# Patient Record
Sex: MALE | Race: WHITE | NOT HISPANIC OR LATINO | Employment: OTHER | URBAN - METROPOLITAN AREA
[De-identification: names, ages, dates, MRNs, and addresses within clinical notes are randomized per-mention and may not be internally consistent; named-entity substitution may affect disease eponyms.]

---

## 2017-02-15 ENCOUNTER — GENERIC CONVERSION - ENCOUNTER (OUTPATIENT)
Dept: OTHER | Facility: OTHER | Age: 62
End: 2017-02-15

## 2017-03-23 ENCOUNTER — ALLSCRIPTS OFFICE VISIT (OUTPATIENT)
Dept: OTHER | Facility: OTHER | Age: 62
End: 2017-03-23

## 2017-03-23 DIAGNOSIS — E78.5 HYPERLIPIDEMIA: ICD-10-CM

## 2017-03-23 DIAGNOSIS — I10 ESSENTIAL (PRIMARY) HYPERTENSION: ICD-10-CM

## 2017-10-17 ENCOUNTER — ALLSCRIPTS OFFICE VISIT (OUTPATIENT)
Dept: OTHER | Facility: OTHER | Age: 62
End: 2017-10-17

## 2017-10-17 DIAGNOSIS — E87.1 HYPO-OSMOLALITY AND HYPONATREMIA (CODE): ICD-10-CM

## 2017-10-17 DIAGNOSIS — I10 ESSENTIAL (PRIMARY) HYPERTENSION: ICD-10-CM

## 2017-10-17 DIAGNOSIS — R73.01 IMPAIRED FASTING GLUCOSE: ICD-10-CM

## 2017-10-17 DIAGNOSIS — E78.5 HYPERLIPIDEMIA: ICD-10-CM

## 2017-10-17 DIAGNOSIS — Z12.11 ENCOUNTER FOR SCREENING FOR MALIGNANT NEOPLASM OF COLON: ICD-10-CM

## 2017-10-17 DIAGNOSIS — Z12.5 ENCOUNTER FOR SCREENING FOR MALIGNANT NEOPLASM OF PROSTATE: ICD-10-CM

## 2017-10-18 NOTE — CONSULTS
Chief Complaint  Chief Complaint Free Text Note Form: Follow up      Advance Directives  Advance Directive [facility]:   The patient is not in agreement to receive the Advance Care Planning service--    NO - Patient does not have an advance health care directive  Capacity/Competence: This patient has full decision making capacity for discussion of advance care planning-- and-- This patient has full decision making competency for discussion of advance care planning  Summary of Advance Directive Conversation  Patient in uninterested in pursuing advance directives  He would like his sister Maya Chacon to be his Health care proxy if he is unable to speak for himself  History of Present Illness  HPI: Patient presents in follow up for his medical problems and to review lab work which was done in april of this yr  He is without acute complaints  Review of Systems  Focused-Male:   Constitutional: no fever or chills, feels well, no tiredness, no recent weight loss or weight gain  ENT: no complaints of earache, no loss of hearing, no nosebleeds or nasal discharge, no sore throat or hoarseness  Cardiovascular: no complaints of slow or fast heart rate, no chest pain, no palpitations, no leg claudication or lower extremity edema  Respiratory: no complaints of shortness of breath, no wheezing or cough, no dyspnea on exertion, no orthopnea or PND  Gastrointestinal: no complaints of abdominal pain, no constipation, no nausea or vomiting, no diarrhea or bloody stools  Genitourinary: no complaints of dysuria or incontinence, no hesitancy, no nocturia, no genital lesion, no inadequacy of penile erection  Musculoskeletal: no complaints of arthralgia, no myalgia, no joint swelling or stiffness, no limb pain or swelling  Integumentary: no complaints of skin rash or lesion, no itching or dry skin, no skin wounds     Neurological: no complaints of headache, no confusion, no numbness or tingling, no dizziness or fainting  ROS Reviewed:   ROS reviewed  Active Problems  1  Acute frontal sinusitis (461 1) (J01 10)   2  Acute kidney injury (584 9) (N17 9)   3  Allergic rhinitis (477 9) (J30 9)   4  Anemia (285 9) (D64 9)   5  Backache (724 5) (M54 9)   6  Diverticulitis of colon (562 11) (K57 32)   7  Dry skin (701 1) (L85 3)   8  Encounter for prostate cancer screening (V76 44) (Z12 5)   9  Flu vaccine need (V04 81) (Z23)   10  Heme positive stool (792 1) (R19 5)   11  Hyperlipidemia (272 4) (E78 5)   12  Hypertension (401 9) (I10)   13  Hyponatremia (276 1) (E87 1)   14  Impaired fasting glucose (790 21) (R73 01)   15  Need for influenza vaccination (V04 81) (Z23)   16  Pain, chest wall (786 52) (R07 89)   17  Positive FIT (fecal immunochemical test) (792 1) (R19 5)   18  Sebaceous cyst (706 2) (L72 3)   19  Tinea cruris (110 3) (B35 6)   20  Warts (078 10) (B07 9)    Past Medical History  1  History of Disc degeneration, lumbar (722 52) (M51 36)   2  History of Flu vaccine need (V04 81) (Z23)   3  History of peripheral vascular disease (V12 59) (Z86 79)   4  History of Impacted cerumen, unspecified laterality (380 4) (H61 20)   5  History of Otalgia, unspecified laterality (388 70) (H92 09)   6  History of Shoulder joint pain, unspecified laterality  Active Problems And Past Medical History Reviewed: The active problems and past medical history were reviewed and updated today  Surgical History  Surgical History Reviewed: The surgical history was reviewed and updated today  Family History  Mother    1  Family history of malignant neoplasm of breast (V16 3) (Z80 3)  Father    2  Family history of hip fracture (V17 89) (Z84 89)  Family History Reviewed: The family history was reviewed and updated today  Social History   · Full-time employment   · Never a smoker   · No alcohol use   · No drug use   · Single  Social History Reviewed: The social history was reviewed and updated today        Current Meds   1  AmLODIPine Besylate 5 MG Oral Tablet; take 1 tablet every day; Therapy: 43FFY7246 to (Evaluate:61Pmf3116)  Requested for: 47YMR5258; Last   Rx:[id number] Ordered   2  Atorvastatin Calcium 40 MG Oral Tablet; take 1 tablet every day; Therapy: 28EIQ4952 to (Evaluate:31Jan2018)  Requested for: 52Hip4018; Last   Rx:24Kbx5682 Ordered   3  HydroCHLOROthiazide 12 5 MG Oral Capsule; TAKE 1 CAPSULE EVERY DAY; Therapy: 62JKC8661 to (MOHGDPRU:26NIW7636)  Requested for: 14Fwr5507; Last   Rx:12Avq7159 Ordered   4  Lisinopril 40 MG Oral Tablet; take 1 tablet every day; Therapy: 88QAH4766 to (Evaluate:14Feb2018)  Requested for: 28Paw7134; Last   Rx:72Tri3552 Ordered    Allergies  1  No Known Drug Allergies    Vitals  Signs   Recorded: 36VAM1020 11:13AM   Heart Rate: 81  Systolic: 222  Diastolic: 70  Height: 6 ft   Weight: 217 lb 6 oz  BMI Calculated: 29 48  BSA Calculated: 2 21  O2 Saturation: 99    Physical Exam    Constitutional   General appearance: No acute distress, well appearing and well nourished  Eyes   Conjunctiva and lids: No swelling, erythema, or discharge  Pupils and irises: Equal, round and reactive to light  Ears, Nose, Mouth, and Throat   External inspection of ears and nose: Normal     Otoscopic examination: Tympanic membrance translucent with normal light reflex  Canals patent without erythema  Nasal mucosa, septum, and turbinates: Normal without edema or erythema  Oropharynx: Normal with no erythema, edema, exudate or lesions  Pulmonary   Respiratory effort: No increased work of breathing or signs of respiratory distress  Auscultation of lungs: Clear to auscultation, equal breath sounds bilaterally, no wheezes, no rales, no rhonci  Cardiovascular   Palpation of heart: Normal PMI, no thrills  Auscultation of heart: Normal rate and rhythm, normal S1 and S2, without murmurs      Examination of extremities for edema and/or varicosities: Normal     Carotid pulses: Normal  Abdomen   Abdomen: Non-tender, no masses  Liver and spleen: No hepatomegaly or splenomegaly  Lymphatic   Palpation of lymph nodes in neck: No lymphadenopathy  Musculoskeletal   Gait and station: Normal     Digits and nails: Normal without clubbing or cyanosis  Inspection/palpation of joints, bones, and muscles: Normal     Skin   Skin and subcutaneous tissue: Normal without rashes or lesions  Neurologic   Cranial nerves: Cranial nerves 2-12 intact  Reflexes: 2+ and symmetric  Sensation: No sensory loss  Psychiatric   Orientation to person, place and time: Normal     Mood and affect: Normal          Results/Data  PHQ-2 Adult Depression Screening 17Oct2017 11:09AM User, Ahs     Test Name Result Flag Reference   PHQ-2 Adult Depression Score 0     Over the last two weeks, how often have you been bothered by any of the following problems? Little interest or pleasure in doing things: Not at all - 0  Feeling down, depressed, or hopeless: Not at all - 0   PHQ-2 Adult Depression Screening Negative         Assessment  1  Hypertension (401 9) (I10)   2  Hyperlipidemia (272 4) (E78 5)   3  Impaired fasting glucose (790 21) (R73 01)   4  Hyponatremia (276 1) (E87 1)   5  Acute renal insufficiency (593 9) (N28 9)   6  Encounter for prostate cancer screening (V76 44) (Z12 5)   7  Colon cancer screening (V76 51) (Z12 11)    Plan  Colon cancer screening    · (1) OCCULT BLOOD, FECAL IMMUNOCHEMICAL TEST; Status:Active; Requested  YEJ:26VCQ1865;   Encounter for prostate cancer screening    · (1) PSA (SCREEN) (Dx V76 44 Screen for Prostate Cancer); Status:Active; Requested  for:17Oct2017;   Hyperlipidemia    · Eat no more than 30 grams of fat per day ; Status:Complete;   Done: 13SBF0099 11:30AM  Hyperlipidemia, Hypertension, Hyponatremia, Impaired fasting glucose    · (1) CK (CPK); Status:Active; Requested for:17Oct2017;    · (1) COMPREHENSIVE METABOLIC PANEL; Status:Active;  Requested BCX:13ESA7490;    · (1) HEMOGLOBIN A1C; Status:Active; Requested for:17Oct2017;    · (1) LIPID PANEL, FASTING; Status:Active; Requested NVD:22OJU8212;   Need for influenza vaccination    · Fluzone Quadrivalent Intramuscular Suspension; INJECT 0 5  ML  Intramuscular; To Be Done: 39AMY4366    Discussion/Summary  Discussion Summary:   1) htn well controlled bmp with elevated crt  Will recheckhyperlipidemia ldl at goal check lipids ck and lftsimpaired fasting glucose follow a1c and fbs and microalbumen ratiohyponatremia will followscreen for protate ca psa and roberta at follow upscreen for colon ca fit test this yrFlu shot today        Signatures   Electronically signed by : KAMLESH Foote ; Oct 17 2017 11:34AM EST                       (Author)

## 2018-01-13 VITALS
WEIGHT: 217.38 LBS | DIASTOLIC BLOOD PRESSURE: 70 MMHG | OXYGEN SATURATION: 99 % | BODY MASS INDEX: 29.44 KG/M2 | SYSTOLIC BLOOD PRESSURE: 108 MMHG | HEIGHT: 72 IN | HEART RATE: 81 BPM

## 2018-01-16 NOTE — MISCELLANEOUS
Assessment    1  Diverticulitis of colon (562 11) (K57 32)   2  Acute kidney injury (584 9) (N17 9)   3  Hypertension (401 9) (I10)   4  Hyperlipidemia (272 4) (E78 5)    Plan  Hyperlipidemia    · (1) BASIC METABOLIC PROFILE; Status:Active; Requested for:23Mar2017;    Perform:[facility]; [facility]:90QAZ7897; Ordered;  Allena Lean; Ordered By:Emmanuel Talavera;  Hypertension    · (1) CBC/PLT/DIFF; Status:Active; Requested for:23Mar2017;    Perform:[facility]; SWU:[id number]; Ordered;  For:Hypertension; Ordered By:Emmanuel Talavera;   · Follow-up visit in 3 months Evaluation and Treatment  Follow-up  Status: Hold For -  Scheduling  Requested for: 34TNX4353   Ordered; For: Hypertension; Ordered By: Salazar Ly Performed:  Due: 15GRJ2998    Discussion/Summary  Discussion Summary:   He is doing well he had some modest renal failure in the hospital( see  Chief Complaint  Chief Complaint Free Text Note Form: Hospital follow-up      History of Present Illness  TCM Communication [facility]: The patient is being contacted for follow-up after hospitalization  Hospital records were reviewed  He was hospitalized at [facility]  The date of admission: 3/20/17, date of discharge: 3/21/17  Diagnosis: divertic  He was discharged to home  Medications reviewed and updated today  The patient is currently asymptomatic  Communication performed and completed by   HPI: He was hospitalized with abdominal pain, diverticulitis he has is still on antibiotics he is asymptomatic no GI complaints at all he is eating well  Diverticulitis (Brief): The patient is being seen for follow-up of a hospitalization for diverticulitis  The patient is currently asymptomatic  No associated symptoms are reported  Hyperlipidemia (Follow-Up): The patient states his hyperlipidemia has been stable since the last visit  He has no comorbid illnesses  He has no significant interval events     Symptoms: The patient is doing well with his hyperlipidemia goals    Hypertension (Follow-Up): The patient presents for follow-up of essential hypertension  The patient states he has been doing well with his blood pressure control since the last visit  He has no comorbid illnesses  He has no significant interval events  Symptoms: The patient is currently asymptomatic  Review of Systems  Complete-Male:   Constitutional: No fever or chills, feels well, no tiredness, no recent weight gain or weight loss  Eyes: No complaints of eye pain, no red eyes, no discharge from eyes, no itchy eyes  ENT: no complaints of earache, no hearing loss, no nosebleeds, no nasal discharge, no sore throat, no hoarseness  Cardiovascular: No complaints of slow heart rate, no fast heart rate, no chest pain, no palpitations, no leg claudication, no lower extremity  Respiratory: No complaints of shortness of breath, no wheezing, no cough, no SOB on exertion, no orthopnea or PND  Gastrointestinal: as noted in HPI  Genitourinary: No complaints of dysuria, no incontinence, no hesitancy, no nocturia, no genital lesion, no testicular pain  Musculoskeletal: No complaints of arthralgia, no myalgias, no joint swelling or stiffness, no limb pain or swelling  Integumentary: No complaints of skin rash or skin lesions, no itching, no skin wound, no dry skin  Neurological: No compliants of headache, no confusion, no convulsions, no numbness or tingling, no dizziness or fainting, no limb weakness, no difficulty walking  Psychiatric: Is not suicidal, no sleep disturbances, no anxiety or depression, no change in personality, no emotional problems  Endocrine: No complaints of proptosis, no hot flashes, no muscle weakness, no erectile dysfunction, no deepening of the voice, no feelings of weakness  Hematologic/Lymphatic: No complaints of swollen glands, no swollen glands in the neck, does not bleed easily, no easy bruising  ROS Reviewed:   ROS reviewed  Active Problems    1   Acute frontal sinusitis (461 1) (J01 10)   2  Allergic rhinitis (477 9) (J30 9)   3  Anemia (285 9) (D64 9)   4  Backache (724 5) (M54 9)   5  Dry skin (701 1) (L85 3)   6  Encounter for prostate cancer screening (V76 44) (Z12 5)   7  Flu vaccine need (V04 81) (Z23)   8  Heme positive stool (792 1) (R19 5)   9  Hyperlipidemia (272 4) (E78 5)   10  Hypertension (401 9) (I10)   11  Hyponatremia (276 1) (E87 1)   12  Impaired fasting glucose (790 21) (R73 01)   13  Pain, chest wall (786 52) (R07 89)   14  Positive FIT (fecal immunochemical test) (792 1) (R19 5)   15  Sebaceous cyst (706 2) (L72 3)   16  Tinea cruris (110 3) (B35 6)   17  Warts (078 10) (B07 9)    Past Medical History    1  History of Disc degeneration, lumbar (722 52) (M51 36)   2  History of Flu vaccine need (V04 81) (Z23)   3  History of peripheral vascular disease (V12 59) (Z86 79)   4  History of Impacted cerumen, unspecified laterality (380 4) (H61 20)   5  History of Otalgia, unspecified laterality (388 70) (H92 09)   6  History of Shoulder joint pain, unspecified laterality    Surgical History  Surgical History Reviewed: The surgical history was reviewed and updated today  Family History  Mother    1  Family history of malignant neoplasm of breast (V16 3) (Z80 3)  Father    2  Family history of hip fracture (V17 89) (Z84 89)  Family History Reviewed: The family history was reviewed and updated today  Social History    · Full-time employment   · Never a smoker   · No alcohol use   · No drug use   · Single  Social History Reviewed: The social history was reviewed and updated today  Current Meds   1  AmLODIPine Besylate 5 MG Oral Tablet; take 1 tablet by mouth once daily; Therapy: 29WBH7532 to (Evaluate:18Feb2017)  Requested for: 51Lrz2146; Last   Rx:41Bnz4088 Ordered   2  Atorvastatin Calcium 40 MG Oral Tablet; TAKE 1 TABLET DAILY  Requested for:   01Pkj4139; Last Rx:60Fie0125 Ordered   3   Clotrimazole-Betamethasone 1-0 05 % External Cream; Apply sparingly twice daily; Therapy: 06ZBK4059 to (Last Rx:07Apr2015)  Requested for: 07Apr2015 Ordered   4  Fluticasone Propionate 50 MCG/ACT Nasal Suspension; USE 2 SPRAYS IN EACH   NOSTRIL ONCE DAILY; Therapy: 19LNC7501 to (Last Rx:14Jan2015)  Requested for: 36ENG5836 Ordered   5  HydroCHLOROthiazide 12 5 MG Oral Capsule; take 1 capsule by mouth once daily; Therapy: 40PHA2566 to (Evaluate:30Oct2016)  Requested for: 96Dwl2131; Last   Rx:00Edn5741 Ordered   6  Lisinopril 40 MG Oral Tablet; take 1 tablet by mouth once daily; Therapy: 61IIE6275 to (Evaluate:30Oct2016)  Requested for: 55Wun1184; Last   Rx:26Ata2344 Ordered  Medication List Reviewed: The medication list was reviewed and updated today  Allergies    1  No Known Drug Allergies    Vitals  Signs   Recorded: 60NCE3980 04:48PM   Temperature: 97 5 F  Heart Rate: 80  Systolic: 188  Diastolic: 72  Height: 6 ft   Weight: 224 lb 4 oz  BMI Calculated: 30 41  BSA Calculated: 2 24  O2 Saturation: 97    Physical Exam    Constitutional   General appearance: No acute distress, well appearing and well nourished  Eyes   Conjunctiva and lids: No swelling, erythema, or discharge  Pupils and irises: Equal, round and reactive to light  Ears, Nose, Mouth, and Throat   External inspection of ears and nose: Normal     Otoscopic examination: Tympanic membrance translucent with normal light reflex  Canals patent without erythema  Nasal mucosa, septum, and turbinates: Normal without edema or erythema  Oropharynx: Normal with no erythema, edema, exudate or lesions  Pulmonary   Respiratory effort: No increased work of breathing or signs of respiratory distress  Auscultation of lungs: Clear to auscultation, equal breath sounds bilaterally, no wheezes, no rales, no rhonci  Cardiovascular   Palpation of heart: Normal PMI, no thrills  Auscultation of heart: Normal rate and rhythm, normal S1 and S2, without murmurs      Examination of extremities for edema and/or varicosities: Normal     Carotid pulses: Normal     Abdomen   Abdomen: Non-tender, no masses  Liver and spleen: No hepatomegaly or splenomegaly  Lymphatic   Palpation of lymph nodes in neck: No lymphadenopathy  Musculoskeletal   Gait and station: Normal     Digits and nails: Normal without clubbing or cyanosis  Inspection/palpation of joints, bones, and muscles: Normal     Skin   Skin and subcutaneous tissue: Normal without rashes or lesions  Neurologic   Cranial nerves: Cranial nerves 2-12 intact  Reflexes: 2+ and symmetric  Sensation: No sensory loss      Psychiatric   Orientation to person, place and time: Normal     Mood and affect: Normal          Signatures   Electronically signed by : SONYA Avalos; Mar 23 2017  6:42PM EST                       (Author)    Electronically signed by : KAMLESH Estrada ; Mar 24 2017  8:16AM EST

## 2018-01-22 VITALS
OXYGEN SATURATION: 97 % | TEMPERATURE: 97.5 F | HEART RATE: 80 BPM | WEIGHT: 224.25 LBS | HEIGHT: 72 IN | BODY MASS INDEX: 30.37 KG/M2 | DIASTOLIC BLOOD PRESSURE: 72 MMHG | SYSTOLIC BLOOD PRESSURE: 126 MMHG

## 2018-01-30 ENCOUNTER — OFFICE VISIT (OUTPATIENT)
Dept: INTERNAL MEDICINE CLINIC | Facility: CLINIC | Age: 63
End: 2018-01-30
Payer: COMMERCIAL

## 2018-01-30 VITALS
HEART RATE: 95 BPM | TEMPERATURE: 99.1 F | WEIGHT: 220 LBS | OXYGEN SATURATION: 97 % | SYSTOLIC BLOOD PRESSURE: 94 MMHG | DIASTOLIC BLOOD PRESSURE: 58 MMHG | BODY MASS INDEX: 30.8 KG/M2 | HEIGHT: 71 IN

## 2018-01-30 DIAGNOSIS — J06.9 VIRAL UPPER RESPIRATORY TRACT INFECTION: Primary | ICD-10-CM

## 2018-01-30 PROCEDURE — 99213 OFFICE O/P EST LOW 20 MIN: CPT | Performed by: INTERNAL MEDICINE

## 2018-01-30 RX ORDER — AMLODIPINE BESYLATE 5 MG/1
1 TABLET ORAL DAILY
COMMUNITY
Start: 2014-08-18 | End: 2018-02-25 | Stop reason: SDUPTHER

## 2018-01-30 RX ORDER — LISINOPRIL 40 MG/1
1 TABLET ORAL DAILY
COMMUNITY
Start: 2014-12-30 | End: 2018-02-25 | Stop reason: SDUPTHER

## 2018-01-30 RX ORDER — ATORVASTATIN CALCIUM 40 MG/1
1 TABLET, FILM COATED ORAL DAILY
COMMUNITY
Start: 2017-08-04 | End: 2018-02-17 | Stop reason: SDUPTHER

## 2018-01-30 RX ORDER — HYDROCHLOROTHIAZIDE 12.5 MG/1
1 CAPSULE, GELATIN COATED ORAL DAILY
COMMUNITY
Start: 2015-01-14 | End: 2018-02-17 | Stop reason: SDUPTHER

## 2018-01-30 RX ORDER — FLUTICASONE PROPIONATE 50 MCG
2 SPRAY, SUSPENSION (ML) NASAL DAILY
Qty: 16 G | Refills: 0 | Status: SHIPPED | OUTPATIENT
Start: 2018-01-30 | End: 2018-12-28 | Stop reason: SDUPTHER

## 2018-01-30 NOTE — PROGRESS NOTES
Assessment/Plan:    No problem-specific Assessment & Plan notes found for this encounter  Diagnoses and all orders for this visit:    Viral upper respiratory tract infection  -     fluticasone (FLONASE) 50 mcg/act nasal spray; 2 sprays into each nostril daily    Other orders  -     amLODIPine (NORVASC) 5 mg tablet; Take 1 tablet by mouth daily  -     atorvastatin (LIPITOR) 40 mg tablet; Take 1 tablet by mouth daily  -     hydrochlorothiazide (MICROZIDE) 12 5 mg capsule; Take 1 capsule by mouth daily  -     lisinopril (ZESTRIL) 40 mg tablet; Take 1 tablet by mouth daily          Subjective:      Patient ID: Andrew Lawson is a 60 y o  male  Patient presents with a 4 day hx of congestion, cough which is nonproductive without fever chills sweats or sob  He took some nyquil otc which didn't help  He also took advil and throat lozenges  The following portions of the patient's history were reviewed and updated as appropriate: allergies, current medications, past family history, past medical history, past social history, past surgical history and problem list       Review of Systems   Constitutional: Negative for activity change, appetite change, chills, diaphoresis, fatigue, fever and unexpected weight change  HENT: Positive for congestion  Negative for dental problem, drooling, ear discharge, ear pain, facial swelling, hearing loss, mouth sores, nosebleeds, postnasal drip, rhinorrhea, sinus pain, sinus pressure, sneezing, sore throat, tinnitus, trouble swallowing and voice change  Eyes: Negative for photophobia, pain, discharge, redness, itching and visual disturbance  Respiratory: Negative for apnea, choking, chest tightness, shortness of breath, wheezing and stridor  Cardiovascular: Negative for chest pain, palpitations and leg swelling     Gastrointestinal: Negative for abdominal distention, abdominal pain, anal bleeding, blood in stool, constipation, diarrhea, nausea, rectal pain and vomiting  Endocrine: Negative for cold intolerance, heat intolerance, polydipsia, polyphagia and polyuria  Genitourinary: Negative for decreased urine volume, difficulty urinating, dysuria, enuresis, flank pain, frequency, genital sores, hematuria and urgency  Musculoskeletal: Negative for arthralgias, back pain, gait problem, joint swelling, myalgias, neck pain and neck stiffness  Skin: Negative for color change, pallor, rash and wound  Allergic/Immunologic: Negative for environmental allergies, food allergies and immunocompromised state  Neurological: Negative for dizziness, tremors, seizures, syncope, facial asymmetry, speech difficulty, weakness, light-headedness, numbness and headaches  Hematological: Negative for adenopathy  Does not bruise/bleed easily  Psychiatric/Behavioral: Negative for agitation, self-injury, sleep disturbance and suicidal ideas  The patient is not nervous/anxious  Objective:     Physical Exam   Constitutional: He is oriented to person, place, and time  He appears well-developed and well-nourished  No distress  HENT:   Head: Normocephalic and atraumatic  Right Ear: External ear normal    Left Ear: External ear normal    Mouth/Throat: Oropharynx is clear and moist    Eyes: Conjunctivae and EOM are normal  Pupils are equal, round, and reactive to light  No scleral icterus  Neck: Normal range of motion  Neck supple  No JVD present  No tracheal deviation present  No thyromegaly present  Cardiovascular: Normal rate, regular rhythm and intact distal pulses  Exam reveals no gallop and no friction rub  No murmur heard  Pulmonary/Chest: Effort normal and breath sounds normal  No respiratory distress  He has no wheezes  He has no rales  He exhibits no tenderness  Abdominal: Soft  Bowel sounds are normal  He exhibits no distension and no mass  There is no tenderness  There is no rebound and no guarding  Musculoskeletal: Normal range of motion   He exhibits no edema, tenderness or deformity  Lymphadenopathy:     He has no cervical adenopathy  Neurological: He is alert and oriented to person, place, and time  He has normal reflexes  No cranial nerve deficit  Coordination normal    Skin: Skin is warm and dry  No rash noted  He is not diaphoretic  No erythema  No pallor  Psychiatric: He has a normal mood and affect   His behavior is normal  Judgment and thought content normal

## 2018-01-30 NOTE — PROGRESS NOTES
Assessment/Plan:      There are no diagnoses linked to this encounter  Subjective:     Patient ID: Criselda Camarena is a 60 y o  male      HPI    Review of Systems      Objective:     Physical Exam

## 2018-01-30 NOTE — LETTER
January 30, 2018     Patient: Devaughn Dobbins   YOB: 1955   Date of Visit: 1/30/2018       To Whom it May Concern:    Raul Lundy is under my professional care  He was seen in my office on 1/30/2018  He may return to work on Thursday, 02/02/2018  If you have any questions or concerns, please don't hesitate to call           Sincerely,          Yaniv Arita MD        CC: No Recipients

## 2018-01-30 NOTE — LETTER
January 30, 2018     Patient: Rosario Fran   YOB: 1955   Date of Visit: 1/30/2018       To Whom it May Concern:    Lito Devante is under my professional care  Please excuse him from work starting Monday 1/29/2018 until Thursday 2/2/2018  If you have any questions or concerns, please don't hesitate to call           Sincerely,          Dontrell Sarkar MD        CC: Rosario Peters

## 2018-01-30 NOTE — ASSESSMENT & PLAN NOTE
URI likely viral will start him on zyrtec otc and flonase spray and if he fails to improve he will call me back

## 2018-02-01 ENCOUNTER — TELEPHONE (OUTPATIENT)
Dept: INTERNAL MEDICINE CLINIC | Facility: CLINIC | Age: 63
End: 2018-02-01

## 2018-02-17 DIAGNOSIS — I10 ESSENTIAL HYPERTENSION: ICD-10-CM

## 2018-02-17 DIAGNOSIS — E78.5 HYPERLIPIDEMIA, UNSPECIFIED HYPERLIPIDEMIA TYPE: Primary | ICD-10-CM

## 2018-02-21 RX ORDER — HYDROCHLOROTHIAZIDE 12.5 MG/1
CAPSULE, GELATIN COATED ORAL
Qty: 30 CAPSULE | Refills: 5 | Status: SHIPPED | OUTPATIENT
Start: 2018-02-21 | End: 2018-12-24 | Stop reason: ALTCHOICE

## 2018-02-21 RX ORDER — ATORVASTATIN CALCIUM 40 MG/1
TABLET, FILM COATED ORAL
Qty: 30 TABLET | Refills: 5 | Status: SHIPPED | OUTPATIENT
Start: 2018-02-21 | End: 2018-06-05 | Stop reason: SDUPTHER

## 2018-02-25 DIAGNOSIS — I10 ESSENTIAL HYPERTENSION: Primary | ICD-10-CM

## 2018-02-27 RX ORDER — AMLODIPINE BESYLATE 5 MG/1
TABLET ORAL
Qty: 30 TABLET | Refills: 5 | Status: SHIPPED | OUTPATIENT
Start: 2018-02-27 | End: 2018-06-05 | Stop reason: SDUPTHER

## 2018-02-27 RX ORDER — LISINOPRIL 40 MG/1
TABLET ORAL
Qty: 30 TABLET | Refills: 5 | Status: SHIPPED | OUTPATIENT
Start: 2018-02-27 | End: 2018-06-05 | Stop reason: SDUPTHER

## 2018-04-21 LAB — HBA1C MFR BLD HPLC: 6.3 %

## 2018-06-05 ENCOUNTER — OFFICE VISIT (OUTPATIENT)
Dept: INTERNAL MEDICINE CLINIC | Facility: CLINIC | Age: 63
End: 2018-06-05
Payer: COMMERCIAL

## 2018-06-05 VITALS
SYSTOLIC BLOOD PRESSURE: 98 MMHG | HEART RATE: 83 BPM | HEIGHT: 71 IN | DIASTOLIC BLOOD PRESSURE: 60 MMHG | BODY MASS INDEX: 30.88 KG/M2 | OXYGEN SATURATION: 95 % | WEIGHT: 220.6 LBS

## 2018-06-05 DIAGNOSIS — E78.5 HYPERLIPIDEMIA, UNSPECIFIED HYPERLIPIDEMIA TYPE: ICD-10-CM

## 2018-06-05 DIAGNOSIS — I10 ESSENTIAL HYPERTENSION: ICD-10-CM

## 2018-06-05 DIAGNOSIS — R73.01 IMPAIRED FASTING GLUCOSE: Primary | ICD-10-CM

## 2018-06-05 DIAGNOSIS — E87.1 HYPONATREMIA: ICD-10-CM

## 2018-06-05 DIAGNOSIS — N18.30 STAGE 3 CHRONIC KIDNEY DISEASE (HCC): ICD-10-CM

## 2018-06-05 PROCEDURE — 99214 OFFICE O/P EST MOD 30 MIN: CPT | Performed by: INTERNAL MEDICINE

## 2018-06-05 PROCEDURE — 3008F BODY MASS INDEX DOCD: CPT | Performed by: INTERNAL MEDICINE

## 2018-06-05 PROCEDURE — 3074F SYST BP LT 130 MM HG: CPT | Performed by: INTERNAL MEDICINE

## 2018-06-05 PROCEDURE — 3078F DIAST BP <80 MM HG: CPT | Performed by: INTERNAL MEDICINE

## 2018-06-05 RX ORDER — ATORVASTATIN CALCIUM 40 MG/1
40 TABLET, FILM COATED ORAL DAILY
Qty: 90 TABLET | Refills: 3 | Status: SHIPPED | OUTPATIENT
Start: 2018-06-05 | End: 2018-12-24

## 2018-06-05 RX ORDER — LISINOPRIL 40 MG/1
40 TABLET ORAL DAILY
Qty: 90 TABLET | Refills: 3 | Status: SHIPPED | OUTPATIENT
Start: 2018-06-05 | End: 2018-12-24

## 2018-06-05 RX ORDER — HYDROCHLOROTHIAZIDE 12.5 MG/1
12.5 CAPSULE, GELATIN COATED ORAL DAILY
Qty: 90 CAPSULE | Refills: 3 | Status: CANCELLED | OUTPATIENT
Start: 2018-06-05

## 2018-06-05 RX ORDER — AMLODIPINE BESYLATE 5 MG/1
5 TABLET ORAL DAILY
Qty: 90 TABLET | Refills: 3 | Status: SHIPPED | OUTPATIENT
Start: 2018-06-05 | End: 2018-12-24

## 2018-06-05 NOTE — PROGRESS NOTES
Assessment/Plan:    Stage 3 chronic kidney disease  Given concurrent hyponatremia will stop hctz and repeat bmp in one week  Impaired fasting glucose  No overt dm  Will follow     Hyponatremia  Likely due to hctz will dc     Hypertension  bp low will dc hctz       Diagnoses and all orders for this visit:    Impaired fasting glucose  -     HEMOGLOBIN A1C W/ EAG ESTIMATION; Future  -     Comprehensive metabolic panel; Future  -     Microalbumin / creatinine urine ratio    Essential hypertension  -     amLODIPine (NORVASC) 5 mg tablet; Take 1 tablet (5 mg total) by mouth daily  -     lisinopril (ZESTRIL) 40 mg tablet; Take 1 tablet (40 mg total) by mouth daily  -     Comprehensive metabolic panel; Future    Hyperlipidemia, unspecified hyperlipidemia type  -     atorvastatin (LIPITOR) 40 mg tablet; Take 1 tablet (40 mg total) by mouth daily  -     CK; Future  -     Lipid Panel with Direct LDL reflex; Future  -     Comprehensive metabolic panel; Future    Hyponatremia  -     Basic metabolic panel; Future    Stage 3 chronic kidney disease    Other orders  -     Cancel: hydrochlorothiazide (MICROZIDE) 12 5 mg capsule; Take 1 capsule (12 5 mg total) by mouth daily          Subjective:      Patient ID: Bashir Martinez is a 60 y o  male  Patient presents in follow up for his medical problems and to review recent lab work  He is without acute complaints  The following portions of the patient's history were reviewed and updated as appropriate: allergies, current medications, past family history, past medical history, past social history, past surgical history and problem list     Review of Systems   Constitutional: Negative for activity change, appetite change, chills, diaphoresis, fatigue, fever and unexpected weight change     HENT: Negative for congestion, dental problem, drooling, ear discharge, ear pain, facial swelling, hearing loss, mouth sores, nosebleeds, postnasal drip, rhinorrhea, sinus pain, sinus pressure, sneezing, sore throat, tinnitus, trouble swallowing and voice change  Eyes: Negative for photophobia, pain, discharge, redness, itching and visual disturbance  Respiratory: Negative for apnea, cough, choking, chest tightness, shortness of breath, wheezing and stridor  Cardiovascular: Negative for chest pain, palpitations and leg swelling  Gastrointestinal: Negative for abdominal distention, abdominal pain, anal bleeding, blood in stool, constipation, diarrhea, nausea, rectal pain and vomiting  Endocrine: Negative for cold intolerance, heat intolerance, polydipsia, polyphagia and polyuria  Genitourinary: Negative for decreased urine volume, difficulty urinating, dysuria, enuresis, flank pain, frequency, genital sores, hematuria and urgency  Musculoskeletal: Negative for arthralgias, back pain, gait problem, joint swelling, myalgias, neck pain and neck stiffness  Skin: Negative for color change, pallor, rash and wound  Allergic/Immunologic: Negative for environmental allergies, food allergies and immunocompromised state  Neurological: Negative for dizziness, tremors, seizures, syncope, facial asymmetry, speech difficulty, weakness, light-headedness, numbness and headaches  Hematological: Negative for adenopathy  Does not bruise/bleed easily  Psychiatric/Behavioral: Negative for agitation, self-injury, sleep disturbance and suicidal ideas  The patient is not nervous/anxious  Objective:      BP 98/60 (BP Location: Left arm, Patient Position: Sitting)   Pulse 83   Ht 5' 11" (1 803 m)   Wt 100 kg (220 lb 9 6 oz)   SpO2 95%   BMI 30 77 kg/m²          Physical Exam   Constitutional: He is oriented to person, place, and time  He appears well-developed and well-nourished  No distress  HENT:   Head: Normocephalic and atraumatic  Right Ear: External ear normal    Left Ear: External ear normal    Mouth/Throat: No oropharyngeal exudate     Eyes: Conjunctivae and EOM are normal  Pupils are equal, round, and reactive to light  No scleral icterus  Neck: Normal range of motion  Neck supple  No JVD present  No tracheal deviation present  No thyromegaly present  Cardiovascular: Normal rate, regular rhythm and intact distal pulses  Exam reveals no gallop and no friction rub  No murmur heard  Pulmonary/Chest: Effort normal and breath sounds normal  No respiratory distress  He has no wheezes  He has no rales  He exhibits no tenderness  Abdominal: Soft  Bowel sounds are normal  He exhibits no distension and no mass  There is no tenderness  There is no rebound and no guarding  Genitourinary:   Genitourinary Comments: Nemg  Testes descended bilaterally without masses  No penile discharge or masses  Nl rectal tone  The prostate is small and smooth and without masses  Musculoskeletal: Normal range of motion  He exhibits no edema, tenderness or deformity  Lymphadenopathy:     He has no cervical adenopathy  Neurological: He is alert and oriented to person, place, and time  He has normal reflexes  No cranial nerve deficit  He exhibits normal muscle tone  Coordination normal    Skin: Skin is warm and dry  No rash noted  He is not diaphoretic  No erythema  No pallor  Psychiatric: He has a normal mood and affect   His behavior is normal  Judgment and thought content normal

## 2018-09-07 DIAGNOSIS — E78.5 HYPERLIPIDEMIA, UNSPECIFIED HYPERLIPIDEMIA TYPE: ICD-10-CM

## 2018-09-07 DIAGNOSIS — I10 ESSENTIAL HYPERTENSION: ICD-10-CM

## 2018-09-07 RX ORDER — ATORVASTATIN CALCIUM 40 MG/1
TABLET, FILM COATED ORAL
Qty: 30 TABLET | Refills: 5 | OUTPATIENT
Start: 2018-09-07

## 2018-09-07 RX ORDER — LISINOPRIL 40 MG/1
TABLET ORAL
Qty: 30 TABLET | Refills: 5 | OUTPATIENT
Start: 2018-09-07

## 2018-09-07 RX ORDER — AMLODIPINE BESYLATE 5 MG/1
TABLET ORAL
Qty: 30 TABLET | Refills: 5 | OUTPATIENT
Start: 2018-09-07

## 2018-09-17 DIAGNOSIS — E78.5 HYPERLIPIDEMIA, UNSPECIFIED HYPERLIPIDEMIA TYPE: ICD-10-CM

## 2018-09-17 DIAGNOSIS — I10 ESSENTIAL HYPERTENSION: ICD-10-CM

## 2018-09-17 RX ORDER — LISINOPRIL 40 MG/1
TABLET ORAL
Qty: 30 TABLET | Refills: 5 | Status: SHIPPED | OUTPATIENT
Start: 2018-09-17 | End: 2018-12-24

## 2018-09-17 RX ORDER — AMLODIPINE BESYLATE 5 MG/1
TABLET ORAL
Qty: 30 TABLET | Refills: 5 | Status: SHIPPED | OUTPATIENT
Start: 2018-09-17 | End: 2018-12-24

## 2018-09-17 RX ORDER — ATORVASTATIN CALCIUM 40 MG/1
TABLET, FILM COATED ORAL
Qty: 30 TABLET | Refills: 5 | Status: SHIPPED | OUTPATIENT
Start: 2018-09-17 | End: 2018-12-24

## 2018-12-07 LAB
CREAT ?TM UR-SCNC: 170 UMOL/L
HBA1C MFR BLD HPLC: 6.3 %

## 2018-12-24 ENCOUNTER — OFFICE VISIT (OUTPATIENT)
Dept: INTERNAL MEDICINE CLINIC | Facility: CLINIC | Age: 63
End: 2018-12-24
Payer: COMMERCIAL

## 2018-12-24 ENCOUNTER — APPOINTMENT (OUTPATIENT)
Dept: LAB | Facility: CLINIC | Age: 63
End: 2018-12-24
Payer: COMMERCIAL

## 2018-12-24 VITALS
HEIGHT: 71 IN | BODY MASS INDEX: 31.08 KG/M2 | TEMPERATURE: 98.7 F | OXYGEN SATURATION: 98 % | WEIGHT: 222 LBS | SYSTOLIC BLOOD PRESSURE: 110 MMHG | HEART RATE: 92 BPM | DIASTOLIC BLOOD PRESSURE: 72 MMHG

## 2018-12-24 DIAGNOSIS — E78.2 MIXED HYPERLIPIDEMIA: ICD-10-CM

## 2018-12-24 DIAGNOSIS — I10 ESSENTIAL HYPERTENSION: ICD-10-CM

## 2018-12-24 DIAGNOSIS — R73.01 IMPAIRED FASTING GLUCOSE: Primary | ICD-10-CM

## 2018-12-24 DIAGNOSIS — R35.0 URINARY FREQUENCY: ICD-10-CM

## 2018-12-24 DIAGNOSIS — Z12.5 SCREENING FOR PROSTATE CANCER: ICD-10-CM

## 2018-12-24 PROBLEM — N18.30 STAGE 3 CHRONIC KIDNEY DISEASE (HCC): Status: RESOLVED | Noted: 2018-06-05 | Resolved: 2018-12-24

## 2018-12-24 LAB
BACTERIA UR QL AUTO: ABNORMAL /HPF
BILIRUB UR QL STRIP: NEGATIVE
CLARITY UR: ABNORMAL
COLOR UR: ABNORMAL
GLUCOSE UR STRIP-MCNC: NEGATIVE MG/DL
HGB UR QL STRIP.AUTO: ABNORMAL
HYALINE CASTS #/AREA URNS LPF: ABNORMAL /LPF
KETONES UR STRIP-MCNC: NEGATIVE MG/DL
LEUKOCYTE ESTERASE UR QL STRIP: ABNORMAL
NITRITE UR QL STRIP: NEGATIVE
NON-SQ EPI CELLS URNS QL MICRO: ABNORMAL /HPF
PH UR STRIP.AUTO: 6 [PH] (ref 4.5–8)
PROT UR STRIP-MCNC: ABNORMAL MG/DL
RBC #/AREA URNS AUTO: ABNORMAL /HPF
SP GR UR STRIP.AUTO: 1.03 (ref 1–1.03)
UROBILINOGEN UR QL STRIP.AUTO: 1 E.U./DL
WBC #/AREA URNS AUTO: ABNORMAL /HPF

## 2018-12-24 PROCEDURE — 3078F DIAST BP <80 MM HG: CPT | Performed by: INTERNAL MEDICINE

## 2018-12-24 PROCEDURE — 99214 OFFICE O/P EST MOD 30 MIN: CPT | Performed by: INTERNAL MEDICINE

## 2018-12-24 PROCEDURE — 3074F SYST BP LT 130 MM HG: CPT | Performed by: INTERNAL MEDICINE

## 2018-12-24 PROCEDURE — 1036F TOBACCO NON-USER: CPT | Performed by: INTERNAL MEDICINE

## 2018-12-24 PROCEDURE — 81001 URINALYSIS AUTO W/SCOPE: CPT | Performed by: INTERNAL MEDICINE

## 2018-12-24 RX ORDER — LISINOPRIL 40 MG/1
40 TABLET ORAL DAILY
Qty: 90 TABLET | Refills: 3
Start: 2018-12-24 | End: 2018-12-24 | Stop reason: SDUPTHER

## 2018-12-24 RX ORDER — AMLODIPINE BESYLATE 5 MG/1
5 TABLET ORAL DAILY
Qty: 90 TABLET | Refills: 3
Start: 2018-12-24 | End: 2018-12-28 | Stop reason: SDUPTHER

## 2018-12-24 RX ORDER — CIPROFLOXACIN 500 MG/1
500 TABLET, FILM COATED ORAL EVERY 12 HOURS SCHEDULED
Qty: 20 TABLET | Refills: 0 | Status: SHIPPED | OUTPATIENT
Start: 2018-12-24 | End: 2019-01-03

## 2018-12-24 RX ORDER — LISINOPRIL 40 MG/1
40 TABLET ORAL DAILY
Qty: 90 TABLET | Refills: 3
Start: 2018-12-24 | End: 2018-12-28 | Stop reason: SDUPTHER

## 2018-12-24 RX ORDER — AMLODIPINE BESYLATE 5 MG/1
5 TABLET ORAL DAILY
Qty: 90 TABLET | Refills: 3
Start: 2018-12-24 | End: 2018-12-24 | Stop reason: SDUPTHER

## 2018-12-24 RX ORDER — ROSUVASTATIN CALCIUM 20 MG/1
20 TABLET, COATED ORAL DAILY
Qty: 90 TABLET | Refills: 3 | Status: SHIPPED | OUTPATIENT
Start: 2018-12-24 | End: 2018-12-28 | Stop reason: SDUPTHER

## 2018-12-24 NOTE — ASSESSMENT & PLAN NOTE
Ldl, and triglycerides elevated   Will change to rosuvastatin and check ck lfts and lipids in 6 mths

## 2018-12-24 NOTE — PROGRESS NOTES
Assessment/Plan:    Urinary frequency  Suspect uti or prostatitis  Check ua and will start cipro 500 mg bid  Impaired fasting glucose  Normal fbs  No overt dm    Hypertension  Well controlled      Hyperlipidemia  Ldl, and triglycerides elevated  Will change to rosuvastatin and check ck lfts and lipids in 6 mths        Diagnoses and all orders for this visit:    Impaired fasting glucose    Essential hypertension  -     Discontinue: amLODIPine (NORVASC) 5 mg tablet; Take 1 tablet (5 mg total) by mouth daily  -     Discontinue: lisinopril (ZESTRIL) 40 mg tablet; Take 1 tablet (40 mg total) by mouth daily  -     amLODIPine (NORVASC) 5 mg tablet; Take 1 tablet (5 mg total) by mouth daily  -     lisinopril (ZESTRIL) 40 mg tablet; Take 1 tablet (40 mg total) by mouth daily    Mixed hyperlipidemia  -     rosuvastatin (CRESTOR) 20 MG tablet; Take 1 tablet (20 mg total) by mouth daily  -     Comprehensive metabolic panel; Future  -     CK; Future  -     Lipid Panel with Direct LDL reflex; Future    Screening for prostate cancer  -     PSA, Total Screen; Future    Urinary frequency  -     UA (URINE) with reflex to Microscopic  -     ciprofloxacin (CIPRO) 500 mg tablet; Take 1 tablet (500 mg total) by mouth every 12 (twelve) hours for 10 days          Subjective:      Patient ID: Anibal Velazquez is a 58 y o  male  Patient presents in follow up for his medical problems and to review recent lab work  He complains of a 3 day hx of runny nose, slight cough  Frequent urination with hesitancy and urgency  He has some chills last night  He has has some soft stools but no diarrhea  Nausea   Associated symptoms include congestion and nausea  Pertinent negatives include no abdominal pain, arthralgias, chest pain, chills, coughing, diaphoresis, fatigue, fever, headaches, joint swelling, myalgias, neck pain, numbness, rash, sore throat, vomiting or weakness         The following portions of the patient's history were reviewed and updated as appropriate: allergies, current medications, past family history, past medical history, past social history, past surgical history and problem list     Review of Systems   Constitutional: Negative for activity change, appetite change, chills, diaphoresis, fatigue, fever and unexpected weight change  HENT: Positive for congestion  Negative for dental problem, drooling, ear discharge, ear pain, facial swelling, hearing loss, mouth sores, nosebleeds, postnasal drip, rhinorrhea, sinus pain, sinus pressure, sneezing, sore throat, tinnitus, trouble swallowing and voice change  Eyes: Negative for photophobia, pain, discharge, redness, itching and visual disturbance  Respiratory: Negative for apnea, cough, choking, chest tightness, shortness of breath, wheezing and stridor  Cardiovascular: Negative for chest pain, palpitations and leg swelling  Gastrointestinal: Positive for nausea  Negative for abdominal distention, abdominal pain, anal bleeding, blood in stool, constipation, diarrhea, rectal pain and vomiting  Endocrine: Negative for cold intolerance, heat intolerance, polydipsia, polyphagia and polyuria  Genitourinary: Positive for frequency and urgency  Negative for decreased urine volume, difficulty urinating, dysuria, enuresis, flank pain, genital sores and hematuria  Musculoskeletal: Negative for arthralgias, back pain, gait problem, joint swelling, myalgias, neck pain and neck stiffness  Skin: Negative for color change, pallor, rash and wound  Allergic/Immunologic: Negative for environmental allergies, food allergies and immunocompromised state  Neurological: Negative for dizziness, tremors, seizures, syncope, facial asymmetry, speech difficulty, weakness, light-headedness, numbness and headaches  Hematological: Negative for adenopathy  Does not bruise/bleed easily  Psychiatric/Behavioral: Negative for agitation, self-injury, sleep disturbance and suicidal ideas   The patient is not nervous/anxious  Objective:      /72   Pulse 92   Temp 98 7 °F (37 1 °C)   Ht 5' 11" (1 803 m)   Wt 101 kg (222 lb)   SpO2 98%   BMI 30 96 kg/m²          Physical Exam   Constitutional: He is oriented to person, place, and time  He appears well-developed and well-nourished  No distress  HENT:   Head: Normocephalic and atraumatic  Right Ear: External ear normal    Left Ear: External ear normal    Mouth/Throat: Oropharynx is clear and moist    Eyes: Pupils are equal, round, and reactive to light  Conjunctivae and EOM are normal  No scleral icterus  Neck: Normal range of motion  Neck supple  No JVD present  No tracheal deviation present  No thyromegaly present  Cardiovascular: Normal rate, regular rhythm and intact distal pulses  Exam reveals no gallop and no friction rub  No murmur heard  Pulmonary/Chest: Effort normal and breath sounds normal  No respiratory distress  He has no wheezes  He has no rales  He exhibits no tenderness  Abdominal: Soft  Bowel sounds are normal  He exhibits no distension and no mass  There is no tenderness  There is no rebound and no guarding  Musculoskeletal: Normal range of motion  He exhibits no edema, tenderness or deformity  Lymphadenopathy:     He has no cervical adenopathy  Neurological: He is alert and oriented to person, place, and time  He has normal reflexes  No cranial nerve deficit  Coordination normal    Skin: Skin is warm and dry  No rash noted  He is not diaphoretic  No erythema  No pallor  Psychiatric: He has a normal mood and affect   His behavior is normal  Judgment and thought content normal

## 2018-12-27 DIAGNOSIS — E78.2 MIXED HYPERLIPIDEMIA: ICD-10-CM

## 2018-12-27 DIAGNOSIS — I10 ESSENTIAL HYPERTENSION: ICD-10-CM

## 2018-12-27 RX ORDER — AMLODIPINE BESYLATE 5 MG/1
5 TABLET ORAL DAILY
Qty: 90 TABLET | Refills: 3 | Status: CANCELLED | OUTPATIENT
Start: 2018-12-27

## 2018-12-27 RX ORDER — ROSUVASTATIN CALCIUM 20 MG/1
20 TABLET, COATED ORAL DAILY
Qty: 90 TABLET | Refills: 3 | Status: CANCELLED | OUTPATIENT
Start: 2018-12-27

## 2018-12-27 RX ORDER — LISINOPRIL 40 MG/1
40 TABLET ORAL DAILY
Qty: 90 TABLET | Refills: 3 | Status: CANCELLED | OUTPATIENT
Start: 2018-12-27

## 2018-12-27 NOTE — TELEPHONE ENCOUNTER
PT SAW DR ARRIETA ON Monday, RX'S WERE SENT TO THE WRONG PHARMACY, PT DID  THE CIPRO, PLEASE SEND THE OTHER RX'S TO [facility]    PT WOULD LIKE A CALL BACK UPON COMPLETION

## 2018-12-28 DIAGNOSIS — I10 ESSENTIAL HYPERTENSION: ICD-10-CM

## 2018-12-28 DIAGNOSIS — E78.2 MIXED HYPERLIPIDEMIA: ICD-10-CM

## 2018-12-28 DIAGNOSIS — J06.9 VIRAL UPPER RESPIRATORY TRACT INFECTION: ICD-10-CM

## 2018-12-28 RX ORDER — FLUTICASONE PROPIONATE 50 MCG
2 SPRAY, SUSPENSION (ML) NASAL DAILY
Qty: 16 G | Refills: 0 | Status: SHIPPED | OUTPATIENT
Start: 2018-12-28 | End: 2019-03-11 | Stop reason: SDUPTHER

## 2018-12-28 RX ORDER — LISINOPRIL 40 MG/1
40 TABLET ORAL DAILY
Qty: 90 TABLET | Refills: 3 | Status: SHIPPED | OUTPATIENT
Start: 2018-12-28 | End: 2020-01-13

## 2018-12-28 RX ORDER — AMLODIPINE BESYLATE 5 MG/1
5 TABLET ORAL DAILY
Qty: 90 TABLET | Refills: 3 | Status: SHIPPED | OUTPATIENT
Start: 2018-12-28 | End: 2020-01-13

## 2018-12-28 RX ORDER — ROSUVASTATIN CALCIUM 20 MG/1
20 TABLET, COATED ORAL DAILY
Qty: 90 TABLET | Refills: 3 | Status: SHIPPED | OUTPATIENT
Start: 2018-12-28 | End: 2020-01-11

## 2019-03-11 ENCOUNTER — OFFICE VISIT (OUTPATIENT)
Dept: INTERNAL MEDICINE CLINIC | Facility: CLINIC | Age: 64
End: 2019-03-11
Payer: COMMERCIAL

## 2019-03-11 VITALS
SYSTOLIC BLOOD PRESSURE: 126 MMHG | OXYGEN SATURATION: 96 % | HEART RATE: 84 BPM | HEIGHT: 71 IN | BODY MASS INDEX: 30.38 KG/M2 | DIASTOLIC BLOOD PRESSURE: 78 MMHG | WEIGHT: 217 LBS

## 2019-03-11 DIAGNOSIS — E78.2 MIXED HYPERLIPIDEMIA: ICD-10-CM

## 2019-03-11 DIAGNOSIS — J06.9 VIRAL UPPER RESPIRATORY TRACT INFECTION: ICD-10-CM

## 2019-03-11 DIAGNOSIS — I10 ESSENTIAL HYPERTENSION: ICD-10-CM

## 2019-03-11 DIAGNOSIS — R73.01 IMPAIRED FASTING GLUCOSE: ICD-10-CM

## 2019-03-11 DIAGNOSIS — Z12.5 PROSTATE CANCER SCREENING: ICD-10-CM

## 2019-03-11 DIAGNOSIS — R19.5 POSITIVE FIT (FECAL IMMUNOCHEMICAL TEST): ICD-10-CM

## 2019-03-11 DIAGNOSIS — J30.9 ALLERGIC RHINITIS, UNSPECIFIED SEASONALITY, UNSPECIFIED TRIGGER: ICD-10-CM

## 2019-03-11 DIAGNOSIS — R01.1 HEART MURMUR: Primary | ICD-10-CM

## 2019-03-11 PROCEDURE — 3078F DIAST BP <80 MM HG: CPT | Performed by: INTERNAL MEDICINE

## 2019-03-11 PROCEDURE — 99213 OFFICE O/P EST LOW 20 MIN: CPT | Performed by: INTERNAL MEDICINE

## 2019-03-11 PROCEDURE — 3074F SYST BP LT 130 MM HG: CPT | Performed by: INTERNAL MEDICINE

## 2019-03-11 PROCEDURE — 3008F BODY MASS INDEX DOCD: CPT | Performed by: INTERNAL MEDICINE

## 2019-03-11 PROCEDURE — 1036F TOBACCO NON-USER: CPT | Performed by: INTERNAL MEDICINE

## 2019-03-11 RX ORDER — FLUTICASONE PROPIONATE 50 MCG
2 SPRAY, SUSPENSION (ML) NASAL DAILY
Qty: 16 G | Refills: 3 | Status: SHIPPED | OUTPATIENT
Start: 2019-03-11 | End: 2019-06-22 | Stop reason: CLARIF

## 2019-03-11 NOTE — PATIENT INSTRUCTIONS
Feels well; blood pressure good  Exam normal except slight heart murmur  Recommend echocardiogram and also repeat F ITP

## 2019-03-11 NOTE — PROGRESS NOTES
Assessment/Plan:       Diagnoses and all orders for this visit:    Heart murmur  -     Echo complete with contrast if indicated; Future    Essential hypertension  -     CBC and differential; Future  -     Echo complete with contrast if indicated; Future    Mixed hyperlipidemia    Impaired fasting glucose    Allergic rhinitis, unspecified seasonality, unspecified trigger    Prostate cancer screening    Positive FIT (fecal immunochemical test)  -     Occult Blood, Fecal Immunochemical; Future          Patient Instructions   Feels well; blood pressure good  Exam normal except slight heart murmur  Recommend echocardiogram and also repeat F ITP        Subjective:      Patient ID: Quyen Wise is a 58 y o  male  A patient who feels well  Hypertension and hyperlipidemia are treated  A number of years ago, he had heme-positive stool  He had the usual pain an endoscopy and PillCam with negative result  No any me  On examination today, he has a 2/6 systolic murmur heard best in the left sternal border radiating to the right in the 2nd ICS  12 point ROS negative      The following portions of the patient's history were reviewed and updated as appropriate:   He has a past medical history of Disc degeneration, lumbar and Peripheral vascular disease ([address] )  ,  does not have any pertinent problems on file  ,   has no past surgical history on file  ,  family history includes Breast cancer in his mother; Hip fracture in his father  ,   reports that he has never smoked  He has never used smokeless tobacco  He reports that he does not drink alcohol or use drugs  ,  has No Known Allergies     Current Outpatient Medications   Medication Sig Dispense Refill    amLODIPine (NORVASC) 5 mg tablet Take 1 tablet (5 mg total) by mouth daily 90 tablet 3    lisinopril (ZESTRIL) 40 mg tablet Take 1 tablet (40 mg total) by mouth daily 90 tablet 3    rosuvastatin (CRESTOR) 20 MG tablet Take 1 tablet (20 mg total) by mouth daily 90 tablet 3    fluticasone (FLONASE) 50 mcg/act nasal spray 2 sprays into each nostril daily (Patient not taking: Reported on 3/11/2019) 16 g 0     No current facility-administered medications for this visit  Review of Systems   Constitutional: Negative for chills and fever  HENT: Negative for sore throat and trouble swallowing  Eyes: Negative for pain  Respiratory: Negative for cough and shortness of breath  Cardiovascular: Negative for chest pain and palpitations  Gastrointestinal: Negative for abdominal pain, blood in stool, diarrhea, nausea and vomiting  Endocrine: Negative for cold intolerance and heat intolerance  Genitourinary: Negative for dysuria, frequency and testicular pain  Musculoskeletal: Negative for joint swelling  Allergic/Immunologic: Negative for immunocompromised state  Neurological: Negative for dizziness, syncope and headaches  Hematological: Negative for adenopathy  Does not bruise/bleed easily  Psychiatric/Behavioral: Negative for dysphoric mood  The patient is not nervous/anxious  Objective:  Vitals:    03/11/19 1757   BP: 126/78   Pulse: 84   SpO2: 96%      Physical Exam   Constitutional: He is oriented to person, place, and time  He appears well-developed and well-nourished  No distress  Male patient, moderately overweight, who appears to be stated age   HENT:   Head: Normocephalic and atraumatic  Eyes: Pupils are equal, round, and reactive to light  Conjunctivae are normal    Neck: Normal range of motion  No thyromegaly present  Cardiovascular: Normal rate and regular rhythm  Murmur heard  Decrescendo systolic murmur is present with a grade of 2/6  Early systolic decrescendo murmur heard best left sternal border 2nd intercostal space radiating to the right  Pulmonary/Chest: Effort normal  No respiratory distress  He has no wheezes  He has no rales  Abdominal: Soft  There is no tenderness     Genitourinary: Penis normal  Right testis shows no mass and no tenderness  Left testis shows no mass and no tenderness  Musculoskeletal: Normal range of motion  He exhibits no deformity  Lymphadenopathy:     He has no cervical adenopathy  Neurological: He is alert and oriented to person, place, and time  Skin: Skin is warm and dry  Psychiatric: He has a normal mood and affect   His behavior is normal  Judgment and thought content normal

## 2019-04-05 ENCOUNTER — HOSPITAL ENCOUNTER (OUTPATIENT)
Dept: NON INVASIVE DIAGNOSTICS | Facility: CLINIC | Age: 64
Discharge: HOME | End: 2019-04-05
Payer: COMMERCIAL

## 2019-04-05 DIAGNOSIS — R01.1 HEART MURMUR: ICD-10-CM

## 2019-04-05 DIAGNOSIS — I10 ESSENTIAL HYPERTENSION: ICD-10-CM

## 2019-04-05 PROCEDURE — 93306 TTE W/DOPPLER COMPLETE: CPT

## 2019-04-05 PROCEDURE — 93306 TTE W/DOPPLER COMPLETE: CPT | Performed by: INTERNAL MEDICINE

## 2019-04-17 ENCOUNTER — OFFICE VISIT (OUTPATIENT)
Dept: INTERNAL MEDICINE CLINIC | Facility: CLINIC | Age: 64
End: 2019-04-17
Payer: COMMERCIAL

## 2019-04-17 VITALS
SYSTOLIC BLOOD PRESSURE: 142 MMHG | WEIGHT: 212 LBS | BODY MASS INDEX: 29.68 KG/M2 | HEIGHT: 71 IN | OXYGEN SATURATION: 96 % | HEART RATE: 85 BPM | DIASTOLIC BLOOD PRESSURE: 80 MMHG

## 2019-04-17 DIAGNOSIS — K52.9 COLITIS: Primary | ICD-10-CM

## 2019-04-17 PROBLEM — I34.0 MILD MITRAL REGURGITATION: Status: ACTIVE | Noted: 2019-04-17

## 2019-04-17 PROBLEM — I35.0 MILD AORTIC STENOSIS: Status: ACTIVE | Noted: 2019-04-05

## 2019-04-17 PROBLEM — I35.0 MILD AORTIC STENOSIS: Status: ACTIVE | Noted: 2019-04-17

## 2019-04-17 PROBLEM — I34.0 MILD MITRAL REGURGITATION: Status: ACTIVE | Noted: 2019-04-05

## 2019-04-17 PROCEDURE — 99213 OFFICE O/P EST LOW 20 MIN: CPT | Performed by: INTERNAL MEDICINE

## 2019-05-30 ENCOUNTER — OFFICE VISIT (OUTPATIENT)
Dept: INTERNAL MEDICINE CLINIC | Facility: CLINIC | Age: 64
End: 2019-05-30
Payer: COMMERCIAL

## 2019-05-30 VITALS
WEIGHT: 213 LBS | SYSTOLIC BLOOD PRESSURE: 140 MMHG | DIASTOLIC BLOOD PRESSURE: 80 MMHG | OXYGEN SATURATION: 96 % | TEMPERATURE: 99.7 F | BODY MASS INDEX: 29.82 KG/M2 | HEART RATE: 87 BPM | HEIGHT: 71 IN

## 2019-05-30 DIAGNOSIS — Z12.11 COLON CANCER SCREENING: ICD-10-CM

## 2019-05-30 DIAGNOSIS — R05.9 COUGH: Primary | ICD-10-CM

## 2019-05-30 PROBLEM — K52.9 COLITIS: Status: RESOLVED | Noted: 2019-04-17 | Resolved: 2019-05-30

## 2019-05-30 LAB — S PYO AG THROAT QL: NEGATIVE

## 2019-05-30 PROCEDURE — 99214 OFFICE O/P EST MOD 30 MIN: CPT | Performed by: INTERNAL MEDICINE

## 2019-05-30 PROCEDURE — 87880 STREP A ASSAY W/OPTIC: CPT | Performed by: INTERNAL MEDICINE

## 2019-05-30 RX ORDER — METHYLPREDNISOLONE 4 MG/1
TABLET ORAL
Qty: 21 EACH | Refills: 0 | Status: SHIPPED | OUTPATIENT
Start: 2019-05-30 | End: 2019-06-17

## 2019-05-31 ENCOUNTER — APPOINTMENT (OUTPATIENT)
Dept: RADIOLOGY | Facility: CLINIC | Age: 64
End: 2019-05-31
Payer: COMMERCIAL

## 2019-05-31 DIAGNOSIS — R05.9 COUGH: ICD-10-CM

## 2019-05-31 PROCEDURE — 70220 X-RAY EXAM OF SINUSES: CPT

## 2019-06-03 ENCOUNTER — OFFICE VISIT (OUTPATIENT)
Dept: INTERNAL MEDICINE CLINIC | Facility: CLINIC | Age: 64
End: 2019-06-03
Payer: COMMERCIAL

## 2019-06-03 ENCOUNTER — TELEPHONE (OUTPATIENT)
Dept: INTERNAL MEDICINE CLINIC | Facility: CLINIC | Age: 64
End: 2019-06-03

## 2019-06-03 VITALS
BODY MASS INDEX: 28.98 KG/M2 | WEIGHT: 207 LBS | HEART RATE: 86 BPM | SYSTOLIC BLOOD PRESSURE: 126 MMHG | OXYGEN SATURATION: 97 % | HEIGHT: 71 IN | TEMPERATURE: 99.9 F | DIASTOLIC BLOOD PRESSURE: 72 MMHG

## 2019-06-03 DIAGNOSIS — R63.4 WEIGHT LOSS, UNINTENTIONAL: Primary | ICD-10-CM

## 2019-06-03 DIAGNOSIS — R10.30 LOWER ABDOMINAL PAIN: ICD-10-CM

## 2019-06-03 PROCEDURE — 99214 OFFICE O/P EST MOD 30 MIN: CPT | Performed by: PHYSICIAN ASSISTANT

## 2019-06-04 ENCOUNTER — TELEPHONE (OUTPATIENT)
Dept: GASTROENTEROLOGY | Facility: CLINIC | Age: 64
End: 2019-06-04

## 2019-06-04 ENCOUNTER — TELEPHONE (OUTPATIENT)
Dept: INTERNAL MEDICINE CLINIC | Facility: CLINIC | Age: 64
End: 2019-06-04

## 2019-06-04 DIAGNOSIS — R10.30 LOWER ABDOMINAL PAIN: ICD-10-CM

## 2019-06-04 DIAGNOSIS — R63.4 WEIGHT LOSS, UNINTENTIONAL: Primary | ICD-10-CM

## 2019-06-07 ENCOUNTER — HOSPITAL ENCOUNTER (OUTPATIENT)
Dept: ULTRASOUND IMAGING | Facility: CLINIC | Age: 64
Discharge: HOME | End: 2019-06-07
Payer: COMMERCIAL

## 2019-06-07 DIAGNOSIS — R10.30 LOWER ABDOMINAL PAIN: ICD-10-CM

## 2019-06-07 DIAGNOSIS — R63.4 WEIGHT LOSS, UNINTENTIONAL: ICD-10-CM

## 2019-06-07 PROCEDURE — 76700 US EXAM ABDOM COMPLETE: CPT

## 2019-06-13 ENCOUNTER — TELEPHONE (OUTPATIENT)
Dept: INTERNAL MEDICINE CLINIC | Facility: CLINIC | Age: 64
End: 2019-06-13

## 2019-06-13 ENCOUNTER — OFFICE VISIT (OUTPATIENT)
Dept: GASTROENTEROLOGY | Facility: CLINIC | Age: 64
End: 2019-06-13
Payer: COMMERCIAL

## 2019-06-13 VITALS
WEIGHT: 209 LBS | SYSTOLIC BLOOD PRESSURE: 120 MMHG | HEIGHT: 71 IN | BODY MASS INDEX: 29.26 KG/M2 | DIASTOLIC BLOOD PRESSURE: 84 MMHG | HEART RATE: 89 BPM | RESPIRATION RATE: 16 BRPM

## 2019-06-13 DIAGNOSIS — R10.30 LOWER ABDOMINAL PAIN: ICD-10-CM

## 2019-06-13 DIAGNOSIS — R63.4 WEIGHT LOSS, UNINTENTIONAL: Primary | ICD-10-CM

## 2019-06-13 PROCEDURE — 99203 OFFICE O/P NEW LOW 30 MIN: CPT | Performed by: PHYSICIAN ASSISTANT

## 2019-06-16 ENCOUNTER — ANESTHESIA EVENT (OUTPATIENT)
Dept: GASTROENTEROLOGY | Facility: HOSPITAL | Age: 64
End: 2019-06-16

## 2019-06-17 ENCOUNTER — ANESTHESIA (OUTPATIENT)
Dept: GASTROENTEROLOGY | Facility: HOSPITAL | Age: 64
End: 2019-06-17

## 2019-06-17 ENCOUNTER — HOSPITAL ENCOUNTER (OUTPATIENT)
Dept: GASTROENTEROLOGY | Facility: HOSPITAL | Age: 64
Setting detail: OUTPATIENT SURGERY
Discharge: HOME | End: 2019-06-17
Attending: INTERNAL MEDICINE | Admitting: INTERNAL MEDICINE
Payer: COMMERCIAL

## 2019-06-17 VITALS
HEART RATE: 68 BPM | DIASTOLIC BLOOD PRESSURE: 76 MMHG | BODY MASS INDEX: 27.92 KG/M2 | WEIGHT: 206.13 LBS | RESPIRATION RATE: 18 BRPM | SYSTOLIC BLOOD PRESSURE: 120 MMHG | TEMPERATURE: 98.1 F | OXYGEN SATURATION: 98 % | HEIGHT: 72 IN

## 2019-06-17 DIAGNOSIS — R63.4 WEIGHT LOSS, UNINTENTIONAL: ICD-10-CM

## 2019-06-17 DIAGNOSIS — R10.30 LOWER ABDOMINAL PAIN: ICD-10-CM

## 2019-06-17 PROCEDURE — 88305 TISSUE EXAM BY PATHOLOGIST: CPT | Performed by: PATHOLOGY

## 2019-06-17 PROCEDURE — 43239 EGD BIOPSY SINGLE/MULTIPLE: CPT | Performed by: INTERNAL MEDICINE

## 2019-06-17 PROCEDURE — 45378 DIAGNOSTIC COLONOSCOPY: CPT | Performed by: INTERNAL MEDICINE

## 2019-06-17 RX ORDER — SODIUM CHLORIDE, SODIUM LACTATE, POTASSIUM CHLORIDE, CALCIUM CHLORIDE 600; 310; 30; 20 MG/100ML; MG/100ML; MG/100ML; MG/100ML
125 INJECTION, SOLUTION INTRAVENOUS CONTINUOUS
Status: DISCONTINUED | OUTPATIENT
Start: 2019-06-17 | End: 2019-06-21 | Stop reason: HOSPADM

## 2019-06-17 RX ORDER — PROPOFOL 10 MG/ML
INJECTION, EMULSION INTRAVENOUS AS NEEDED
Status: DISCONTINUED | OUTPATIENT
Start: 2019-06-17 | End: 2019-06-17 | Stop reason: SURG

## 2019-06-17 RX ORDER — LIDOCAINE HYDROCHLORIDE 10 MG/ML
INJECTION, SOLUTION EPIDURAL; INFILTRATION; INTRACAUDAL; PERINEURAL AS NEEDED
Status: DISCONTINUED | OUTPATIENT
Start: 2019-06-17 | End: 2019-06-17 | Stop reason: SURG

## 2019-06-17 RX ADMIN — SODIUM CHLORIDE, SODIUM LACTATE, POTASSIUM CHLORIDE, AND CALCIUM CHLORIDE 125 ML/HR: .6; .31; .03; .02 INJECTION, SOLUTION INTRAVENOUS at 11:43

## 2019-06-17 RX ADMIN — PROPOFOL 50 MG: 10 INJECTION, EMULSION INTRAVENOUS at 11:59

## 2019-06-17 RX ADMIN — PROPOFOL 100 MG: 10 INJECTION, EMULSION INTRAVENOUS at 11:56

## 2019-06-17 RX ADMIN — PROPOFOL 50 MG: 10 INJECTION, EMULSION INTRAVENOUS at 12:07

## 2019-06-17 RX ADMIN — LIDOCAINE HYDROCHLORIDE 40 MG: 10 INJECTION, SOLUTION EPIDURAL; INFILTRATION; INTRACAUDAL; PERINEURAL at 11:56

## 2019-06-17 RX ADMIN — PROPOFOL 50 MG: 10 INJECTION, EMULSION INTRAVENOUS at 12:03

## 2019-06-19 ENCOUNTER — TELEPHONE (OUTPATIENT)
Dept: GASTROENTEROLOGY | Facility: CLINIC | Age: 64
End: 2019-06-19

## 2019-06-22 ENCOUNTER — APPOINTMENT (OUTPATIENT)
Dept: LAB | Facility: CLINIC | Age: 64
End: 2019-06-22
Payer: COMMERCIAL

## 2019-06-22 ENCOUNTER — OFFICE VISIT (OUTPATIENT)
Dept: INTERNAL MEDICINE CLINIC | Facility: CLINIC | Age: 64
End: 2019-06-22
Payer: COMMERCIAL

## 2019-06-22 VITALS
TEMPERATURE: 97.8 F | OXYGEN SATURATION: 98 % | SYSTOLIC BLOOD PRESSURE: 118 MMHG | HEART RATE: 69 BPM | HEIGHT: 72 IN | DIASTOLIC BLOOD PRESSURE: 64 MMHG | BODY MASS INDEX: 28.17 KG/M2 | WEIGHT: 208 LBS

## 2019-06-22 DIAGNOSIS — B36.9 FUNGAL RASH OF TORSO: ICD-10-CM

## 2019-06-22 DIAGNOSIS — R10.30 LOWER ABDOMINAL PAIN: ICD-10-CM

## 2019-06-22 DIAGNOSIS — I10 ESSENTIAL HYPERTENSION: ICD-10-CM

## 2019-06-22 DIAGNOSIS — R63.4 WEIGHT LOSS, UNINTENTIONAL: ICD-10-CM

## 2019-06-22 DIAGNOSIS — B36.9 FUNGAL RASH OF TORSO: Primary | ICD-10-CM

## 2019-06-22 DIAGNOSIS — E78.2 MIXED HYPERLIPIDEMIA: ICD-10-CM

## 2019-06-22 LAB
ALBUMIN SERPL BCP-MCNC: 4.1 G/DL (ref 3.5–5)
ALP SERPL-CCNC: 84 U/L (ref 46–116)
ALT SERPL W P-5'-P-CCNC: 57 U/L (ref 12–78)
ANION GAP SERPL CALCULATED.3IONS-SCNC: 4 MMOL/L (ref 4–13)
AST SERPL W P-5'-P-CCNC: 27 U/L (ref 5–45)
BASOPHILS # BLD AUTO: 0.04 THOUSANDS/ΜL (ref 0–0.1)
BASOPHILS NFR BLD AUTO: 1 % (ref 0–1)
BILIRUB SERPL-MCNC: 0.49 MG/DL (ref 0.2–1)
BILIRUB UR QL STRIP: NEGATIVE
BUN SERPL-MCNC: 23 MG/DL (ref 5–25)
CALCIUM SERPL-MCNC: 9.9 MG/DL (ref 8.3–10.1)
CHLORIDE SERPL-SCNC: 102 MMOL/L (ref 100–108)
CHOLEST SERPL-MCNC: 182 MG/DL (ref 50–200)
CLARITY UR: CLEAR
CO2 SERPL-SCNC: 27 MMOL/L (ref 21–32)
COLOR UR: YELLOW
CREAT SERPL-MCNC: 1.18 MG/DL (ref 0.6–1.3)
EOSINOPHIL # BLD AUTO: 0.48 THOUSAND/ΜL (ref 0–0.61)
EOSINOPHIL NFR BLD AUTO: 7 % (ref 0–6)
ERYTHROCYTE [DISTWIDTH] IN BLOOD BY AUTOMATED COUNT: 13.1 % (ref 11.6–15.1)
EST. AVERAGE GLUCOSE BLD GHB EST-MCNC: 134 MG/DL
GFR SERPL CREATININE-BSD FRML MDRD: 65 ML/MIN/1.73SQ M
GLUCOSE P FAST SERPL-MCNC: 98 MG/DL (ref 65–99)
GLUCOSE UR STRIP-MCNC: NEGATIVE MG/DL
HBA1C MFR BLD: 6.3 % (ref 4.2–6.3)
HCT VFR BLD AUTO: 41.3 % (ref 36.5–49.3)
HDLC SERPL-MCNC: 42 MG/DL (ref 40–60)
HGB BLD-MCNC: 13.1 G/DL (ref 12–17)
HGB UR QL STRIP.AUTO: NEGATIVE
IMM GRANULOCYTES # BLD AUTO: 0.02 THOUSAND/UL (ref 0–0.2)
IMM GRANULOCYTES NFR BLD AUTO: 0 % (ref 0–2)
KETONES UR STRIP-MCNC: NEGATIVE MG/DL
LDLC SERPL CALC-MCNC: 108 MG/DL (ref 0–100)
LEUKOCYTE ESTERASE UR QL STRIP: NEGATIVE
LYMPHOCYTES # BLD AUTO: 2.18 THOUSANDS/ΜL (ref 0.6–4.47)
LYMPHOCYTES NFR BLD AUTO: 31 % (ref 14–44)
MCH RBC QN AUTO: 29.8 PG (ref 26.8–34.3)
MCHC RBC AUTO-ENTMCNC: 31.7 G/DL (ref 31.4–37.4)
MCV RBC AUTO: 94 FL (ref 82–98)
MONOCYTES # BLD AUTO: 0.52 THOUSAND/ΜL (ref 0.17–1.22)
MONOCYTES NFR BLD AUTO: 7 % (ref 4–12)
NEUTROPHILS # BLD AUTO: 3.85 THOUSANDS/ΜL (ref 1.85–7.62)
NEUTS SEG NFR BLD AUTO: 54 % (ref 43–75)
NITRITE UR QL STRIP: NEGATIVE
NONHDLC SERPL-MCNC: 140 MG/DL
NRBC BLD AUTO-RTO: 0 /100 WBCS
PH UR STRIP.AUTO: 6 [PH]
PLATELET # BLD AUTO: 246 THOUSANDS/UL (ref 149–390)
PMV BLD AUTO: 9.5 FL (ref 8.9–12.7)
POTASSIUM SERPL-SCNC: 5 MMOL/L (ref 3.5–5.3)
PROT SERPL-MCNC: 8.3 G/DL (ref 6.4–8.2)
PROT UR STRIP-MCNC: NEGATIVE MG/DL
PSA SERPL-MCNC: 0.8 NG/ML (ref 0–4)
RBC # BLD AUTO: 4.39 MILLION/UL (ref 3.88–5.62)
SODIUM SERPL-SCNC: 133 MMOL/L (ref 136–145)
SP GR UR STRIP.AUTO: 1.02 (ref 1–1.03)
TRIGL SERPL-MCNC: 162 MG/DL
TSH SERPL DL<=0.05 MIU/L-ACNC: 1.08 UIU/ML (ref 0.36–3.74)
UROBILINOGEN UR QL STRIP.AUTO: 0.2 E.U./DL
WBC # BLD AUTO: 7.09 THOUSAND/UL (ref 4.31–10.16)

## 2019-06-22 PROCEDURE — 84443 ASSAY THYROID STIM HORMONE: CPT

## 2019-06-22 PROCEDURE — 36415 COLL VENOUS BLD VENIPUNCTURE: CPT

## 2019-06-22 PROCEDURE — 83036 HEMOGLOBIN GLYCOSYLATED A1C: CPT

## 2019-06-22 PROCEDURE — G0103 PSA SCREENING: HCPCS

## 2019-06-22 PROCEDURE — 99214 OFFICE O/P EST MOD 30 MIN: CPT | Performed by: PHYSICIAN ASSISTANT

## 2019-06-22 PROCEDURE — 3074F SYST BP LT 130 MM HG: CPT | Performed by: PHYSICIAN ASSISTANT

## 2019-06-22 PROCEDURE — 80061 LIPID PANEL: CPT

## 2019-06-22 PROCEDURE — 80053 COMPREHEN METABOLIC PANEL: CPT

## 2019-06-22 PROCEDURE — 81003 URINALYSIS AUTO W/O SCOPE: CPT | Performed by: PHYSICIAN ASSISTANT

## 2019-06-22 PROCEDURE — 85025 COMPLETE CBC W/AUTO DIFF WBC: CPT

## 2019-06-22 PROCEDURE — 3078F DIAST BP <80 MM HG: CPT | Performed by: PHYSICIAN ASSISTANT

## 2019-06-22 RX ORDER — CLOTRIMAZOLE AND BETAMETHASONE DIPROPIONATE 10; .64 MG/G; MG/G
CREAM TOPICAL 2 TIMES DAILY
Qty: 30 G | Refills: 0 | Status: SHIPPED | OUTPATIENT
Start: 2019-06-22 | End: 2019-07-01 | Stop reason: SDUPTHER

## 2019-06-25 ENCOUNTER — TELEPHONE (OUTPATIENT)
Dept: INTERNAL MEDICINE CLINIC | Facility: CLINIC | Age: 64
End: 2019-06-25

## 2019-06-25 ENCOUNTER — TELEPHONE (OUTPATIENT)
Dept: GASTROENTEROLOGY | Facility: CLINIC | Age: 64
End: 2019-06-25

## 2019-07-01 ENCOUNTER — TELEPHONE (OUTPATIENT)
Dept: INTERNAL MEDICINE CLINIC | Facility: CLINIC | Age: 64
End: 2019-07-01

## 2019-07-01 DIAGNOSIS — E78.2 MIXED HYPERLIPIDEMIA: ICD-10-CM

## 2019-07-01 DIAGNOSIS — R63.4 WEIGHT LOSS, UNINTENTIONAL: ICD-10-CM

## 2019-07-01 DIAGNOSIS — R10.30 LOWER ABDOMINAL PAIN: ICD-10-CM

## 2019-07-01 DIAGNOSIS — B36.9 FUNGAL RASH OF TORSO: ICD-10-CM

## 2019-07-01 DIAGNOSIS — I10 ESSENTIAL HYPERTENSION: ICD-10-CM

## 2019-07-01 RX ORDER — CLOTRIMAZOLE AND BETAMETHASONE DIPROPIONATE 10; .64 MG/G; MG/G
CREAM TOPICAL 2 TIMES DAILY
Qty: 30 G | Refills: 0 | Status: SHIPPED | OUTPATIENT
Start: 2019-07-01 | End: 2019-07-03 | Stop reason: SDUPTHER

## 2019-07-01 NOTE — TELEPHONE ENCOUNTER
Needs refill on clotrimazole-betamethasone cream   [facility]    Would also like his results of his recent blood work done on 6/22/19

## 2019-07-01 NOTE — TELEPHONE ENCOUNTER
Week ago Saturday he had labs done, then Friday, in the mail, he rec'ed more lab orders     When he came in this morning to get them done, the lab told him they were already done  Ivett Maxwell He thinks they were different lab orders then he already had done    Are there any other labs he needs to have done  Ivett Maxwell ? ?

## 2019-07-03 ENCOUNTER — OFFICE VISIT (OUTPATIENT)
Dept: INTERNAL MEDICINE CLINIC | Facility: CLINIC | Age: 64
End: 2019-07-03
Payer: COMMERCIAL

## 2019-07-03 VITALS
DIASTOLIC BLOOD PRESSURE: 80 MMHG | HEIGHT: 72 IN | HEART RATE: 70 BPM | WEIGHT: 209 LBS | SYSTOLIC BLOOD PRESSURE: 142 MMHG | BODY MASS INDEX: 28.31 KG/M2 | OXYGEN SATURATION: 97 %

## 2019-07-03 DIAGNOSIS — B36.9 FUNGAL RASH OF TORSO: ICD-10-CM

## 2019-07-03 DIAGNOSIS — E78.2 MIXED HYPERLIPIDEMIA: ICD-10-CM

## 2019-07-03 DIAGNOSIS — L70.0 BLACKHEAD: Primary | ICD-10-CM

## 2019-07-03 DIAGNOSIS — R10.30 LOWER ABDOMINAL PAIN: ICD-10-CM

## 2019-07-03 DIAGNOSIS — I10 ESSENTIAL HYPERTENSION: ICD-10-CM

## 2019-07-03 PROCEDURE — 99214 OFFICE O/P EST MOD 30 MIN: CPT | Performed by: PHYSICIAN ASSISTANT

## 2019-07-03 PROCEDURE — 1036F TOBACCO NON-USER: CPT | Performed by: PHYSICIAN ASSISTANT

## 2019-07-03 PROCEDURE — 1101F PT FALLS ASSESS-DOCD LE1/YR: CPT | Performed by: PHYSICIAN ASSISTANT

## 2019-07-03 PROCEDURE — 3008F BODY MASS INDEX DOCD: CPT | Performed by: PHYSICIAN ASSISTANT

## 2019-07-03 RX ORDER — CLOTRIMAZOLE AND BETAMETHASONE DIPROPIONATE 10; .64 MG/G; MG/G
CREAM TOPICAL 2 TIMES DAILY
Qty: 45 G | Refills: 3 | Status: SHIPPED | OUTPATIENT
Start: 2019-07-03 | End: 2020-03-16 | Stop reason: CLARIF

## 2019-07-12 ENCOUNTER — OFFICE VISIT (OUTPATIENT)
Dept: INTERNAL MEDICINE CLINIC | Facility: CLINIC | Age: 64
End: 2019-07-12
Payer: COMMERCIAL

## 2019-07-12 VITALS
BODY MASS INDEX: 28.56 KG/M2 | DIASTOLIC BLOOD PRESSURE: 82 MMHG | WEIGHT: 210.6 LBS | SYSTOLIC BLOOD PRESSURE: 134 MMHG | OXYGEN SATURATION: 96 % | HEART RATE: 77 BPM

## 2019-07-12 DIAGNOSIS — I35.0 MILD AORTIC STENOSIS: ICD-10-CM

## 2019-07-12 DIAGNOSIS — E78.2 MIXED HYPERLIPIDEMIA: ICD-10-CM

## 2019-07-12 DIAGNOSIS — Z23 NEED FOR PNEUMOCOCCAL VACCINATION: ICD-10-CM

## 2019-07-12 DIAGNOSIS — I34.0 MILD MITRAL REGURGITATION: ICD-10-CM

## 2019-07-12 DIAGNOSIS — R73.01 IMPAIRED FASTING GLUCOSE: ICD-10-CM

## 2019-07-12 DIAGNOSIS — I10 ESSENTIAL HYPERTENSION: Primary | ICD-10-CM

## 2019-07-12 PROCEDURE — 90670 PCV13 VACCINE IM: CPT | Performed by: INTERNAL MEDICINE

## 2019-07-12 PROCEDURE — 99213 OFFICE O/P EST LOW 20 MIN: CPT | Performed by: INTERNAL MEDICINE

## 2019-07-12 PROCEDURE — 1036F TOBACCO NON-USER: CPT | Performed by: INTERNAL MEDICINE

## 2019-07-12 PROCEDURE — 3075F SYST BP GE 130 - 139MM HG: CPT | Performed by: INTERNAL MEDICINE

## 2019-07-12 PROCEDURE — 90471 IMMUNIZATION ADMIN: CPT | Performed by: INTERNAL MEDICINE

## 2019-07-12 NOTE — PROGRESS NOTES
Assessment/Plan:       Diagnoses and all orders for this visit:    Essential hypertension    Mild aortic stenosis    Mild mitral regurgitation    Impaired fasting glucose    Mixed hyperlipidemia    Need for pneumococcal vaccination  -     PNEUMOCOCCAL CONJUGATE VACCINE 13-VALENT GREATER THAN 6 MONTHS          Patient Instructions    Hypertension and hyperlipidemia are controlled  Prediabetic with good hemoglobin A1c  Continue same medications and see me again in 6 months  Prevnar today  Subjective:      Patient ID: Debi Arvizu is a 71 y o  male  Follow-up for this patient for seen earlier this year   Hypertensive, hyperlipidemia, impaired fasting glucose with normal hemoglobin A1c  A fairly loud holosystolic heart murmur translated into mild aortic stenosis and mild mitral regurgitation    He feels fairly well  He had  Both upper and lower endoscopy  The upper endoscopy was normal with the exception of mild antral gastritis  The lower endoscopy -to colonoscopy -was completely normal   Due to prior polyps he needs to have another colonoscopy in 5 years  He had been expanding some crampy lower abdominal pain  This is still present but significantly reduced only very rare episodes  The following portions of the patient's history were reviewed and updated as appropriate:   He has a past medical history of Disc degeneration, lumbar and Peripheral vascular disease ([address] )  ,  does not have any pertinent problems on file  ,   has no past surgical history on file  ,  family history includes Breast cancer in his mother; Hip fracture in his father  ,   reports that he has never smoked  He has never used smokeless tobacco  He reports that he does not drink alcohol or use drugs  ,  has No Known Allergies     Current Outpatient Medications   Medication Sig Dispense Refill    amLODIPine (NORVASC) 5 mg tablet Take 1 tablet (5 mg total) by mouth daily 90 tablet 3    clotrimazole-betamethasone (LOTRISONE) 1-0 05 % cream Apply topically 2 (two) times a day 45 g 3    lisinopril (ZESTRIL) 40 mg tablet Take 1 tablet (40 mg total) by mouth daily 90 tablet 3    rosuvastatin (CRESTOR) 20 MG tablet Take 1 tablet (20 mg total) by mouth daily 90 tablet 3     No current facility-administered medications for this visit  Review of Systems   Constitutional: Negative for chills and fever  HENT: Negative for sore throat and trouble swallowing  Eyes: Negative for pain  Respiratory: Negative for cough and shortness of breath  Cardiovascular: Negative for chest pain and palpitations  Gastrointestinal: Positive for abdominal pain  Negative for blood in stool, diarrhea, nausea and vomiting  Endocrine: Negative for cold intolerance and heat intolerance  Genitourinary: Negative for dysuria, frequency and testicular pain  Musculoskeletal: Negative for joint swelling  Allergic/Immunologic: Negative for immunocompromised state  Neurological: Negative for dizziness, syncope and headaches  Hematological: Negative for adenopathy  Does not bruise/bleed easily  Psychiatric/Behavioral: Negative for dysphoric mood  The patient is not nervous/anxious  Objective:  Vitals:    07/12/19 1700   BP: 134/82   Pulse: 77   SpO2: 96%      Physical Exam   Constitutional: He is oriented to person, place, and time  He appears well-developed and well-nourished  Alert overweight male patient who appears to be stated age   HENT:   Head: Normocephalic and atraumatic  Eyes: Pupils are equal, round, and reactive to light  Neck: Normal range of motion  Neck supple  No tracheal deviation present  No thyromegaly present  Cardiovascular: Normal rate, regular rhythm and normal heart sounds  Exam reveals no gallop  No murmur heard  Pulmonary/Chest: Effort normal  No respiratory distress  He has no wheezes  He has no rales  Abdominal: Soft  Bowel sounds are normal  There is no tenderness     Musculoskeletal: Normal range of motion  He exhibits no tenderness or deformity  Neurological: He is alert and oriented to person, place, and time  He has normal reflexes  Coordination normal    Skin: Skin is warm and dry  Psychiatric: He has a normal mood and affect

## 2019-07-12 NOTE — PATIENT INSTRUCTIONS
Hypertension and hyperlipidemia are controlled  Prediabetic with good hemoglobin A1c  Continue same medications and see me again in 6 months  Prevnar today

## 2019-07-12 NOTE — PROGRESS NOTES
BMI Counseling: Body mass index is 28 56 kg/m²  Discussed the patient's BMI with him  The BMI is above average  BMI counseling and education was provided to the patient  Nutrition recommendations include moderation in carbohydrate intake

## 2019-10-18 ENCOUNTER — OFFICE VISIT (OUTPATIENT)
Dept: INTERNAL MEDICINE CLINIC | Facility: CLINIC | Age: 64
End: 2019-10-18
Payer: COMMERCIAL

## 2019-10-18 VITALS
SYSTOLIC BLOOD PRESSURE: 122 MMHG | TEMPERATURE: 97.9 F | OXYGEN SATURATION: 98 % | WEIGHT: 212.8 LBS | BODY MASS INDEX: 28.82 KG/M2 | HEIGHT: 72 IN | HEART RATE: 80 BPM | DIASTOLIC BLOOD PRESSURE: 82 MMHG

## 2019-10-18 DIAGNOSIS — I34.0 MILD MITRAL REGURGITATION: ICD-10-CM

## 2019-10-18 DIAGNOSIS — J06.9 VIRAL UPPER RESPIRATORY TRACT INFECTION: ICD-10-CM

## 2019-10-18 DIAGNOSIS — E87.1 HYPONATREMIA: ICD-10-CM

## 2019-10-18 DIAGNOSIS — R05.9 COUGH: ICD-10-CM

## 2019-10-18 DIAGNOSIS — E78.2 MIXED HYPERLIPIDEMIA: ICD-10-CM

## 2019-10-18 DIAGNOSIS — I10 ESSENTIAL HYPERTENSION: Primary | ICD-10-CM

## 2019-10-18 PROCEDURE — 3008F BODY MASS INDEX DOCD: CPT | Performed by: PHYSICIAN ASSISTANT

## 2019-10-18 PROCEDURE — 99214 OFFICE O/P EST MOD 30 MIN: CPT | Performed by: PHYSICIAN ASSISTANT

## 2019-10-18 PROCEDURE — 3074F SYST BP LT 130 MM HG: CPT | Performed by: PHYSICIAN ASSISTANT

## 2019-10-18 PROCEDURE — 3079F DIAST BP 80-89 MM HG: CPT | Performed by: PHYSICIAN ASSISTANT

## 2019-10-18 RX ORDER — FEXOFENADINE HCL AND PSEUDOEPHEDRINE HCI 60; 120 MG/1; MG/1
1 TABLET, EXTENDED RELEASE ORAL 2 TIMES DAILY
Qty: 30 TABLET | Refills: 0 | Status: SHIPPED | OUTPATIENT
Start: 2019-10-18 | End: 2020-01-28

## 2019-10-18 NOTE — PROGRESS NOTES
Assessment/Plan: viral/allergic cold  Tylenol Allegra-D return if he worsens       Diagnoses and all orders for this visit:    Essential hypertension    Mild mitral regurgitation    Mixed hyperlipidemia    Hyponatremia    Cough  -     fexofenadine-pseudoephedrine (ALLEGRA-D)  MG per tablet; Take 1 tablet by mouth 2 (two) times a day    Viral upper respiratory tract infection        No problem-specific Assessment & Plan notes found for this encounter  Subjective:      Patient ID: Danial Porter is a 71 y o  male  3 day history of stuffy nose nasal congestion slight sore throat and occasional nonproductive cough  Felt a little achy and the 1st day  Sore throat has resolved complaining of stuffy runny nose and occasional coughing no wheezing or shortness of breath no chest pain dizziness or syncope   Today has a slight frontal headache  normally active his health has been good hypertension hyperlipidemia well controlled with medication  The following portions of the patient's history were reviewed and updated as appropriate:   He has a past medical history of Disc degeneration, lumbar and Peripheral vascular disease ([address] )  ,  does not have any pertinent problems on file  ,   has no past surgical history on file  ,  family history includes Breast cancer in his mother; Hip fracture in his father  ,   reports that he has never smoked  He has never used smokeless tobacco  He reports that he does not drink alcohol or use drugs  ,  has No Known Allergies     Current Outpatient Medications   Medication Sig Dispense Refill    amLODIPine (NORVASC) 5 mg tablet Take 1 tablet (5 mg total) by mouth daily 90 tablet 3    lisinopril (ZESTRIL) 40 mg tablet Take 1 tablet (40 mg total) by mouth daily 90 tablet 3    rosuvastatin (CRESTOR) 20 MG tablet Take 1 tablet (20 mg total) by mouth daily 90 tablet 3    clotrimazole-betamethasone (LOTRISONE) 1-0 05 % cream Apply topically 2 (two) times a day (Patient not taking: Reported on 10/18/2019) 45 g 3    fexofenadine-pseudoephedrine (ALLEGRA-D)  MG per tablet Take 1 tablet by mouth 2 (two) times a day 30 tablet 0     No current facility-administered medications for this visit  Review of Systems   Constitutional: Negative for activity change, appetite change, chills, diaphoresis, fatigue, fever and unexpected weight change  HENT: Positive for congestion and postnasal drip  Negative for dental problem, drooling, ear discharge, ear pain, facial swelling, hearing loss, nosebleeds, rhinorrhea, sinus pressure, sinus pain, sneezing, sore throat, tinnitus, trouble swallowing and voice change  Eyes: Negative for photophobia, pain, discharge, redness, itching and visual disturbance  Respiratory: Positive for cough  Negative for apnea, choking, chest tightness, shortness of breath, wheezing and stridor  Cardiovascular: Negative for chest pain, palpitations and leg swelling  Gastrointestinal: Negative for abdominal distention, abdominal pain, anal bleeding, blood in stool, constipation, diarrhea, nausea, rectal pain and vomiting  Endocrine: Negative for cold intolerance, heat intolerance, polydipsia, polyphagia and polyuria  Genitourinary: Negative for decreased urine volume, difficulty urinating, dysuria, enuresis, flank pain, frequency, genital sores, hematuria and urgency  Musculoskeletal: Negative for arthralgias, back pain, gait problem, joint swelling, myalgias, neck pain and neck stiffness  Skin: Negative for color change, pallor, rash and wound  Neurological: Positive for headaches  Negative for dizziness, tremors, seizures, syncope, facial asymmetry, speech difficulty, weakness, light-headedness and numbness  Hematological: Negative for adenopathy  Does not bruise/bleed easily     Psychiatric/Behavioral: Negative for agitation, behavioral problems, confusion, decreased concentration, dysphoric mood, hallucinations, self-injury, sleep disturbance and suicidal ideas  The patient is not nervous/anxious and is not hyperactive  Objective:  Vitals:    10/18/19 0803   BP: 122/82   BP Location: Left arm   Patient Position: Sitting   Cuff Size: Standard   Pulse: 80   Temp: 97 9 °F (36 6 °C)   TempSrc: Oral   SpO2: 98%   Weight: 96 5 kg (212 lb 12 8 oz)   Height: 6' (1 829 m)     Body mass index is 28 86 kg/m²  Physical Exam   Constitutional: He is oriented to person, place, and time  He appears well-developed  HENT:   Head: Normocephalic  Right Ear: External ear normal    Left Ear: External ear normal    Nose: Mucosal edema present  Right sinus exhibits no frontal sinus tenderness  Left sinus exhibits no frontal sinus tenderness  Mouth/Throat: Oropharynx is clear and moist    Eyes: Pupils are equal, round, and reactive to light  Conjunctivae are normal    Neck: Neck supple  No JVD present  No thyromegaly present  Cardiovascular: Normal rate, regular rhythm and intact distal pulses  Murmur heard  Pulmonary/Chest: Effort normal and breath sounds normal  No stridor  No respiratory distress  He has no wheezes  He has no rales  He exhibits no tenderness  Abdominal: Soft  Bowel sounds are normal    Musculoskeletal: Normal range of motion  He exhibits no edema  Lymphadenopathy:     He has no cervical adenopathy  Neurological: He is alert and oriented to person, place, and time  He has normal reflexes  Skin: Skin is warm and dry

## 2019-11-04 ENCOUNTER — OFFICE VISIT (OUTPATIENT)
Dept: DERMATOLOGY | Facility: CLINIC | Age: 64
End: 2019-11-04
Payer: COMMERCIAL

## 2019-11-04 DIAGNOSIS — B36.9 FUNGAL RASH OF TORSO: ICD-10-CM

## 2019-11-04 DIAGNOSIS — Z13.89 SCREENING FOR SKIN CONDITION: ICD-10-CM

## 2019-11-04 DIAGNOSIS — B36.0 TINEA VERSICOLOR: Primary | ICD-10-CM

## 2019-11-04 DIAGNOSIS — L40.8 INVERSE PSORIASIS: ICD-10-CM

## 2019-11-04 DIAGNOSIS — L70.0 BLACKHEAD: ICD-10-CM

## 2019-11-04 PROCEDURE — 99203 OFFICE O/P NEW LOW 30 MIN: CPT | Performed by: DERMATOLOGY

## 2019-11-04 RX ORDER — MOMETASONE FUROATE 1 MG/G
CREAM TOPICAL DAILY
Qty: 15 G | Refills: 1 | Status: SHIPPED | OUTPATIENT
Start: 2019-11-04 | End: 2020-03-16 | Stop reason: CLARIF

## 2019-11-04 RX ORDER — KETOCONAZOLE 20 MG/ML
1 SHAMPOO TOPICAL WEEKLY
Qty: 120 ML | Refills: 4 | Status: SHIPPED | OUTPATIENT
Start: 2019-11-04 | End: 2021-03-09

## 2019-11-04 NOTE — PROGRESS NOTES
[facility] DERMATOLOGY  [address] [place] [address]  Martha [place]unama [id number]  [phone]  [phone]     MRN: [id number] : 1955  Encounter: [id number]  Patient Information: Tamera Whaley  Chief complaint:  Painful rash in groin and rash he has had for years    History of present illness:  41-year-old male presents for concerns regarding a rash in his groin folds that happened per present for awhile and been hurting patient has been given some Lotrisone cream which helped a little bit also concerned regarding another rash that is in his axilla and chest that is been present for years  Past Medical History:   Diagnosis Date    Disc degeneration, lumbar     Peripheral vascular disease ([address] )      History reviewed  No pertinent surgical history  Social History   Social History     Substance and Sexual Activity   Alcohol Use No     Social History     Substance and Sexual Activity   Drug Use No     Social History     Tobacco Use   Smoking Status Never Smoker   Smokeless Tobacco Never Used     Family History   Problem Relation Age of Onset    Breast cancer Mother     Hip fracture Father      Meds/Allergies   No Known Allergies    Meds:  Prior to Admission medications    Medication Sig Start Date End Date Taking?  Authorizing Provider   amLODIPine (NORVASC) 5 mg tablet Take 1 tablet (5 mg total) by mouth daily 18  Yes Ludwig Aparicio PA-C   lisinopril (ZESTRIL) 40 mg tablet Take 1 tablet (40 mg total) by mouth daily 18  Yes Ludwig Aparicio PA-C   rosuvastatin (CRESTOR) 20 MG tablet Take 1 tablet (20 mg total) by mouth daily 18  Yes Ludwig Aparicio PA-C   clotrimazole-betamethasone (LOTRISONE) 1-0 05 % cream Apply topically 2 (two) times a day  Patient not taking: Reported on 10/18/2019 7/3/19   Ludwig Aparicio PA-C   fexofenadine-pseudoephedrine (ALLEGRA-D)  MG per tablet Take 1 tablet by mouth 2 (two) times a day  Patient not taking: Reported on 2019 10/18/19 Mana Trimble PA-C       Subjective:     Review of Systems:    General: negative for - chills, fatigue, fever,  weight gain or weight loss  Psychological: negative for - anxiety, behavioral disorder, concentration difficulties, decreased libido, depression, irritability, memory difficulties, mood swings, sleep disturbances or suicidal ideation  ENT: negative for - hearing difficulties , nasal congestion, nasal discharge, oral lesions, sinus pain, sneezing, sore throat  Allergy and Immunology: negative for - hives, insect bite sensitivity,  Hematological and Lymphatic: negative for - bleeding problems, blood clots,bruising, swollen lymph nodes  Endocrine: negative for - hair pattern changes, hot flashes, malaise/lethargy, mood swings, palpitations, polydipsia/polyuria, skin changes, temperature intolerance or unexpected weight change  Respiratory: negative for - cough, hemoptysis, orthopnea, shortness of breath, or wheezing  Cardiovascular: negative for - chest pain, dyspnea on exertion, edema,  Gastrointestinal: negative for - abdominal pain, nausea/vomiting  Genito-Urinary: negative for - dysuria, incontinence, irregular/heavy menses or urinary frequency/urgency  Musculoskeletal: negative for - gait disturbance, joint pain, joint stiffness, joint swelling, muscle pain, muscular weakness  Dermatological:  As in HPI  Neurological: negative for confusion, dizziness, headaches, impaired coordination/balance, memory loss, numbness/tingling, seizures, speech problems, tremors or weakness       Objective: There were no vitals taken for this visit      Physical Exam:    General Appearance:    Alert, cooperative, no distress   Head:    Normocephalic, without obvious abnormality, atraumatic           Skin:   A full skin exam was performed including scalp, head scalp, eyes, ears, nose, lips, neck, chest, axilla, abdomen, back, buttocks, bilateral upper extremities, bilateral lower extremities, hands, feet, fingers, toes, fingernails, and toenails scaling erythematous area with well-demarcated patches in intergluteal area as well as in the lower groin fold with some fissures noted tannish scaling widespread macules on the axilla chest and back KOH prep performed and positive     Assessment:     1  Tinea versicolor  ketoconazole (NIZORAL) 2 % shampoo   2  Fungal rash of torso  Ambulatory referral to Dermatology   3  [facility]  Ambulatory referral to Dermatology   4  Inverse psoriasis  mometasone (ELOCON) 0 1 % cream   5  Screening for skin condition           Plan:   Tinea versicolor we discussed the concept of this yeast overgrowth will go ahead treat with ketoconazole shampoo patient to shampoo with medication and his lather from head to thighs rinse after 5 minutes repeat daily for 3 days and then weekly to prevent recurrence  Inverse psoriasis at present will go ahead treat with cortisone salve to see if we get this under control and to use as needed  Screening for Dermatologic Disorders: Nothing else of concern noted on complete exam follow up diana Ncik MD  11/4/2019,3:45 PM    Portions of the record may have been created with voice recognition software   Occasional wrong word or "sound a like" substitutions may have occurred due to the inherent limitations of voice recognition software   Read the chart carefully and recognize, using context, where substitutions have occurred

## 2019-11-04 NOTE — PATIENT INSTRUCTIONS
Tinea versicolor we discussed the concept of this yeast overgrowth will go ahead treat with ketoconazole shampoo patient to shampoo with medication and his lather from head to thighs rinse after 5 minutes repeat daily for 3 days and then weekly to prevent recurrence  Inverse psoriasis at present will go ahead treat with cortisone salve to see if we get this under control and to use as needed  Screening for Dermatologic Disorders: Nothing else of concern noted on complete exam follow up prn

## 2020-01-11 DIAGNOSIS — E78.2 MIXED HYPERLIPIDEMIA: ICD-10-CM

## 2020-01-11 RX ORDER — ROSUVASTATIN CALCIUM 20 MG/1
TABLET, COATED ORAL
Qty: 30 TABLET | Refills: 0 | Status: SHIPPED | OUTPATIENT
Start: 2020-01-11 | End: 2020-02-10

## 2020-01-12 DIAGNOSIS — I10 ESSENTIAL HYPERTENSION: ICD-10-CM

## 2020-01-13 RX ORDER — LISINOPRIL 40 MG/1
TABLET ORAL
Qty: 30 TABLET | Refills: 0 | Status: SHIPPED | OUTPATIENT
Start: 2020-01-13 | End: 2020-02-10

## 2020-01-13 RX ORDER — AMLODIPINE BESYLATE 5 MG/1
TABLET ORAL
Qty: 30 TABLET | Refills: 0 | Status: SHIPPED | OUTPATIENT
Start: 2020-01-13 | End: 2020-02-10

## 2020-01-23 ENCOUNTER — OFFICE VISIT (OUTPATIENT)
Dept: INTERNAL MEDICINE CLINIC | Facility: CLINIC | Age: 65
End: 2020-01-23
Payer: COMMERCIAL

## 2020-01-23 VITALS
HEIGHT: 72 IN | BODY MASS INDEX: 29.74 KG/M2 | WEIGHT: 219.6 LBS | HEART RATE: 78 BPM | DIASTOLIC BLOOD PRESSURE: 88 MMHG | OXYGEN SATURATION: 98 % | SYSTOLIC BLOOD PRESSURE: 150 MMHG

## 2020-01-23 DIAGNOSIS — E78.2 MIXED HYPERLIPIDEMIA: ICD-10-CM

## 2020-01-23 DIAGNOSIS — Z23 ENCOUNTER FOR IMMUNIZATION: ICD-10-CM

## 2020-01-23 DIAGNOSIS — Z12.5 PROSTATE CANCER SCREENING: ICD-10-CM

## 2020-01-23 DIAGNOSIS — I34.0 MILD MITRAL REGURGITATION: ICD-10-CM

## 2020-01-23 DIAGNOSIS — Z11.4 ENCOUNTER FOR SCREENING FOR HIV: ICD-10-CM

## 2020-01-23 DIAGNOSIS — Z11.59 ENCOUNTER FOR HEPATITIS C SCREENING TEST FOR LOW RISK PATIENT: ICD-10-CM

## 2020-01-23 DIAGNOSIS — R06.00 DYSPNEA ON EXERTION: ICD-10-CM

## 2020-01-23 DIAGNOSIS — R10.9 ABDOMINAL CRAMPING: Primary | ICD-10-CM

## 2020-01-23 DIAGNOSIS — I10 ESSENTIAL HYPERTENSION: ICD-10-CM

## 2020-01-23 DIAGNOSIS — I35.0 MILD AORTIC STENOSIS: ICD-10-CM

## 2020-01-23 PROBLEM — R05.9 COUGH: Status: RESOLVED | Noted: 2019-05-30 | Resolved: 2020-01-23

## 2020-01-23 PROCEDURE — 90471 IMMUNIZATION ADMIN: CPT | Performed by: INTERNAL MEDICINE

## 2020-01-23 PROCEDURE — 90732 PPSV23 VACC 2 YRS+ SUBQ/IM: CPT | Performed by: INTERNAL MEDICINE

## 2020-01-23 PROCEDURE — 90472 IMMUNIZATION ADMIN EACH ADD: CPT | Performed by: INTERNAL MEDICINE

## 2020-01-23 PROCEDURE — 99214 OFFICE O/P EST MOD 30 MIN: CPT | Performed by: INTERNAL MEDICINE

## 2020-01-23 PROCEDURE — 3008F BODY MASS INDEX DOCD: CPT | Performed by: INTERNAL MEDICINE

## 2020-01-23 PROCEDURE — 93000 ELECTROCARDIOGRAM COMPLETE: CPT | Performed by: INTERNAL MEDICINE

## 2020-01-23 PROCEDURE — 90662 IIV NO PRSV INCREASED AG IM: CPT | Performed by: INTERNAL MEDICINE

## 2020-01-23 PROCEDURE — 1036F TOBACCO NON-USER: CPT | Performed by: INTERNAL MEDICINE

## 2020-01-23 PROCEDURE — 1160F RVW MEDS BY RX/DR IN RCRD: CPT | Performed by: INTERNAL MEDICINE

## 2020-01-23 NOTE — PATIENT INSTRUCTIONS
Hypertensive and dyslipidemic patient with occasional dyspnea on exertion will get a walking stress test   He should take aspirin 81 mg daily   Recheck hemoglobin A1c  23 Valent pneumococcal vaccine today and influenza vaccine today  Follow-up around the time of his birthday        Referral to GI for continued crampy abdominal pain

## 2020-01-23 NOTE — PROGRESS NOTES
Assessment/Plan:       Diagnoses and all orders for this visit:    Abdominal cramping  -     Ambulatory referral to Gastroenterology; Future    Dyspnea on exertion  -     POCT ECG  -     Stress test only, exercise; Future    Essential hypertension  -     POCT ECG    Mild mitral regurgitation    Mild aortic stenosis    Mixed hyperlipidemia    Encounter for immunization  -     influenza vaccine, [id number], high-dose, PF 0 5 mL (FLUZONE HIGH-DOSE)  -     PNEUMOCOCCAL POLYSACCHARIDE VACCINE 23-VALENT =>3YO SQ IM    Prostate cancer screening    Encounter for hepatitis C screening test for low risk patient  -     Hepatitis C antibody; Future    Encounter for screening for HIV  -     HIV 1/2 AG-AB combo; Future                Subjective:      Patient ID: Louis Neville is a 71 y o  male  Hypertensive, hyperlipidemia, impaired fasting glucose with normal hemoglobin A1c      about 2 weeks ago the patient had a 20 minutes episode  He was helping a nephew move from 1 home to another and so was doing a lot of heavy lifting and carrying  He had an onset of shortness of breath associated with a sensation of "tightness "felt in the high upper back centrally  There was no diaphoresis and no nausea  He sat down to rest and 8 disappeared in about 20 minutes  This is not the 1st time this has happened; he has had this occur occasionally for the past 7-8 years in situations of heavy exertion  It has never been assessed before  A fairly loud holosystolic heart murmur translated into mild aortic stenosis and mild mitral regurgitation      He continues to experience occasional episodes of crampy GI distress with no nausea, vomiting, or diarrhea  He had  Both upper and lower endoscopy  The upper endoscopy was normal with the exception of mild antral gastritis  The lower endoscopy -to colonoscopy -was completely normal   Due to prior polyps he needs to have another colonoscopy in 4 years            The following portions of the patient's history were reviewed and updated as appropriate:   He has a past medical history of Disc degeneration, lumbar and Peripheral vascular disease ([address] )  ,  does not have any pertinent problems on file  ,   has no past surgical history on file  ,  family history includes Breast cancer in his mother; Hip fracture in his father  ,   reports that he has never smoked  He has never used smokeless tobacco  He reports that he does not drink alcohol or use drugs  ,  has No Known Allergies     Current Outpatient Medications   Medication Sig Dispense Refill    amLODIPine (NORVASC) 5 mg tablet TAKE 1 TABLET BY MOUTH EVERY DAY 30 tablet 0    ketoconazole (NIZORAL) 2 % shampoo Apply 1 application topically once a week Lather from head to thighs repeat weekly 120 mL 4    lisinopril (ZESTRIL) 40 mg tablet TAKE 1 TABLET BY MOUTH EVERY DAY 30 tablet 0    rosuvastatin (CRESTOR) 20 MG tablet TAKE 1 TABLET BY MOUTH EVERY DAY 30 tablet 0    clotrimazole-betamethasone (LOTRISONE) 1-0 05 % cream Apply topically 2 (two) times a day (Patient not taking: Reported on 10/18/2019) 45 g 3    mometasone (ELOCON) 0 1 % cream Apply topically daily To rash in groin as needed (Patient not taking: Reported on 1/23/2020) 15 g 1     No current facility-administered medications for this visit  Review of Systems   Constitutional: Negative for chills and fever  HENT: Negative for sore throat and trouble swallowing  Eyes: Negative for pain  Respiratory: Positive for shortness of breath  Negative for cough  Cardiovascular: Negative for chest pain and palpitations  Gastrointestinal: Positive for abdominal pain  Negative for blood in stool, diarrhea, nausea and vomiting  Endocrine: Negative for cold intolerance and heat intolerance  Genitourinary: Negative for dysuria, frequency and testicular pain  Musculoskeletal: Positive for back pain  Negative for joint swelling  Skin: Negative for rash     Allergic/Immunologic: Negative for immunocompromised state  Neurological: Negative for dizziness, syncope and headaches  Hematological: Negative for adenopathy  Does not bruise/bleed easily  Psychiatric/Behavioral: Negative for dysphoric mood  The patient is not nervous/anxious  Objective:  Vitals:    01/23/20 1708   BP: 150/88   Pulse: 78   SpO2: 98%      Physical Exam   Constitutional: He is oriented to person, place, and time  He appears well-developed and well-nourished  Alert overweight male patient who appears to be stated age   HENT:   Head: Normocephalic and atraumatic  Eyes: Pupils are equal, round, and reactive to light  Neck: Normal range of motion  Neck supple  No tracheal deviation present  No thyromegaly present  Cardiovascular: Normal rate, regular rhythm and normal heart sounds  Exam reveals no gallop  No murmur heard  Pulmonary/Chest: Effort normal  No respiratory distress  He has no wheezes  He has no rales  Abdominal: Soft  Bowel sounds are normal  There is no tenderness  Musculoskeletal: Normal range of motion  He exhibits no tenderness or deformity  Neurological: He is alert and oriented to person, place, and time  He has normal reflexes  Coordination normal    Skin: Skin is warm and dry  Psychiatric: He has a normal mood and affect  Patient Instructions    Hypertensive and dyslipidemic patient with occasional dyspnea on exertion will get a walking stress test   He should take aspirin 81 mg daily   Recheck hemoglobin A1c  23 Valent pneumococcal vaccine today and influenza vaccine today  Follow-up around the time of his birthday        Referral to GI for continued crampy abdominal pain

## 2020-01-25 ENCOUNTER — APPOINTMENT (OUTPATIENT)
Dept: LAB | Facility: CLINIC | Age: 65
End: 2020-01-25
Payer: COMMERCIAL

## 2020-01-25 DIAGNOSIS — Z11.59 ENCOUNTER FOR HEPATITIS C SCREENING TEST FOR LOW RISK PATIENT: ICD-10-CM

## 2020-01-25 DIAGNOSIS — Z11.4 ENCOUNTER FOR SCREENING FOR HIV: ICD-10-CM

## 2020-01-25 LAB — HCV AB SER QL: NORMAL

## 2020-01-25 PROCEDURE — 86803 HEPATITIS C AB TEST: CPT

## 2020-01-25 PROCEDURE — 36415 COLL VENOUS BLD VENIPUNCTURE: CPT

## 2020-01-25 PROCEDURE — 87389 HIV-1 AG W/HIV-1&-2 AB AG IA: CPT

## 2020-01-26 LAB — HIV 1+2 AB+HIV1 P24 AG SERPL QL IA: NORMAL

## 2020-02-03 ENCOUNTER — HOSPITAL ENCOUNTER (OUTPATIENT)
Dept: NON INVASIVE DIAGNOSTICS | Facility: CLINIC | Age: 65
Discharge: HOME | End: 2020-02-03
Payer: COMMERCIAL

## 2020-02-03 DIAGNOSIS — R06.00 DYSPNEA ON EXERTION: ICD-10-CM

## 2020-02-03 LAB
ARRHY DURING EX: NORMAL
CHEST PAIN STATEMENT: NORMAL
MAX DIASTOLIC BP: 78 MMHG
MAX HEART RATE: 139 BPM
MAX PREDICTED HEART RATE: 155 BPM
MAX. SYSTOLIC BP: 190 MMHG
PROTOCOL NAME: NORMAL
REASON FOR TERMINATION: NORMAL
TARGET HR FORMULA: NORMAL
TEST INDICATION: NORMAL
TIME IN EXERCISE PHASE: NORMAL

## 2020-02-03 PROCEDURE — 93016 CV STRESS TEST SUPVJ ONLY: CPT | Performed by: INTERNAL MEDICINE

## 2020-02-03 PROCEDURE — 93018 CV STRESS TEST I&R ONLY: CPT | Performed by: INTERNAL MEDICINE

## 2020-02-03 PROCEDURE — 93017 CV STRESS TEST TRACING ONLY: CPT

## 2020-02-08 DIAGNOSIS — E78.2 MIXED HYPERLIPIDEMIA: ICD-10-CM

## 2020-02-08 DIAGNOSIS — I10 ESSENTIAL HYPERTENSION: ICD-10-CM

## 2020-02-10 RX ORDER — LISINOPRIL 40 MG/1
TABLET ORAL
Qty: 30 TABLET | Refills: 0 | Status: SHIPPED | OUTPATIENT
Start: 2020-02-10 | End: 2020-03-11

## 2020-02-10 RX ORDER — AMLODIPINE BESYLATE 5 MG/1
TABLET ORAL
Qty: 30 TABLET | Refills: 0 | Status: SHIPPED | OUTPATIENT
Start: 2020-02-10 | End: 2020-03-11

## 2020-02-10 RX ORDER — ROSUVASTATIN CALCIUM 20 MG/1
TABLET, COATED ORAL
Qty: 30 TABLET | Refills: 0 | Status: SHIPPED | OUTPATIENT
Start: 2020-02-10 | End: 2020-03-11

## 2020-02-21 ENCOUNTER — CONSULT (OUTPATIENT)
Dept: GASTROENTEROLOGY | Facility: CLINIC | Age: 65
End: 2020-02-21
Payer: COMMERCIAL

## 2020-02-21 VITALS
HEIGHT: 72 IN | DIASTOLIC BLOOD PRESSURE: 80 MMHG | WEIGHT: 217 LBS | RESPIRATION RATE: 16 BRPM | HEART RATE: 66 BPM | SYSTOLIC BLOOD PRESSURE: 134 MMHG | BODY MASS INDEX: 29.39 KG/M2

## 2020-02-21 DIAGNOSIS — R10.9 ABDOMINAL CRAMPING: ICD-10-CM

## 2020-02-21 PROCEDURE — 99214 OFFICE O/P EST MOD 30 MIN: CPT | Performed by: INTERNAL MEDICINE

## 2020-02-21 PROCEDURE — 3079F DIAST BP 80-89 MM HG: CPT | Performed by: INTERNAL MEDICINE

## 2020-02-21 PROCEDURE — 3075F SYST BP GE 130 - 139MM HG: CPT | Performed by: INTERNAL MEDICINE

## 2020-02-21 PROCEDURE — 4040F PNEUMOC VAC/ADMIN/RCVD: CPT | Performed by: INTERNAL MEDICINE

## 2020-02-21 PROCEDURE — 3008F BODY MASS INDEX DOCD: CPT | Performed by: INTERNAL MEDICINE

## 2020-02-21 PROCEDURE — 1036F TOBACCO NON-USER: CPT | Performed by: INTERNAL MEDICINE

## 2020-02-21 NOTE — PROGRESS NOTES
[facility]      Chief Complaint:  Abdominal cramping    HPI:  Angie Dixon is a 71 y o   male who presents with occasional abdominal cramping across the mid and lower abdomen which is directly related to stress at his job  Patient has had a significant workup in the past including colonoscopy with terminal ileoscopy, colo guard, blood work, ultrasound, EGD  Results were very mild inflammation of the GE junction on gastroscopy, essentially unremarkable LFTs and CBC except for slightly low protein and a slightly elevated eosinophil count  Ultrasound showed mild fatty liver with hepatomegaly  Again is LFTs were normal   Patient has no nausea or vomiting  No radiation of the discomfort  It lasts until the stressful situation is over  He has no melena hematochezia or rectal bleeding  No weight loss  No fever or chills  No alarm symptomatology at all         Review of Systems:   Constitutional: No fever or chills, feels well, no tiredness, no recent weight gain or weight loss  HENT: No complaints of earache, no hearing loss, no nosebleeds, no nasal discharge, no sore throat, no hoarseness  Eyes: No complaints of eye pain, no red eyes, no discharge from eyes, no itchy eyes  Cardiovascular: No complaints of slow heart rate, no fast heart rate, no chest pain, no palpitations, no leg claudication, no lower extremity edema  Respiratory: No complaints of shortness of breath, no wheezing, no cough, no SOB on exertion, no orthopnea  Gastrointestinal: As noted in HPI  Genitourinary: No complaints of dysuria, no incontinence, no hesitancy, no nocturia  Musculoskeletal: No complaints of arthralgia, no myalgias, no joint swelling or stiffness, no limb pain or swelling  Neurological: No complaints of headache, no confusion, no convulsions, no numbness or tingling, no dizziness or fainting, no limb weakness, no difficulty walking      Skin: No complaints of skin rash or skin lesions, no itching, no skin wound, no dry skin  Hematological/Lymphatic: No complaints of swollen glands, does not bleed easy  Allergic/Immunologic: No immunocompromised state  Endocrine:  No complaints of polyuria, no polydipsia  Psychiatric/Behavioral: is not suicidal, no sleep disturbances, no anxiety or depression, no change in personality, no emotional problems  Historical Information   Past Medical History:   Diagnosis Date    Disc degeneration, lumbar     Peripheral vascular disease ([address] )      History reviewed  No pertinent surgical history  Social History   Social History     Substance and Sexual Activity   Alcohol Use No     Social History     Substance and Sexual Activity   Drug Use No     Social History     Tobacco Use   Smoking Status Never Smoker   Smokeless Tobacco Never Used     Family History   Problem Relation Age of Onset    Breast cancer Mother     Hip fracture Father          Current Medications: has a current medication list which includes the following prescription(s): amlodipine, lisinopril, rosuvastatin, clotrimazole-betamethasone, hyoscyamine, ketoconazole, and mometasone  Vital Signs: /80   Pulse 66   Resp 16   Ht 6' (1 829 m)   Wt 98 4 kg (217 lb)   BMI 29 43 kg/m²       Physical Exam:   Constitutional  General Appearance: No acute distress, well appearing and well nourished  Head  Normocephalic  Eyes  Conjunctivae and lids: No swelling, erythema, or discharge  Pupils and irises: Equal, round and reactive to light  Ears, Nose, Mouth, and Throat  External inspection of ears and nose: Normal  Nasal mucosa, septum and turbinates: Normal without edema or erythema/   Oropharynx: Normal with no erythema, edema, exudate or lesions  Neck  Normal range of motion  Neck supple  Cardiovascular  Auscultation of the heart: Normal rate and rhythm, normal S1 and S2 without murmurs    Examination of the extremities for edema and/or varicosities: Normal  Pulmonary/Chest  Respiratory effort: No increased work of breathing or signs of respiratory distress  Auscultation of lungs: Clear to auscultation, equal breath sounds bilaterally, no wheezes, rales, no rhonchi  Abdomen  Abdomen: Non-tender, no masses  Liver and spleen: No hepatomegaly or splenomegaly  Musculoskeletal  Gait and station: normal   Digits and Nails: normal without clubbing or cyanosis  Inspection/palpation of joints, bones, and muscles: Normal  Neurological  No nystagmus or asterixis  Skin  Skin and subcutaneous tissue: Normal without rashes or lesions  Lymphatic  Palpation of the lymph nodes in neck: No lymphadenopathy  Psychiatric  Orientation to person, place and time: Normal   Mood and affect: Normal          Labs:  Lab Results   Component Value Date    ALT 57 06/22/2019    AST 27 06/22/2019    BUN 23 06/22/2019    CALCIUM 9 9 06/22/2019     06/22/2019    CO2 27 06/22/2019    CREATININE 1 18 06/22/2019    HDL 42 06/22/2019    HCT 41 3 06/22/2019    HGB 13 1 06/22/2019    HGBA1C 6 3 06/22/2019     06/22/2019    K 5 0 06/22/2019    PSA 0 8 06/22/2019    TRIG 162 (H) 06/22/2019    WBC 7 09 06/22/2019         X-Rays & Procedures:   No orders to display           ______________________________________________________________________      Assessment & Plan:     Ad Cobos was seen today for abdominal pain  Diagnoses and all orders for this visit:    Abdominal cramping  -     Ambulatory referral to Gastroenterology  -     hyoscyamine (LEVSIN/SL) 0 125 mg SL tablet; Take 1 tablet (0 125 mg total) by mouth every 4 (four) hours as needed for cramping    There is very significant and close relationship between his symptoms and stress on his job  He is going to retire in July  However his symptomatology is only very mild and sporadic  There are no alarm symptoms  Patient will be given a trial of Levsin  He will call me with his progress    We will see him again in 1 year

## 2020-03-11 DIAGNOSIS — E78.2 MIXED HYPERLIPIDEMIA: ICD-10-CM

## 2020-03-11 DIAGNOSIS — I10 ESSENTIAL HYPERTENSION: ICD-10-CM

## 2020-03-11 RX ORDER — AMLODIPINE BESYLATE 5 MG/1
TABLET ORAL
Qty: 30 TABLET | Refills: 0 | Status: SHIPPED | OUTPATIENT
Start: 2020-03-11 | End: 2020-04-07

## 2020-03-11 RX ORDER — ROSUVASTATIN CALCIUM 20 MG/1
TABLET, COATED ORAL
Qty: 30 TABLET | Refills: 0 | Status: SHIPPED | OUTPATIENT
Start: 2020-03-11 | End: 2020-04-07

## 2020-03-11 RX ORDER — LISINOPRIL 40 MG/1
TABLET ORAL
Qty: 30 TABLET | Refills: 0 | Status: SHIPPED | OUTPATIENT
Start: 2020-03-11 | End: 2020-04-07

## 2020-03-16 ENCOUNTER — OFFICE VISIT (OUTPATIENT)
Dept: INTERNAL MEDICINE CLINIC | Facility: CLINIC | Age: 65
End: 2020-03-16
Payer: COMMERCIAL

## 2020-03-16 VITALS
WEIGHT: 216 LBS | HEIGHT: 72 IN | SYSTOLIC BLOOD PRESSURE: 160 MMHG | HEART RATE: 81 BPM | BODY MASS INDEX: 29.26 KG/M2 | OXYGEN SATURATION: 98 % | TEMPERATURE: 98.1 F | DIASTOLIC BLOOD PRESSURE: 72 MMHG

## 2020-03-16 DIAGNOSIS — J06.9 VIRAL UPPER RESPIRATORY TRACT INFECTION: ICD-10-CM

## 2020-03-16 DIAGNOSIS — I35.0 MILD AORTIC STENOSIS: Primary | ICD-10-CM

## 2020-03-16 DIAGNOSIS — I10 ESSENTIAL HYPERTENSION: ICD-10-CM

## 2020-03-16 PROCEDURE — 4040F PNEUMOC VAC/ADMIN/RCVD: CPT | Performed by: PHYSICIAN ASSISTANT

## 2020-03-16 PROCEDURE — 3288F FALL RISK ASSESSMENT DOCD: CPT | Performed by: PHYSICIAN ASSISTANT

## 2020-03-16 PROCEDURE — 3077F SYST BP >= 140 MM HG: CPT | Performed by: PHYSICIAN ASSISTANT

## 2020-03-16 PROCEDURE — 1036F TOBACCO NON-USER: CPT | Performed by: PHYSICIAN ASSISTANT

## 2020-03-16 PROCEDURE — 99214 OFFICE O/P EST MOD 30 MIN: CPT | Performed by: PHYSICIAN ASSISTANT

## 2020-03-16 PROCEDURE — 1101F PT FALLS ASSESS-DOCD LE1/YR: CPT | Performed by: PHYSICIAN ASSISTANT

## 2020-03-16 PROCEDURE — 3008F BODY MASS INDEX DOCD: CPT | Performed by: PHYSICIAN ASSISTANT

## 2020-03-16 PROCEDURE — 3078F DIAST BP <80 MM HG: CPT | Performed by: PHYSICIAN ASSISTANT

## 2020-03-16 NOTE — PROGRESS NOTES
Assessment/Plan: physical exam unremarkable  Most likely viral illness resolving recommend symptomatic treatment  He is okay to go back to work       Diagnoses and all orders for this visit:    Mild aortic stenosis    Essential hypertension    Viral upper respiratory tract infection        No problem-specific Assessment & Plan notes found for this encounter  Subjective:      Patient ID: Ellie Jin is a 71 y o  male  5 days ago he developed scratchy throat stuffy nose and a slight cough  On the 1st day he had an episode of vomiting and diarrhea but this resolved scratchy throat and coughing have resolved today he has somewhat of a frontal headache mild no facial pain no fever chills his appetite is good he sleeping well control his stuffy nose symptoms with NyQuil  He is anxious to go back to work  Normally active and well previous problems with abdominal pain cramping vomiting followed by GI doing well on Levsin history of hypertension hyperlipidemia well controlled with meds      The following portions of the patient's history were reviewed and updated as appropriate:   He has a past medical history of Disc degeneration, lumbar and Peripheral vascular disease ([address] )  ,  does not have any pertinent problems on file  ,   has no past surgical history on file  ,  family history includes Breast cancer in his mother; Hip fracture in his father  ,   reports that he has never smoked  He has never used smokeless tobacco  He reports that he does not drink alcohol or use drugs  ,  has No Known Allergies     Current Outpatient Medications   Medication Sig Dispense Refill    amLODIPine (NORVASC) 5 mg tablet TAKE 1 TABLET BY MOUTH EVERY DAY 30 tablet 0    hyoscyamine (LEVSIN/SL) 0 125 mg SL tablet Take 1 tablet (0 125 mg total) by mouth every 4 (four) hours as needed for cramping 30 tablet 3    ketoconazole (NIZORAL) 2 % shampoo Apply 1 application topically once a week Lather from head to thighs repeat weekly 120 mL 4    lisinopril (ZESTRIL) 40 mg tablet TAKE 1 TABLET BY MOUTH EVERY DAY 30 tablet 0    rosuvastatin (CRESTOR) 20 MG tablet TAKE 1 TABLET BY MOUTH EVERY DAY 30 tablet 0     No current facility-administered medications for this visit  Review of Systems   Constitutional: Negative for activity change, appetite change, chills, diaphoresis, fatigue, fever and unexpected weight change  HENT: Positive for congestion  Negative for dental problem, drooling, ear discharge, ear pain, facial swelling, hearing loss, nosebleeds, postnasal drip, rhinorrhea, sinus pressure, sinus pain, sneezing, sore throat, tinnitus, trouble swallowing and voice change  Eyes: Negative for photophobia, pain, discharge, redness, itching and visual disturbance  Respiratory: Positive for cough  Negative for apnea, choking, chest tightness, shortness of breath, wheezing and stridor  Cardiovascular: Negative for chest pain, palpitations and leg swelling  Gastrointestinal: Negative for abdominal distention, abdominal pain, anal bleeding, blood in stool, constipation, diarrhea, nausea, rectal pain and vomiting  Endocrine: Negative for cold intolerance, heat intolerance, polydipsia, polyphagia and polyuria  Genitourinary: Negative for decreased urine volume, difficulty urinating, dysuria, enuresis, flank pain, frequency, genital sores, hematuria and urgency  Musculoskeletal: Negative for arthralgias, back pain, gait problem, joint swelling, myalgias, neck pain and neck stiffness  Skin: Negative for color change, pallor, rash and wound  Neurological: Negative for dizziness, tremors, seizures, syncope, facial asymmetry, speech difficulty, weakness, light-headedness, numbness and headaches  Hematological: Negative for adenopathy  Does not bruise/bleed easily     Psychiatric/Behavioral: Negative for agitation, behavioral problems, confusion, decreased concentration, dysphoric mood, hallucinations, self-injury, sleep disturbance and suicidal ideas  The patient is not nervous/anxious and is not hyperactive  Objective:  Vitals:    03/16/20 1409   BP: 160/72   BP Location: Left arm   Patient Position: Sitting   Cuff Size: Standard   Pulse: 81   Temp: 98 1 °F (36 7 °C)   TempSrc: Tympanic   SpO2: 98%   Weight: 98 kg (216 lb)   Height: 6' (1 829 m)     Body mass index is 29 29 kg/m²  Physical Exam   Constitutional: He is oriented to person, place, and time  He appears well-developed and well-nourished  HENT:   Head: Normocephalic and atraumatic  Right Ear: External ear normal    Left Ear: External ear normal    Nose: Nose normal    Mouth/Throat: Oropharynx is clear and moist    Eyes: Pupils are equal, round, and reactive to light  Conjunctivae are normal    Neck: Normal range of motion  Neck supple  No JVD present  No thyromegaly present  Cardiovascular: Normal rate, regular rhythm and intact distal pulses  Murmur ( 2/6 at the base) heard  Pulmonary/Chest: Effort normal and breath sounds normal  No stridor  No respiratory distress  He has no wheezes  He has no rales  He exhibits no tenderness  Abdominal: Soft  Bowel sounds are normal    Musculoskeletal: Normal range of motion  He exhibits no edema  Lymphadenopathy:     He has no cervical adenopathy  Neurological: He is alert and oriented to person, place, and time  He has normal reflexes  Skin: Skin is warm and dry  Vitals reviewed

## 2020-03-16 NOTE — LETTER
March 16, 2020     Patient: Colleen Hansen   YOB: 1955   Date of Visit: 3/16/2020       To Whom it May Concern:    Keke Shane is under my professional care  He was seen in my office on 3/16/2020  He may return to work on 3/17/20  If you have any questions or concerns, please don't hesitate to call  Sincerely,          Michaelle Carrasquillo PA-C        CC: Dejuan Menard

## 2020-04-07 DIAGNOSIS — E78.2 MIXED HYPERLIPIDEMIA: ICD-10-CM

## 2020-04-07 DIAGNOSIS — I10 ESSENTIAL HYPERTENSION: ICD-10-CM

## 2020-04-07 RX ORDER — AMLODIPINE BESYLATE 5 MG/1
TABLET ORAL
Qty: 30 TABLET | Refills: 0 | Status: SHIPPED | OUTPATIENT
Start: 2020-04-07 | End: 2020-05-04

## 2020-04-07 RX ORDER — ROSUVASTATIN CALCIUM 20 MG/1
TABLET, COATED ORAL
Qty: 30 TABLET | Refills: 0 | Status: SHIPPED | OUTPATIENT
Start: 2020-04-07 | End: 2020-05-04

## 2020-04-07 RX ORDER — LISINOPRIL 40 MG/1
TABLET ORAL
Qty: 30 TABLET | Refills: 0 | Status: SHIPPED | OUTPATIENT
Start: 2020-04-07 | End: 2020-05-04

## 2020-05-03 DIAGNOSIS — I10 ESSENTIAL HYPERTENSION: ICD-10-CM

## 2020-05-03 DIAGNOSIS — E78.2 MIXED HYPERLIPIDEMIA: ICD-10-CM

## 2020-05-04 RX ORDER — AMLODIPINE BESYLATE 5 MG/1
TABLET ORAL
Qty: 30 TABLET | Refills: 0 | Status: SHIPPED | OUTPATIENT
Start: 2020-05-04 | End: 2020-06-04

## 2020-05-04 RX ORDER — ROSUVASTATIN CALCIUM 20 MG/1
TABLET, COATED ORAL
Qty: 30 TABLET | Refills: 0 | Status: SHIPPED | OUTPATIENT
Start: 2020-05-04 | End: 2020-06-04

## 2020-05-04 RX ORDER — LISINOPRIL 40 MG/1
TABLET ORAL
Qty: 30 TABLET | Refills: 0 | Status: SHIPPED | OUTPATIENT
Start: 2020-05-04 | End: 2020-06-04

## 2020-06-04 DIAGNOSIS — E78.2 MIXED HYPERLIPIDEMIA: ICD-10-CM

## 2020-06-04 DIAGNOSIS — I10 ESSENTIAL HYPERTENSION: ICD-10-CM

## 2020-06-04 RX ORDER — LISINOPRIL 40 MG/1
TABLET ORAL
Qty: 30 TABLET | Refills: 0 | Status: SHIPPED | OUTPATIENT
Start: 2020-06-04 | End: 2020-07-02

## 2020-06-04 RX ORDER — AMLODIPINE BESYLATE 5 MG/1
TABLET ORAL
Qty: 30 TABLET | Refills: 0 | Status: SHIPPED | OUTPATIENT
Start: 2020-06-04 | End: 2020-07-02

## 2020-06-04 RX ORDER — ROSUVASTATIN CALCIUM 20 MG/1
TABLET, COATED ORAL
Qty: 30 TABLET | Refills: 0 | Status: SHIPPED | OUTPATIENT
Start: 2020-06-04 | End: 2020-07-02

## 2020-06-23 ENCOUNTER — TELEPHONE (OUTPATIENT)
Dept: INTERNAL MEDICINE CLINIC | Facility: CLINIC | Age: 65
End: 2020-06-23

## 2020-06-24 ENCOUNTER — OFFICE VISIT (OUTPATIENT)
Dept: INTERNAL MEDICINE CLINIC | Facility: CLINIC | Age: 65
End: 2020-06-24
Payer: COMMERCIAL

## 2020-06-24 VITALS
SYSTOLIC BLOOD PRESSURE: 116 MMHG | TEMPERATURE: 98.2 F | WEIGHT: 212.6 LBS | HEIGHT: 71 IN | BODY MASS INDEX: 29.76 KG/M2 | HEART RATE: 68 BPM | DIASTOLIC BLOOD PRESSURE: 68 MMHG | OXYGEN SATURATION: 97 %

## 2020-06-24 DIAGNOSIS — E78.2 MIXED HYPERLIPIDEMIA: ICD-10-CM

## 2020-06-24 DIAGNOSIS — I34.0 MILD MITRAL REGURGITATION: ICD-10-CM

## 2020-06-24 DIAGNOSIS — Z12.5 PROSTATE CANCER SCREENING: ICD-10-CM

## 2020-06-24 DIAGNOSIS — R73.01 IMPAIRED FASTING GLUCOSE: ICD-10-CM

## 2020-06-24 DIAGNOSIS — I10 ESSENTIAL HYPERTENSION: Primary | ICD-10-CM

## 2020-06-24 DIAGNOSIS — I35.0 MILD AORTIC STENOSIS: ICD-10-CM

## 2020-06-24 PROCEDURE — 1160F RVW MEDS BY RX/DR IN RCRD: CPT | Performed by: INTERNAL MEDICINE

## 2020-06-24 PROCEDURE — 3008F BODY MASS INDEX DOCD: CPT | Performed by: INTERNAL MEDICINE

## 2020-06-24 PROCEDURE — 3078F DIAST BP <80 MM HG: CPT | Performed by: INTERNAL MEDICINE

## 2020-06-24 PROCEDURE — 99213 OFFICE O/P EST LOW 20 MIN: CPT | Performed by: INTERNAL MEDICINE

## 2020-06-24 PROCEDURE — 1036F TOBACCO NON-USER: CPT | Performed by: INTERNAL MEDICINE

## 2020-06-24 PROCEDURE — 3074F SYST BP LT 130 MM HG: CPT | Performed by: INTERNAL MEDICINE

## 2020-06-24 PROCEDURE — 4040F PNEUMOC VAC/ADMIN/RCVD: CPT | Performed by: INTERNAL MEDICINE

## 2020-06-26 ENCOUNTER — TELEPHONE (OUTPATIENT)
Dept: INTERNAL MEDICINE CLINIC | Facility: CLINIC | Age: 65
End: 2020-06-26

## 2020-06-26 NOTE — TELEPHONE ENCOUNTER
NETO: Dr Vinicio Marin the ECHO was DENIED by [facility]    Since the hardening of a valve in the heart is mild  Plus he had an ECHO done last yr  [employer] through his insurance will only pay for an ECHO every 3 yrs  Unless there are New signs or symptoms of heart disease  You can do a peer to peer; if you wish      P#: [phone]    Please let me know, how you'd like to proceed

## 2020-07-02 DIAGNOSIS — I10 ESSENTIAL HYPERTENSION: ICD-10-CM

## 2020-07-02 DIAGNOSIS — E78.2 MIXED HYPERLIPIDEMIA: ICD-10-CM

## 2020-07-02 RX ORDER — ROSUVASTATIN CALCIUM 20 MG/1
TABLET, COATED ORAL
Qty: 30 TABLET | Refills: 0 | Status: SHIPPED | OUTPATIENT
Start: 2020-07-02 | End: 2020-07-27

## 2020-07-02 RX ORDER — AMLODIPINE BESYLATE 5 MG/1
TABLET ORAL
Qty: 30 TABLET | Refills: 0 | Status: SHIPPED | OUTPATIENT
Start: 2020-07-02 | End: 2020-07-27

## 2020-07-02 RX ORDER — LISINOPRIL 40 MG/1
TABLET ORAL
Qty: 30 TABLET | Refills: 0 | Status: SHIPPED | OUTPATIENT
Start: 2020-07-02 | End: 2020-07-27

## 2020-07-06 ENCOUNTER — APPOINTMENT (OUTPATIENT)
Dept: LAB | Facility: CLINIC | Age: 65
End: 2020-07-06
Payer: COMMERCIAL

## 2020-07-06 ENCOUNTER — TELEPHONE (OUTPATIENT)
Dept: INTERNAL MEDICINE CLINIC | Facility: CLINIC | Age: 65
End: 2020-07-06

## 2020-07-06 DIAGNOSIS — E83.52 HYPERCALCEMIA: ICD-10-CM

## 2020-07-06 DIAGNOSIS — E83.52 HYPERCALCEMIA: Primary | ICD-10-CM

## 2020-07-06 DIAGNOSIS — E78.2 MIXED HYPERLIPIDEMIA: ICD-10-CM

## 2020-07-06 DIAGNOSIS — R73.01 IMPAIRED FASTING GLUCOSE: ICD-10-CM

## 2020-07-06 DIAGNOSIS — I10 ESSENTIAL HYPERTENSION: ICD-10-CM

## 2020-07-06 DIAGNOSIS — Z12.5 PROSTATE CANCER SCREENING: ICD-10-CM

## 2020-07-06 LAB
25(OH)D3 SERPL-MCNC: 21 NG/ML (ref 30–100)
ALBUMIN SERPL BCP-MCNC: 4.2 G/DL (ref 3.5–5)
ALP SERPL-CCNC: 86 U/L (ref 46–116)
ALT SERPL W P-5'-P-CCNC: 57 U/L (ref 12–78)
ANION GAP SERPL CALCULATED.3IONS-SCNC: 7 MMOL/L (ref 4–13)
AST SERPL W P-5'-P-CCNC: 29 U/L (ref 5–45)
BASOPHILS # BLD AUTO: 0.06 THOUSANDS/ΜL (ref 0–0.1)
BASOPHILS NFR BLD AUTO: 1 % (ref 0–1)
BILIRUB SERPL-MCNC: 0.33 MG/DL (ref 0.2–1)
BILIRUB UR QL STRIP: NEGATIVE
BUN SERPL-MCNC: 22 MG/DL (ref 5–25)
CA-I BLD-SCNC: 1.24 MMOL/L (ref 1.12–1.32)
CALCIUM ALBUM COR SERPL-MCNC: 10.3 MG/DL (ref 8.3–10.1)
CALCIUM SERPL-MCNC: 10.5 MG/DL (ref 8.3–10.1)
CHLORIDE SERPL-SCNC: 106 MMOL/L (ref 100–108)
CHOLEST SERPL-MCNC: 178 MG/DL (ref 50–200)
CLARITY UR: CLEAR
CO2 SERPL-SCNC: 24 MMOL/L (ref 21–32)
COLOR UR: YELLOW
CREAT SERPL-MCNC: 1.21 MG/DL (ref 0.6–1.3)
EOSINOPHIL # BLD AUTO: 0.82 THOUSAND/ΜL (ref 0–0.61)
EOSINOPHIL NFR BLD AUTO: 11 % (ref 0–6)
ERYTHROCYTE [DISTWIDTH] IN BLOOD BY AUTOMATED COUNT: 12.9 % (ref 11.6–15.1)
EST. AVERAGE GLUCOSE BLD GHB EST-MCNC: 134 MG/DL
GFR SERPL CREATININE-BSD FRML MDRD: 62 ML/MIN/1.73SQ M
GLUCOSE P FAST SERPL-MCNC: 104 MG/DL (ref 65–99)
GLUCOSE UR STRIP-MCNC: NEGATIVE MG/DL
HBA1C MFR BLD: 6.3 %
HCT VFR BLD AUTO: 43.1 % (ref 36.5–49.3)
HDLC SERPL-MCNC: 39 MG/DL
HGB BLD-MCNC: 14 G/DL (ref 12–17)
HGB UR QL STRIP.AUTO: NEGATIVE
IGA SERPL-MCNC: 274 MG/DL (ref 70–400)
IGG SERPL-MCNC: 1230 MG/DL (ref 700–1600)
IGM SERPL-MCNC: 112 MG/DL (ref 40–230)
IMM GRANULOCYTES # BLD AUTO: 0.02 THOUSAND/UL (ref 0–0.2)
IMM GRANULOCYTES NFR BLD AUTO: 0 % (ref 0–2)
KETONES UR STRIP-MCNC: NEGATIVE MG/DL
LDLC SERPL CALC-MCNC: 105 MG/DL (ref 0–100)
LEUKOCYTE ESTERASE UR QL STRIP: NEGATIVE
LYMPHOCYTES # BLD AUTO: 2.38 THOUSANDS/ΜL (ref 0.6–4.47)
LYMPHOCYTES NFR BLD AUTO: 31 % (ref 14–44)
MCH RBC QN AUTO: 30.6 PG (ref 26.8–34.3)
MCHC RBC AUTO-ENTMCNC: 32.5 G/DL (ref 31.4–37.4)
MCV RBC AUTO: 94 FL (ref 82–98)
MONOCYTES # BLD AUTO: 0.62 THOUSAND/ΜL (ref 0.17–1.22)
MONOCYTES NFR BLD AUTO: 8 % (ref 4–12)
NEUTROPHILS # BLD AUTO: 3.83 THOUSANDS/ΜL (ref 1.85–7.62)
NEUTS SEG NFR BLD AUTO: 49 % (ref 43–75)
NITRITE UR QL STRIP: NEGATIVE
NRBC BLD AUTO-RTO: 0 /100 WBCS
PH UR STRIP.AUTO: 6 [PH]
PLATELET # BLD AUTO: 272 THOUSANDS/UL (ref 149–390)
PMV BLD AUTO: 9.6 FL (ref 8.9–12.7)
POTASSIUM SERPL-SCNC: 4.9 MMOL/L (ref 3.5–5.3)
PROT SERPL-MCNC: 8.4 G/DL (ref 6.4–8.2)
PROT UR STRIP-MCNC: NEGATIVE MG/DL
PSA SERPL-MCNC: 0.9 NG/ML (ref 0–4)
PTH-INTACT SERPL-MCNC: 21.3 PG/ML (ref 18.4–80.1)
RBC # BLD AUTO: 4.58 MILLION/UL (ref 3.88–5.62)
SODIUM SERPL-SCNC: 137 MMOL/L (ref 136–145)
SP GR UR STRIP.AUTO: 1.02 (ref 1–1.03)
TRIGL SERPL-MCNC: 172 MG/DL
TSH SERPL DL<=0.05 MIU/L-ACNC: 1.3 UIU/ML (ref 0.36–3.74)
UROBILINOGEN UR QL STRIP.AUTO: 0.2 E.U./DL
WBC # BLD AUTO: 7.73 THOUSAND/UL (ref 4.31–10.16)

## 2020-07-06 PROCEDURE — 36415 COLL VENOUS BLD VENIPUNCTURE: CPT

## 2020-07-06 PROCEDURE — 83036 HEMOGLOBIN GLYCOSYLATED A1C: CPT

## 2020-07-06 PROCEDURE — 84443 ASSAY THYROID STIM HORMONE: CPT

## 2020-07-06 PROCEDURE — 82330 ASSAY OF CALCIUM: CPT

## 2020-07-06 PROCEDURE — 82784 ASSAY IGA/IGD/IGG/IGM EACH: CPT

## 2020-07-06 PROCEDURE — 84165 PROTEIN E-PHORESIS SERUM: CPT

## 2020-07-06 PROCEDURE — 81003 URINALYSIS AUTO W/O SCOPE: CPT | Performed by: INTERNAL MEDICINE

## 2020-07-06 PROCEDURE — 82306 VITAMIN D 25 HYDROXY: CPT

## 2020-07-06 PROCEDURE — 80061 LIPID PANEL: CPT

## 2020-07-06 PROCEDURE — 84165 PROTEIN E-PHORESIS SERUM: CPT | Performed by: PATHOLOGY

## 2020-07-06 PROCEDURE — 82397 CHEMILUMINESCENT ASSAY: CPT

## 2020-07-06 PROCEDURE — 80053 COMPREHEN METABOLIC PANEL: CPT

## 2020-07-06 PROCEDURE — G0103 PSA SCREENING: HCPCS

## 2020-07-06 PROCEDURE — 85025 COMPLETE CBC W/AUTO DIFF WBC: CPT

## 2020-07-06 PROCEDURE — 83970 ASSAY OF PARATHORMONE: CPT

## 2020-07-06 NOTE — TELEPHONE ENCOUNTER
I had messaged Dr Erendira Vicente on: 6/26 about the denial    Since the hardening of a valve in his heart is mild and his regurgitation is mild  - his insurance will only approve an ECHO every 3 yrs- unless his cardiac symptoms change

## 2020-07-06 NOTE — TELEPHONE ENCOUNTER
----- Message from Damian Valero MD sent at 7/6/2020 12:08 PM EDT -----  High calcium level requires additional lab testing  S MO    Please call the patient

## 2020-07-07 ENCOUNTER — TELEPHONE (OUTPATIENT)
Dept: INTERNAL MEDICINE CLINIC | Facility: CLINIC | Age: 65
End: 2020-07-07

## 2020-07-07 LAB
ALBUMIN SERPL ELPH-MCNC: 4.56 G/DL (ref 3.5–5)
ALBUMIN SERPL ELPH-MCNC: 58.5 % (ref 52–65)
ALPHA1 GLOB SERPL ELPH-MCNC: 0.23 G/DL (ref 0.1–0.4)
ALPHA1 GLOB SERPL ELPH-MCNC: 2.9 % (ref 2.5–5)
ALPHA2 GLOB SERPL ELPH-MCNC: 0.95 G/DL (ref 0.4–1.2)
ALPHA2 GLOB SERPL ELPH-MCNC: 12.2 % (ref 7–13)
BETA GLOB ABNORMAL SERPL ELPH-MCNC: 0.45 G/DL (ref 0.4–0.8)
BETA1 GLOB SERPL ELPH-MCNC: 5.8 % (ref 5–13)
BETA2 GLOB SERPL ELPH-MCNC: 5.8 % (ref 2–8)
BETA2+GAMMA GLOB SERPL ELPH-MCNC: 0.45 G/DL (ref 0.2–0.5)
GAMMA GLOB ABNORMAL SERPL ELPH-MCNC: 1.15 G/DL (ref 0.5–1.6)
GAMMA GLOB SERPL ELPH-MCNC: 14.8 % (ref 12–22)
IGG/ALB SER: 1.41 {RATIO} (ref 1.1–1.8)
PROT PATTERN SERPL ELPH-IMP: NORMAL
PROT SERPL-MCNC: 7.8 G/DL (ref 6.4–8.2)

## 2020-07-07 NOTE — TELEPHONE ENCOUNTER
Called patient and notified    He also asked about the Echo that was denied, I notified him that Dr David Teague said it can wait until next year   (there is a previous message about this)

## 2020-07-07 NOTE — TELEPHONE ENCOUNTER
----- Message from Jhoana Adkins MD sent at 7/7/2020  8:02 AM EDT -----  Low vitamin- D  Should take vitamin-D over-the-counter 1000 units 2 capsules daily  This is probably the cause of that elevated alkaline phosphatase although 2 other result still need to come in

## 2020-07-14 LAB — PTH RELATED PROT SERPL-SCNC: <2 PMOL/L

## 2020-07-25 DIAGNOSIS — I10 ESSENTIAL HYPERTENSION: ICD-10-CM

## 2020-07-25 DIAGNOSIS — E78.2 MIXED HYPERLIPIDEMIA: ICD-10-CM

## 2020-07-27 RX ORDER — AMLODIPINE BESYLATE 5 MG/1
TABLET ORAL
Qty: 30 TABLET | Refills: 0 | Status: SHIPPED | OUTPATIENT
Start: 2020-07-27 | End: 2020-08-24

## 2020-07-27 RX ORDER — LISINOPRIL 40 MG/1
TABLET ORAL
Qty: 30 TABLET | Refills: 0 | Status: SHIPPED | OUTPATIENT
Start: 2020-07-27 | End: 2020-08-24

## 2020-07-27 RX ORDER — ROSUVASTATIN CALCIUM 20 MG/1
TABLET, COATED ORAL
Qty: 30 TABLET | Refills: 0 | Status: SHIPPED | OUTPATIENT
Start: 2020-07-27 | End: 2020-08-24

## 2020-08-22 DIAGNOSIS — E78.2 MIXED HYPERLIPIDEMIA: ICD-10-CM

## 2020-08-22 DIAGNOSIS — I10 ESSENTIAL HYPERTENSION: ICD-10-CM

## 2020-08-24 RX ORDER — AMLODIPINE BESYLATE 5 MG/1
TABLET ORAL
Qty: 30 TABLET | Refills: 0 | Status: SHIPPED | OUTPATIENT
Start: 2020-08-24 | End: 2020-10-15

## 2020-08-24 RX ORDER — ROSUVASTATIN CALCIUM 20 MG/1
TABLET, COATED ORAL
Qty: 30 TABLET | Refills: 0 | Status: SHIPPED | OUTPATIENT
Start: 2020-08-24 | End: 2020-10-15

## 2020-08-24 RX ORDER — LISINOPRIL 40 MG/1
TABLET ORAL
Qty: 30 TABLET | Refills: 0 | Status: SHIPPED | OUTPATIENT
Start: 2020-08-24 | End: 2020-10-15

## 2020-10-15 DIAGNOSIS — E78.2 MIXED HYPERLIPIDEMIA: ICD-10-CM

## 2020-10-15 DIAGNOSIS — I10 ESSENTIAL HYPERTENSION: ICD-10-CM

## 2020-10-15 RX ORDER — AMLODIPINE BESYLATE 5 MG/1
TABLET ORAL
Qty: 30 TABLET | Refills: 0 | Status: SHIPPED | OUTPATIENT
Start: 2020-10-15 | End: 2020-12-07

## 2020-10-15 RX ORDER — LISINOPRIL 40 MG/1
TABLET ORAL
Qty: 30 TABLET | Refills: 0 | Status: SHIPPED | OUTPATIENT
Start: 2020-10-15 | End: 2020-12-07

## 2020-10-15 RX ORDER — ROSUVASTATIN CALCIUM 20 MG/1
TABLET, COATED ORAL
Qty: 30 TABLET | Refills: 0 | Status: SHIPPED | OUTPATIENT
Start: 2020-10-15 | End: 2020-12-07

## 2020-12-07 DIAGNOSIS — I10 ESSENTIAL HYPERTENSION: ICD-10-CM

## 2020-12-07 DIAGNOSIS — E78.2 MIXED HYPERLIPIDEMIA: ICD-10-CM

## 2020-12-07 RX ORDER — AMLODIPINE BESYLATE 5 MG/1
TABLET ORAL
Qty: 30 TABLET | Refills: 0 | Status: SHIPPED | OUTPATIENT
Start: 2020-12-07 | End: 2021-01-04

## 2020-12-07 RX ORDER — ROSUVASTATIN CALCIUM 20 MG/1
TABLET, COATED ORAL
Qty: 30 TABLET | Refills: 0 | Status: SHIPPED | OUTPATIENT
Start: 2020-12-07 | End: 2021-01-04

## 2020-12-07 RX ORDER — LISINOPRIL 40 MG/1
TABLET ORAL
Qty: 30 TABLET | Refills: 0 | Status: SHIPPED | OUTPATIENT
Start: 2020-12-07 | End: 2021-01-04

## 2021-01-04 DIAGNOSIS — E78.2 MIXED HYPERLIPIDEMIA: ICD-10-CM

## 2021-01-04 DIAGNOSIS — I10 ESSENTIAL HYPERTENSION: ICD-10-CM

## 2021-01-04 RX ORDER — ROSUVASTATIN CALCIUM 20 MG/1
TABLET, COATED ORAL
Qty: 30 TABLET | Refills: 0 | Status: SHIPPED | OUTPATIENT
Start: 2021-01-04 | End: 2021-02-02

## 2021-01-04 RX ORDER — LISINOPRIL 40 MG/1
TABLET ORAL
Qty: 30 TABLET | Refills: 0 | Status: SHIPPED | OUTPATIENT
Start: 2021-01-04 | End: 2021-02-02

## 2021-01-04 RX ORDER — AMLODIPINE BESYLATE 5 MG/1
TABLET ORAL
Qty: 30 TABLET | Refills: 0 | Status: SHIPPED | OUTPATIENT
Start: 2021-01-04 | End: 2021-02-02

## 2021-02-02 DIAGNOSIS — E78.2 MIXED HYPERLIPIDEMIA: ICD-10-CM

## 2021-02-02 DIAGNOSIS — I10 ESSENTIAL HYPERTENSION: ICD-10-CM

## 2021-02-02 RX ORDER — ROSUVASTATIN CALCIUM 20 MG/1
TABLET, COATED ORAL
Qty: 30 TABLET | Refills: 0 | Status: SHIPPED | OUTPATIENT
Start: 2021-02-02 | End: 2021-03-05 | Stop reason: SDUPTHER

## 2021-02-02 RX ORDER — LISINOPRIL 40 MG/1
TABLET ORAL
Qty: 30 TABLET | Refills: 0 | Status: SHIPPED | OUTPATIENT
Start: 2021-02-02 | End: 2021-03-05 | Stop reason: SDUPTHER

## 2021-02-02 RX ORDER — AMLODIPINE BESYLATE 5 MG/1
TABLET ORAL
Qty: 30 TABLET | Refills: 0 | Status: SHIPPED | OUTPATIENT
Start: 2021-02-02 | End: 2021-03-05 | Stop reason: SDUPTHER

## 2021-02-08 RX ORDER — ROSUVASTATIN CALCIUM 20 MG/1
20 TABLET, COATED ORAL DAILY
Qty: 90 TABLET | Refills: 1 | OUTPATIENT
Start: 2021-02-08

## 2021-02-08 RX ORDER — AMLODIPINE BESYLATE 5 MG/1
5 TABLET ORAL DAILY
Qty: 90 TABLET | Refills: 1 | OUTPATIENT
Start: 2021-02-08

## 2021-02-08 NOTE — TELEPHONE ENCOUNTER
amLODIPine (NORVASC) 5 mg tablet    rosuvastatin (CRESTOR) 20 MG tablet    [facility] # [phone]     # [phone]

## 2021-03-05 DIAGNOSIS — I10 ESSENTIAL HYPERTENSION: ICD-10-CM

## 2021-03-05 DIAGNOSIS — E78.2 MIXED HYPERLIPIDEMIA: ICD-10-CM

## 2021-03-05 RX ORDER — AMLODIPINE BESYLATE 5 MG/1
5 TABLET ORAL DAILY
Qty: 90 TABLET | Refills: 3 | Status: SHIPPED | OUTPATIENT
Start: 2021-03-05 | End: 2021-12-27

## 2021-03-05 RX ORDER — ROSUVASTATIN CALCIUM 20 MG/1
20 TABLET, COATED ORAL DAILY
Qty: 90 TABLET | Refills: 3 | Status: SHIPPED | OUTPATIENT
Start: 2021-03-05 | End: 2021-12-27

## 2021-03-05 RX ORDER — LISINOPRIL 40 MG/1
40 TABLET ORAL DAILY
Qty: 90 TABLET | Refills: 3 | Status: SHIPPED | OUTPATIENT
Start: 2021-03-05 | End: 2021-12-27

## 2021-03-05 NOTE — TELEPHONE ENCOUNTER
[facility] mail order # [phone]  Fax # [phone]    100 day supply    amLODIPine (NORVASC) 5 mg tablet    lisinopril (ZESTRIL) 40 mg tablet    rosuvastatin (CRESTOR) 20 MG tablet

## 2021-03-09 ENCOUNTER — OFFICE VISIT (OUTPATIENT)
Dept: INTERNAL MEDICINE CLINIC | Facility: CLINIC | Age: 66
End: 2021-03-09
Payer: COMMERCIAL

## 2021-03-09 VITALS
DIASTOLIC BLOOD PRESSURE: 80 MMHG | HEIGHT: 71 IN | OXYGEN SATURATION: 97 % | WEIGHT: 225 LBS | BODY MASS INDEX: 31.5 KG/M2 | HEART RATE: 89 BPM | SYSTOLIC BLOOD PRESSURE: 120 MMHG | TEMPERATURE: 96.9 F

## 2021-03-09 DIAGNOSIS — R09.89 SYMPTOMS OF UPPER RESPIRATORY INFECTION (URI): ICD-10-CM

## 2021-03-09 DIAGNOSIS — Z23 ENCOUNTER FOR IMMUNIZATION: Primary | ICD-10-CM

## 2021-03-09 DIAGNOSIS — R73.01 IMPAIRED FASTING GLUCOSE: ICD-10-CM

## 2021-03-09 DIAGNOSIS — I35.0 MILD AORTIC STENOSIS: ICD-10-CM

## 2021-03-09 DIAGNOSIS — I10 ESSENTIAL HYPERTENSION: ICD-10-CM

## 2021-03-09 DIAGNOSIS — E78.2 MIXED HYPERLIPIDEMIA: ICD-10-CM

## 2021-03-09 PROCEDURE — 3008F BODY MASS INDEX DOCD: CPT | Performed by: INTERNAL MEDICINE

## 2021-03-09 PROCEDURE — 3288F FALL RISK ASSESSMENT DOCD: CPT | Performed by: INTERNAL MEDICINE

## 2021-03-09 PROCEDURE — 1170F FXNL STATUS ASSESSED: CPT | Performed by: INTERNAL MEDICINE

## 2021-03-09 PROCEDURE — G0439 PPPS, SUBSEQ VISIT: HCPCS | Performed by: INTERNAL MEDICINE

## 2021-03-09 PROCEDURE — 1160F RVW MEDS BY RX/DR IN RCRD: CPT | Performed by: INTERNAL MEDICINE

## 2021-03-09 PROCEDURE — 1125F AMNT PAIN NOTED PAIN PRSNT: CPT | Performed by: INTERNAL MEDICINE

## 2021-03-09 PROCEDURE — 3074F SYST BP LT 130 MM HG: CPT | Performed by: INTERNAL MEDICINE

## 2021-03-09 PROCEDURE — 3079F DIAST BP 80-89 MM HG: CPT | Performed by: INTERNAL MEDICINE

## 2021-03-09 PROCEDURE — 1036F TOBACCO NON-USER: CPT | Performed by: INTERNAL MEDICINE

## 2021-03-09 PROCEDURE — 99214 OFFICE O/P EST MOD 30 MIN: CPT | Performed by: INTERNAL MEDICINE

## 2021-03-09 NOTE — PATIENT INSTRUCTIONS
Chronic diagnoses are stable   Bilateral wax impaction; get over-the-counter Debrox from the pharmacy  Uses a week  If no relief    Please call to get your ears flushed     Laboratory testing requested     Echocardiogram requested     Revisit  In 6 months

## 2021-03-09 NOTE — PROGRESS NOTES
Assessment and Plan:     Problem List Items Addressed This Visit     None      Visit Diagnoses     Encounter for immunization    -  Primary    Relevant Orders    influenza vaccine, high-dose, PF 0 7 mL (FLUZONE HIGH-DOSE)        BMI Counseling: Body mass index is 31 38 kg/m²  The BMI is above normal  Nutrition recommendations include decreasing portion sizes and moderation in carbohydrate intake  Exercise recommendations include moderate physical activity 150 minutes/week  Preventive health issues were discussed with patient, and age appropriate screening tests were ordered as noted in patient's After Visit Summary  Personalized health advice and appropriate referrals for health education or preventive services given if needed, as noted in patient's After Visit Summary  History of Present Illness:     Patient presents for Medicare Annual Wellness visit    Patient Care Team:  Luis Manuel Owens MD as PCP - General (Internal Medicine)  Luis Manuel Owens MD as PCP - [address] [place], [place] [address])     Problem List:     Patient Active Problem List   Diagnosis    Allergic rhinitis    Hyperlipidemia    Hypertension    Hyponatremia    Impaired fasting glucose    Urinary frequency    Prostate cancer screening    Mild aortic stenosis    Mild mitral regurgitation    Symptoms of upper respiratory infection (URI)      Past Medical and Surgical History:     Past Medical History:   Diagnosis Date    Disc degeneration, lumbar     Peripheral vascular disease ([facility] [address] )      History reviewed  No pertinent surgical history     Family History:     Family History   Problem Relation Age of Onset    Breast cancer Mother     Hip fracture Father       Social History:     E-Cigarette/Vaping    E-Cigarette Use Never User      E-Cigarette/Vaping Substances    Nicotine No     THC No     CBD No     Flavoring No      Social History     Socioeconomic History    Marital status: Single     Spouse name: None    Number of children: None    Years of education: None    Highest education level: None   Occupational History    Occupation: full-time employment   Social Needs    Financial resource strain: None    Food insecurity     Worry: None     Inability: None    Transportation needs     Medical: None     Non-medical: None   Tobacco Use    Smoking status: Never Smoker    Smokeless tobacco: Never Used   Substance and Sexual Activity    Alcohol use: No    Drug use: No    Sexual activity: Not Currently   Lifestyle    Physical activity     Days per week: 0 days     Minutes per session: 0 min    Stress: Not at all   Relationships    Social connections     Talks on phone: None     Gets together: None     Attends Muslim service: None     Active member of club or organization: None     Attends meetings of clubs or organizations: None     Relationship status: None    Intimate partner violence     Fear of current or ex partner: None     Emotionally abused: None     Physically abused: None     Forced sexual activity: None   Other Topics Concern    None   Social History Narrative    None      Medications and Allergies:     Current Outpatient Medications   Medication Sig Dispense Refill    amLODIPine (NORVASC) 5 mg tablet Take 1 tablet (5 mg total) by mouth daily 90 tablet 3    lisinopril (ZESTRIL) 40 mg tablet Take 1 tablet (40 mg total) by mouth daily 90 tablet 3    rosuvastatin (CRESTOR) 20 MG tablet Take 1 tablet (20 mg total) by mouth daily 90 tablet 3     No current facility-administered medications for this visit        Allergies   Allergen Reactions    Pollen Extract Nasal Congestion      Immunizations:     Immunization History   Administered Date(s) Administered    INFLUENZA 11/05/2014, 01/05/2016, 10/17/2017    Influenza Quadrivalent, 6-35 Months IM 11/05/2014, 01/05/2016, 10/17/2017    Influenza, high dose seasonal 0 7 mL 01/23/2020    Influenza, seasonal, injectable 1955    Pneumococcal Conjugate 13-Valent 07/12/2019    Pneumococcal Polysaccharide PPV23 1955, 01/23/2020    Tdap [birth year]    Zoster 1955      Health Maintenance:         Topic Date Due    Colonoscopy Surveillance  06/17/2024    Colorectal Cancer Screening  06/17/2029    Hepatitis C Screening  Completed         Topic Date Due    COVID-19 Vaccine (1 of 2) 07/03/1970    DTaP,Tdap,and Td Vaccines (1 - Tdap) 07/03/1975    Influenza Vaccine (1) 09/01/2020      Medicare Health Risk Assessment:     /80 (BP Location: Left arm, Patient Position: Sitting, Cuff Size: Large)   Pulse 89   Temp (!) 96 9 °F (36 1 °C) (Temporal)   Ht 5' 11" (1 803 m)   Wt 102 kg (225 lb)   SpO2 97%   BMI 31 38 kg/m²      Dejuan is here for his Subsequent Wellness visit  Health Risk Assessment:   Patient rates overall health as very good  Patient feels that their physical health rating is same  Eyesight was rated as same  Hearing was rated as same  Patient feels that their emotional and mental health rating is same  Pain experienced in the last 7 days has been none  Patient states that he has experienced no weight loss or gain in last 6 months  Fall Risk Screening: In the past year, patient has experienced: no history of falling in past year      Home Safety:  Patient does not have trouble with stairs inside or outside of their home  Patient has working smoke alarms and has working carbon monoxide detector  Home safety hazards include: none  Nutrition:   Current diet is Regular  Medications:   Patient is not currently taking any over-the-counter supplements  Patient is able to manage medications  Activities of Daily Living (ADLs)/Instrumental Activities of Daily Living (IADLs):   Walk and transfer into and out of bed and chair?: Yes  Dress and groom yourself?: Yes    Bathe or shower yourself?: Yes    Feed yourself?  Yes  Do your laundry/housekeeping?: Yes  Manage your money, pay your bills and track your expenses?: Yes  Make your own meals?: Yes    Do your own shopping?: Yes    Previous Hospitalizations:   Any hospitalizations or ED visits within the last 12 months?: No      Advance Care Planning:   Living will: No    Advanced directive counseling given: Yes    Five wishes given: No    End of Life Decisions reviewed with patient: Yes    Provider agrees with end of life decisions: Yes      Cognitive Screening:   Provider or family/friend/caregiver concerned regarding cognition?: No    PREVENTIVE SCREENINGS      Cardiovascular Screening:    General: Screening Not Indicated and History Lipid Disorder      Diabetes Screening:     General: Screening Current      Colorectal Cancer Screening:     General: Screening Current      Prostate Cancer Screening:    General: Screening Current      Osteoporosis Screening:    General: Risks and Benefits Discussed      Abdominal Aortic Aneurysm (AAA) Screening:        General: Screening Not Indicated      Lung Cancer Screening:     General: Screening Not Indicated      Hepatitis C Screening:    General: Screening Current    Screening, Brief Intervention, and Referral to Treatment (SBIRT)    Screening  Typical number of drinks in a day: 0      Porsche Wells MD

## 2021-03-09 NOTE — PROGRESS NOTES
Assessment/Plan:       Diagnoses and all orders for this visit:    Encounter for immunization  -     Cancel: influenza vaccine, high-dose, PF 0 7 mL (FLUZONE HIGH-DOSE)    Impaired fasting glucose  -     CBC and differential; Future  -     Hemoglobin A1C; Future  -     Comprehensive metabolic panel; Future  -     TSH, 3rd generation with Free T4 reflex; Future  -     UA (URINE) with reflex to Scope  -     Lipid Panel with Direct LDL reflex; Future    Mild aortic stenosis  -     Echo complete with contrast if indicated; Future    Essential hypertension  -     CBC and differential; Future  -     Comprehensive metabolic panel; Future  -     TSH, 3rd generation with Free T4 reflex; Future  -     UA (URINE) with reflex to Scope  -     Lipid Panel with Direct LDL reflex; Future    Symptoms of upper respiratory infection (URI)    Mixed hyperlipidemia  -     Lipid Panel with Direct LDL reflex; Future                Subjective:      Patient ID: Andria Jarquin is a 76 y o  male  A 77-year-old man with mild mitral regurgitation and mild aortic stenosis  essential hypertension is treated and stable  He is on medication   Only symptom reported today is that of some upper respiratory symptoms including sensation of nasal stuffiness  Also, he reports intermittent sensation of clogging any years  On examination he has bilateral ear wax  Last summer he reported occasional lightheadedness with exertion in hot weather  This is not problem right now  Impaired fasting glucose with prediabetic hemoglobin A1c     Modest hyperlipidemia , treated with Crestor      Most recent colonoscopy July 2019  Follow-up recommended 2024  ROS otherwise negative      The following portions of the patient's history were reviewed and updated as appropriate:   He has a past medical history of Disc degeneration, lumbar and Peripheral vascular disease ([address] )  ,  does not have any pertinent problems on file  ,   has no past surgical history on file  ,  family history includes Breast cancer in his mother; Hip fracture in his father  ,   reports that he has never smoked  He has never used smokeless tobacco  He reports that he does not drink alcohol or use drugs  ,  is allergic to pollen extract     Current Outpatient Medications   Medication Sig Dispense Refill    amLODIPine (NORVASC) 5 mg tablet Take 1 tablet (5 mg total) by mouth daily 90 tablet 3    lisinopril (ZESTRIL) 40 mg tablet Take 1 tablet (40 mg total) by mouth daily 90 tablet 3    rosuvastatin (CRESTOR) 20 MG tablet Take 1 tablet (20 mg total) by mouth daily 90 tablet 3     No current facility-administered medications for this visit  Review of Systems   HENT: Positive for congestion  All other systems reviewed and are negative  Objective:  Vitals:    03/09/21 1114   BP: 120/80   Pulse: 89   Temp: (!) 96 9 °F (36 1 °C)   SpO2: 97%      Physical Exam  Constitutional:       Appearance: He is well-developed  Comments:  Alert moderately overweight male patient who appears to be the stated age   HENT:      Head: Normocephalic and atraumatic  Right Ear: There is impacted cerumen  Left Ear: There is impacted cerumen  Eyes:      Pupils: Pupils are equal, round, and reactive to light  Neck:      Musculoskeletal: Normal range of motion and neck supple  Thyroid: No thyromegaly  Trachea: No tracheal deviation  Cardiovascular:      Rate and Rhythm: Normal rate and regular rhythm  Heart sounds: Normal heart sounds  No murmur  No gallop  Pulmonary:      Effort: Pulmonary effort is normal  No respiratory distress  Breath sounds: No wheezing or rales  Abdominal:      General: Bowel sounds are normal       Palpations: Abdomen is soft  Tenderness: There is no abdominal tenderness  Musculoskeletal: Normal range of motion  General: No tenderness or deformity  Skin:     General: Skin is warm and dry     Neurological:      Mental Status: He is alert and oriented to person, place, and time  Coordination: Coordination normal       Deep Tendon Reflexes: Reflexes are normal and symmetric  Patient Instructions    Chronic diagnoses are stable   Bilateral wax impaction; get over-the-counter Debrox from the pharmacy  Uses a week  If no relief    Please call to get your ears flushed     Laboratory testing requested     Echocardiogram requested     Revisit  In 6 months

## 2021-03-10 DIAGNOSIS — Z23 ENCOUNTER FOR IMMUNIZATION: ICD-10-CM

## 2021-03-16 ENCOUNTER — APPOINTMENT (OUTPATIENT)
Dept: LAB | Facility: CLINIC | Age: 66
End: 2021-03-16
Payer: COMMERCIAL

## 2021-03-16 DIAGNOSIS — I10 ESSENTIAL HYPERTENSION: ICD-10-CM

## 2021-03-16 DIAGNOSIS — E78.2 MIXED HYPERLIPIDEMIA: ICD-10-CM

## 2021-03-16 DIAGNOSIS — R73.01 IMPAIRED FASTING GLUCOSE: ICD-10-CM

## 2021-03-16 LAB
ALBUMIN SERPL BCP-MCNC: 4 G/DL (ref 3.5–5)
ALP SERPL-CCNC: 88 U/L (ref 46–116)
ALT SERPL W P-5'-P-CCNC: 70 U/L (ref 12–78)
ANION GAP SERPL CALCULATED.3IONS-SCNC: 5 MMOL/L (ref 4–13)
AST SERPL W P-5'-P-CCNC: 29 U/L (ref 5–45)
BASOPHILS # BLD AUTO: 0.09 THOUSANDS/ΜL (ref 0–0.1)
BASOPHILS NFR BLD AUTO: 1 % (ref 0–1)
BILIRUB SERPL-MCNC: 0.38 MG/DL (ref 0.2–1)
BILIRUB UR QL STRIP: NEGATIVE
BUN SERPL-MCNC: 20 MG/DL (ref 5–25)
CALCIUM SERPL-MCNC: 9.7 MG/DL (ref 8.3–10.1)
CHLORIDE SERPL-SCNC: 103 MMOL/L (ref 100–108)
CHOLEST SERPL-MCNC: 185 MG/DL (ref 50–200)
CLARITY UR: CLEAR
CO2 SERPL-SCNC: 27 MMOL/L (ref 21–32)
COLOR UR: YELLOW
CREAT SERPL-MCNC: 1.16 MG/DL (ref 0.6–1.3)
EOSINOPHIL # BLD AUTO: 0.92 THOUSAND/ΜL (ref 0–0.61)
EOSINOPHIL NFR BLD AUTO: 10 % (ref 0–6)
ERYTHROCYTE [DISTWIDTH] IN BLOOD BY AUTOMATED COUNT: 12.2 % (ref 11.6–15.1)
EST. AVERAGE GLUCOSE BLD GHB EST-MCNC: 134 MG/DL
GFR SERPL CREATININE-BSD FRML MDRD: 65 ML/MIN/1.73SQ M
GLUCOSE P FAST SERPL-MCNC: 120 MG/DL (ref 65–99)
GLUCOSE UR STRIP-MCNC: NEGATIVE MG/DL
HBA1C MFR BLD: 6.3 %
HCT VFR BLD AUTO: 44.6 % (ref 36.5–49.3)
HDLC SERPL-MCNC: 37 MG/DL
HGB BLD-MCNC: 14.4 G/DL (ref 12–17)
HGB UR QL STRIP.AUTO: NEGATIVE
IMM GRANULOCYTES # BLD AUTO: 0.06 THOUSAND/UL (ref 0–0.2)
IMM GRANULOCYTES NFR BLD AUTO: 1 % (ref 0–2)
KETONES UR STRIP-MCNC: NEGATIVE MG/DL
LDLC SERPL CALC-MCNC: 103 MG/DL (ref 0–100)
LEUKOCYTE ESTERASE UR QL STRIP: NEGATIVE
LYMPHOCYTES # BLD AUTO: 3.22 THOUSANDS/ΜL (ref 0.6–4.47)
LYMPHOCYTES NFR BLD AUTO: 33 % (ref 14–44)
MCH RBC QN AUTO: 29.8 PG (ref 26.8–34.3)
MCHC RBC AUTO-ENTMCNC: 32.3 G/DL (ref 31.4–37.4)
MCV RBC AUTO: 92 FL (ref 82–98)
MONOCYTES # BLD AUTO: 0.71 THOUSAND/ΜL (ref 0.17–1.22)
MONOCYTES NFR BLD AUTO: 7 % (ref 4–12)
NEUTROPHILS # BLD AUTO: 4.73 THOUSANDS/ΜL (ref 1.85–7.62)
NEUTS SEG NFR BLD AUTO: 48 % (ref 43–75)
NITRITE UR QL STRIP: NEGATIVE
NRBC BLD AUTO-RTO: 0 /100 WBCS
PH UR STRIP.AUTO: 6 [PH]
PLATELET # BLD AUTO: 285 THOUSANDS/UL (ref 149–390)
PMV BLD AUTO: 9.2 FL (ref 8.9–12.7)
POTASSIUM SERPL-SCNC: 4.6 MMOL/L (ref 3.5–5.3)
PROT SERPL-MCNC: 8.4 G/DL (ref 6.4–8.2)
PROT UR STRIP-MCNC: NEGATIVE MG/DL
RBC # BLD AUTO: 4.83 MILLION/UL (ref 3.88–5.62)
SODIUM SERPL-SCNC: 135 MMOL/L (ref 136–145)
SP GR UR STRIP.AUTO: 1.02 (ref 1–1.03)
TRIGL SERPL-MCNC: 227 MG/DL
TSH SERPL DL<=0.05 MIU/L-ACNC: 2.1 UIU/ML (ref 0.36–3.74)
UROBILINOGEN UR QL STRIP.AUTO: 0.2 E.U./DL
WBC # BLD AUTO: 9.73 THOUSAND/UL (ref 4.31–10.16)

## 2021-03-16 PROCEDURE — 36415 COLL VENOUS BLD VENIPUNCTURE: CPT

## 2021-03-16 PROCEDURE — 83036 HEMOGLOBIN GLYCOSYLATED A1C: CPT

## 2021-03-16 PROCEDURE — 80053 COMPREHEN METABOLIC PANEL: CPT

## 2021-03-16 PROCEDURE — 85025 COMPLETE CBC W/AUTO DIFF WBC: CPT

## 2021-03-16 PROCEDURE — 84443 ASSAY THYROID STIM HORMONE: CPT

## 2021-03-16 PROCEDURE — 81003 URINALYSIS AUTO W/O SCOPE: CPT | Performed by: INTERNAL MEDICINE

## 2021-03-16 PROCEDURE — 80061 LIPID PANEL: CPT

## 2021-03-18 ENCOUNTER — IMMUNIZATIONS (OUTPATIENT)
Dept: FAMILY MEDICINE CLINIC | Facility: HOSPITAL | Age: 66
End: 2021-03-18

## 2021-03-18 DIAGNOSIS — Z23 ENCOUNTER FOR IMMUNIZATION: Primary | ICD-10-CM

## 2021-03-18 PROCEDURE — 0001A SARS-COV-2 / COVID-19 MRNA VACCINE (PFIZER-BIONTECH) 30 MCG: CPT

## 2021-03-18 PROCEDURE — 91300 SARS-COV-2 / COVID-19 MRNA VACCINE (PFIZER-BIONTECH) 30 MCG: CPT

## 2021-04-01 ENCOUNTER — HOSPITAL ENCOUNTER (OUTPATIENT)
Dept: NON INVASIVE DIAGNOSTICS | Facility: CLINIC | Age: 66
Discharge: HOME | End: 2021-04-01
Payer: COMMERCIAL

## 2021-04-01 DIAGNOSIS — I35.0 MILD AORTIC STENOSIS: ICD-10-CM

## 2021-04-01 PROCEDURE — 93306 TTE W/DOPPLER COMPLETE: CPT | Performed by: INTERNAL MEDICINE

## 2021-04-01 PROCEDURE — 93306 TTE W/DOPPLER COMPLETE: CPT

## 2021-04-02 ENCOUNTER — TELEPHONE (OUTPATIENT)
Dept: INTERNAL MEDICINE CLINIC | Facility: CLINIC | Age: 66
End: 2021-04-02

## 2021-04-02 NOTE — TELEPHONE ENCOUNTER
----- Message from Ulisses Wallace MD sent at 4/2/2021 11:33 AM EDT -----  Mild aortic stenosis noted on echocardiogram no change from the last 1

## 2021-04-09 ENCOUNTER — OFFICE VISIT (OUTPATIENT)
Dept: INTERNAL MEDICINE CLINIC | Facility: CLINIC | Age: 66
End: 2021-04-09
Payer: COMMERCIAL

## 2021-04-09 VITALS
DIASTOLIC BLOOD PRESSURE: 82 MMHG | SYSTOLIC BLOOD PRESSURE: 110 MMHG | OXYGEN SATURATION: 97 % | TEMPERATURE: 98.2 F | HEART RATE: 86 BPM | BODY MASS INDEX: 31.44 KG/M2 | WEIGHT: 224.6 LBS | HEIGHT: 71 IN

## 2021-04-09 DIAGNOSIS — R06.00 DYSPNEA ON EXERTION: ICD-10-CM

## 2021-04-09 DIAGNOSIS — I35.0 MILD AORTIC STENOSIS: ICD-10-CM

## 2021-04-09 DIAGNOSIS — R06.02 SOB (SHORTNESS OF BREATH): ICD-10-CM

## 2021-04-09 DIAGNOSIS — Z23 ENCOUNTER FOR IMMUNIZATION: Primary | ICD-10-CM

## 2021-04-09 DIAGNOSIS — M54.6 ACUTE MIDLINE THORACIC BACK PAIN: ICD-10-CM

## 2021-04-09 PROCEDURE — 1036F TOBACCO NON-USER: CPT | Performed by: PHYSICIAN ASSISTANT

## 2021-04-09 PROCEDURE — 3079F DIAST BP 80-89 MM HG: CPT | Performed by: PHYSICIAN ASSISTANT

## 2021-04-09 PROCEDURE — 99214 OFFICE O/P EST MOD 30 MIN: CPT | Performed by: PHYSICIAN ASSISTANT

## 2021-04-09 PROCEDURE — 3725F SCREEN DEPRESSION PERFORMED: CPT | Performed by: PHYSICIAN ASSISTANT

## 2021-04-09 PROCEDURE — 3008F BODY MASS INDEX DOCD: CPT | Performed by: PHYSICIAN ASSISTANT

## 2021-04-09 PROCEDURE — 3074F SYST BP LT 130 MM HG: CPT | Performed by: PHYSICIAN ASSISTANT

## 2021-04-09 PROCEDURE — 93000 ELECTROCARDIOGRAM COMPLETE: CPT | Performed by: PHYSICIAN ASSISTANT

## 2021-04-09 PROCEDURE — 1160F RVW MEDS BY RX/DR IN RCRD: CPT | Performed by: PHYSICIAN ASSISTANT

## 2021-04-09 NOTE — PROGRESS NOTES
Assessment/Plan: history of exertional shortness of breath and aching in his shoulder blades yesterday that caused him to stop  Lasted a 0 5 hour  Now asymptomatic physical exam unremarkable EKG is normal   getting a stress echo D-dimer  If these symptoms recur he will go to the ER or come in immediately  Follow-up in 2 weeks I walked him around the [facility] oxygen saturations stated 98%       Diagnoses and all orders for this visit:    Encounter for immunization    SOB (shortness of breath)    Acute midline thoracic back pain  -     POCT ECG  -     CBC and differential; Future  -     Basic metabolic panel; Future  -     Echo stress test w contrast if indicated; Future    Mild aortic stenosis  -     POCT ECG  -     CBC and differential; Future  -     Basic metabolic panel; Future  -     Echo stress test w contrast if indicated; Future    Dyspnea on exertion  -     POCT ECG  -     CBC and differential; Future  -     Basic metabolic panel; Future  -     Echo stress test w contrast if indicated; Future    Other orders  -     Cancel: TDAP VACCINE GREATER THAN OR EQUAL TO 8YO IM        No problem-specific Assessment & Plan notes found for this encounter  Subjective:      Patient ID: Anibal Velazquez is a 76 y o  male  Yesterday morning while trimming some branches he suddenly developed shortness of breath and an aching feeling in his shoulder blades which caused him to stop what he was doing and bend over forward for relief a little bit of sweating  No pleuritic pain or cough no palpitations or dizziness his shortness of breath lasted for about a 0 5 hour at rest   An hour later he tried to do a little bit of work but remained fatigued with aching he needs his shoulder blade  He rested for the rest of the day with no further shortness of breath or chest pain  Today he feels a very slight vague aching feeling under his shoulder blades    No pleuritic pain no fever chills he has been feeling well active ambulatory until his present problem began he has had episodes of exertional shortness of breath in the past that passed  After a few minutes  Had an echocardiogram 2 weeks ago which showed mild aortic stenosis normal ejection fraction recent screening labs normal   Negative non-nuclear stress test 1 year ago  Hyperlipidemia hypertension well controlled with meds      The following portions of the patient's history were reviewed and updated as appropriate:   He has a past medical history of Disc degeneration, lumbar and Peripheral vascular disease ([address] )  ,  does not have any pertinent problems on file  ,   has no past surgical history on file  ,  family history includes Breast cancer in his mother; Hip fracture in his father  ,   reports that he has never smoked  He has never used smokeless tobacco  He reports that he does not drink alcohol or use drugs  ,  is allergic to pollen extract     Current Outpatient Medications   Medication Sig Dispense Refill    amLODIPine (NORVASC) 5 mg tablet Take 1 tablet (5 mg total) by mouth daily 90 tablet 3    lisinopril (ZESTRIL) 40 mg tablet Take 1 tablet (40 mg total) by mouth daily 90 tablet 3    rosuvastatin (CRESTOR) 20 MG tablet Take 1 tablet (20 mg total) by mouth daily 90 tablet 3     No current facility-administered medications for this visit  Review of Systems   Constitutional: Negative for chills and fever  HENT: Negative for ear pain and sore throat  Eyes: Negative for pain and visual disturbance  Respiratory: Negative for cough and shortness of breath  Cardiovascular: Positive for chest pain  Negative for palpitations  Gastrointestinal: Negative for abdominal pain and vomiting  Genitourinary: Negative for dysuria and hematuria  Musculoskeletal: Positive for back pain  Negative for arthralgias  Skin: Negative for color change and rash  Neurological: Negative for seizures and syncope  All other systems reviewed and are negative  Objective:  Vitals:    04/09/21 0751   BP: 110/82   Pulse: 86   Temp: 98 2 °F (36 8 °C)   SpO2: 97%   Weight: 102 kg (224 lb 9 6 oz)   Height: 5' 11" (1 803 m)     Body mass index is 31 33 kg/m²  Physical Exam  Vitals signs reviewed  Constitutional:       Appearance: He is well-developed  He is obese  HENT:      Head: Normocephalic  Right Ear: Tympanic membrane and external ear normal       Left Ear: Tympanic membrane and external ear normal       Nose: Nose normal       Mouth/Throat:      Mouth: Mucous membranes are moist    Eyes:      Extraocular Movements: Extraocular movements intact  Conjunctiva/sclera: Conjunctivae normal       Pupils: Pupils are equal, round, and reactive to light  Neck:      Musculoskeletal: Normal range of motion and neck supple  Thyroid: No thyromegaly  Vascular: No carotid bruit  Comments:  No JVD sitting  Cardiovascular:      Rate and Rhythm: Normal rate and regular rhythm  Pulses: Normal pulses  Heart sounds: Murmur ( soft systolic at the base) present  Pulmonary:      Effort: Pulmonary effort is normal  No respiratory distress  Breath sounds: Normal breath sounds  No stridor  No wheezing, rhonchi or rales  Chest:      Chest wall: No tenderness  Abdominal:      General: Abdomen is flat  Bowel sounds are normal       Palpations: Abdomen is soft  Musculoskeletal: Normal range of motion  General: No swelling  Lymphadenopathy:      Cervical: No cervical adenopathy  Skin:     General: Skin is warm and dry  Neurological:      General: No focal deficit present  Mental Status: He is alert and oriented to person, place, and time  Deep Tendon Reflexes: Reflexes are normal and symmetric  Psychiatric:         Mood and Affect: Mood normal          Thought Content:  Thought content normal          Judgment: Judgment normal

## 2021-04-10 ENCOUNTER — APPOINTMENT (OUTPATIENT)
Dept: LAB | Facility: CLINIC | Age: 66
End: 2021-04-10
Payer: COMMERCIAL

## 2021-04-10 DIAGNOSIS — R06.00 DYSPNEA ON EXERTION: ICD-10-CM

## 2021-04-10 DIAGNOSIS — M54.6 ACUTE MIDLINE THORACIC BACK PAIN: ICD-10-CM

## 2021-04-10 DIAGNOSIS — I35.0 MILD AORTIC STENOSIS: ICD-10-CM

## 2021-04-10 LAB
ANION GAP SERPL CALCULATED.3IONS-SCNC: 3 MMOL/L (ref 4–13)
BASOPHILS # BLD AUTO: 0.06 THOUSANDS/ΜL (ref 0–0.1)
BASOPHILS NFR BLD AUTO: 1 % (ref 0–1)
BUN SERPL-MCNC: 20 MG/DL (ref 5–25)
CALCIUM SERPL-MCNC: 9.7 MG/DL (ref 8.3–10.1)
CHLORIDE SERPL-SCNC: 104 MMOL/L (ref 100–108)
CO2 SERPL-SCNC: 28 MMOL/L (ref 21–32)
CREAT SERPL-MCNC: 1.2 MG/DL (ref 0.6–1.3)
EOSINOPHIL # BLD AUTO: 0.59 THOUSAND/ΜL (ref 0–0.61)
EOSINOPHIL NFR BLD AUTO: 8 % (ref 0–6)
ERYTHROCYTE [DISTWIDTH] IN BLOOD BY AUTOMATED COUNT: 12.8 % (ref 11.6–15.1)
GFR SERPL CREATININE-BSD FRML MDRD: 63 ML/MIN/1.73SQ M
GLUCOSE P FAST SERPL-MCNC: 120 MG/DL (ref 65–99)
HCT VFR BLD AUTO: 43 % (ref 36.5–49.3)
HGB BLD-MCNC: 14.2 G/DL (ref 12–17)
IMM GRANULOCYTES # BLD AUTO: 0.03 THOUSAND/UL (ref 0–0.2)
IMM GRANULOCYTES NFR BLD AUTO: 0 % (ref 0–2)
LYMPHOCYTES # BLD AUTO: 2.73 THOUSANDS/ΜL (ref 0.6–4.47)
LYMPHOCYTES NFR BLD AUTO: 36 % (ref 14–44)
MCH RBC QN AUTO: 30.5 PG (ref 26.8–34.3)
MCHC RBC AUTO-ENTMCNC: 33 G/DL (ref 31.4–37.4)
MCV RBC AUTO: 92 FL (ref 82–98)
MONOCYTES # BLD AUTO: 0.54 THOUSAND/ΜL (ref 0.17–1.22)
MONOCYTES NFR BLD AUTO: 7 % (ref 4–12)
NEUTROPHILS # BLD AUTO: 3.6 THOUSANDS/ΜL (ref 1.85–7.62)
NEUTS SEG NFR BLD AUTO: 48 % (ref 43–75)
NRBC BLD AUTO-RTO: 0 /100 WBCS
PLATELET # BLD AUTO: 258 THOUSANDS/UL (ref 149–390)
PMV BLD AUTO: 9 FL (ref 8.9–12.7)
POTASSIUM SERPL-SCNC: 4.5 MMOL/L (ref 3.5–5.3)
RBC # BLD AUTO: 4.66 MILLION/UL (ref 3.88–5.62)
SODIUM SERPL-SCNC: 135 MMOL/L (ref 136–145)
WBC # BLD AUTO: 7.55 THOUSAND/UL (ref 4.31–10.16)

## 2021-04-10 PROCEDURE — 85025 COMPLETE CBC W/AUTO DIFF WBC: CPT

## 2021-04-10 PROCEDURE — 36415 COLL VENOUS BLD VENIPUNCTURE: CPT

## 2021-04-10 PROCEDURE — 80048 BASIC METABOLIC PNL TOTAL CA: CPT

## 2021-04-11 ENCOUNTER — APPOINTMENT (EMERGENCY)
Dept: RADIOLOGY | Facility: HOSPITAL | Age: 66
End: 2021-04-11
Payer: COMMERCIAL

## 2021-04-11 ENCOUNTER — HOSPITAL ENCOUNTER (EMERGENCY)
Facility: HOSPITAL | Age: 66
Discharge: HOME | End: 2021-04-11
Attending: EMERGENCY MEDICINE
Payer: COMMERCIAL

## 2021-04-11 VITALS
SYSTOLIC BLOOD PRESSURE: 136 MMHG | OXYGEN SATURATION: 96 % | RESPIRATION RATE: 18 BRPM | TEMPERATURE: 98.3 F | DIASTOLIC BLOOD PRESSURE: 92 MMHG | HEART RATE: 74 BPM

## 2021-04-11 DIAGNOSIS — M54.6 ACUTE BILATERAL THORACIC BACK PAIN: Primary | ICD-10-CM

## 2021-04-11 DIAGNOSIS — R06.00 DYSPNEA ON EXERTION: ICD-10-CM

## 2021-04-11 LAB
ALBUMIN SERPL BCP-MCNC: 3.8 G/DL (ref 3.5–5)
ALP SERPL-CCNC: 80 U/L (ref 46–116)
ALT SERPL W P-5'-P-CCNC: 78 U/L (ref 12–78)
ANION GAP SERPL CALCULATED.3IONS-SCNC: 12 MMOL/L (ref 4–13)
AST SERPL W P-5'-P-CCNC: 34 U/L (ref 5–45)
BASOPHILS # BLD AUTO: 0.05 THOUSANDS/ΜL (ref 0–0.1)
BASOPHILS NFR BLD AUTO: 1 % (ref 0–1)
BILIRUB SERPL-MCNC: 0.25 MG/DL (ref 0.2–1)
BUN SERPL-MCNC: 21 MG/DL (ref 5–25)
CALCIUM SERPL-MCNC: 10.1 MG/DL (ref 8.3–10.1)
CHLORIDE SERPL-SCNC: 99 MMOL/L (ref 100–108)
CO2 SERPL-SCNC: 23 MMOL/L (ref 21–32)
CREAT SERPL-MCNC: 1.17 MG/DL (ref 0.6–1.3)
EOSINOPHIL # BLD AUTO: 0.53 THOUSAND/ΜL (ref 0–0.61)
EOSINOPHIL NFR BLD AUTO: 7 % (ref 0–6)
ERYTHROCYTE [DISTWIDTH] IN BLOOD BY AUTOMATED COUNT: 12.7 % (ref 11.6–15.1)
GFR SERPL CREATININE-BSD FRML MDRD: 65 ML/MIN/1.73SQ M
GLUCOSE SERPL-MCNC: 129 MG/DL (ref 65–140)
HCT VFR BLD AUTO: 42.1 % (ref 36.5–49.3)
HGB BLD-MCNC: 13.8 G/DL (ref 12–17)
IMM GRANULOCYTES # BLD AUTO: 0.04 THOUSAND/UL (ref 0–0.2)
IMM GRANULOCYTES NFR BLD AUTO: 1 % (ref 0–2)
LYMPHOCYTES # BLD AUTO: 2.25 THOUSANDS/ΜL (ref 0.6–4.47)
LYMPHOCYTES NFR BLD AUTO: 30 % (ref 14–44)
MCH RBC QN AUTO: 29.4 PG (ref 26.8–34.3)
MCHC RBC AUTO-ENTMCNC: 32.8 G/DL (ref 31.4–37.4)
MCV RBC AUTO: 90 FL (ref 82–98)
MONOCYTES # BLD AUTO: 0.68 THOUSAND/ΜL (ref 0.17–1.22)
MONOCYTES NFR BLD AUTO: 9 % (ref 4–12)
NEUTROPHILS # BLD AUTO: 3.86 THOUSANDS/ΜL (ref 1.85–7.62)
NEUTS SEG NFR BLD AUTO: 52 % (ref 43–75)
NRBC BLD AUTO-RTO: 0 /100 WBCS
PLATELET # BLD AUTO: 233 THOUSANDS/UL (ref 149–390)
PMV BLD AUTO: 8.5 FL (ref 8.9–12.7)
POTASSIUM SERPL-SCNC: 4 MMOL/L (ref 3.5–5.3)
PROT SERPL-MCNC: 8 G/DL (ref 6.4–8.2)
RBC # BLD AUTO: 4.69 MILLION/UL (ref 3.88–5.62)
SODIUM SERPL-SCNC: 134 MMOL/L (ref 136–145)
TROPONIN I SERPL-MCNC: <0.02 NG/ML
WBC # BLD AUTO: 7.41 THOUSAND/UL (ref 4.31–10.16)

## 2021-04-11 PROCEDURE — 84484 ASSAY OF TROPONIN QUANT: CPT | Performed by: EMERGENCY MEDICINE

## 2021-04-11 PROCEDURE — 71046 X-RAY EXAM CHEST 2 VIEWS: CPT

## 2021-04-11 PROCEDURE — 99284 EMERGENCY DEPT VISIT MOD MDM: CPT | Performed by: EMERGENCY MEDICINE

## 2021-04-11 PROCEDURE — 85025 COMPLETE CBC W/AUTO DIFF WBC: CPT | Performed by: EMERGENCY MEDICINE

## 2021-04-11 PROCEDURE — 80053 COMPREHEN METABOLIC PANEL: CPT | Performed by: EMERGENCY MEDICINE

## 2021-04-11 PROCEDURE — 36415 COLL VENOUS BLD VENIPUNCTURE: CPT

## 2021-04-11 PROCEDURE — 99284 EMERGENCY DEPT VISIT MOD MDM: CPT

## 2021-04-11 PROCEDURE — 93005 ELECTROCARDIOGRAM TRACING: CPT

## 2021-04-11 RX ORDER — LIDOCAINE 50 MG/G
1 PATCH TOPICAL ONCE
Status: DISCONTINUED | OUTPATIENT
Start: 2021-04-11 | End: 2021-04-11 | Stop reason: HOSPADM

## 2021-04-11 RX ADMIN — LIDOCAINE 5% 1 PATCH: 700 PATCH TOPICAL at 13:59

## 2021-04-11 NOTE — DISCHARGE INSTRUCTIONS
Take ibuprofen (Motrin, Advil) or acetaminophen (Tylenol) as needed for pain, as per the instructions  Use ice or heat, and topical medications to the area  Try to avoid heavy lifting or strenuous activities until your back feels better  Do stretching exercises to keep the muscles of your loose  Follow up closely with your primary care doctor for further evaluation of your symptoms  Return if you develop worsening or changing pain, chest pain, persistent trouble breathing, or for any other concerns

## 2021-04-11 NOTE — ED PROVIDER NOTES
History  Chief Complaint   Patient presents with    Shoulder Pain     pt states to have started with pain between shoulder blades after doing yardwork on thursday  pt states to have been seen at PCP on friday  pt states PCP informed pt if not improved to be seen in ED  pt c/o mid back pain, neck pain     HPI    Prior to Admission Medications   Prescriptions Last Dose Informant Patient Reported? Taking? amLODIPine (NORVASC) 5 mg tablet  Self No No   Sig: Take 1 tablet (5 mg total) by mouth daily   lisinopril (ZESTRIL) 40 mg tablet  Self No No   Sig: Take 1 tablet (40 mg total) by mouth daily   rosuvastatin (CRESTOR) 20 MG tablet  Self No No   Sig: Take 1 tablet (20 mg total) by mouth daily      Facility-Administered Medications: None       Past Medical History:   Diagnosis Date    Disc degeneration, lumbar     Peripheral vascular disease ([facility] [address] )        History reviewed  No pertinent surgical history  Family History   Problem Relation Age of Onset    Breast cancer Mother     Hip fracture Father      I have reviewed and agree with the history as documented  E-Cigarette/Vaping    E-Cigarette Use Never User      E-Cigarette/Vaping Substances    Nicotine No     THC No     CBD No     Flavoring No      Social History     Tobacco Use    Smoking status: Never Smoker    Smokeless tobacco: Never Used   Substance Use Topics    Alcohol use: No    Drug use: No       Review of Systems    Physical Exam  Physical Exam  Vitals signs and nursing note reviewed  Constitutional:       General: He is not in acute distress  Appearance: He is well-developed  Comments: Hypertensive on arrival, improved on repeat   HENT:      Head: Normocephalic and atraumatic  Eyes:      Conjunctiva/sclera: Conjunctivae normal       Pupils: Pupils are equal, round, and reactive to light  Neck:      Musculoskeletal: Full passive range of motion without pain and normal range of motion  Normal range of motion   No pain with movement, spinous process tenderness or muscular tenderness  Trachea: No tracheal deviation  Comments: No worsening pain in her back with movement of the neck  Cardiovascular:      Rate and Rhythm: Normal rate and regular rhythm  Heart sounds: Normal heart sounds  Pulmonary:      Effort: Pulmonary effort is normal  No respiratory distress  Breath sounds: Normal breath sounds  Chest:      Chest wall: No tenderness or crepitus  Abdominal:      General: There is no distension  Palpations: Abdomen is soft  Tenderness: There is no abdominal tenderness  Musculoskeletal:      Right shoulder: He exhibits normal range of motion (no pain in back with movement) and no tenderness  Left shoulder: He exhibits normal range of motion (no pain in back with movement) and no tenderness  Thoracic back: He exhibits tenderness (very mild)  He exhibits normal range of motion (no worsening pain with movement of the upper back, including extension of both arms forward and contraction of scapula together) and no bony tenderness  Lumbar back: He exhibits normal range of motion, no tenderness and no bony tenderness  Back:       Right lower leg: No edema  Left lower leg: No edema  Skin:     General: Skin is warm and dry  Neurological:      Mental Status: He is alert and oriented to person, place, and time  GCS: GCS eye subscore is 4  GCS verbal subscore is 5  GCS motor subscore is 6     Psychiatric:         Behavior: Behavior normal          Vital Signs  ED Triage Vitals [04/11/21 1150]   Temperature Pulse Respirations Blood Pressure SpO2   98 3 °F (36 8 °C) 94 16 (!) 173/96 98 %      Temp Source Heart Rate Source Patient Position - Orthostatic VS BP Location FiO2 (%)   Oral Monitor Sitting Right arm --      Pain Score       --           Vitals:    04/11/21 1150 04/11/21 1401   BP: (!) 173/96 136/92   Pulse: 94 74   Patient Position - Orthostatic VS: Sitting Sitting Visual Acuity      ED Medications  Medications   lidocaine (LIDODERM) 5 % patch 1 patch (1 patch Topical Medication Applied 4/11/21 4210)       Diagnostic Studies  Results Reviewed     Procedure Component Value Units Date/Time    Troponin I [[id number]]  (Normal) Collected: 04/11/21 1240    Lab Status: Final result Specimen: Blood from Arm, Right Updated: 04/11/21 1307     Troponin I <0 02 ng/mL     Comprehensive metabolic panel [[id number]]  (Abnormal) Collected: 04/11/21 1240    Lab Status: Final result Specimen: Blood from Arm, Right Updated: 04/11/21 1305     Sodium 134 mmol/L      Potassium 4 0 mmol/L      Chloride 99 mmol/L      CO2 23 mmol/L      ANION GAP 12 mmol/L      BUN 21 mg/dL      Creatinine 1 17 mg/dL      Glucose 129 mg/dL      Calcium 10 1 mg/dL      AST 34 U/L      ALT 78 U/L      Alkaline Phosphatase 80 U/L      Total Protein 8 0 g/dL      Albumin 3 8 g/dL      Total Bilirubin 0 25 mg/dL      eGFR 65 ml/min/1 73sq m     Narrative:      [place] guidelines for Chronic Kidney Disease (CKD):     Stage 1 with normal or high GFR (GFR > 90 mL/min/1 73 square meters)    Stage 2 Mild CKD (GFR = 60-89 mL/min/1 73 square meters)    Stage 3A Moderate CKD (GFR = 45-59 mL/min/1 73 square meters)    Stage 3B Moderate CKD (GFR = 30-44 mL/min/1 73 square meters)    Stage 4 Severe CKD (GFR = 15-29 mL/min/1 73 square meters)    Stage 5 End Stage CKD (GFR <15 mL/min/1 73 square meters)  Note: GFR calculation is accurate only with a steady state creatinine    CBC and differential [[id number]]  (Abnormal) Collected: 04/11/21 1240    Lab Status: Final result Specimen: Blood from Arm, Right Updated: 04/11/21 1246     WBC 7 41 Thousand/uL      RBC 4 69 Million/uL      Hemoglobin 13 8 g/dL      Hematocrit 42 1 %      MCV 90 fL      MCH 29 4 pg      MCHC 32 8 g/dL      RDW 12 7 %      MPV 8 5 fL      Platelets 471 Thousands/uL      nRBC 0 /100 WBCs      Neutrophils Relative 52 % Immat GRANS % 1 %      Lymphocytes Relative 30 %      Monocytes Relative 9 %      Eosinophils Relative 7 %      Basophils Relative 1 %      Neutrophils Absolute 3 86 Thousands/µL      Immature Grans Absolute 0 04 Thousand/uL      Lymphocytes Absolute 2 25 Thousands/µL      Monocytes Absolute 0 68 Thousand/µL      Eosinophils Absolute 0 53 Thousand/µL      Basophils Absolute 0 05 Thousands/µL                  XR chest 2 views    (Results Pending)              Procedures  ECG 12 Lead Documentation Only    Date/Time: 4/11/2021 2:12 PM  Performed by: Selena Jj MD  Authorized by: Selena Jj MD     Indications / Diagnosis:  Upper back pain  ECG reviewed by me, the ED Provider: yes    Patient location:  ED  Previous ECG:     Previous ECG:  Unavailable  Interpretation:     Interpretation: non-specific    Rate:     ECG rate:  81    ECG rate assessment: normal    Rhythm:     Rhythm: sinus rhythm and A-V block    Ectopy:     Ectopy: none    QRS:     QRS axis:  Left    QRS intervals:  Normal  Conduction:     Conduction: abnormal      Abnormal conduction: 1st degree    ST segments:     ST segments:  Normal  T waves:     T waves: normal               ED Course               Identification of Seniors at Risk      Most Recent Value   (ISAR) Identification of Seniors at Risk   Before the illness or injury that brought you to the Emergency, did you need someone to help you on a regular basis? 0 Filed at: 04/11/2021 1150   In the last 24 hours, have you needed more help than usual?  0 Filed at: 04/11/2021 1150   Have you been hospitalized for one or more nights during the past 6 months? 0 Filed at: 04/11/2021 1150   In general, do you see well?  0 Filed at: 04/11/2021 1150   In general, do you have serious problems with your memory? 0 Filed at: 04/11/2021 1150   Do you take more than three different medications every day?   1 Filed at: 04/11/2021 1150   ISAR Score  1 Filed at: 04/11/2021 1150 SBIRT 20yo+      Most Recent Value   SBIRT (24 yo +)   In order to provide better care to our patients, we are screening all of our patients for alcohol and drug use  Would it be okay to ask you these screening questions? No Filed at: 04/11/2021 1312   Initial Alcohol Screen: US AUDIT-C    1  How often do you have a drink containing alcohol?  0 Filed at: 04/11/2021 1312   2  How many drinks containing alcohol do you have on a typical day you are drinking? 0 Filed at: 04/11/2021 1312   3a  Male UNDER 65: How often do you have five or more drinks on one occasion? 0 Filed at: 04/11/2021 1312   Audit-C Score  0 Filed at: 04/11/2021 1312   SHEELA: How many times in the past year have you    Used an illegal drug or used a prescription medication for non-medical reasons? Never Filed at: 04/11/2021 1312                    MDM  Number of Diagnoses or Management Options  Acute bilateral thoracic back pain: new and requires workup  Dyspnea on exertion: new and requires workup  Diagnosis management comments: This is a 68-year-old male who presents here today for evaluation of upper back pain  He states on 4/8 he was outside doing yard work, trimming some bushes when he developed pain in his upper back between the shoulder blades, that made him feel like he had to hunch over  He says at certain times, will radiate up into his neck  He felt like he was short of breath with this  Symptoms resolved after about 30 minutes  He states later in the day he was trying to help somebody doing lifting and felt a had less energy than normal and mild worsening of the pain, but no recurrence of the shortness breath  He was seen by his doctor the following day, has EKG that was normal, and referred for an outpatient stress test   He was advised that if he had recurrence of symptoms, to come to the emergency department for further evaluation  The patient states he was fine yesterday    He states today he started having pain in his upper back again  He feels like he has a deep "muscle ache "  He says sometimes movements will cause worsening symptoms  He denies any associated shortness of breath  There is no radiation outside of his back  He has no chest pain or abdominal pain, palpitations current dyspnea exertion  He blunt trauma to the area  He denies fevers or URI symptoms  He has not taken or done anything for the pain since he does not like medications  He states his pain has been present for several hours, prompting him to come for evaluation  He states he has had similar symptoms before, but usually episodes do not last nearly as long  He says last time this happened was back in August when he was doing lifting yard work in the summer and was not drinking as much as he should have  He states the other day when he began having symptoms he had not drank any water that day  He does have an exercise stress test done on 2/3/20 which was normal   He did have an echocardiogram that showed mild aortic stenosis but no other abnormalities  He was referred for a stress test by his doctor after his visit two days ago for further evaluation of his symptoms  Review of systems:  Otherwise negative unless stated as above    He is well-appearing, acute distress  He has mild tenderness just to the midline of the right scapula but no other tenderness to his back  He has no worsening pain with movement  Exam is otherwise unremarkable  With just back pain today this is more suspicious for muscular process, however with him he usually having some shortness of breath with that there is concern for atypical presentation of ACS  He is a nonsmoker and does not live with a smoker, this could still be related to underlying lung disease  I am not concerned about aortic dissection, PE contributing to his symptoms  We will check a chest x-ray and lab work here to evaluate  The EKG obtained in triage is unremarkable      Chest x-ray was reviewed by myself, and shows no acute abnormalities  Lab work is unremarkable  He feels like his muscle aching is slightly less after application of lidocaine patch  He is denying any other symptoms while being in the emergency department  I discussed with the patient findings, uncertain exact etiology of his symptoms, discussed staying for delta troponin to further evaluate for ACS, and the patient declines this, stating he will follow up closely with his primary care doctor  I discussed with him continued treatment at home, follow-up with PCP, low threshold for return for worsening or changing symptoms, and he expresses understanding with this plan  Amount and/or Complexity of Data Reviewed  Clinical lab tests: reviewed and ordered  Tests in the radiology section of CPT®: ordered and reviewed  Decide to obtain previous medical records or to obtain history from someone other than the patient: yes  Review and summarize past medical records: yes  Independent visualization of images, tracings, or specimens: yes        Disposition  Final diagnoses:   Acute bilateral thoracic back pain   Dyspnea on exertion     Time reflects when diagnosis was documented in both MDM as applicable and the Disposition within this note     Time User Action Codes Description Comment    4/11/2021  2:30 PM Chapin Caro Add [M54 6] Acute bilateral thoracic back pain     4/11/2021  2:30 PM Chapin Caro Add [R06 00] Dyspnea on exertion       ED Disposition     ED Disposition Condition Date/Time Comment    Discharge Fair Sun Apr 11, 2021  2:31 PM Ida Duarte discharge to home/self care        Follow-up Information     Follow up With Specialties Details Why Contact Info    Viki Contreras MD Internal Medicine Schedule an appointment as soon as possible for a visit in 2 days to follow up on your symptoms [id number] [facility] [address] [phone]            Patient's Medications   Discharge Prescriptions    No medications on file     No discharge procedures on file      PDMP Review     None          ED Provider  Electronically Signed by           Soren Aviles MD  04/11/21 5387

## 2021-04-12 ENCOUNTER — IMMUNIZATIONS (OUTPATIENT)
Dept: FAMILY MEDICINE CLINIC | Facility: HOSPITAL | Age: 66
End: 2021-04-12

## 2021-04-12 DIAGNOSIS — Z23 ENCOUNTER FOR IMMUNIZATION: Primary | ICD-10-CM

## 2021-04-12 PROCEDURE — 91300 SARS-COV-2 / COVID-19 MRNA VACCINE (PFIZER-BIONTECH) 30 MCG: CPT

## 2021-04-12 PROCEDURE — 0002A SARS-COV-2 / COVID-19 MRNA VACCINE (PFIZER-BIONTECH) 30 MCG: CPT

## 2021-04-13 LAB
ATRIAL RATE: 81 BPM
P AXIS: 55 DEGREES
PR INTERVAL: 224 MS
QRS AXIS: -34 DEGREES
QRSD INTERVAL: 98 MS
QT INTERVAL: 338 MS
QTC INTERVAL: 392 MS
T WAVE AXIS: 36 DEGREES
VENTRICULAR RATE: 81 BPM

## 2021-04-13 PROCEDURE — 93010 ELECTROCARDIOGRAM REPORT: CPT | Performed by: INTERNAL MEDICINE

## 2021-05-11 ENCOUNTER — HOSPITAL ENCOUNTER (OUTPATIENT)
Dept: NON INVASIVE DIAGNOSTICS | Facility: CLINIC | Age: 66
Discharge: HOME | End: 2021-05-11
Payer: COMMERCIAL

## 2021-05-11 DIAGNOSIS — M54.6 ACUTE MIDLINE THORACIC BACK PAIN: ICD-10-CM

## 2021-05-11 DIAGNOSIS — I35.0 MILD AORTIC STENOSIS: ICD-10-CM

## 2021-05-11 DIAGNOSIS — R06.00 DYSPNEA ON EXERTION: ICD-10-CM

## 2021-05-11 PROCEDURE — 93350 STRESS TTE ONLY: CPT

## 2021-05-11 PROCEDURE — 93351 STRESS TTE COMPLETE: CPT | Performed by: INTERNAL MEDICINE

## 2021-05-12 LAB
MAX DIASTOLIC BP: 84 MMHG
MAX HEART RATE: 153 BPM
MAX PREDICTED HEART RATE: 154 BPM
MAX. SYSTOLIC BP: 186 MMHG
PROTOCOL NAME: NORMAL
TARGET HR FORMULA: NORMAL
TIME IN EXERCISE PHASE: NORMAL

## 2021-05-14 ENCOUNTER — RA CDI HCC (OUTPATIENT)
Dept: OTHER | Facility: HOSPITAL | Age: 66
End: 2021-05-14

## 2021-05-14 NOTE — PROGRESS NOTES
[facility]  coding opportunities          Chart reviewed, no opportunity found: CHART REVIEWED, NO OPPORTUNITY FOUND

## 2021-05-17 ENCOUNTER — OFFICE VISIT (OUTPATIENT)
Dept: INTERNAL MEDICINE CLINIC | Facility: CLINIC | Age: 66
End: 2021-05-17
Payer: COMMERCIAL

## 2021-05-17 VITALS
BODY MASS INDEX: 30.88 KG/M2 | SYSTOLIC BLOOD PRESSURE: 118 MMHG | OXYGEN SATURATION: 97 % | WEIGHT: 221.4 LBS | DIASTOLIC BLOOD PRESSURE: 80 MMHG | TEMPERATURE: 97.8 F | HEART RATE: 75 BPM

## 2021-05-17 DIAGNOSIS — T73.3XXS FATIGUE DUE TO EXCESSIVE EXERTION, SEQUELA: ICD-10-CM

## 2021-05-17 DIAGNOSIS — I35.0 MILD AORTIC STENOSIS: ICD-10-CM

## 2021-05-17 DIAGNOSIS — R06.00 DYSPNEA ON EXERTION: ICD-10-CM

## 2021-05-17 DIAGNOSIS — M54.6 ACUTE MIDLINE THORACIC BACK PAIN: ICD-10-CM

## 2021-05-17 DIAGNOSIS — R06.02 SOB (SHORTNESS OF BREATH): Primary | ICD-10-CM

## 2021-05-17 PROCEDURE — 1036F TOBACCO NON-USER: CPT | Performed by: PHYSICIAN ASSISTANT

## 2021-05-17 PROCEDURE — 99214 OFFICE O/P EST MOD 30 MIN: CPT | Performed by: PHYSICIAN ASSISTANT

## 2021-05-17 PROCEDURE — 3074F SYST BP LT 130 MM HG: CPT | Performed by: PHYSICIAN ASSISTANT

## 2021-05-17 PROCEDURE — 1160F RVW MEDS BY RX/DR IN RCRD: CPT | Performed by: PHYSICIAN ASSISTANT

## 2021-05-17 PROCEDURE — 3079F DIAST BP 80-89 MM HG: CPT | Performed by: PHYSICIAN ASSISTANT

## 2021-05-17 NOTE — PROGRESS NOTES
Assessment/Plan:  I reviewed his lab stress echo with him  Negative  He still feels very fatigued he does not sleep well will try workup for sleep apnea  Follow-up in a month       Diagnoses and all orders for this visit:    SOB (shortness of breath)  -     Diagnostic Sleep Study; Future  -     Complete PFT with post bronchodilator; Future  -     Ambulatory referral to Pulmonology; Future    Acute midline thoracic back pain    Mild aortic stenosis    Dyspnea on exertion  -     Diagnostic Sleep Study; Future  -     Complete PFT with post bronchodilator; Future  -     Ambulatory referral to Pulmonology; Future    Fatigue due to excessive exertion, sequela  -     Diagnostic Sleep Study; Future  -     Complete PFT with post bronchodilator; Future  -     Ambulatory referral to Pulmonology; Future        No problem-specific Assessment & Plan notes found for this encounter  Subjective:      Patient ID: So López is a 76 y o  male  For follow-up  Seen a month ago because of exertional dyspnea and vague tightness across his back with exertion  Screening labs stress echo was ordered  He was seen in the ER 4 weeks ago because of aching in his upper back workup was done no etiology was found troponins negative PE ruled out  No signs of respiratory failure  Had a recent stress echo which showed no sign of any ischemia  He still feels very fatigued he does not sleep well he has vague discomfort at times in his upper back sometimes when walking sometimes not overall however has been feeling well normally active no fever chills no cough his appetite is good  History of hypertension hyperlipidemia well controlled meds      The following portions of the patient's history were reviewed and updated as appropriate:   He has a past medical history of Disc degeneration, lumbar and Peripheral vascular disease ([address] )  ,  does not have any pertinent problems on file  ,   has no past surgical history on file  ,  family history includes Breast cancer in his mother; Hip fracture in his father  ,   reports that he has never smoked  He has never used smokeless tobacco  He reports that he does not drink alcohol or use drugs  ,  is allergic to pollen extract     Current Outpatient Medications   Medication Sig Dispense Refill    amLODIPine (NORVASC) 5 mg tablet Take 1 tablet (5 mg total) by mouth daily 90 tablet 3    lisinopril (ZESTRIL) 40 mg tablet Take 1 tablet (40 mg total) by mouth daily 90 tablet 3    rosuvastatin (CRESTOR) 20 MG tablet Take 1 tablet (20 mg total) by mouth daily 90 tablet 3     No current facility-administered medications for this visit  Review of Systems   Constitutional: Positive for fatigue  Negative for chills and fever  HENT: Negative for ear pain and sore throat  Eyes: Negative for pain and visual disturbance  Respiratory: Positive for chest tightness and shortness of breath  Negative for cough  Cardiovascular: Negative for chest pain and palpitations  Gastrointestinal: Negative for abdominal pain and vomiting  Genitourinary: Negative for dysuria and hematuria  Musculoskeletal: Positive for back pain  Negative for arthralgias  Skin: Negative for color change and rash  Neurological: Negative for seizures and syncope  All other systems reviewed and are negative  Objective:  Vitals:    05/17/21 0748   BP: 118/80   BP Location: Left arm   Patient Position: Sitting   Cuff Size: Standard   Pulse: 75   Temp: 97 8 °F (36 6 °C)   TempSrc: Temporal   SpO2: 97%   Weight: 100 kg (221 lb 6 4 oz)     Body mass index is 30 88 kg/m²  Physical Exam  Vitals signs reviewed  Constitutional:       Appearance: He is well-developed  He is obese  HENT:      Head: Normocephalic        Right Ear: Tympanic membrane and external ear normal       Left Ear: Tympanic membrane and external ear normal       Nose: Nose normal       Mouth/Throat:      Mouth: Mucous membranes are moist    Eyes: Extraocular Movements: Extraocular movements intact  Conjunctiva/sclera: Conjunctivae normal       Pupils: Pupils are equal, round, and reactive to light  Neck:      Musculoskeletal: Normal range of motion and neck supple  Thyroid: No thyromegaly  Cardiovascular:      Rate and Rhythm: Normal rate and regular rhythm  Pulses: Normal pulses  Heart sounds: Normal heart sounds  No murmur  Pulmonary:      Effort: Pulmonary effort is normal  No respiratory distress  Breath sounds: Normal breath sounds  No wheezing, rhonchi or rales  Abdominal:      General: Abdomen is flat  Bowel sounds are normal  There is no distension  Palpations: Abdomen is soft  Genitourinary:     Penis: Normal     Musculoskeletal: Normal range of motion  General: No swelling or deformity  Skin:     General: Skin is warm and dry  Coloration: Skin is not jaundiced  Neurological:      General: No focal deficit present  Mental Status: He is alert and oriented to person, place, and time  Cranial Nerves: No cranial nerve deficit  Motor: No weakness  Deep Tendon Reflexes: Reflexes are normal and symmetric  Psychiatric:         Mood and Affect: Mood normal          Thought Content:  Thought content normal          Judgment: Judgment normal

## 2021-06-01 ENCOUNTER — TELEPHONE (OUTPATIENT)
Dept: SLEEP CENTER | Facility: CLINIC | Age: 66
End: 2021-06-01

## 2021-06-01 NOTE — TELEPHONE ENCOUNTER
----- Message from Izzy Parkinson DO sent at 5/30/2021 10:20 PM EDT -----  approve  ----- Message -----  From: Kierra Lamar  Sent: 5/26/2021   9:24 AM EDT  To: [facility] Provider    This sleep study needs approval      If approved please sign and return to clerical pool  If denied please include reasons why  Also provide alternative testing if warranted  Please sign and return to clerical pool

## 2021-06-24 ENCOUNTER — HOSPITAL ENCOUNTER (OUTPATIENT)
Dept: SLEEP CENTER | Facility: CLINIC | Age: 66
Discharge: HOME | End: 2021-06-24
Payer: COMMERCIAL

## 2021-06-24 DIAGNOSIS — R06.00 DYSPNEA ON EXERTION: ICD-10-CM

## 2021-06-24 DIAGNOSIS — T73.3XXS FATIGUE DUE TO EXCESSIVE EXERTION, SEQUELA: ICD-10-CM

## 2021-06-24 DIAGNOSIS — R06.02 SOB (SHORTNESS OF BREATH): ICD-10-CM

## 2021-06-24 PROCEDURE — 95810 POLYSOM 6/> YRS 4/> PARAM: CPT | Performed by: INTERNAL MEDICINE

## 2021-06-24 PROCEDURE — 95810 POLYSOM 6/> YRS 4/> PARAM: CPT

## 2021-06-25 NOTE — PROGRESS NOTES
Sleep Study Documentation    Pre-Sleep Study       Sleep testing procedure explained to patient:YES    Patient napped prior to study:NO    Caffeine:Dayshift worker after 12PM   Caffeine use:YES- coffee  18 ounces or more    Alcohol:Dayshift workers after 5PM: Alcohol use:NO    Typical day for patient:YES       Study Documentation    Sleep Study Indications: excessive daytime sleepiness, BMI>30, chronic or AM headache, unrefreshing sleep    Sleep Study: Diagnostic   Snore:Mild  Supplemental O2: no    O2 flow rate (L/min) range n/a  O2 flow rate (L/min) final n/a  Minimum SaO2 86%  Baseline SaO2 95%        Mode of Therapy: n/a    EKG abnormalities: no     EEG abnormalities: no    Sleep Study Recorded < 2 hours: N/A    Sleep Study Recorded > 2 hours but incomplete study: N/A    Sleep Study Recorded 6 hours but no sleep obtained: NO    Patient classification: retired       Post-Sleep Study    Medication used at bedtime or during sleep study:NO    Patient reports time it took to fall asleep:30 to 60 minutes    Patient reports waking up during study:3 or more times  Patient reports returning to sleep in greater than 30 minutes  Patient reports sleeping 2 to 4 hours without dreaming  Patient reports sleep during study:typical    Patient rated sleepiness: Somewhat sleepy or tired    PAP treatment:no

## 2021-06-28 ENCOUNTER — HOSPITAL ENCOUNTER (OUTPATIENT)
Dept: PULMONOLOGY | Facility: HOSPITAL | Age: 66
Discharge: HOME | End: 2021-06-28
Payer: COMMERCIAL

## 2021-06-28 DIAGNOSIS — T73.3XXS FATIGUE DUE TO EXCESSIVE EXERTION, SEQUELA: ICD-10-CM

## 2021-06-28 DIAGNOSIS — R06.02 SOB (SHORTNESS OF BREATH): ICD-10-CM

## 2021-06-28 DIAGNOSIS — R06.00 DYSPNEA ON EXERTION: ICD-10-CM

## 2021-06-28 PROCEDURE — 94060 EVALUATION OF WHEEZING: CPT | Performed by: INTERNAL MEDICINE

## 2021-06-28 PROCEDURE — 94727 GAS DIL/WSHOT DETER LNG VOL: CPT

## 2021-06-28 PROCEDURE — 94727 GAS DIL/WSHOT DETER LNG VOL: CPT | Performed by: INTERNAL MEDICINE

## 2021-06-28 PROCEDURE — 94060 EVALUATION OF WHEEZING: CPT

## 2021-06-28 PROCEDURE — 94729 DIFFUSING CAPACITY: CPT | Performed by: INTERNAL MEDICINE

## 2021-06-28 PROCEDURE — 94760 N-INVAS EAR/PLS OXIMETRY 1: CPT

## 2021-06-28 PROCEDURE — 94729 DIFFUSING CAPACITY: CPT

## 2021-06-28 RX ORDER — ALBUTEROL SULFATE 2.5 MG/3ML
2.5 SOLUTION RESPIRATORY (INHALATION) ONCE
Status: COMPLETED | OUTPATIENT
Start: 2021-06-28 | End: 2021-06-28

## 2021-06-28 RX ADMIN — ALBUTEROL SULFATE 2.5 MG: 2.5 SOLUTION RESPIRATORY (INHALATION) at 08:39

## 2021-07-07 ENCOUNTER — TELEPHONE (OUTPATIENT)
Dept: SLEEP CENTER | Facility: CLINIC | Age: 66
End: 2021-07-07

## 2021-07-07 NOTE — TELEPHONE ENCOUNTER
Spoke with patient, advised no ABUNDIO or PLM    Can discuss further at visit with Dr Amalia Ham 7/9/2021

## 2021-07-09 ENCOUNTER — CONSULT (OUTPATIENT)
Dept: PULMONOLOGY | Facility: CLINIC | Age: 66
End: 2021-07-09
Payer: COMMERCIAL

## 2021-07-09 VITALS
SYSTOLIC BLOOD PRESSURE: 110 MMHG | TEMPERATURE: 96.6 F | BODY MASS INDEX: 30.52 KG/M2 | DIASTOLIC BLOOD PRESSURE: 80 MMHG | HEIGHT: 71 IN | WEIGHT: 218 LBS | HEART RATE: 75 BPM | OXYGEN SATURATION: 97 %

## 2021-07-09 DIAGNOSIS — R06.02 SOB (SHORTNESS OF BREATH): Primary | ICD-10-CM

## 2021-07-09 DIAGNOSIS — J98.4 RESTRICTIVE LUNG DISEASE: ICD-10-CM

## 2021-07-09 DIAGNOSIS — E66.9 OBESITY (BMI 30-39.9): ICD-10-CM

## 2021-07-09 DIAGNOSIS — R06.00 DYSPNEA ON EXERTION: ICD-10-CM

## 2021-07-09 DIAGNOSIS — J45.40 MODERATE PERSISTENT ASTHMA WITHOUT COMPLICATION: ICD-10-CM

## 2021-07-09 PROCEDURE — 1036F TOBACCO NON-USER: CPT | Performed by: INTERNAL MEDICINE

## 2021-07-09 PROCEDURE — 99204 OFFICE O/P NEW MOD 45 MIN: CPT | Performed by: INTERNAL MEDICINE

## 2021-07-09 PROCEDURE — 3008F BODY MASS INDEX DOCD: CPT | Performed by: INTERNAL MEDICINE

## 2021-07-09 RX ORDER — BUDESONIDE AND FORMOTEROL FUMARATE DIHYDRATE 160; 4.5 UG/1; UG/1
2 AEROSOL RESPIRATORY (INHALATION) 2 TIMES DAILY
Qty: 10.2 G | Refills: 1 | Status: SHIPPED | OUTPATIENT
Start: 2021-07-09 | End: 2021-07-19 | Stop reason: SDUPTHER

## 2021-07-09 NOTE — PROGRESS NOTES
Assessment/Plan:     Diagnoses and all orders for this visit:    SOB (shortness of breath)  -     CT chest high resolution; Future  -     [facility] Allergy Panel, Adult; Future  -     Hypersensitivity pnuemonitis profile; Future    Dyspnea on exertion  -     CT chest high resolution; Future    Restrictive lung disease  -     CT chest high resolution; Future    Moderate persistent asthma without complication  -     budesonide-formoterol (SYMBICORT) 160-4 5 mcg/act inhaler; Inhale 2 puffs 2 (two) times a day Rinse mouth after use  Obesity (BMI 30-39  9)        Shortness of breath in this patient who has never smoked was worked in a [employer] for about 12 years, PFTs demonstrating moderate obstructive and restrictive limitation  Will get high-resolution CT of the chest further delineate the cause for the restriction  Reviewed PFT results with the patient in detail  Given significant in coughing spells and wheezing will and Symbicort 160/4 52 puffs twice daily   MDI technique reviewed with the patient  Rinse mouth after use   He does have albuterol MDI to be used 2 puffs 4 times daily as needed only  Will also get respiratory allergy panel and hypersensitivity pneumonitis panel and follow-up  Also reviewed his sleep study results with the patient with diagnostic sleep study with poor sleep efficiency at 54%, with increased sleep onset latency, no evidence of any obstructive sleep apnea no periodic limb movement disorder  He does have trouble falling asleep he states, sometimes 7 hour of 1-1/2 hours, discussed with the patient to take melatonin 6 mg tablet to the counter an hour prior to the referred bedtime  Sleep hygiene discussed with the patient  Follow-up after the about testing or p r n  earlier as needed  Return in about 2 months (around 9/9/2021)  All questions are answered to the patient's satisfaction and understanding  He verbalizes understanding    He is encouraged to call with any further questions or concerns  Portions of the record may have been created with voice recognition software  Occasional wrong word or "sound a like" substitutions may have occurred due to the inherent limitations of voice recognition software  Read the chart carefully and recognize, using context, where substitutions have occurred  a    Electronically Signed by Michael Owens MD    ______________________________________________________________________    Chief Complaint:   Chief Complaint   Patient presents with    Shortness of Breath    Wheezing    Insomnia        Patient ID: Mina Steven is a 78 y o  y o  male has a past medical history of Disc degeneration, lumbar, Peripheral vascular disease ([address] ), SOB (shortness of breath), and Wheezing  7/9/2021  Patient presents today for initial visit  Patient is a very pleasant 48-year-old gentleman who has never smoked complaining of shortness of breath and occasional coughing spells he states  Also has history of insomnia and history of snoring for which he also had a diagnostic sleep study done  Patient also has history of  Sleep disturbance with history of snoring he did have a diagnostic sleep study done to rule out obstructive sleep apnea  He states he goes to bed at around 10:00 p m  takes about an hour to 1-1/2 hours to fall asleep and his out of bed at 6:00 a m  has 1-2 nocturnal awakenings for nocturia  He still tired and fatigued during the day but does not take a nap during the daytime  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 , 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   Pets/Enviroment:one cat and a dog  Travel history: none  Review of Systems   Constitutional: Negative  HENT: Negative  Eyes: Negative  Respiratory: Positive for cough and shortness of breath  Cardiovascular: Negative  Gastrointestinal: Negative  Endocrine: Negative  Genitourinary: Negative  Musculoskeletal: Negative  Allergic/Immunologic: Negative  Neurological: Negative  Hematological: Negative  Psychiatric/Behavioral: Negative  Social history: He reports that he has never smoked  He has never used smokeless tobacco  He reports that he does not drink alcohol and does not use drugs  Past surgical history: History reviewed  No pertinent surgical history  Family history:   Family History   Problem Relation Age of Onset    Breast cancer Mother     Hip fracture Father        Immunization History   Administered Date(s) Administered    INFLUENZA 11/05/2014, 01/05/2016, 10/17/2017    Influenza Quadrivalent, 6-35 Months IM 11/05/2014, 01/05/2016, 10/17/2017    Influenza, high dose seasonal 0 7 mL 01/23/2020    Influenza, seasonal, injectable 1955    Pneumococcal Conjugate 13-Valent 07/12/2019    Pneumococcal Polysaccharide PPV23 1955, 01/23/2020    SARS-CoV-2 / COVID-19 mRNA IM (Pfizer-BioNTech) 03/18/2021, 04/12/2021    Tdap 1955, 1955    Zoster 1955     Current Outpatient Medications   Medication Sig Dispense Refill    amLODIPine (NORVASC) 5 mg tablet Take 1 tablet (5 mg total) by mouth daily 90 tablet 3    lisinopril (ZESTRIL) 40 mg tablet Take 1 tablet (40 mg total) by mouth daily 90 tablet 3    rosuvastatin (CRESTOR) 20 MG tablet Take 1 tablet (20 mg total) by mouth daily 90 tablet 3    budesonide-formoterol (SYMBICORT) 160-4 5 mcg/act inhaler Inhale 2 puffs 2 (two) times a day Rinse mouth after use  10 2 g 1     No current facility-administered medications for this visit  Allergies: Pollen extract    Objective:  Vitals:    07/09/21 1007   BP: 110/80   Pulse: 75   Temp: (!) 96 6 °F (35 9 °C)   SpO2: 97%   Weight: 98 9 kg (218 lb)   Height: 5' 11" (1 803 m)   Oxygen Therapy  SpO2: 97 %      Wt Readings from Last 3 Encounters:   07/09/21 98 9 kg (218 lb)   05/17/21 100 kg (221 lb 6 4 oz)   04/09/21 102 kg (224 lb 9 6 oz)     Body mass index is 30 4 kg/m²  Physical Exam  Vitals and nursing note reviewed  Constitutional:       Appearance: He is well-developed  HENT:      Head: Normocephalic and atraumatic  Eyes:      Conjunctiva/sclera: Conjunctivae normal       Pupils: Pupils are equal, round, and reactive to light  Neck:      Thyroid: No thyromegaly  Vascular: No JVD  Cardiovascular:      Rate and Rhythm: Normal rate and regular rhythm  Heart sounds: Normal heart sounds  No murmur heard  No friction rub  No gallop  Pulmonary:      Effort: Pulmonary effort is normal  No respiratory distress  Breath sounds: Normal breath sounds  No wheezing or rales  Chest:      Chest wall: No tenderness  Musculoskeletal:         General: No tenderness or deformity  Normal range of motion  Cervical back: Normal range of motion and neck supple  Lymphadenopathy:      Cervical: No cervical adenopathy  Skin:     General: Skin is warm and dry  Neurological:      Mental Status: He is alert and oriented to person, place, and time  Diagnostics:  I have personally reviewed pertinent reports  Complete PFT with post bronchodilator    Result Date: 7/2/2021  Narrative: Requesting provider:  Joshua Mahoney Diagnosis:  Shortness of breath Results: Patient gave good effort and cooperation  The testing met ATS Standards for Acceptability and Repeatability  Patient had some difficulty with FVC maneuver Spirometry: FEV1/FVC Ratio is 65 %  FEV1 is 2 36 L/66 % of predicted  FVC is 3 65 L/78 % of predicted  There is significant bronchodilator response Lung volumes: RV 2 18 L/87 % of predicted, TLC 5 31 L/71 on % of predicted, RV/TLC ratio 41 % Diffusing capacity: 88 % of predicted     Impression: 1  Combined moderate obstructive and mild restrictive airflow limitation on spirometry with decreased vital capacity 2  Significant bronchodilator response 3  Decreased total lung capacity and vital capacity indicating mild restriction 4   Normal diffusion capacity 5  Probably normal flow volume loop with some cough or hesitancy during the maneuver Hiral King MD Portions of the record may have been created with voice recognition software  Occasional wrong word or "sound a like" substitutions may have occurred due to the inherent limitations of voice recognition software  Read the chart carefully and recognize, using context, where substitutions have occurred

## 2021-07-10 ENCOUNTER — APPOINTMENT (OUTPATIENT)
Dept: LAB | Facility: CLINIC | Age: 66
End: 2021-07-10
Payer: COMMERCIAL

## 2021-07-10 DIAGNOSIS — R06.02 SOB (SHORTNESS OF BREATH): ICD-10-CM

## 2021-07-10 PROCEDURE — 86003 ALLG SPEC IGE CRUDE XTRC EA: CPT

## 2021-07-10 PROCEDURE — 36415 COLL VENOUS BLD VENIPUNCTURE: CPT

## 2021-07-10 PROCEDURE — 82785 ASSAY OF IGE: CPT

## 2021-07-10 PROCEDURE — 86331 IMMUNODIFFUSION OUCHTERLONY: CPT

## 2021-07-10 PROCEDURE — 86602 ANTINOMYCES ANTIBODY: CPT

## 2021-07-10 PROCEDURE — 86606 ASPERGILLUS ANTIBODY: CPT

## 2021-07-10 PROCEDURE — 86671 FUNGUS NES ANTIBODY: CPT

## 2021-07-12 LAB
A ALTERNATA IGE QN: <0.1 KUA/I
A FUMIGATUS IGE QN: <0.1 KUA/I
ALLERGEN COMMENT: ABNORMAL
BERMUDA GRASS IGE QN: <0.1 KUA/I
BOXELDER IGE QN: <0.1 KUA/I
C HERBARUM IGE QN: <0.1 KUA/I
CAT DANDER IGE QN: <0.1 KUA/I
CMN PIGWEED IGE QN: <0.1 KUA/I
COMMON RAGWEED IGE QN: 0.19 KUA/I
COTTONWOOD IGE QN: <0.1 KUA/I
D FARINAE IGE QN: 0.28 KUA/I
D PTERONYSS IGE QN: 0.7 KUA/I
DOG DANDER IGE QN: <0.1 KUA/I
LONDON PLANE IGE QN: <0.1 KUA/I
MOUSE URINE PROT IGE QN: <0.1 KUA/I
MT JUNIPER IGE QN: <0.1 KUA/I
MUGWORT IGE QN: <0.1 KUA/I
P NOTATUM IGE QN: <0.1 KUA/I
ROACH IGE QN: 0.24 KUA/I
SHEEP SORREL IGE QN: <0.1 KUA/I
SILVER BIRCH IGE QN: <0.1 KUA/I
TIMOTHY IGE QN: <0.1 KUA/I
TOTAL IGE SMQN RAST: 70.2 KU/L (ref 0–113)
WALNUT IGE QN: <0.1 KUA/I
WHITE ASH IGE QN: 0.12 KUA/I
WHITE ELM IGE QN: <0.1 KUA/I
WHITE MULBERRY IGE QN: <0.1 KUA/I
WHITE OAK IGE QN: <0.1 KUA/I

## 2021-07-16 LAB
A FUMIGATUS1 AB SER QL ID: NEGATIVE
A PULLULANS AB SER QL: NEGATIVE
LACEYELLA SACCHARI AB SER QL: NEGATIVE
PIGEON SERUM AB QL ID: NEGATIVE
S RECTIVIRGULA AB SER QL ID: NEGATIVE
T VULGARIS AB SER QL ID: NEGATIVE

## 2021-07-19 DIAGNOSIS — J45.40 MODERATE PERSISTENT ASTHMA WITHOUT COMPLICATION: ICD-10-CM

## 2021-07-19 RX ORDER — BUDESONIDE AND FORMOTEROL FUMARATE DIHYDRATE 160; 4.5 UG/1; UG/1
2 AEROSOL RESPIRATORY (INHALATION) 2 TIMES DAILY
Qty: 30.6 G | Refills: 1 | Status: SHIPPED | OUTPATIENT
Start: 2021-07-19 | End: 2021-09-15

## 2021-08-01 ENCOUNTER — HOSPITAL ENCOUNTER (OUTPATIENT)
Dept: CT IMAGING | Facility: HOSPITAL | Age: 66
Discharge: HOME | End: 2021-08-01
Attending: INTERNAL MEDICINE
Payer: COMMERCIAL

## 2021-08-01 DIAGNOSIS — R06.02 SOB (SHORTNESS OF BREATH): ICD-10-CM

## 2021-08-01 DIAGNOSIS — R06.00 DYSPNEA ON EXERTION: ICD-10-CM

## 2021-08-01 DIAGNOSIS — J98.4 RESTRICTIVE LUNG DISEASE: ICD-10-CM

## 2021-08-01 PROCEDURE — 71250 CT THORAX DX C-: CPT

## 2021-08-01 PROCEDURE — G1004 CDSM NDSC: HCPCS

## 2021-08-06 ENCOUNTER — TELEPHONE (OUTPATIENT)
Dept: PULMONOLOGY | Facility: CLINIC | Age: 66
End: 2021-08-06

## 2021-08-06 NOTE — TELEPHONE ENCOUNTER
----- Message from Duncan Brown MD sent at 8/6/2021  3:07 PM EDT -----   Please let him know the CT of the chest looks good and will discuss in detail during his next visit

## 2021-09-15 ENCOUNTER — OFFICE VISIT (OUTPATIENT)
Dept: INTERNAL MEDICINE CLINIC | Facility: CLINIC | Age: 66
End: 2021-09-15
Payer: COMMERCIAL

## 2021-09-15 VITALS
WEIGHT: 219 LBS | SYSTOLIC BLOOD PRESSURE: 110 MMHG | HEART RATE: 75 BPM | HEIGHT: 71 IN | BODY MASS INDEX: 30.66 KG/M2 | TEMPERATURE: 98.9 F | OXYGEN SATURATION: 97 % | DIASTOLIC BLOOD PRESSURE: 72 MMHG

## 2021-09-15 DIAGNOSIS — I35.0 MILD AORTIC STENOSIS: ICD-10-CM

## 2021-09-15 DIAGNOSIS — I10 ESSENTIAL HYPERTENSION: ICD-10-CM

## 2021-09-15 DIAGNOSIS — Z12.5 PROSTATE CANCER SCREENING: ICD-10-CM

## 2021-09-15 DIAGNOSIS — I34.0 MILD MITRAL REGURGITATION: ICD-10-CM

## 2021-09-15 DIAGNOSIS — E78.2 MIXED HYPERLIPIDEMIA: ICD-10-CM

## 2021-09-15 DIAGNOSIS — R73.01 IMPAIRED FASTING GLUCOSE: Primary | ICD-10-CM

## 2021-09-15 PROCEDURE — 99214 OFFICE O/P EST MOD 30 MIN: CPT | Performed by: INTERNAL MEDICINE

## 2021-09-15 NOTE — PATIENT INSTRUCTIONS
A patient with some continued exertional symptoms  At this point, seems to be some kind of lung disease  Will be following with Pulmonary to consider allergic disease, Churg-Loc syndrome other etiologies  Mild AS with normal systolic function    Dannielle Chaidez is to control the blood pressure   Impaired fasting glucose but stable for years with hemoglobin A1c 6 3% unchanged     Otherwise feels well     Revisit with primary care for his next

## 2021-09-15 NOTE — PROGRESS NOTES
Assessment/Plan:       Diagnoses and all orders for this visit:    Impaired fasting glucose    Essential hypertension    Mild aortic stenosis    Mild mitral regurgitation    Mixed hyperlipidemia    Other orders  -     Cancel: TDAP VACCINE GREATER THAN OR EQUAL TO 6YO IM                Subjective:      Patient ID: Everett Okeefe is a 78 y o  male  A 58-year-old man with  mild aortic stenosis  essential hypertension is treated and stable  He is on medication     Currently following with Pulmonary has a 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  In use to report intermittent shortness of breath and lightheadedness on exertion  A stress echocardiogram done recently was normal   A recent echocardiogram documented normal systolic function and diastolic function with mild AS with normal mitral valve  Impaired fasting glucose with prediabetic hemoglobin A1c     Modest hyperlipidemia , treated with Crestor      Most recent colonoscopy July 2019  Follow-up recommended 2024  ROS otherwise negative      The following portions of the patient's history were reviewed and updated as appropriate:   He has a past medical history of Disc degeneration, lumbar, Peripheral vascular disease ([address] ), SOB (shortness of breath), and Wheezing ,  does not have any pertinent problems on file  ,   has no past surgical history on file  ,  family history includes Breast cancer in his mother; Hip fracture in his father  ,   reports that he has never smoked  He has never used smokeless tobacco  He reports that he does not drink alcohol and does not use drugs  ,  is allergic to pollen extract     Current Outpatient Medications   Medication Sig Dispense Refill    amLODIPine (NORVASC) 5 mg tablet Take 1 tablet (5 mg total) by mouth daily 90 tablet 3    lisinopril (ZESTRIL) 40 mg tablet Take 1 tablet (40 mg total) by mouth daily 90 tablet 3    rosuvastatin (CRESTOR) 20 MG tablet Take 1 tablet (20 mg total) by mouth daily 90 tablet 3     No current facility-administered medications for this visit  Review of Systems   Respiratory: Positive for shortness of breath  Neurological: Positive for light-headedness  All other systems reviewed and are negative  Objective:  Vitals:    09/15/21 0818   BP: 110/72   Pulse: 75   Temp: 98 9 °F (37 2 °C)   SpO2: 97%      Physical Exam  Constitutional:       Appearance: He is well-developed  HENT:      Head: Normocephalic and atraumatic  Eyes:      Pupils: Pupils are equal, round, and reactive to light  Neck:      Thyroid: No thyromegaly  Trachea: No tracheal deviation  Cardiovascular:      Rate and Rhythm: Normal rate and regular rhythm  Heart sounds: Normal heart sounds  No murmur heard  No gallop  Pulmonary:      Effort: Pulmonary effort is normal  No respiratory distress  Breath sounds: No wheezing or rales  Abdominal:      General: Bowel sounds are normal       Palpations: Abdomen is soft  Tenderness: There is no abdominal tenderness  Musculoskeletal:         General: No tenderness or deformity  Normal range of motion  Cervical back: Normal range of motion and neck supple  Skin:     General: Skin is warm and dry  Neurological:      Mental Status: He is alert and oriented to person, place, and time  Coordination: Coordination normal       Deep Tendon Reflexes: Reflexes are normal and symmetric  There are no Patient Instructions on file for this visit

## 2021-09-16 ENCOUNTER — APPOINTMENT (OUTPATIENT)
Dept: LAB | Facility: CLINIC | Age: 66
End: 2021-09-16
Payer: COMMERCIAL

## 2021-09-16 DIAGNOSIS — R73.01 IMPAIRED FASTING GLUCOSE: ICD-10-CM

## 2021-09-16 DIAGNOSIS — Z12.5 PROSTATE CANCER SCREENING: ICD-10-CM

## 2021-09-16 LAB
EST. AVERAGE GLUCOSE BLD GHB EST-MCNC: 134 MG/DL
HBA1C MFR BLD: 6.3 %
PSA SERPL-MCNC: 1 NG/ML (ref 0–4)

## 2021-09-16 PROCEDURE — G0103 PSA SCREENING: HCPCS

## 2021-09-16 PROCEDURE — 36415 COLL VENOUS BLD VENIPUNCTURE: CPT

## 2021-09-16 PROCEDURE — 83036 HEMOGLOBIN GLYCOSYLATED A1C: CPT

## 2021-09-22 ENCOUNTER — OFFICE VISIT (OUTPATIENT)
Dept: PULMONOLOGY | Facility: CLINIC | Age: 66
End: 2021-09-22
Payer: COMMERCIAL

## 2021-09-22 ENCOUNTER — TELEPHONE (OUTPATIENT)
Dept: PULMONOLOGY | Facility: CLINIC | Age: 66
End: 2021-09-22

## 2021-09-22 VITALS
WEIGHT: 218 LBS | BODY MASS INDEX: 30.52 KG/M2 | HEART RATE: 91 BPM | TEMPERATURE: 97 F | HEIGHT: 71 IN | OXYGEN SATURATION: 96 % | SYSTOLIC BLOOD PRESSURE: 130 MMHG | DIASTOLIC BLOOD PRESSURE: 84 MMHG

## 2021-09-22 DIAGNOSIS — J45.40 MODERATE PERSISTENT ASTHMA WITHOUT COMPLICATION: Primary | ICD-10-CM

## 2021-09-22 DIAGNOSIS — R06.02 SOB (SHORTNESS OF BREATH): ICD-10-CM

## 2021-09-22 DIAGNOSIS — E66.9 OBESITY (BMI 30-39.9): ICD-10-CM

## 2021-09-22 PROCEDURE — 1160F RVW MEDS BY RX/DR IN RCRD: CPT | Performed by: INTERNAL MEDICINE

## 2021-09-22 PROCEDURE — 3008F BODY MASS INDEX DOCD: CPT | Performed by: INTERNAL MEDICINE

## 2021-09-22 PROCEDURE — 99214 OFFICE O/P EST MOD 30 MIN: CPT | Performed by: INTERNAL MEDICINE

## 2021-09-22 PROCEDURE — 1036F TOBACCO NON-USER: CPT | Performed by: INTERNAL MEDICINE

## 2021-09-22 PROCEDURE — 3079F DIAST BP 80-89 MM HG: CPT | Performed by: INTERNAL MEDICINE

## 2021-09-22 PROCEDURE — 3075F SYST BP GE 130 - 139MM HG: CPT | Performed by: INTERNAL MEDICINE

## 2021-09-22 RX ORDER — MONTELUKAST SODIUM 10 MG/1
10 TABLET ORAL
Qty: 30 TABLET | Refills: 0 | Status: SHIPPED | OUTPATIENT
Start: 2021-09-22 | End: 2021-12-16

## 2021-09-22 RX ORDER — IPRATROPIUM BROMIDE AND ALBUTEROL SULFATE 2.5; .5 MG/3ML; MG/3ML
3 SOLUTION RESPIRATORY (INHALATION) 4 TIMES DAILY
Qty: 360 ML | Refills: 3 | Status: SHIPPED | OUTPATIENT
Start: 2021-09-22 | End: 2021-09-22 | Stop reason: SDUPTHER

## 2021-09-22 RX ORDER — MONTELUKAST SODIUM 10 MG/1
10 TABLET ORAL
Qty: 90 TABLET | Refills: 1 | Status: SHIPPED | OUTPATIENT
Start: 2021-09-22

## 2021-09-22 RX ORDER — BUDESONIDE AND FORMOTEROL FUMARATE DIHYDRATE 160; 4.5 UG/1; UG/1
2 AEROSOL RESPIRATORY (INHALATION) 2 TIMES DAILY
Qty: 10.2 G | Refills: 1 | Status: SHIPPED | OUTPATIENT
Start: 2021-09-22 | End: 2021-09-22 | Stop reason: SDUPTHER

## 2021-09-22 NOTE — TELEPHONE ENCOUNTER
Thought he told me during his office visit that it would be cheaper if it goes through the mail order that is why sent in there to [facility] mail order pharmacy but anyway if he wants it to the [facility] pharmacy I did resend it again thank you

## 2021-09-22 NOTE — TELEPHONE ENCOUNTER
Patient is calling in stating the prescriptions for today are suppose to go to [facility]  Can you resend to them? Thank you

## 2021-09-22 NOTE — PROGRESS NOTES
Assessment/Plan:   Diagnoses and all orders for this visit:    Moderate persistent asthma without complication  -     ipratropium-albuterol (DUO-NEB) 0 5-2 5 mg/3 mL nebulizer solution; Take 3 mL by nebulization 4 (four) times a day  -     montelukast (SINGULAIR) 10 mg tablet; Take 1 tablet (10 mg total) by mouth daily at bedtime  -     budesonide-formoterol (SYMBICORT) 160-4 5 mcg/act inhaler; Inhale 2 puffs 2 (two) times a day Rinse mouth after use  -     montelukast (SINGULAIR) 10 mg tablet; Take 1 tablet (10 mg total) by mouth daily at bedtime    SOB (shortness of breath)    Obesity (BMI 30-39 9)         moderate persistent asthma without complication  PFTs recently done demonstrating moderate obstructive airflow limitation with a significant response to the bronchodilator also has a restrictive airflow limitation with mildly decreased DLCO   Respiratory allergy panel with mild increase IgE to ragweed and dust mite and  High-resolution CT of the chest is completely normal no evidence of any interstitial lung disease  Add DuoNeb via nebulizer 4 times daily as needed     Add Symbicort 160/4 52 puffs twice daily  Rinse mouth after use   Singulair 10 mg daily at bedtime  Call if any worsening symptoms or increased use of the rescue MDI   Recommend weight loss recent sleep study did not demonstrate any evidence of obstructive sleep apnea his sleep study results were discussed during the last visit  Follow-up in 6 months or p r n  earlier as needed  Return in about 6 months (around 3/22/2022)  All questions are answered to the patient's satisfaction and understanding  He verbalizes understanding  He is encouraged to call with any further questions or concerns  Portions of the record may have been created with voice recognition software  Occasional wrong word or "sound a like" substitutions may have occurred due to the inherent limitations of voice recognition software    Read the chart carefully and recognize, using context, where substitutions have occurred  Electronically Signed by Alfonso Palmer MD    ______________________________________________________________________    Chief Complaint:   Chief Complaint   Patient presents with    Follow-up       Patient ID: Vinicio Bhatti is a 78 y o  y o  male has a past medical history of Disc degeneration, lumbar, Peripheral vascular disease ([address] ), SOB (shortness of breath), and Wheezing     9/22/2021  Patient presents today for follow-up visit  Patient is a very pleasant 14-year-old gentleman who has never smoked complaining of shortness of breath and occasional coughing spells he states  had a CT of the chest and allergy panel done and he is here for follow-up      Review of Systems   Constitutional: Positive for fatigue  HENT: Negative  Eyes: Negative  Respiratory: Positive for cough and shortness of breath  Cardiovascular: Negative  Gastrointestinal: Negative  Endocrine: Negative  Genitourinary: Negative  Musculoskeletal: Negative  Allergic/Immunologic: Positive for environmental allergies  Neurological: Negative  Hematological: Negative  Psychiatric/Behavioral: Negative  Smoking history: He reports that he has never smoked   He has never used smokeless tobacco     The following portions of the patient's history were reviewed and updated as appropriate: allergies, current medications, past family history, past medical history, past social history, past surgical history and problem list     Immunization History   Administered Date(s) Administered    INFLUENZA 11/05/2014, 01/05/2016, 10/17/2017    Influenza Quadrivalent, 6-35 Months IM 11/05/2014, 01/05/2016, 10/17/2017    Influenza, high dose seasonal 0 7 mL 01/23/2020    Influenza, seasonal, injectable 1955    Pneumococcal Conjugate 13-Valent 07/12/2019    Pneumococcal Polysaccharide PPV23 1954, 01/23/2020    SARS-CoV-2 / COVID-19 mRNA IM (Surikate) 03/18/2021, 04/12/2021    Tdap 1955, 1955    Zoster 1955     Current Outpatient Medications   Medication Sig Dispense Refill    amLODIPine (NORVASC) 5 mg tablet Take 1 tablet (5 mg total) by mouth daily 90 tablet 3    lisinopril (ZESTRIL) 40 mg tablet Take 1 tablet (40 mg total) by mouth daily 90 tablet 3    rosuvastatin (CRESTOR) 20 MG tablet Take 1 tablet (20 mg total) by mouth daily 90 tablet 3    budesonide-formoterol (SYMBICORT) 160-4 5 mcg/act inhaler Inhale 2 puffs 2 (two) times a day Rinse mouth after use  10 2 g 1    ipratropium-albuterol (DUO-NEB) 0 5-2 5 mg/3 mL nebulizer solution Take 3 mL by nebulization 4 (four) times a day 360 mL 3    montelukast (SINGULAIR) 10 mg tablet Take 1 tablet (10 mg total) by mouth daily at bedtime 90 tablet 1    montelukast (SINGULAIR) 10 mg tablet Take 1 tablet (10 mg total) by mouth daily at bedtime 30 tablet 0     No current facility-administered medications for this visit  Allergies: Pollen extract    Objective:  Vitals:    09/22/21 0927   BP: 130/84   Pulse: 91   Temp: (!) 97 °F (36 1 °C)   SpO2: 96%   Weight: 98 9 kg (218 lb)   Height: 5' 11" (1 803 m)   Oxygen Therapy  SpO2: 96 %    Wt Readings from Last 3 Encounters:   09/22/21 98 9 kg (218 lb)   09/15/21 99 3 kg (219 lb)   07/09/21 98 9 kg (218 lb)     Body mass index is 30 4 kg/m²  Physical Exam  Vitals and nursing note reviewed  Constitutional:       Appearance: He is well-developed  HENT:      Head: Normocephalic and atraumatic  Eyes:      Conjunctiva/sclera: Conjunctivae normal       Pupils: Pupils are equal, round, and reactive to light  Neck:      Thyroid: No thyromegaly  Vascular: No JVD  Cardiovascular:      Rate and Rhythm: Normal rate and regular rhythm  Heart sounds: Normal heart sounds  No murmur heard  No friction rub  No gallop  Pulmonary:      Effort: Pulmonary effort is normal  No respiratory distress        Breath sounds: Normal breath sounds  No wheezing or rales  Chest:      Chest wall: No tenderness  Musculoskeletal:         General: No tenderness or deformity  Normal range of motion  Cervical back: Normal range of motion and neck supple  Lymphadenopathy:      Cervical: No cervical adenopathy  Skin:     General: Skin is warm and dry  Neurological:      Mental Status: He is alert and oriented to person, place, and time           Lab Review:   Pertinent labs reviewed    Diagnostics:  I have personally reviewed pertinent films in PACS

## 2021-10-12 ENCOUNTER — TELEPHONE (OUTPATIENT)
Dept: PULMONOLOGY | Facility: CLINIC | Age: 66
End: 2021-10-12

## 2021-11-16 ENCOUNTER — TELEPHONE (OUTPATIENT)
Dept: NEPHROLOGY | Facility: CLINIC | Age: 66
End: 2021-11-16

## 2021-11-29 ENCOUNTER — OFFICE VISIT (OUTPATIENT)
Dept: INTERNAL MEDICINE CLINIC | Facility: CLINIC | Age: 66
End: 2021-11-29
Payer: COMMERCIAL

## 2021-11-29 VITALS
HEART RATE: 86 BPM | DIASTOLIC BLOOD PRESSURE: 84 MMHG | SYSTOLIC BLOOD PRESSURE: 128 MMHG | WEIGHT: 219.4 LBS | HEIGHT: 71 IN | OXYGEN SATURATION: 99 % | BODY MASS INDEX: 30.72 KG/M2

## 2021-11-29 DIAGNOSIS — G89.29 CHRONIC ABDOMINAL PAIN: ICD-10-CM

## 2021-11-29 DIAGNOSIS — R10.9 CHRONIC ABDOMINAL PAIN: ICD-10-CM

## 2021-11-29 DIAGNOSIS — Z23 ENCOUNTER FOR IMMUNIZATION: Primary | ICD-10-CM

## 2021-11-29 PROCEDURE — 90662 IIV NO PRSV INCREASED AG IM: CPT

## 2021-11-29 PROCEDURE — G0008 ADMIN INFLUENZA VIRUS VAC: HCPCS

## 2021-11-29 PROCEDURE — 99213 OFFICE O/P EST LOW 20 MIN: CPT | Performed by: INTERNAL MEDICINE

## 2021-12-07 ENCOUNTER — APPOINTMENT (OUTPATIENT)
Dept: LAB | Facility: CLINIC | Age: 66
End: 2021-12-07
Payer: COMMERCIAL

## 2021-12-07 ENCOUNTER — TELEPHONE (OUTPATIENT)
Dept: INTERNAL MEDICINE CLINIC | Facility: CLINIC | Age: 66
End: 2021-12-07

## 2021-12-07 DIAGNOSIS — I10 PRIMARY HYPERTENSION: ICD-10-CM

## 2021-12-07 LAB
ANION GAP SERPL CALCULATED.3IONS-SCNC: 8 MMOL/L (ref 4–13)
BUN SERPL-MCNC: 16 MG/DL (ref 5–25)
CALCIUM SERPL-MCNC: 10.3 MG/DL (ref 8.3–10.1)
CHLORIDE SERPL-SCNC: 99 MMOL/L (ref 100–108)
CO2 SERPL-SCNC: 25 MMOL/L (ref 21–32)
CREAT SERPL-MCNC: 1.13 MG/DL (ref 0.6–1.3)
GFR SERPL CREATININE-BSD FRML MDRD: 67 ML/MIN/1.73SQ M
GLUCOSE P FAST SERPL-MCNC: 130 MG/DL (ref 65–99)
POTASSIUM SERPL-SCNC: 3.7 MMOL/L (ref 3.5–5.3)
SODIUM SERPL-SCNC: 132 MMOL/L (ref 136–145)

## 2021-12-07 PROCEDURE — 80048 BASIC METABOLIC PNL TOTAL CA: CPT

## 2021-12-07 PROCEDURE — 36415 COLL VENOUS BLD VENIPUNCTURE: CPT

## 2021-12-08 ENCOUNTER — TELEPHONE (OUTPATIENT)
Dept: INTERNAL MEDICINE CLINIC | Facility: CLINIC | Age: 66
End: 2021-12-08

## 2021-12-14 ENCOUNTER — APPOINTMENT (OUTPATIENT)
Dept: LAB | Facility: CLINIC | Age: 66
End: 2021-12-14
Payer: COMMERCIAL

## 2021-12-14 DIAGNOSIS — R73.01 IMPAIRED FASTING GLUCOSE: ICD-10-CM

## 2021-12-14 DIAGNOSIS — E83.52 HYPERCALCEMIA: ICD-10-CM

## 2021-12-14 LAB
CA-I BLD-SCNC: 1.31 MMOL/L (ref 1.12–1.32)
EST. AVERAGE GLUCOSE BLD GHB EST-MCNC: 137 MG/DL
HBA1C MFR BLD: 6.4 %
IGA SERPL-MCNC: 295 MG/DL (ref 70–400)
IGG SERPL-MCNC: 1260 MG/DL (ref 700–1600)
IGM SERPL-MCNC: 96 MG/DL (ref 40–230)
PTH-INTACT SERPL-MCNC: 20.7 PG/ML (ref 18.4–80.1)

## 2021-12-14 PROCEDURE — 82330 ASSAY OF CALCIUM: CPT

## 2021-12-14 PROCEDURE — 84165 PROTEIN E-PHORESIS SERUM: CPT

## 2021-12-14 PROCEDURE — 84165 PROTEIN E-PHORESIS SERUM: CPT | Performed by: PATHOLOGY

## 2021-12-14 PROCEDURE — 83970 ASSAY OF PARATHORMONE: CPT

## 2021-12-14 PROCEDURE — 82784 ASSAY IGA/IGD/IGG/IGM EACH: CPT

## 2021-12-14 PROCEDURE — 36415 COLL VENOUS BLD VENIPUNCTURE: CPT

## 2021-12-14 PROCEDURE — 82397 CHEMILUMINESCENT ASSAY: CPT

## 2021-12-14 PROCEDURE — 83036 HEMOGLOBIN GLYCOSYLATED A1C: CPT

## 2021-12-15 LAB
ALBUMIN SERPL ELPH-MCNC: 4.63 G/DL (ref 3.5–5)
ALBUMIN SERPL ELPH-MCNC: 55.1 % (ref 52–65)
ALPHA1 GLOB SERPL ELPH-MCNC: 0.27 G/DL (ref 0.1–0.4)
ALPHA1 GLOB SERPL ELPH-MCNC: 3.2 % (ref 2.5–5)
ALPHA2 GLOB SERPL ELPH-MCNC: 1.15 G/DL (ref 0.4–1.2)
ALPHA2 GLOB SERPL ELPH-MCNC: 13.7 % (ref 7–13)
BETA GLOB ABNORMAL SERPL ELPH-MCNC: 0.52 G/DL (ref 0.4–0.8)
BETA1 GLOB SERPL ELPH-MCNC: 6.2 % (ref 5–13)
BETA2 GLOB SERPL ELPH-MCNC: 6.5 % (ref 2–8)
BETA2+GAMMA GLOB SERPL ELPH-MCNC: 0.55 G/DL (ref 0.2–0.5)
GAMMA GLOB ABNORMAL SERPL ELPH-MCNC: 1.29 G/DL (ref 0.5–1.6)
GAMMA GLOB SERPL ELPH-MCNC: 15.3 % (ref 12–22)
IGG/ALB SER: 1.23 {RATIO} (ref 1.1–1.8)
PROT PATTERN SERPL ELPH-IMP: ABNORMAL
PROT SERPL-MCNC: 8.4 G/DL (ref 6.4–8.2)

## 2021-12-21 ENCOUNTER — IMMUNIZATIONS (OUTPATIENT)
Dept: FAMILY MEDICINE CLINIC | Facility: HOSPITAL | Age: 66
End: 2021-12-21

## 2021-12-21 DIAGNOSIS — Z23 ENCOUNTER FOR IMMUNIZATION: Primary | ICD-10-CM

## 2021-12-21 LAB — PTH RELATED PROT SERPL-SCNC: <2 PMOL/L

## 2021-12-21 PROCEDURE — 91300 COVID-19 PFIZER VACC 0.3 ML: CPT

## 2021-12-21 PROCEDURE — 0001A COVID-19 PFIZER VACC 0.3 ML: CPT

## 2021-12-27 ENCOUNTER — HOSPITAL ENCOUNTER (OUTPATIENT)
Dept: CT IMAGING | Facility: CLINIC | Age: 66
Discharge: HOME | End: 2021-12-27
Payer: COMMERCIAL

## 2021-12-27 DIAGNOSIS — G89.29 CHRONIC ABDOMINAL PAIN: ICD-10-CM

## 2021-12-27 DIAGNOSIS — E78.2 MIXED HYPERLIPIDEMIA: ICD-10-CM

## 2021-12-27 DIAGNOSIS — I10 ESSENTIAL HYPERTENSION: ICD-10-CM

## 2021-12-27 DIAGNOSIS — R10.9 CHRONIC ABDOMINAL PAIN: ICD-10-CM

## 2021-12-27 PROCEDURE — 74160 CT ABDOMEN W/CONTRAST: CPT

## 2021-12-27 PROCEDURE — G1004 CDSM NDSC: HCPCS

## 2021-12-27 RX ORDER — AMLODIPINE BESYLATE 5 MG/1
TABLET ORAL
Qty: 90 TABLET | Refills: 3 | Status: SHIPPED | OUTPATIENT
Start: 2021-12-27 | End: 2022-03-14 | Stop reason: SDUPTHER

## 2021-12-27 RX ORDER — ROSUVASTATIN CALCIUM 20 MG/1
TABLET, COATED ORAL
Qty: 90 TABLET | Refills: 3 | Status: SHIPPED | OUTPATIENT
Start: 2021-12-27 | End: 2022-03-14 | Stop reason: SDUPTHER

## 2021-12-27 RX ORDER — LISINOPRIL 40 MG/1
TABLET ORAL
Qty: 90 TABLET | Refills: 3 | Status: SHIPPED | OUTPATIENT
Start: 2021-12-27 | End: 2022-03-14 | Stop reason: SDUPTHER

## 2021-12-27 RX ADMIN — IOHEXOL 100 ML: 350 INJECTION, SOLUTION INTRAVENOUS at 14:52

## 2022-01-04 ENCOUNTER — OFFICE VISIT (OUTPATIENT)
Dept: PULMONOLOGY | Facility: CLINIC | Age: 67
End: 2022-01-04
Payer: COMMERCIAL

## 2022-01-04 VITALS
HEIGHT: 71 IN | SYSTOLIC BLOOD PRESSURE: 138 MMHG | DIASTOLIC BLOOD PRESSURE: 78 MMHG | WEIGHT: 216 LBS | TEMPERATURE: 97.8 F | HEART RATE: 94 BPM | BODY MASS INDEX: 30.24 KG/M2 | OXYGEN SATURATION: 98 %

## 2022-01-04 DIAGNOSIS — J45.20 MILD INTERMITTENT ASTHMA WITHOUT COMPLICATION: Primary | ICD-10-CM

## 2022-01-04 DIAGNOSIS — J30.9 ALLERGIC RHINITIS, UNSPECIFIED SEASONALITY, UNSPECIFIED TRIGGER: ICD-10-CM

## 2022-01-04 PROCEDURE — 99213 OFFICE O/P EST LOW 20 MIN: CPT | Performed by: PHYSICIAN ASSISTANT

## 2022-01-04 NOTE — PROGRESS NOTES
Assessment/Plan:   Diagnoses and all orders for this visit:    Mild intermittent asthma without complication  -     fluticasone-salmeterol (Advair Diskus) 250-50 mcg/dose inhaler; Inhale 1 puff 2 (two) times a day Rinse mouth after use  Allergic rhinitis, unspecified seasonality, unspecified trigger     Patient is here today for follow up  He is overall doing well with his breathing  Has some mild chronic dyspnea on exertion  He continues with his Wixela twice per day  Has been using the nebulizer 3-4 times per day as he thought he was supposed to use it that many times  I have asked him to cut down to twice per day and see if he still needs it that many times  Can cut down to use as needed  He will follow up with us in 6 months or sooner if necessary  Return in about 6 months (around 7/4/2022)  All questions are answered to the patient's satisfaction and understanding  He verbalizes understanding  He is encouraged to call with any further questions or concerns  Portions of the record may have been created with voice recognition software  Occasional wrong word or "sound a like" substitutions may have occurred due to the inherent limitations of voice recognition software  Read the chart carefully and recognize, using context, where substitutions have occurred  Electronically Signed by Marcos Edward PA-C    ______________________________________________________________________    Chief Complaint: No chief complaint on file  Patient ID: Soraida Carter is a 78 y o  y o  male has a past medical history of Disc degeneration, lumbar, Peripheral vascular disease ([address] ), SOB (shortness of breath), and Wheezing  1/4/2022  Patient presents today for follow-up visit  Patient is a 77 yo male nonsmoker with PMH of asthma, hypertension, hyperlipidemia, mild aortic stenosis  He is here today for follow up  He is overall doing well with his breathing  He continues with his Wixela twice per day, singulair  He has been using his nebulizer 3-4 times per day as he thought he was to be using it regularly  Review of Systems   Constitutional: Negative  HENT: Negative  Respiratory: Negative  Cardiovascular: Negative  Gastrointestinal: Negative  Genitourinary: Negative  Musculoskeletal: Negative  Skin: Negative  Allergic/Immunologic: Positive for environmental allergies  Neurological: Negative  Psychiatric/Behavioral: Negative  Smoking history: He reports that he has never smoked   He has never used smokeless tobacco     The following portions of the patient's history were reviewed and updated as appropriate: allergies, current medications, past family history, past medical history, past social history, past surgical history and problem list     Immunization History   Administered Date(s) Administered    COVID-19 PFIZER VACCINE 0 3 ML IM 03/18/2021, 04/12/2021, 12/21/2021    INFLUENZA 11/05/2014, 01/05/2016, 10/17/2017    Influenza Quadrivalent, 6-35 Months IM 11/05/2014, 01/05/2016, 10/17/2017    Influenza, high dose seasonal 0 7 mL 01/23/2020, 11/29/2021    Influenza, seasonal, injectable 1954    Pneumococcal Conjugate 13-Valent 07/12/2019    Pneumococcal Polysaccharide PPV23 1954, 01/23/2020    Tdap 1955, 1955    Zoster 1955     Current Outpatient Medications   Medication Sig Dispense Refill    amLODIPine (NORVASC) 5 mg tablet TAKE ONE TABLET BY MOUTH EVERY DAY 90 tablet 3    ipratropium-albuterol (DUO-NEB) 0 5-2 5 mg/3 mL nebulizer solution Take 3 mL by nebulization 4 (four) times a day 360 mL 3    lisinopril (ZESTRIL) 40 mg tablet TAKE ONE TABLET BY MOUTH EVERY DAY 90 tablet 3    montelukast (SINGULAIR) 10 mg tablet Take 1 tablet (10 mg total) by mouth daily at bedtime 90 tablet 1    rosuvastatin (CRESTOR) 20 MG tablet TAKE ONE TABLET BY MOUTH EVERY DAY 90 tablet 3    fluticasone-salmeterol (Advair Diskus) 250-50 mcg/dose inhaler Inhale 1 puff 2 (two) times a day Rinse mouth after use  180 blister 2    montelukast (SINGULAIR) 10 mg tablet TAKE 1 TABLET BY MOUTH DAILY AT BEDTIME (Patient not taking: Reported on 1/4/2022) 30 tablet 0     No current facility-administered medications for this visit  Allergies: Pollen extract    Objective:  Vitals:    01/04/22 1357   BP: 138/78   Pulse: 94   Temp: 97 8 °F (36 6 °C)   SpO2: 98%   Weight: 98 kg (216 lb)   Height: 5' 11" (1 803 m)   Oxygen Therapy  SpO2: 98 %    Wt Readings from Last 3 Encounters:   01/04/22 98 kg (216 lb)   11/29/21 99 5 kg (219 lb 6 4 oz)   09/22/21 98 9 kg (218 lb)     Body mass index is 30 13 kg/m²  Physical Exam  Vitals reviewed  Constitutional:       General: He is not in acute distress  Appearance: Normal appearance  He is not ill-appearing  HENT:      Head: Normocephalic and atraumatic  Mouth/Throat:      Pharynx: Oropharynx is clear  Eyes:      Conjunctiva/sclera: Conjunctivae normal       Pupils: Pupils are equal, round, and reactive to light  Cardiovascular:      Rate and Rhythm: Normal rate and regular rhythm  Pulmonary:      Effort: Pulmonary effort is normal  No respiratory distress  Breath sounds: Normal breath sounds  No decreased breath sounds, wheezing, rhonchi or rales  Abdominal:      General: Abdomen is flat  There is no distension  Musculoskeletal:         General: Normal range of motion  Cervical back: Normal range of motion  Right lower leg: No edema  Left lower leg: No edema  Skin:     General: Skin is warm and dry  Neurological:      Mental Status: He is alert and oriented to person, place, and time     Psychiatric:         Mood and Affect: Mood normal          Behavior: Behavior normal          Lab Review:   Lab Results   Component Value Date    K 3 7 12/07/2021    CL 99 (L) 12/07/2021    CO2 25 12/07/2021    BUN 16 12/07/2021    CREATININE 1 13 12/07/2021    CALCIUM 10 3 (H) 12/07/2021     Lab Results Component Value Date    WBC 7 41 04/11/2021    HGB 13 8 04/11/2021    HCT 42 1 04/11/2021    MCV 90 04/11/2021     04/11/2021       Diagnostics:  I have personally reviewed pertinent reports  Reviewed prior CT scan  Office Spirometry Results:     ESS:    CT abdomen w contrast    Result Date: 1/2/2022  Narrative: CT ABDOMEN WITH IV CONTRAST INDICATION:   R10 9: Unspecified abdominal pain G89 29: Other chronic pain  COMPARISON: None  TECHNIQUE:  CT examination of the abdomen was performed  Axial, sagittal, and coronal 2D reformatted images were created from the source data and submitted for interpretation  Radiation dose length product (DLP) for this visit:  860 mGy-cm   This examination, like all CT scans performed in the [facility], was performed utilizing techniques to minimize radiation dose exposure, including the use of iterative reconstruction and automated exposure control  IV Contrast:  100 mL of iohexol (OMNIPAQUE) Enteric Contrast:  Enteric contrast was administered  FINDINGS: ABDOMEN LOWER CHEST:  No clinically significant abnormality identified in the visualized lower chest  LIVER/BILIARY TREE:  Liver is diffusely decreased in density consistent with fatty change  Normal hepatic contours  No biliary dilatation  GALLBLADDER:  No calcified gallstones  No pericholecystic inflammatory change  SPLEEN:  Unremarkable  PANCREAS:  Unremarkable  ADRENAL GLANDS:  Unremarkable  KIDNEYS/URETERS:  Unremarkable  No hydronephrosis  VISUALIZED STOMACH AND BOWEL:  Unremarkable  ABDOMINAL CAVITY:  There is enlargement of a portacaval lymph node, measuring up to 17 mm in short axis (series 202, image 80)  VESSELS:  Atherosclerotic sclerotic changes are present  There is no aneurysm  ABDOMINAL WALL:  Unremarkable  OSSEOUS STRUCTURES:  No acute fracture or destructive osseous lesion  Impression: 1  Hepatic steatosis   2   Portacaval lymph node enlargement measuring up to 17 mm in short axis is nonspecific  Correlation with upper endoscopy may be prudent  The study was marked in EPIC for significant notification   Workstation performed: [id number]

## 2022-01-05 ENCOUNTER — RA CDI HCC (OUTPATIENT)
Dept: OTHER | Facility: HOSPITAL | Age: 67
End: 2022-01-05

## 2022-01-05 NOTE — PROGRESS NOTES
[facility]  coding opportunities       Chart reviewed, no opportunity found: CHART REVIEWED, NO OPPORTUNITY FOUND                        Patients insurance company: [facility] [address]  (Medicare Advantage and Commercial)

## 2022-01-10 ENCOUNTER — OFFICE VISIT (OUTPATIENT)
Dept: INTERNAL MEDICINE CLINIC | Facility: CLINIC | Age: 67
End: 2022-01-10
Payer: COMMERCIAL

## 2022-01-10 VITALS
HEART RATE: 73 BPM | HEIGHT: 71 IN | DIASTOLIC BLOOD PRESSURE: 80 MMHG | SYSTOLIC BLOOD PRESSURE: 124 MMHG | WEIGHT: 218.8 LBS | OXYGEN SATURATION: 98 % | BODY MASS INDEX: 30.63 KG/M2

## 2022-01-10 DIAGNOSIS — K21.00 GASTROESOPHAGEAL REFLUX DISEASE WITH ESOPHAGITIS, UNSPECIFIED WHETHER HEMORRHAGE: ICD-10-CM

## 2022-01-10 DIAGNOSIS — R59.0 LYMPHADENOPATHY, ABDOMINAL: ICD-10-CM

## 2022-01-10 DIAGNOSIS — E88.89 STEATOSIS (HCC): Primary | ICD-10-CM

## 2022-01-10 PROCEDURE — 99213 OFFICE O/P EST LOW 20 MIN: CPT | Performed by: INTERNAL MEDICINE

## 2022-01-10 PROCEDURE — 3288F FALL RISK ASSESSMENT DOCD: CPT | Performed by: INTERNAL MEDICINE

## 2022-01-10 PROCEDURE — 1101F PT FALLS ASSESS-DOCD LE1/YR: CPT | Performed by: INTERNAL MEDICINE

## 2022-01-10 NOTE — PROGRESS NOTES
Assessment/Plan:       Diagnoses and all orders for this visit:    Steatosis  -     Ambulatory referral to Gastroenterology; Future    Lymphadenopathy, abdominal  -     Ambulatory referral to Gastroenterology; Future    Gastroesophageal reflux disease with esophagitis, unspecified whether hemorrhage  -     Ambulatory referral to Gastroenterology; Future                Subjective:      Patient ID: Ivory Oneil is a 78 y o  male  Seen here at the end of November  That time he had some complaints of abdominal distress  He also complaint of pain in the posterior midthoracic region  These complaint seem to have disappeared  Hypertensive   He is not on thiazide diuretic but every now and then his calcium sneaks above normal   He had SPEP, PTH, PTH like peptide and all these were normal       He feels well     CT scanning documented steatosis and 1 minimally enlarged portacaval node with recommendation for GI consultation and that will be done  The following portions of the patient's history were reviewed and updated as appropriate:   He has a past medical history of Disc degeneration, lumbar, Peripheral vascular disease ([address] ), SOB (shortness of breath), and Wheezing ,  does not have any pertinent problems on file  ,   has no past surgical history on file  ,  family history includes Breast cancer in his mother; Hip fracture in his father  ,   reports that he has never smoked  He has never used smokeless tobacco  He reports that he does not drink alcohol and does not use drugs  ,  is allergic to pollen extract     Current Outpatient Medications   Medication Sig Dispense Refill    amLODIPine (NORVASC) 5 mg tablet TAKE ONE TABLET BY MOUTH EVERY DAY 90 tablet 3    fluticasone-salmeterol (Advair Diskus) 250-50 mcg/dose inhaler Inhale 1 puff 2 (two) times a day Rinse mouth after use   180 blister 2    ipratropium-albuterol (DUO-NEB) 0 5-2 5 mg/3 mL nebulizer solution Take 3 mL by nebulization 4 (four) times a day 360 mL 3    lisinopril (ZESTRIL) 40 mg tablet TAKE ONE TABLET BY MOUTH EVERY DAY 90 tablet 3    montelukast (SINGULAIR) 10 mg tablet Take 1 tablet (10 mg total) by mouth daily at bedtime 90 tablet 1    rosuvastatin (CRESTOR) 20 MG tablet TAKE ONE TABLET BY MOUTH EVERY DAY 90 tablet 3    montelukast (SINGULAIR) 10 mg tablet TAKE 1 TABLET BY MOUTH DAILY AT BEDTIME (Patient not taking: Reported on 1/10/2022) 30 tablet 0     No current facility-administered medications for this visit  Review of Systems   Gastrointestinal: Negative for abdominal distention, abdominal pain and blood in stool  Objective:  Vitals:    01/10/22 0755   BP: 124/80   Pulse: 73   SpO2: 98%      Physical Exam  Constitutional:       Appearance: Normal appearance  Cardiovascular:      Rate and Rhythm: Normal rate  Pulmonary:      Effort: Pulmonary effort is normal    Abdominal:      General: Abdomen is flat  Palpations: Abdomen is soft  Neurological:      Mental Status: He is alert  Patient Instructions     Issue described above with GI consultation to be done  See me in July

## 2022-01-11 ENCOUNTER — OFFICE VISIT (OUTPATIENT)
Dept: GASTROENTEROLOGY | Facility: CLINIC | Age: 67
End: 2022-01-11
Payer: COMMERCIAL

## 2022-01-11 VITALS
HEIGHT: 71 IN | HEART RATE: 109 BPM | DIASTOLIC BLOOD PRESSURE: 80 MMHG | BODY MASS INDEX: 30.66 KG/M2 | OXYGEN SATURATION: 96 % | WEIGHT: 219 LBS | SYSTOLIC BLOOD PRESSURE: 152 MMHG

## 2022-01-11 DIAGNOSIS — E88.89 STEATOSIS (HCC): ICD-10-CM

## 2022-01-11 DIAGNOSIS — R59.0 LYMPHADENOPATHY, ABDOMINAL: ICD-10-CM

## 2022-01-11 DIAGNOSIS — K21.00 GASTROESOPHAGEAL REFLUX DISEASE WITH ESOPHAGITIS, UNSPECIFIED WHETHER HEMORRHAGE: ICD-10-CM

## 2022-01-11 PROCEDURE — 3079F DIAST BP 80-89 MM HG: CPT | Performed by: INTERNAL MEDICINE

## 2022-01-11 PROCEDURE — 1160F RVW MEDS BY RX/DR IN RCRD: CPT | Performed by: INTERNAL MEDICINE

## 2022-01-11 PROCEDURE — 3008F BODY MASS INDEX DOCD: CPT | Performed by: INTERNAL MEDICINE

## 2022-01-11 PROCEDURE — 1036F TOBACCO NON-USER: CPT | Performed by: INTERNAL MEDICINE

## 2022-01-11 PROCEDURE — 3077F SYST BP >= 140 MM HG: CPT | Performed by: INTERNAL MEDICINE

## 2022-01-11 PROCEDURE — 99214 OFFICE O/P EST MOD 30 MIN: CPT | Performed by: INTERNAL MEDICINE

## 2022-01-11 NOTE — PROGRESS NOTES
[facility]      Chief Complaint:  GERD    HPI:  Quyen Wise is a 78 y o   male who presents with recurrence of reflux  Patient had undergone EGD 2 years ago  At that time mild esophagitis was found which was biopsy negative  He was given medication and did well but stopped taking it  He is not sure why he stopped it  Recently he has been having recurrence of significant reflux  He also has COPD with exertional shortness of breath  CT scan done on December 21st showed hepatic steatosis and a 17 mm lymph node in the portacaval area which was read as nonspecific  However repeat EGD was recommended based on that finding  Patient is not having any dysphagia or odynophagia  No chest pain  No dyspnea at rest   The CT scan was prompted by abdominal gas symptomatology  He notes that his symptoms are worse after he takes his nebulizer         Review of Systems:   Constitutional: No fever or chills, feels well, no tiredness, no recent weight gain or weight loss  HENT: No complaints of earache, no hearing loss, no nosebleeds, no nasal discharge, no sore throat, no hoarseness  Eyes: No complaints of eye pain, no red eyes, no discharge from eyes, no itchy eyes  Cardiovascular: No complaints of slow heart rate, no fast heart rate, no chest pain, no palpitations, no leg claudication, no lower extremity edema  Respiratory:  As per HPI  Gastrointestinal: As noted in HPI  Genitourinary: No complaints of dysuria, no incontinence, no hesitancy, no nocturia  Musculoskeletal: No complaints of arthralgia, no myalgias, no joint swelling or stiffness, no limb pain or swelling  Neurological: No complaints of headache, no confusion, no convulsions, no numbness or tingling, no dizziness or fainting, no limb weakness, no difficulty walking  Skin: No complaints of skin rash or skin lesions, no itching, no skin wound, no dry skin      Hematological/Lymphatic: No complaints of swollen glands, does not bleed easy  Allergic/Immunologic: No immunocompromised state  Endocrine:  No complaints of polyuria, no polydipsia  Psychiatric/Behavioral: is not suicidal, no sleep disturbances, no anxiety or depression, no change in personality, no emotional problems  Historical Information   Past Medical History:   Diagnosis Date    Disc degeneration, lumbar     Peripheral vascular disease ([address] )     SOB (shortness of breath)     Wheezing      History reviewed  No pertinent surgical history  Social History   Social History     Substance and Sexual Activity   Alcohol Use No     Social History     Substance and Sexual Activity   Drug Use No     Social History     Tobacco Use   Smoking Status Never Smoker   Smokeless Tobacco Never Used     Family History   Problem Relation Age of Onset    Breast cancer Mother     Hip fracture Father          Current Medications: has a current medication list which includes the following prescription(s): amlodipine, fluticasone-salmeterol, ipratropium-albuterol, lisinopril, montelukast, montelukast, and rosuvastatin  Vital Signs: /80   Pulse (!) 109   Ht 5' 11" (1 803 m)   Wt 99 3 kg (219 lb)   SpO2 96%   BMI 30 54 kg/m²       Physical Exam:   Constitutional  General Appearance: No acute distress, well appearing and well nourished  Head  Normocephalic  Eyes  Conjunctivae and lids: No swelling, erythema, or discharge  Pupils and irises: Equal, round and reactive to light  Ears, Nose, Mouth, and Throat  External inspection of ears and nose: Normal  Nasal mucosa, septum and turbinates: Normal without edema or erythema/   Oropharynx: Normal with no erythema, edema, exudate or lesions  Neck  Normal range of motion  Neck supple  Cardiovascular  Auscultation of the heart: Normal rate and rhythm, normal S1 and S2 without murmurs    Examination of the extremities for edema and/or varicosities: Normal  Pulmonary/Chest  Respiratory effort: No increased work of breathing or signs of respiratory distress  Auscultation of lungs: Clear to auscultation, equal breath sounds bilaterally, no wheezes, rales, no rhonchi  Abdomen  Abdomen: Non-tender, no masses  Liver and spleen: No hepatomegaly or splenomegaly  Musculoskeletal  Gait and station: normal   Digits and Nails: normal without clubbing or cyanosis  Inspection/palpation of joints, bones, and muscles: Normal  Neurological  No nystagmus or asterixis  Skin  Skin and subcutaneous tissue: Normal without rashes or lesions  Lymphatic  Palpation of the lymph nodes in neck: No lymphadenopathy  Psychiatric  Orientation to person, place and time: Normal   Mood and affect: Normal          Labs:  Lab Results   Component Value Date    ALT 78 04/11/2021    AST 34 04/11/2021    BUN 16 12/07/2021    CALCIUM 10 3 (H) 12/07/2021    CL 99 (L) 12/07/2021    CO2 25 12/07/2021    CREATININE 1 13 12/07/2021    HDL 37 (L) 03/16/2021    HCT 42 1 04/11/2021    HGB 13 8 04/11/2021    HGBA1C 6 4 (H) 12/14/2021     04/11/2021    K 3 7 12/07/2021    PSA 1 0 09/16/2021    TRIG 227 (H) 03/16/2021    WBC 7 41 04/11/2021         X-Rays & Procedures:   No orders to display           ______________________________________________________________________      Assessment & Plan:     Diagnoses and all orders for this visit:    Steatosis  -     Ambulatory referral to Gastroenterology    Lymphadenopathy, abdominal  -     Ambulatory referral to Gastroenterology  -     EGD; Future    Gastroesophageal reflux disease with esophagitis, unspecified whether hemorrhage  -     Ambulatory referral to Gastroenterology  -     EGD; Future      Patient will undergo EGD  He is advised over-the-counter Pepcid 20 mg p o  b i d  pending further results  Currently he has fatty liver seen on CT but no abnormal liver functions  He will continue his current dietary regimen  LFTs may be done on a yearly basis    Further recommendations will be based on the progress of this  He will continue his other current medical regimen

## 2022-01-11 NOTE — H&P (VIEW-ONLY)
[facility]      Chief Complaint:  GERD    HPI:  Reymundo Bishop is a 78 y o   male who presents with recurrence of reflux  Patient had undergone EGD 2 years ago  At that time mild esophagitis was found which was biopsy negative  He was given medication and did well but stopped taking it  He is not sure why he stopped it  Recently he has been having recurrence of significant reflux  He also has COPD with exertional shortness of breath  CT scan done on December 21st showed hepatic steatosis and a 17 mm lymph node in the portacaval area which was read as nonspecific  However repeat EGD was recommended based on that finding  Patient is not having any dysphagia or odynophagia  No chest pain  No dyspnea at rest   The CT scan was prompted by abdominal gas symptomatology  He notes that his symptoms are worse after he takes his nebulizer         Review of Systems:   Constitutional: No fever or chills, feels well, no tiredness, no recent weight gain or weight loss  HENT: No complaints of earache, no hearing loss, no nosebleeds, no nasal discharge, no sore throat, no hoarseness  Eyes: No complaints of eye pain, no red eyes, no discharge from eyes, no itchy eyes  Cardiovascular: No complaints of slow heart rate, no fast heart rate, no chest pain, no palpitations, no leg claudication, no lower extremity edema  Respiratory:  As per HPI  Gastrointestinal: As noted in HPI  Genitourinary: No complaints of dysuria, no incontinence, no hesitancy, no nocturia  Musculoskeletal: No complaints of arthralgia, no myalgias, no joint swelling or stiffness, no limb pain or swelling  Neurological: No complaints of headache, no confusion, no convulsions, no numbness or tingling, no dizziness or fainting, no limb weakness, no difficulty walking  Skin: No complaints of skin rash or skin lesions, no itching, no skin wound, no dry skin      Hematological/Lymphatic: No complaints of swollen glands, does not bleed easy  Allergic/Immunologic: No immunocompromised state  Endocrine:  No complaints of polyuria, no polydipsia  Psychiatric/Behavioral: is not suicidal, no sleep disturbances, no anxiety or depression, no change in personality, no emotional problems  Historical Information   Past Medical History:   Diagnosis Date    Disc degeneration, lumbar     Peripheral vascular disease ([address] )     SOB (shortness of breath)     Wheezing      History reviewed  No pertinent surgical history  Social History   Social History     Substance and Sexual Activity   Alcohol Use No     Social History     Substance and Sexual Activity   Drug Use No     Social History     Tobacco Use   Smoking Status Never Smoker   Smokeless Tobacco Never Used     Family History   Problem Relation Age of Onset    Breast cancer Mother     Hip fracture Father          Current Medications: has a current medication list which includes the following prescription(s): amlodipine, fluticasone-salmeterol, ipratropium-albuterol, lisinopril, montelukast, montelukast, and rosuvastatin  Vital Signs: /80   Pulse (!) 109   Ht 5' 11" (1 803 m)   Wt 99 3 kg (219 lb)   SpO2 96%   BMI 30 54 kg/m²       Physical Exam:   Constitutional  General Appearance: No acute distress, well appearing and well nourished  Head  Normocephalic  Eyes  Conjunctivae and lids: No swelling, erythema, or discharge  Pupils and irises: Equal, round and reactive to light  Ears, Nose, Mouth, and Throat  External inspection of ears and nose: Normal  Nasal mucosa, septum and turbinates: Normal without edema or erythema/   Oropharynx: Normal with no erythema, edema, exudate or lesions  Neck  Normal range of motion  Neck supple  Cardiovascular  Auscultation of the heart: Normal rate and rhythm, normal S1 and S2 without murmurs    Examination of the extremities for edema and/or varicosities: Normal  Pulmonary/Chest  Respiratory effort: No increased work of breathing or signs of respiratory distress  Auscultation of lungs: Clear to auscultation, equal breath sounds bilaterally, no wheezes, rales, no rhonchi  Abdomen  Abdomen: Non-tender, no masses  Liver and spleen: No hepatomegaly or splenomegaly  Musculoskeletal  Gait and station: normal   Digits and Nails: normal without clubbing or cyanosis  Inspection/palpation of joints, bones, and muscles: Normal  Neurological  No nystagmus or asterixis  Skin  Skin and subcutaneous tissue: Normal without rashes or lesions  Lymphatic  Palpation of the lymph nodes in neck: No lymphadenopathy  Psychiatric  Orientation to person, place and time: Normal   Mood and affect: Normal          Labs:  Lab Results   Component Value Date    ALT 78 04/11/2021    AST 34 04/11/2021    BUN 16 12/07/2021    CALCIUM 10 3 (H) 12/07/2021    CL 99 (L) 12/07/2021    CO2 25 12/07/2021    CREATININE 1 13 12/07/2021    HDL 37 (L) 03/16/2021    HCT 42 1 04/11/2021    HGB 13 8 04/11/2021    HGBA1C 6 4 (H) 12/14/2021     04/11/2021    K 3 7 12/07/2021    PSA 1 0 09/16/2021    TRIG 227 (H) 03/16/2021    WBC 7 41 04/11/2021         X-Rays & Procedures:   No orders to display           ______________________________________________________________________      Assessment & Plan:     Diagnoses and all orders for this visit:    Steatosis  -     Ambulatory referral to Gastroenterology    Lymphadenopathy, abdominal  -     Ambulatory referral to Gastroenterology  -     EGD; Future    Gastroesophageal reflux disease with esophagitis, unspecified whether hemorrhage  -     Ambulatory referral to Gastroenterology  -     EGD; Future      Patient will undergo EGD  He is advised over-the-counter Pepcid 20 mg p o  b i d  pending further results  Currently he has fatty liver seen on CT but no abnormal liver functions  He will continue his current dietary regimen  LFTs may be done on a yearly basis    Further recommendations will be based on the progress of this  He will continue his other current medical regimen

## 2022-01-14 ENCOUNTER — TELEPHONE (OUTPATIENT)
Dept: GASTROENTEROLOGY | Facility: HOSPITAL | Age: 67
End: 2022-01-14

## 2022-01-17 ENCOUNTER — TELEPHONE (OUTPATIENT)
Dept: GASTROENTEROLOGY | Facility: CLINIC | Age: 67
End: 2022-01-17

## 2022-01-17 ENCOUNTER — ANESTHESIA (OUTPATIENT)
Dept: GASTROENTEROLOGY | Facility: HOSPITAL | Age: 67
End: 2022-01-17

## 2022-01-17 ENCOUNTER — HOSPITAL ENCOUNTER (OUTPATIENT)
Dept: GASTROENTEROLOGY | Facility: HOSPITAL | Age: 67
Setting detail: OUTPATIENT SURGERY
Discharge: HOME | End: 2022-01-17
Attending: INTERNAL MEDICINE | Admitting: INTERNAL MEDICINE
Payer: COMMERCIAL

## 2022-01-17 ENCOUNTER — ANESTHESIA EVENT (OUTPATIENT)
Dept: GASTROENTEROLOGY | Facility: HOSPITAL | Age: 67
End: 2022-01-17

## 2022-01-17 VITALS
WEIGHT: 218.92 LBS | SYSTOLIC BLOOD PRESSURE: 131 MMHG | TEMPERATURE: 98.2 F | HEART RATE: 78 BPM | RESPIRATION RATE: 14 BRPM | BODY MASS INDEX: 29.65 KG/M2 | DIASTOLIC BLOOD PRESSURE: 72 MMHG | OXYGEN SATURATION: 97 % | HEIGHT: 72 IN

## 2022-01-17 DIAGNOSIS — K22.10 EROSIVE ESOPHAGITIS: Primary | ICD-10-CM

## 2022-01-17 DIAGNOSIS — R59.0 LYMPHADENOPATHY, ABDOMINAL: ICD-10-CM

## 2022-01-17 DIAGNOSIS — K21.00 GASTROESOPHAGEAL REFLUX DISEASE WITH ESOPHAGITIS, UNSPECIFIED WHETHER HEMORRHAGE: ICD-10-CM

## 2022-01-17 PROCEDURE — 88312 SPECIAL STAINS GROUP 1: CPT | Performed by: PATHOLOGY

## 2022-01-17 PROCEDURE — 88341 IMHCHEM/IMCYTCHM EA ADD ANTB: CPT | Performed by: PATHOLOGY

## 2022-01-17 PROCEDURE — 43239 EGD BIOPSY SINGLE/MULTIPLE: CPT | Performed by: INTERNAL MEDICINE

## 2022-01-17 PROCEDURE — 88305 TISSUE EXAM BY PATHOLOGIST: CPT | Performed by: PATHOLOGY

## 2022-01-17 PROCEDURE — 88313 SPECIAL STAINS GROUP 2: CPT | Performed by: PATHOLOGY

## 2022-01-17 PROCEDURE — 88342 IMHCHEM/IMCYTCHM 1ST ANTB: CPT | Performed by: PATHOLOGY

## 2022-01-17 RX ORDER — SODIUM CHLORIDE, SODIUM LACTATE, POTASSIUM CHLORIDE, CALCIUM CHLORIDE 600; 310; 30; 20 MG/100ML; MG/100ML; MG/100ML; MG/100ML
125 INJECTION, SOLUTION INTRAVENOUS CONTINUOUS
Status: DISCONTINUED | OUTPATIENT
Start: 2022-01-17 | End: 2022-01-21 | Stop reason: HOSPADM

## 2022-01-17 RX ORDER — SODIUM CHLORIDE, SODIUM LACTATE, POTASSIUM CHLORIDE, CALCIUM CHLORIDE 600; 310; 30; 20 MG/100ML; MG/100ML; MG/100ML; MG/100ML
INJECTION, SOLUTION INTRAVENOUS CONTINUOUS PRN
Status: DISCONTINUED | OUTPATIENT
Start: 2022-01-17 | End: 2022-01-17

## 2022-01-17 RX ORDER — PROPOFOL 10 MG/ML
INJECTION, EMULSION INTRAVENOUS AS NEEDED
Status: DISCONTINUED | OUTPATIENT
Start: 2022-01-17 | End: 2022-01-17

## 2022-01-17 RX ORDER — LIDOCAINE HYDROCHLORIDE 20 MG/ML
INJECTION, SOLUTION EPIDURAL; INFILTRATION; INTRACAUDAL; PERINEURAL AS NEEDED
Status: DISCONTINUED | OUTPATIENT
Start: 2022-01-17 | End: 2022-01-17

## 2022-01-17 RX ADMIN — PROPOFOL 50 MG: 10 INJECTION, EMULSION INTRAVENOUS at 10:40

## 2022-01-17 RX ADMIN — SODIUM CHLORIDE, SODIUM LACTATE, POTASSIUM CHLORIDE, AND CALCIUM CHLORIDE: .6; .31; .03; .02 INJECTION, SOLUTION INTRAVENOUS at 10:34

## 2022-01-17 RX ADMIN — LIDOCAINE HYDROCHLORIDE 140 MG: 20 INJECTION, SOLUTION EPIDURAL; INFILTRATION; INTRACAUDAL; PERINEURAL at 10:38

## 2022-01-17 RX ADMIN — PROPOFOL 100 MG: 10 INJECTION, EMULSION INTRAVENOUS at 10:38

## 2022-01-17 NOTE — TELEPHONE ENCOUNTER
Pt called was told he was going to be receiving script for protonix 40 mg but pharmacy does not have script can we please call in

## 2022-01-17 NOTE — ANESTHESIA PREPROCEDURE EVALUATION
Procedure:  EGD    Relevant Problems   CARDIO   (+) Hyperlipidemia   (+) Hypertension   (+) Mild aortic stenosis   (+) Mild mitral regurgitation      PULMONARY   (+) ABUNDIO (obstructive sleep apnea)   (+) SOB (shortness of breath)             Anesthesia Plan  ASA Score- 2     Anesthesia Type- IV sedation with anesthesia with ASA Monitors  Additional Monitors:   Airway Plan:           Plan Factors-Exercise tolerance (METS): >4 METS  Chart reviewed  EKG reviewed  Existing labs reviewed  Patient is not a current smoker  Patient instructed to abstain from smoking on day of procedure  Patient did not smoke on day of surgery  There is medical exclusion for perioperative obstructive sleep apnea risk education  Induction- intravenous  Postoperative Plan-     Informed Consent- Anesthetic plan and risks discussed with patient  I personally reviewed this patient with the CRNA  Discussed and agreed on the Anesthesia Plan with the CRNA  Ryan Ramos

## 2022-01-17 NOTE — ANESTHESIA POSTPROCEDURE EVALUATION
Post-Op Assessment Note    CV Status:  Stable  Pain Score: 0    Pain management: adequate     Mental Status:  Alert and awake   Hydration Status:  Euvolemic   PONV Controlled:  Controlled   Airway Patency:  Patent      Post Op Vitals Reviewed: Yes      Staff: CRNA         No complications documented

## 2022-01-17 NOTE — INTERVAL H&P NOTE
H&P reviewed  After examining the patient I find no changes in the patients condition since the H&P had been written      Vitals:    01/17/22 0954   BP: 146/75   Pulse: 76   Resp: 18   Temp: (!) 97 °F (36 1 °C)   SpO2: 99%

## 2022-01-18 RX ORDER — PANTOPRAZOLE SODIUM 40 MG/1
40 TABLET, DELAYED RELEASE ORAL DAILY
Qty: 90 TABLET | Refills: 0 | Status: SHIPPED | OUTPATIENT
Start: 2022-01-18 | End: 2022-04-11

## 2022-01-24 ENCOUNTER — TELEPHONE (OUTPATIENT)
Dept: PULMONOLOGY | Facility: CLINIC | Age: 67
End: 2022-01-24

## 2022-01-24 NOTE — TELEPHONE ENCOUNTER
[place] [facility] [place], the reason advair inhaler is not covered it's because pt not longer have [employer], pt notified, advair rx sent to [facility]

## 2022-01-24 NOTE — TELEPHONE ENCOUNTER
Pt called re: when he was in to see Cydney Lyons on 1/4/22 his insurance had changed and the new one would NOT cover the Advair 250-50 mcg inhaler  We need to either send a different brand or start a prior auth  Pt uses [facility] in [address]    Please advise: [phone]

## 2022-01-25 ENCOUNTER — TELEPHONE (OUTPATIENT)
Dept: GASTROENTEROLOGY | Facility: CLINIC | Age: 67
End: 2022-01-25

## 2022-01-25 NOTE — TELEPHONE ENCOUNTER
Patient is calling, he went to his pharmacy to  his Advair/Wixela refill  When he went to pick it up he said the price per inhaler went from $7 to $47  He said he just can't afford that continuously  He spoke with the pharmacy and the other options of inhalers he could be on were all about the same price  He isn't sure if there is another medication he can be on that is cheaper or if we can get it discounted at all for him   Please advise

## 2022-01-25 NOTE — TELEPHONE ENCOUNTER
I sent in the [facility] please ask him to see if that is covered otherwise will have to send come into the office to get some coupons for the [facility]

## 2022-01-25 NOTE — TELEPHONE ENCOUNTER
Please tell the patient the biopsies were benign, to continue the Protonix and call me in 4-6 weeks with a progress report

## 2022-01-27 NOTE — TELEPHONE ENCOUNTER
Results reviewed in detail  He is just having some gas  I recommended Phazyme  He will continue his other medication  He will call me in 4 weeks with his progress

## 2022-01-28 ENCOUNTER — TELEPHONE (OUTPATIENT)
Dept: PULMONOLOGY | Facility: CLINIC | Age: 67
End: 2022-01-28

## 2022-01-28 NOTE — TELEPHONE ENCOUNTER
Patient calling stating last year when he was in we supplied him with a nebulizer from our office  He recently changed insurance and he is concerned about who is paying for it now that he changed insurance  He stated he spoke with his new insurance company and they are telling him we need to "pre approve" it   Please advise [phone]

## 2022-01-28 NOTE — TELEPHONE ENCOUNTER
Per Margarita Mahoney nebulizer was paid off before the end of the year  Patient is aware nothing will be due for the nebulizer machine

## 2022-03-14 DIAGNOSIS — I10 ESSENTIAL HYPERTENSION: ICD-10-CM

## 2022-03-14 DIAGNOSIS — E78.2 MIXED HYPERLIPIDEMIA: ICD-10-CM

## 2022-03-14 RX ORDER — ROSUVASTATIN CALCIUM 20 MG/1
20 TABLET, COATED ORAL DAILY
Qty: 90 TABLET | Refills: 0 | Status: SHIPPED | OUTPATIENT
Start: 2022-03-14 | End: 2022-06-06

## 2022-03-14 RX ORDER — AMLODIPINE BESYLATE 5 MG/1
5 TABLET ORAL DAILY
Qty: 90 TABLET | Refills: 3 | Status: SHIPPED | OUTPATIENT
Start: 2022-03-14 | End: 2022-07-11

## 2022-03-14 RX ORDER — LISINOPRIL 40 MG/1
40 TABLET ORAL DAILY
Qty: 90 TABLET | Refills: 3 | Status: SHIPPED | OUTPATIENT
Start: 2022-03-14 | End: 2022-07-11

## 2022-03-25 ENCOUNTER — TELEPHONE (OUTPATIENT)
Dept: INTERNAL MEDICINE CLINIC | Facility: CLINIC | Age: 67
End: 2022-03-25

## 2022-03-25 NOTE — TELEPHONE ENCOUNTER
Brendan De La Rosa from [facility] called, needing referral back dated from 2/11    I [phone]  Dr Wilton Khanna    Referral not required

## 2022-04-11 DIAGNOSIS — K22.10 EROSIVE ESOPHAGITIS: ICD-10-CM

## 2022-04-11 RX ORDER — PANTOPRAZOLE SODIUM 40 MG/1
40 TABLET, DELAYED RELEASE ORAL DAILY
Qty: 90 TABLET | Refills: 0 | Status: SHIPPED | OUTPATIENT
Start: 2022-04-11 | End: 2022-07-11

## 2022-06-06 DIAGNOSIS — E78.2 MIXED HYPERLIPIDEMIA: ICD-10-CM

## 2022-06-06 RX ORDER — ROSUVASTATIN CALCIUM 20 MG/1
TABLET, COATED ORAL
Qty: 90 TABLET | Refills: 0 | Status: SHIPPED | OUTPATIENT
Start: 2022-06-06

## 2022-06-23 ENCOUNTER — APPOINTMENT (EMERGENCY)
Dept: RADIOLOGY | Facility: HOSPITAL | Age: 67
End: 2022-06-23
Payer: COMMERCIAL

## 2022-06-23 ENCOUNTER — HOSPITAL ENCOUNTER (EMERGENCY)
Facility: HOSPITAL | Age: 67
Discharge: HOME | End: 2022-06-23
Attending: EMERGENCY MEDICINE
Payer: COMMERCIAL

## 2022-06-23 VITALS
SYSTOLIC BLOOD PRESSURE: 128 MMHG | RESPIRATION RATE: 13 BRPM | OXYGEN SATURATION: 100 % | DIASTOLIC BLOOD PRESSURE: 77 MMHG | TEMPERATURE: 98 F | HEART RATE: 66 BPM

## 2022-06-23 DIAGNOSIS — R05.9 COUGH: Primary | ICD-10-CM

## 2022-06-23 LAB
ALBUMIN SERPL BCP-MCNC: 4.1 G/DL (ref 3.5–5)
ALP SERPL-CCNC: 94 U/L (ref 46–116)
ALT SERPL W P-5'-P-CCNC: 70 U/L (ref 12–78)
ANION GAP SERPL CALCULATED.3IONS-SCNC: 11 MMOL/L (ref 4–13)
AST SERPL W P-5'-P-CCNC: 34 U/L (ref 5–45)
BASOPHILS # BLD AUTO: 0.04 THOUSANDS/ΜL (ref 0–0.1)
BASOPHILS NFR BLD AUTO: 1 % (ref 0–1)
BILIRUB SERPL-MCNC: 0.27 MG/DL (ref 0.2–1)
BUN SERPL-MCNC: 15 MG/DL (ref 5–25)
CALCIUM SERPL-MCNC: 9.7 MG/DL (ref 8.3–10.1)
CARDIAC TROPONIN I PNL SERPL HS: 5 NG/L
CHLORIDE SERPL-SCNC: 102 MMOL/L (ref 100–108)
CO2 SERPL-SCNC: 25 MMOL/L (ref 21–32)
CREAT SERPL-MCNC: 1.29 MG/DL (ref 0.6–1.3)
EOSINOPHIL # BLD AUTO: 0.3 THOUSAND/ΜL (ref 0–0.61)
EOSINOPHIL NFR BLD AUTO: 3 % (ref 0–6)
ERYTHROCYTE [DISTWIDTH] IN BLOOD BY AUTOMATED COUNT: 13.2 % (ref 11.6–15.1)
FLUAV RNA RESP QL NAA+PROBE: NEGATIVE
FLUBV RNA RESP QL NAA+PROBE: NEGATIVE
GFR SERPL CREATININE-BSD FRML MDRD: 56 ML/MIN/1.73SQ M
GLUCOSE SERPL-MCNC: 149 MG/DL (ref 65–140)
HCT VFR BLD AUTO: 43 % (ref 36.5–49.3)
HGB BLD-MCNC: 14.4 G/DL (ref 12–17)
IMM GRANULOCYTES # BLD AUTO: 0.04 THOUSAND/UL (ref 0–0.2)
IMM GRANULOCYTES NFR BLD AUTO: 1 % (ref 0–2)
LYMPHOCYTES # BLD AUTO: 1.98 THOUSANDS/ΜL (ref 0.6–4.47)
LYMPHOCYTES NFR BLD AUTO: 23 % (ref 14–44)
MCH RBC QN AUTO: 30 PG (ref 26.8–34.3)
MCHC RBC AUTO-ENTMCNC: 33.5 G/DL (ref 31.4–37.4)
MCV RBC AUTO: 90 FL (ref 82–98)
MONOCYTES # BLD AUTO: 0.76 THOUSAND/ΜL (ref 0.17–1.22)
MONOCYTES NFR BLD AUTO: 9 % (ref 4–12)
NEUTROPHILS # BLD AUTO: 5.65 THOUSANDS/ΜL (ref 1.85–7.62)
NEUTS SEG NFR BLD AUTO: 63 % (ref 43–75)
NRBC BLD AUTO-RTO: 0 /100 WBCS
PLATELET # BLD AUTO: 273 THOUSANDS/UL (ref 149–390)
PMV BLD AUTO: 8.6 FL (ref 8.9–12.7)
POTASSIUM SERPL-SCNC: 4.1 MMOL/L (ref 3.5–5.3)
PROT SERPL-MCNC: 8.5 G/DL (ref 6.4–8.2)
RBC # BLD AUTO: 4.8 MILLION/UL (ref 3.88–5.62)
RSV RNA RESP QL NAA+PROBE: NEGATIVE
SARS-COV-2 RNA RESP QL NAA+PROBE: NEGATIVE
SODIUM SERPL-SCNC: 138 MMOL/L (ref 136–145)
WBC # BLD AUTO: 8.77 THOUSAND/UL (ref 4.31–10.16)

## 2022-06-23 PROCEDURE — 85025 COMPLETE CBC W/AUTO DIFF WBC: CPT | Performed by: PHYSICIAN ASSISTANT

## 2022-06-23 PROCEDURE — 71045 X-RAY EXAM CHEST 1 VIEW: CPT

## 2022-06-23 PROCEDURE — 93005 ELECTROCARDIOGRAM TRACING: CPT

## 2022-06-23 PROCEDURE — 80053 COMPREHEN METABOLIC PANEL: CPT | Performed by: PHYSICIAN ASSISTANT

## 2022-06-23 PROCEDURE — 36415 COLL VENOUS BLD VENIPUNCTURE: CPT | Performed by: PHYSICIAN ASSISTANT

## 2022-06-23 PROCEDURE — 99284 EMERGENCY DEPT VISIT MOD MDM: CPT | Performed by: PHYSICIAN ASSISTANT

## 2022-06-23 PROCEDURE — 84484 ASSAY OF TROPONIN QUANT: CPT | Performed by: PHYSICIAN ASSISTANT

## 2022-06-23 PROCEDURE — 0241U HB NFCT DS VIR RESP RNA 4 TRGT: CPT | Performed by: PHYSICIAN ASSISTANT

## 2022-06-23 PROCEDURE — 99284 EMERGENCY DEPT VISIT MOD MDM: CPT

## 2022-06-23 RX ORDER — LIDOCAINE HYDROCHLORIDE 20 MG/ML
15 SOLUTION OROPHARYNGEAL ONCE
Status: COMPLETED | OUTPATIENT
Start: 2022-06-23 | End: 2022-06-23

## 2022-06-23 RX ORDER — MAGNESIUM HYDROXIDE/ALUMINUM HYDROXICE/SIMETHICONE 120; 1200; 1200 MG/30ML; MG/30ML; MG/30ML
30 SUSPENSION ORAL ONCE
Status: COMPLETED | OUTPATIENT
Start: 2022-06-23 | End: 2022-06-23

## 2022-06-23 RX ADMIN — LIDOCAINE HYDROCHLORIDE 15 ML: 20 SOLUTION ORAL; TOPICAL at 22:06

## 2022-06-23 RX ADMIN — ALUMINUM HYDROXIDE, MAGNESIUM HYDROXIDE, AND SIMETHICONE 30 ML: 200; 200; 20 SUSPENSION ORAL at 22:06

## 2022-06-24 LAB
ATRIAL RATE: 79 BPM
P AXIS: 46 DEGREES
PR INTERVAL: 218 MS
QRS AXIS: 86 DEGREES
QRSD INTERVAL: 104 MS
QT INTERVAL: 382 MS
QTC INTERVAL: 438 MS
T WAVE AXIS: 25 DEGREES
VENTRICULAR RATE: 79 BPM

## 2022-06-24 PROCEDURE — 93010 ELECTROCARDIOGRAM REPORT: CPT | Performed by: INTERNAL MEDICINE

## 2022-06-24 NOTE — ED PROVIDER NOTES
History  Chief Complaint   Patient presents with    Cough     Pt states he had a coughing episode that resulted in a burning and tingling sensation in his chest  Pt also c/o burning and an acid reflux sensation in his chest and throat      Patient is a 77-year-old male with a past medical history significant for asthma presenting to the emergency department for evaluation of coughing fit that started approximately 1 hour prior to arrival   He states that he had an episode for approximately 20 minutes where he could not stop coughing  He is not reporting a tingling sensation across his chest, however denies any chest pain or difficulty breathing  He is reporting GERD and indigestion  He has a burning sensation to his chest and throat is consistent with GERD he has had previously  He is very gassy and reports frequent belching  He is denying any fevers, chills, abdominal pain, nausea, vomiting, diarrhea  No other complaints at this time  Prior to Admission Medications   Prescriptions Last Dose Informant Patient Reported? Taking?    Breo Ellipta 200-25 MCG/INH inhaler   No No   Sig: INHALE 1 PUFF DAILY RINSE MOUTH AFTER USE    amLODIPine (NORVASC) 5 mg tablet   No No   Sig: Take 1 tablet (5 mg total) by mouth daily   ipratropium-albuterol (DUO-NEB) 0 5-2 5 mg/3 mL nebulizer solution   No No   Sig: TAKE 3 ML BY NEBULIZATION 4 (FOUR) TIMES A DAY   lisinopril (ZESTRIL) 40 mg tablet   No No   Sig: Take 1 tablet (40 mg total) by mouth daily   montelukast (SINGULAIR) 10 mg tablet  Self No No   Sig: Take 1 tablet (10 mg total) by mouth daily at bedtime   montelukast (SINGULAIR) 10 mg tablet   No No   Sig: TAKE 1 TABLET BY MOUTH EVERYDAY AT BEDTIME   pantoprazole (PROTONIX) 40 mg tablet   No No   Sig: TAKE 1 TABLET (40 MG TOTAL) BY MOUTH DAILY 1/2 HOUR BEFORE BREAKFAST   rosuvastatin (CRESTOR) 20 MG tablet   No No   Sig: TAKE 1 TABLET BY MOUTH EVERY DAY      Facility-Administered Medications: None       Past Medical History:   Diagnosis Date    Asthma     Disc degeneration, lumbar     Peripheral vascular disease (HCC)     SOB (shortness of breath)     Wheezing        No past surgical history on file  Family History   Problem Relation Age of Onset    Breast cancer Mother     Hip fracture Father      I have reviewed and agree with the history as documented  E-Cigarette/Vaping    E-Cigarette Use Never User      E-Cigarette/Vaping Substances    Nicotine No     THC No     CBD No     Flavoring No      Social History     Tobacco Use    Smoking status: Never Smoker    Smokeless tobacco: Never Used   Vaping Use    Vaping Use: Never used   Substance Use Topics    Alcohol use: No    Drug use: No       Review of Systems   Constitutional: Negative for chills, diaphoresis and fever  HENT: Negative for congestion, drooling, facial swelling, nosebleeds, sore throat and voice change  Eyes: Negative for discharge and redness  Respiratory: Positive for cough  Negative for choking, chest tightness, shortness of breath and stridor  Cardiovascular: Positive for chest pain  Negative for palpitations  Gastrointestinal: Negative for abdominal pain, diarrhea, nausea and vomiting  Musculoskeletal: Negative for arthralgias, back pain, neck pain and neck stiffness  Skin: Negative for color change, rash and wound  Neurological: Negative for dizziness, syncope, facial asymmetry, weakness, light-headedness, numbness and headaches  Psychiatric/Behavioral: Negative for confusion and suicidal ideas  The patient is not nervous/anxious  All other systems reviewed and are negative  Physical Exam  Physical Exam  Vitals reviewed  Constitutional:       General: He is not in acute distress  Appearance: Normal appearance  He is obese  He is not ill-appearing, toxic-appearing or diaphoretic  HENT:      Head: Normocephalic and atraumatic        Right Ear: External ear normal       Left Ear: External ear normal       Mouth/Throat:      Mouth: Mucous membranes are moist       Pharynx: Oropharynx is clear  No oropharyngeal exudate or posterior oropharyngeal erythema  Eyes:      General: No scleral icterus  Right eye: No discharge  Left eye: No discharge  Extraocular Movements: Extraocular movements intact  Conjunctiva/sclera: Conjunctivae normal    Cardiovascular:      Rate and Rhythm: Normal rate and regular rhythm  Pulses: Normal pulses  Heart sounds: Murmur (Holosystolic) heard  No friction rub  No gallop  Pulmonary:      Effort: Pulmonary effort is normal  No respiratory distress  Breath sounds: Normal breath sounds  No stridor  No wheezing, rhonchi or rales  Abdominal:      General: Abdomen is flat  Palpations: Abdomen is soft  Tenderness: There is no abdominal tenderness  There is no right CVA tenderness, left CVA tenderness, guarding or rebound  Musculoskeletal:         General: Normal range of motion  Cervical back: Normal range of motion and neck supple  Right lower leg: No edema  Left lower leg: No edema  Skin:     General: Skin is warm and dry  Capillary Refill: Capillary refill takes less than 2 seconds  Neurological:      General: No focal deficit present  Mental Status: He is alert and oriented to person, place, and time     Psychiatric:         Mood and Affect: Mood normal          Behavior: Behavior normal          Vital Signs  ED Triage Vitals   Temperature Pulse Respirations Blood Pressure SpO2   06/23/22 2058 06/23/22 2058 06/23/22 2058 06/23/22 2058 06/23/22 2058   98 °F (36 7 °C) 88 18 148/85 97 %      Temp src Heart Rate Source Patient Position - Orthostatic VS BP Location FiO2 (%)   -- 06/23/22 2058 06/23/22 2058 06/23/22 2058 --    Monitor Sitting Right arm       Pain Score       06/23/22 2206       4           Vitals:    06/23/22 2058 06/23/22 2206 06/23/22 2300   BP: 148/85  128/77   Pulse: 88 80 66 Patient Position - Orthostatic VS: Sitting Lying Lying         Visual Acuity      ED Medications  Medications   aluminum-magnesium hydroxide-simethicone (MYLANTA) oral suspension 30 mL (30 mL Oral Given 6/23/22 2206)   Lidocaine Viscous HCl (XYLOCAINE) 2 % mucosal solution 15 mL (15 mL Swish & Swallow Given 6/23/22 2206)       Diagnostic Studies  Results Reviewed     Procedure Component Value Units Date/Time    COVID/FLU/RSV [[id number]]  (Normal) Collected: 06/23/22 2205    Lab Status: Final result Specimen: Nares from Nose Updated: 06/23/22 2248     SARS-CoV-2 Negative     INFLUENZA A PCR Negative     INFLUENZA B PCR Negative     RSV PCR Negative    Narrative:      FOR PEDIATRIC PATIENTS - copy/paste COVID Guidelines URL to browser: [email or web address]  [employer]    SARS-CoV-2 assay is a Nucleic Acid Amplification assay intended for the  qualitative detection of nucleic acid from SARS-CoV-2 in nasopharyngeal  swabs  Results are for the presumptive identification of SARS-CoV-2 RNA  Positive results are indicative of infection with SARS-CoV-2, the virus  causing COVID-19, but do not rule out bacterial infection or co-infection  with other viruses  Laboratories within the [place] and its  territories are required to report all positive results to the appropriate  public health authorities  Negative results do not preclude SARS-CoV-2  infection and should not be used as the sole basis for treatment or other  patient management decisions  Negative results must be combined with  clinical observations, patient history, and epidemiological information  This test has not been FDA cleared or approved  This test has been authorized by FDA under an Emergency Use Authorization  (EUA)   This test is only authorized for the duration of time the  declaration that circumstances exist justifying the authorization of the  emergency use of an in vitro diagnostic tests for detection of SARS-CoV-2  virus and/or diagnosis of COVID-19 infection under section 564(b)(1) of  the Act, 21 U  S C  418TVR-4(E)(4), unless the authorization is terminated  or revoked sooner  The test has been validated but independent review by FDA  and CLIA is pending  Test performed using [employer] GeneXpert: This RT-PCR assay targets N2,  a region unique to SARS-CoV-2  A conserved region in the E-gene was chosen  for pan-Sarbecovirus detection which includes SARS-CoV-2      Comprehensive metabolic panel [[id number]]  (Abnormal) Collected: 06/23/22 2205    Lab Status: Final result Specimen: Blood from Arm, Right Updated: 06/23/22 2236     Sodium 138 mmol/L      Potassium 4 1 mmol/L      Chloride 102 mmol/L      CO2 25 mmol/L      ANION GAP 11 mmol/L      BUN 15 mg/dL      Creatinine 1 29 mg/dL      Glucose 149 mg/dL      Calcium 9 7 mg/dL      AST 34 U/L      ALT 70 U/L      Alkaline Phosphatase 94 U/L      Total Protein 8 5 g/dL      Albumin 4 1 g/dL      Total Bilirubin 0 27 mg/dL      eGFR 56 ml/min/1 73sq m     Narrative:      [facility] guidelines for Chronic Kidney Disease (CKD):     Stage 1 with normal or high GFR (GFR > 90 mL/min/1 73 square meters)    Stage 2 Mild CKD (GFR = 60-89 mL/min/1 73 square meters)    Stage 3A Moderate CKD (GFR = 45-59 mL/min/1 73 square meters)    Stage 3B Moderate CKD (GFR = 30-44 mL/min/1 73 square meters)    Stage 4 Severe CKD (GFR = 15-29 mL/min/1 73 square meters)    Stage 5 End Stage CKD (GFR <15 mL/min/1 73 square meters)  Note: GFR calculation is accurate only with a steady state creatinine    HS Troponin 0hr (reflex protocol) [[id number]]  (Normal) Collected: 06/23/22 2205    Lab Status: Final result Specimen: Blood from Arm, Right Updated: 06/23/22 2236     hs TnI 0hr 5 ng/L     CBC and differential [[id number]]  (Abnormal) Collected: 06/23/22 2205    Lab Status: Final result Specimen: Blood from Arm, Right Updated: 06/23/22 2212     WBC 8 77 Thousand/uL      RBC 4 80 Million/uL      Hemoglobin 14 4 g/dL      Hematocrit 43 0 %      MCV 90 fL      MCH 30 0 pg      MCHC 33 5 g/dL      RDW 13 2 %      MPV 8 6 fL      Platelets 165 Thousands/uL      nRBC 0 /100 WBCs      Neutrophils Relative 63 %      Immat GRANS % 1 %      Lymphocytes Relative 23 %      Monocytes Relative 9 %      Eosinophils Relative 3 %      Basophils Relative 1 %      Neutrophils Absolute 5 65 Thousands/µL      Immature Grans Absolute 0 04 Thousand/uL      Lymphocytes Absolute 1 98 Thousands/µL      Monocytes Absolute 0 76 Thousand/µL      Eosinophils Absolute 0 30 Thousand/µL      Basophils Absolute 0 04 Thousands/µL                  XR chest 1 view portable   Final Result by Christina Harrison MD (06/24 2601)      No acute cardiopulmonary disease  Workstation performed: [id number]                    Procedures  Procedures         ED Course                               SBIRT 22yo+    Flowsheet Row Most Recent Value   SBIRT (25 yo +)    In order to provide better care to our patients, we are screening all of our patients for alcohol and drug use  Would it be okay to ask you these screening questions? Yes Filed at: 06/23/2022 2100   Initial Alcohol Screen: US AUDIT-C     1  How often do you have a drink containing alcohol? 0 Filed at: 06/23/2022 2100   2  How many drinks containing alcohol do you have on a typical day you are drinking? 1 Filed at: 06/23/2022 2100   3b  FEMALE Any Age, or MALE 65+: How often do you have 4 or more drinks on one occassion? 0 Filed at: 06/23/2022 2100   Audit-C Score 1 Filed at: 06/23/2022 2100   SHEELA: How many times in the past year have you    Used an illegal drug or used a prescription medication for non-medical reasons?  Never Filed at: 06/23/2022 2100                    MDM  Number of Diagnoses or Management Options  Cough  Diagnosis management comments: Patient presenting for evaluation of cough, indigestion, chest discomfort  He appears comfortable  He is not any acute distress  Vital signs unremarkable  EKG without ischemic changes  Troponin normal   Felt significant improvement after Mylanta and viscous lidocaine  Chest x-ray without acute cardiopulmonary disease  He was discharged home with instructions to follow-up with his primary care provider  Strict return precautions were discussed  He is in stable condition at time of discharge       Amount and/or Complexity of Data Reviewed  Clinical lab tests: ordered and reviewed  Tests in the radiology section of CPT®: ordered and reviewed    Patient Progress  Patient progress: stable      Disposition  Final diagnoses:   Cough     Time reflects when diagnosis was documented in both MDM as applicable and the Disposition within this note     Time User Action Codes Description Comment    6/23/2022 11:03 PM Eliza Sommers Add [R05 9] Cough       ED Disposition     ED Disposition   Discharge    Condition   Stable    Date/Time   Thu Jun 23, 2022 11:03 PM    Comment   Nicolette Dorsey discharge to home/self care                 Follow-up Information     Follow up With Specialties Details Why Contact Info Additional [address] [facility] Emergency Department Emergency Medicine Go to  If symptoms worsen [address] Emergency Department, [address] [place] [address],[facility] A Gricel Browne MD Internal Medicine Schedule an appointment as soon as possible for a visit  for follow up [address] [place] [address] [address] [phone]  [phone]             Discharge Medication List as of 6/23/2022 11:03 PM      CONTINUE these medications which have NOT CHANGED    Details   amLODIPine (NORVASC) 5 mg tablet Take 1 tablet (5 mg total) by mouth daily, Starting Mon 3/14/2022, Until Sun 6/12/2022, Normal      Breo Ellipta 200-25 MCG/INH inhaler INHALE 1 PUFF DAILY RINSE MOUTH AFTER USE , Starting Wed 6/15/2022, Until Fri 7/15/2022, Normal      ipratropium-albuterol (DUO-NEB) 0 5-2 5 mg/3 mL nebulizer solution TAKE 3 ML BY NEBULIZATION 4 (FOUR) TIMES A DAY, Starting Mon 5/16/2022, Normal      lisinopril (ZESTRIL) 40 mg tablet Take 1 tablet (40 mg total) by mouth daily, Starting Mon 3/14/2022, Until Sun 6/12/2022, Normal      !! montelukast (SINGULAIR) 10 mg tablet Take 1 tablet (10 mg total) by mouth daily at bedtime, Starting Wed 9/22/2021, Normal      !! montelukast (SINGULAIR) 10 mg tablet TAKE 1 TABLET BY MOUTH EVERYDAY AT BEDTIME, Normal      pantoprazole (PROTONIX) 40 mg tablet TAKE 1 TABLET (40 MG TOTAL) BY MOUTH DAILY 1/2 HOUR BEFORE BREAKFAST, Starting Mon 4/11/2022, Normal      rosuvastatin (CRESTOR) 20 MG tablet TAKE 1 TABLET BY MOUTH EVERY DAY, Normal       !! - Potential duplicate medications found  Please discuss with provider  No discharge procedures on file      PDMP Review     None          ED Provider  Electronically Signed by           Demian Rich PA-C  07/03/22 [id number]

## 2022-07-05 ENCOUNTER — RA CDI HCC (OUTPATIENT)
Dept: OTHER | Facility: HOSPITAL | Age: 67
End: 2022-07-05

## 2022-07-05 NOTE — PROGRESS NOTES
[facility]  coding opportunities       Chart reviewed, no opportunity found:   [address]        Patients Insurance     Medicare Insurance: [employer]age

## 2022-07-06 ENCOUNTER — TELEPHONE (OUTPATIENT)
Dept: OTHER | Facility: OTHER | Age: 67
End: 2022-07-06

## 2022-07-07 ENCOUNTER — OFFICE VISIT (OUTPATIENT)
Dept: PULMONOLOGY | Facility: CLINIC | Age: 67
End: 2022-07-07
Payer: COMMERCIAL

## 2022-07-07 VITALS
HEART RATE: 73 BPM | BODY MASS INDEX: 29.66 KG/M2 | SYSTOLIC BLOOD PRESSURE: 128 MMHG | OXYGEN SATURATION: 99 % | TEMPERATURE: 97.8 F | WEIGHT: 219 LBS | DIASTOLIC BLOOD PRESSURE: 76 MMHG | HEIGHT: 72 IN

## 2022-07-07 DIAGNOSIS — J45.20 MILD INTERMITTENT ASTHMA WITHOUT COMPLICATION: Primary | ICD-10-CM

## 2022-07-07 DIAGNOSIS — G47.33 OSA (OBSTRUCTIVE SLEEP APNEA): ICD-10-CM

## 2022-07-07 DIAGNOSIS — J30.9 ALLERGIC RHINITIS, UNSPECIFIED SEASONALITY, UNSPECIFIED TRIGGER: ICD-10-CM

## 2022-07-07 PROCEDURE — 99213 OFFICE O/P EST LOW 20 MIN: CPT | Performed by: PHYSICIAN ASSISTANT

## 2022-07-07 NOTE — PROGRESS NOTES
Assessment/Plan:   Diagnoses and all orders for this visit:    Mild intermittent asthma without complication    ABUNDIO (obstructive sleep apnea)    Allergic rhinitis, unspecified seasonality, unspecified trigger     Patient is here today for follow-up  He is overall stable with his breathing, continues on Breo daily, using his DuoNeb 3 times per day, taking Singulair  I did ask him to try cutting down on the DuoNeb to twice a day and can add the 3rd if needed, can then continue cutting down to only using the DuoNeb p r n  He was seen in the ER about 2 weeks ago for coughing fit as well as burning in his chest and tingling, has been having increased belching as well  Seems to be related to his GERD  He has a follow-up with PCP and will also call GI  He will follow up with us in 4 months or sooner if necessary  Return in about 4 months (around 11/7/2022)  All questions are answered to the patient's satisfaction and understanding  He verbalizes understanding  He is encouraged to call with any further questions or concerns  Portions of the record may have been created with voice recognition software  Occasional wrong word or "sound a like" substitutions may have occurred due to the inherent limitations of voice recognition software  Read the chart carefully and recognize, using context, where substitutions have occurred  Electronically Signed by Lucía Rhodes PA-C    ______________________________________________________________________    Chief Complaint: No chief complaint on file  Patient ID: Ronaldo Connell is a 75 y o  y o  male has a past medical history of Asthma, Disc degeneration, lumbar, Peripheral vascular disease ([address] ), SOB (shortness of breath), and Wheezing  7/7/2022  Patient presents today for follow-up visit  Patient is a 74 yo male nonsmoker with PMH of asthma, hypertension, hyperlipidemia, mild aortic stenosis      He is here today for follow-up  He is overall doing well with his breathing  Using Breo once per day, Singulair  He is using the nebulizer 3 times per day  Does not feel any significant shortness of breath  He does feel that when he exerts himself he becomes fatigued  He was recently in the ER last week for a coughing fit as well as burning in his chest as well as tingling  This was thought to be related to his GERD  Has also been having increased belching recently  She      Review of Systems   Constitutional: Positive for fatigue  HENT: Negative  Respiratory: Positive for cough  Cardiovascular: Negative  Gastrointestinal:        Belching, burning in chest   Genitourinary: Negative  Musculoskeletal: Negative  Skin: Negative  Allergic/Immunologic: Negative  Neurological: Negative  Psychiatric/Behavioral: Negative  Smoking history: He reports that he has never smoked   He has never used smokeless tobacco     The following portions of the patient's history were reviewed and updated as appropriate: allergies, current medications, past family history, past medical history, past social history, past surgical history and problem list     Immunization History   Administered Date(s) Administered    COVID-19 PFIZER VACCINE 0 3 ML IM 03/18/2021, 04/12/2021, 12/21/2021    INFLUENZA 11/05/2014, 01/05/2016, 10/17/2017    Influenza Quadrivalent, 6-35 Months IM 11/05/2014, 01/05/2016, 10/17/2017    Influenza, high dose seasonal 0 7 mL 01/23/2020, 11/29/2021    Influenza, seasonal, injectable 1955    Pneumococcal Conjugate 13-Valent 07/12/2019    Pneumococcal Polysaccharide PPV23 1955, 01/23/2020    Tdap 1955, 1955    Zoster 1955     Current Outpatient Medications   Medication Sig Dispense Refill    amLODIPine (NORVASC) 5 mg tablet Take 1 tablet (5 mg total) by mouth daily 90 tablet 3    Breo Ellipta 200-25 MCG/INH inhaler INHALE 1 PUFF DAILY RINSE MOUTH AFTER USE  60 each 3    ipratropium-albuterol (DUO-NEB) 0 5-2 5 mg/3 mL nebulizer solution TAKE 3 ML BY NEBULIZATION 4 (FOUR) TIMES A  mL 3    lisinopril (ZESTRIL) 40 mg tablet Take 1 tablet (40 mg total) by mouth daily 90 tablet 3    montelukast (SINGULAIR) 10 mg tablet Take 1 tablet (10 mg total) by mouth daily at bedtime 90 tablet 1    pantoprazole (PROTONIX) 40 mg tablet TAKE 1 TABLET (40 MG TOTAL) BY MOUTH DAILY 1/2 HOUR BEFORE BREAKFAST 90 tablet 0    rosuvastatin (CRESTOR) 20 MG tablet TAKE 1 TABLET BY MOUTH EVERY DAY 90 tablet 0    montelukast (SINGULAIR) 10 mg tablet TAKE 1 TABLET BY MOUTH EVERYDAY AT BEDTIME 30 tablet 5     No current facility-administered medications for this visit  Allergies: Pollen extract    Objective:  Vitals:    07/07/22 1407   BP: 128/76   Pulse: 73   Temp: 97 8 °F (36 6 °C)   SpO2: 99%   Weight: 99 3 kg (219 lb)   Height: 6' (1 829 m)   Oxygen Therapy  SpO2: 99 %    Wt Readings from Last 3 Encounters:   07/07/22 99 3 kg (219 lb)   01/17/22 99 3 kg (218 lb 14 7 oz)   01/11/22 99 3 kg (219 lb)     Body mass index is 29 7 kg/m²  Physical Exam  Vitals reviewed  Constitutional:       General: He is not in acute distress  Appearance: Normal appearance  He is not ill-appearing  HENT:      Head: Normocephalic and atraumatic  Mouth/Throat:      Pharynx: Oropharynx is clear  Eyes:      Conjunctiva/sclera: Conjunctivae normal    Cardiovascular:      Rate and Rhythm: Normal rate and regular rhythm  Pulmonary:      Effort: Pulmonary effort is normal  No respiratory distress  Breath sounds: Normal breath sounds  No decreased breath sounds, wheezing, rhonchi or rales  Abdominal:      General: Abdomen is flat  There is no distension  Musculoskeletal:         General: Normal range of motion  Cervical back: Normal range of motion  Right lower leg: No edema  Left lower leg: No edema  Skin:     General: Skin is warm and dry     Neurological: Mental Status: He is alert and oriented to person, place, and time  Psychiatric:         Mood and Affect: Mood normal          Behavior: Behavior normal          Lab Review:   Lab Results   Component Value Date    K 4 1 06/23/2022     06/23/2022    CO2 25 06/23/2022    BUN 15 06/23/2022    CREATININE 1 29 06/23/2022    CALCIUM 9 7 06/23/2022     Lab Results   Component Value Date    WBC 8 77 06/23/2022    HGB 14 4 06/23/2022    HCT 43 0 06/23/2022    MCV 90 06/23/2022     06/23/2022       Diagnostics:  I have personally reviewed pertinent reports  Reviewed recent chest x-ray  Office Spirometry Results:     ESS:    XR chest 1 view portable    Result Date: 6/24/2022  Narrative: CHEST INDICATION:   cough  COMPARISON:  April 11, 2021 chest x-ray, CT thorax of August 2021 EXAM PERFORMED/VIEWS:  XR CHEST PORTABLE FINDINGS: Cardiomediastinal silhouette appears unremarkable  The lungs are clear  No pneumothorax or pleural effusion  Osseous structures appear within normal limits for patient age  Impression: No acute cardiopulmonary disease   Workstation performed: [id number]

## 2022-07-11 ENCOUNTER — OFFICE VISIT (OUTPATIENT)
Dept: INTERNAL MEDICINE CLINIC | Facility: CLINIC | Age: 67
End: 2022-07-11
Payer: COMMERCIAL

## 2022-07-11 VITALS
SYSTOLIC BLOOD PRESSURE: 122 MMHG | OXYGEN SATURATION: 96 % | TEMPERATURE: 98.2 F | DIASTOLIC BLOOD PRESSURE: 78 MMHG | HEART RATE: 71 BPM | BODY MASS INDEX: 29.8 KG/M2 | RESPIRATION RATE: 18 BRPM | WEIGHT: 220 LBS | HEIGHT: 72 IN

## 2022-07-11 DIAGNOSIS — R73.9 HYPERGLYCEMIA: ICD-10-CM

## 2022-07-11 DIAGNOSIS — Z12.5 PROSTATE CANCER SCREENING: ICD-10-CM

## 2022-07-11 DIAGNOSIS — E88.09 PLASMA PROTEIN DISORDER: Primary | ICD-10-CM

## 2022-07-11 DIAGNOSIS — K22.10 EROSIVE ESOPHAGITIS: ICD-10-CM

## 2022-07-11 PROCEDURE — G0439 PPPS, SUBSEQ VISIT: HCPCS | Performed by: INTERNAL MEDICINE

## 2022-07-11 PROCEDURE — 99214 OFFICE O/P EST MOD 30 MIN: CPT | Performed by: INTERNAL MEDICINE

## 2022-07-11 PROCEDURE — 3288F FALL RISK ASSESSMENT DOCD: CPT | Performed by: INTERNAL MEDICINE

## 2022-07-11 PROCEDURE — 1160F RVW MEDS BY RX/DR IN RCRD: CPT | Performed by: INTERNAL MEDICINE

## 2022-07-11 PROCEDURE — 1170F FXNL STATUS ASSESSED: CPT | Performed by: INTERNAL MEDICINE

## 2022-07-11 PROCEDURE — 1125F AMNT PAIN NOTED PAIN PRSNT: CPT | Performed by: INTERNAL MEDICINE

## 2022-07-11 PROCEDURE — 3725F SCREEN DEPRESSION PERFORMED: CPT | Performed by: INTERNAL MEDICINE

## 2022-07-11 RX ORDER — PANTOPRAZOLE SODIUM 40 MG/1
40 TABLET, DELAYED RELEASE ORAL DAILY
Qty: 90 TABLET | Refills: 0 | Status: SHIPPED | OUTPATIENT
Start: 2022-07-11 | End: 2022-10-08

## 2022-07-11 NOTE — PROGRESS NOTES
Assessment and Plan:     Problem List Items Addressed This Visit        Other    Prostate cancer screening    Relevant Orders    PSA, Total Screen      Other Visit Diagnoses     Plasma protein disorder    -  Primary    Relevant Orders    IgG, IgA, IgM    Protein electrophoresis, serum    Hyperglycemia        Relevant Orders    Hemoglobin A1C        BMI Counseling: Body mass index is 29 84 kg/m²  The BMI is above normal  Nutrition recommendations include decreasing portion sizes and moderation in carbohydrate intake  Rationale for BMI follow-up plan is due to patient being overweight or obese  Depression Screening and Follow-up Plan: Patient was screened for depression during today's encounter  They screened negative with a PHQ-2 score of 0  Preventive health issues were discussed with patient, and age appropriate screening tests were ordered as noted in patient's After Visit Summary  Personalized health advice and appropriate referrals for health education or preventive services given if needed, as noted in patient's After Visit Summary  History of Present Illness:     Patient presents for a Medicare Wellness Visit     A 58-year-old man with  mild aortic stenosis  essential hypertension is treated and stable  He is on medication     Currently following with Pulmonary has a 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  Was briefly symptomatic and went to the ER  This was 2 months ago  No major abnormalities found  Laboratory testing was done and once again he had elevated total protein although this time calcium level was normal so we can reassess some of those parameters    Intermittent shortness of breath and lightheadedness on exertion  Stress echocardiogram done about a year ago was normal   Echocardiography done April 2021 documented mild AS and normal mitral valve with no change from prior so I think this does not need to be done this year        Impaired fasting glucose with prediabetic hemoglobin A1c     Modest hyperlipidemia , treated with Crestor      Most recent colonoscopy July 2019  Follow-up recommended 2024  EGD January 2022 for reflux symptoms with no significant      ROS otherwise negative         Patient Care Team:  Lizzette Ford MD as PCP - General (Internal Medicine)  Lizzette Ford MD as PCP - [address] [place] [address] (RTE)  [facility]     Review of Systems:     Review of Systems   Constitutional: Negative for chills and fever  HENT: Negative for ear pain and sore throat  Eyes: Negative for pain and visual disturbance  Respiratory: Negative for cough and shortness of breath  Cardiovascular: Negative for chest pain and palpitations  Gastrointestinal: Negative for abdominal pain and vomiting  Genitourinary: Negative for dysuria and hematuria  Musculoskeletal: Negative for arthralgias and back pain  Skin: Negative for color change and rash  Neurological: Negative for seizures and syncope  All other systems reviewed and are negative         Problem List:     Patient Active Problem List   Diagnosis    Allergic rhinitis    Hyperlipidemia    Hypertension    Hyponatremia    Impaired fasting glucose    Urinary frequency    Prostate cancer screening    Mild aortic stenosis    Mild mitral regurgitation    Symptoms of upper respiratory infection (URI)    ABUNDIO (obstructive sleep apnea)    SOB (shortness of breath)    Snoring    Mild intermittent asthma without complication      Past Medical and Surgical History:     Past Medical History:   Diagnosis Date    Asthma     Disc degeneration, lumbar     Peripheral vascular disease (HCC)     SOB (shortness of breath)     Wheezing      Past Surgical History:   Procedure Laterality Date    ESOPHAGOSCOPY        Family History:     Family History   Problem Relation Age of Onset    Breast cancer Mother     Hip fracture Father       Social History:     Social History     Socioeconomic History    Marital status: Single     Spouse name: None    Number of children: None    Years of education: None    Highest education level: None   Occupational History    Occupation: retired   Tobacco Use    Smoking status: Never Smoker    Smokeless tobacco: Never Used   Vaping Use    Vaping Use: Never used   Substance and Sexual Activity    Alcohol use: No    Drug use: No    Sexual activity: Not Currently   Other Topics Concern    None   Social History Narrative    None     Social Determinants of Health     Financial Resource Strain: Not on file   Food Insecurity: Not on file   Transportation Needs: Not on file   Physical Activity: Not on file   Stress: No Stress Concern Present    Feeling of Stress : Not at all   Social Connections: Not on file   Intimate Partner Violence: Not on file   Housing Stability: Not on file      Medications and Allergies:     Current Outpatient Medications   Medication Sig Dispense Refill    amLODIPine (NORVASC) 5 mg tablet Take 1 tablet (5 mg total) by mouth daily 90 tablet 3    Breo Ellipta 200-25 MCG/INH inhaler INHALE 1 PUFF DAILY RINSE MOUTH AFTER USE  60 each 3    ipratropium-albuterol (DUO-NEB) 0 5-2 5 mg/3 mL nebulizer solution TAKE 3 ML BY NEBULIZATION 4 (FOUR) TIMES A  mL 3    lisinopril (ZESTRIL) 40 mg tablet Take 1 tablet (40 mg total) by mouth daily 90 tablet 3    montelukast (SINGULAIR) 10 mg tablet Take 1 tablet (10 mg total) by mouth daily at bedtime 90 tablet 1    pantoprazole (PROTONIX) 40 mg tablet TAKE 1 TABLET (40 MG TOTAL) BY MOUTH DAILY 1/2 HOUR BEFORE BREAKFAST 90 tablet 0    rosuvastatin (CRESTOR) 20 MG tablet TAKE 1 TABLET BY MOUTH EVERY DAY 90 tablet 0     No current facility-administered medications for this visit       Allergies   Allergen Reactions    Pollen Extract Nasal Congestion      Immunizations:     Immunization History   Administered Date(s) Administered    COVID-19 PFIZER VACCINE 0 3 ML IM 03/18/2021, 04/12/2021, 12/21/2021    INFLUENZA 11/05/2014, 01/05/2016, 10/17/2017    Influenza Quadrivalent, 6-35 Months IM 11/05/2014, 01/05/2016, 10/17/2017    Influenza, high dose seasonal 0 7 mL 01/23/2020, 11/29/2021    Influenza, seasonal, injectable 1955    Pneumococcal Conjugate 13-Valent 07/12/2019    Pneumococcal Polysaccharide PPV23 1955, 01/23/2020    Tdap [birth year]    Zoster 1955      Health Maintenance:         Topic Date Due    Colorectal Cancer Screening  06/17/2024    Hepatitis C Screening  Completed         Topic Date Due    DTaP,Tdap,and Td Vaccines (1 - Tdap) 07/03/1975    COVID-19 Vaccine (4 - Booster for Pfizer series) 04/21/2022    Influenza Vaccine (1) 09/01/2022      Medicare Screening Tests and Risk Assessments:     Hillary Casiano is here for his Subsequent Wellness visit  Last Medicare Wellness visit information reviewed, patient interviewed and updates made to the record today  Health Risk Assessment:   Patient rates overall health as fair  Patient feels that their physical health rating is same  Patient is satisfied with their life  Eyesight was rated as slightly worse  Hearing was rated as same  Patient feels that their emotional and mental health rating is same  Patients states they are sometimes angry  Patient states they are sometimes unusually tired/fatigued  Pain experienced in the last 7 days has been none  Patient states that he has experienced no weight loss or gain in last 6 months  Depression Screening:   PHQ-2 Score: 0      Fall Risk Screening: In the past year, patient has experienced: no history of falling in past year      Home Safety:  Patient does not have trouble with stairs inside or outside of their home  Patient has working smoke alarms and has working carbon monoxide detector  Home safety hazards include: none  Nutrition:   Current diet is Regular       Medications:   Patient is not currently taking any over-the-counter supplements  Patient is able to manage medications  Activities of Daily Living (ADLs)/Instrumental Activities of Daily Living (IADLs):   Walk and transfer into and out of bed and chair?: Yes  Dress and groom yourself?: Yes    Bathe or shower yourself?: Yes    Feed yourself? Yes  Do your laundry/housekeeping?: Yes  Manage your money, pay your bills and track your expenses?: Yes  Make your own meals?: Yes    Do your own shopping?: Yes    Previous Hospitalizations:   Any hospitalizations or ED visits within the last 12 months?: Yes    How many hospitalizations have you had in the last year?: 1-2    Advance Care Planning:   Living will: No    End of Life Decisions reviewed with patient: Yes      Cognitive Screening:   Provider or family/friend/caregiver concerned regarding cognition?: No    PREVENTIVE SCREENINGS      Cardiovascular Screening:    General: Screening Not Indicated and History Lipid Disorder      Diabetes Screening:     General: Screening Current      Colorectal Cancer Screening:     General: Screening Current      Prostate Cancer Screening:    General: Screening Current      Abdominal Aortic Aneurysm (AAA) Screening:    Risk factors include: age between 73-69 yo        General: Screening Not Indicated      Lung Cancer Screening:     General: Screening Not Indicated      Hepatitis C Screening:    General: Screening Current    Screening, Brief Intervention, and Referral to Treatment (SBIRT)    Screening  Typical number of drinks in a day: 0  Typical number of drinks in a week: 0  Interpretation: Low risk drinking behavior      AUDIT-C Screenin) How often did you have a drink containing alcohol in the past year? never  2) How many drinks did you have on a typical day when you were drinking in the past year? 0  3) How often did you have 6 or more drinks on one occasion in the past year? never    AUDIT-C Score: 0  Interpretation: Score 0-3 (male): Negative screen for alcohol misuse    Single Item Drug Screening:  How often have you used an illegal drug (including marijuana) or a prescription medication for non-medical reasons in the past year? never    Single Item Drug Screen Score: 0  Interpretation: Negative screen for possible drug use disorder    No exam data present     Physical Exam:     /78 (BP Location: Left arm, Patient Position: Sitting, Cuff Size: Standard)   Pulse 71   Temp 98 2 °F (36 8 °C) (Temporal) Comment: no nsaids  Resp 18   Ht 6' (1 829 m)   Wt 99 8 kg (220 lb)   SpO2 96%   BMI 29 84 kg/m²     Physical Exam  Vitals and nursing note reviewed  Constitutional:       Appearance: He is well-developed  Comments: Moderately overweight male patient who appears to be stated age   HENT:      Head: Normocephalic and atraumatic  Eyes:      General: No scleral icterus  Conjunctiva/sclera: Conjunctivae normal    Cardiovascular:      Rate and Rhythm: Normal rate and regular rhythm  Heart sounds: Murmur heard  Decrescendo systolic murmur is present with a grade of 2/6  Comments: Grade 2/6 murmur heard best left sternal border 2nd intercostal space with radiation to the right sternal border  Pulmonary:      Effort: Pulmonary effort is normal  No respiratory distress  Breath sounds: Normal breath sounds  Abdominal:      Palpations: Abdomen is soft  Tenderness: There is no abdominal tenderness  Musculoskeletal:         General: No swelling or tenderness  Cervical back: Neck supple  Skin:     General: Skin is warm and dry  Neurological:      Mental Status: He is alert     Psychiatric:         Mood and Affect: Mood normal          Judgment: Judgment normal           Ap Grewal MD

## 2022-07-12 ENCOUNTER — APPOINTMENT (OUTPATIENT)
Dept: LAB | Facility: CLINIC | Age: 67
End: 2022-07-12
Payer: COMMERCIAL

## 2022-07-12 DIAGNOSIS — E88.09 PLASMA PROTEIN DISORDER: ICD-10-CM

## 2022-07-12 DIAGNOSIS — R73.9 HYPERGLYCEMIA: ICD-10-CM

## 2022-07-12 DIAGNOSIS — Z12.5 PROSTATE CANCER SCREENING: ICD-10-CM

## 2022-07-12 LAB
EST. AVERAGE GLUCOSE BLD GHB EST-MCNC: 143 MG/DL
HBA1C MFR BLD: 6.6 %
IGA SERPL-MCNC: 261 MG/DL (ref 70–400)
IGG SERPL-MCNC: 1110 MG/DL (ref 700–1600)
IGM SERPL-MCNC: 83 MG/DL (ref 40–230)
PSA SERPL-MCNC: 1 NG/ML (ref 0–4)

## 2022-07-12 PROCEDURE — 83036 HEMOGLOBIN GLYCOSYLATED A1C: CPT

## 2022-07-12 PROCEDURE — 84165 PROTEIN E-PHORESIS SERUM: CPT | Performed by: PATHOLOGY

## 2022-07-12 PROCEDURE — 84165 PROTEIN E-PHORESIS SERUM: CPT

## 2022-07-12 PROCEDURE — G0103 PSA SCREENING: HCPCS

## 2022-07-12 PROCEDURE — 82784 ASSAY IGA/IGD/IGG/IGM EACH: CPT

## 2022-07-12 PROCEDURE — 36415 COLL VENOUS BLD VENIPUNCTURE: CPT

## 2022-07-14 LAB
ALBUMIN SERPL ELPH-MCNC: 4.51 G/DL (ref 3.5–5)
ALBUMIN SERPL ELPH-MCNC: 60.9 % (ref 52–65)
ALPHA1 GLOB SERPL ELPH-MCNC: 0.21 G/DL (ref 0.1–0.4)
ALPHA1 GLOB SERPL ELPH-MCNC: 2.9 % (ref 2.5–5)
ALPHA2 GLOB SERPL ELPH-MCNC: 0.9 G/DL (ref 0.4–1.2)
ALPHA2 GLOB SERPL ELPH-MCNC: 12.2 % (ref 7–13)
BETA GLOB ABNORMAL SERPL ELPH-MCNC: 0.43 G/DL (ref 0.4–0.8)
BETA1 GLOB SERPL ELPH-MCNC: 5.8 % (ref 5–13)
BETA2 GLOB SERPL ELPH-MCNC: 6 % (ref 2–8)
BETA2+GAMMA GLOB SERPL ELPH-MCNC: 0.44 G/DL (ref 0.2–0.5)
GAMMA GLOB ABNORMAL SERPL ELPH-MCNC: 0.9 G/DL (ref 0.5–1.6)
GAMMA GLOB SERPL ELPH-MCNC: 12.2 % (ref 12–22)
IGG/ALB SER: 1.56 {RATIO} (ref 1.1–1.8)
PROT PATTERN SERPL ELPH-IMP: NORMAL
PROT SERPL-MCNC: 7.4 G/DL (ref 6.4–8.2)

## 2022-09-08 ENCOUNTER — OFFICE VISIT (OUTPATIENT)
Dept: URGENT CARE | Facility: CLINIC | Age: 67
End: 2022-09-08
Payer: COMMERCIAL

## 2022-09-08 VITALS — TEMPERATURE: 99.5 F | HEART RATE: 87 BPM | OXYGEN SATURATION: 97 % | RESPIRATION RATE: 18 BRPM

## 2022-09-08 DIAGNOSIS — L03.114 CELLULITIS OF LEFT ARM: Primary | ICD-10-CM

## 2022-09-08 PROCEDURE — 99203 OFFICE O/P NEW LOW 30 MIN: CPT | Performed by: PHYSICIAN ASSISTANT

## 2022-09-08 PROCEDURE — S9083 URGENT CARE CENTER GLOBAL: HCPCS | Performed by: PHYSICIAN ASSISTANT

## 2022-09-08 RX ORDER — CEPHALEXIN 500 MG/1
500 CAPSULE ORAL EVERY 6 HOURS SCHEDULED
Qty: 28 CAPSULE | Refills: 0 | Status: SHIPPED | OUTPATIENT
Start: 2022-09-08 | End: 2022-09-15

## 2022-09-08 NOTE — PROGRESS NOTES
[facility]        NAME: Evangelina Gamboa is a 75 y o  male  : 1955    MRN: [id number]  DATE: 2022  TIME: 9:30 AM    Assessment and Plan   Cellulitis of left arm [L03 114]  1  Cellulitis of left arm  cephalexin (KEFLEX) 500 mg capsule         Patient Instructions   There are no Patient Instructions on file for this visit  Follow up with PCP in 3-5 days  Proceed to  ER if symptoms worsen  Chief Complaint     Chief Complaint   Patient presents with    Insect Bite     Questionable insect bite/  Unknown when this occurred  Stated he noticed approx a week ago          History of Present Illness       Patient presents with a rash starting about a week ago in front of his left elbow  Started as what he states was 2 small bite wounds  He states the area of redness has been slowly spreading  Denies fevers, muscle aches, body aches  He does not recall anything specifically biting and he noticed it soon after he was working with the [employer] in his yard      Review of Systems   Review of Systems   Constitutional: Negative for fatigue and fever  Musculoskeletal: Negative for arthralgias and myalgias  Skin: Positive for rash  Neurological: Negative for weakness  Psychiatric/Behavioral: Negative for confusion           Current Medications       Current Outpatient Medications:     amLODIPine (NORVASC) 5 mg tablet, Take 1 tablet (5 mg total) by mouth daily, Disp: 90 tablet, Rfl: 3    Breo Ellipta 200-25 MCG/INH inhaler, INHALE 1 PUFF DAILY RINSE MOUTH AFTER USE , Disp: 60 each, Rfl: 3    cephalexin (KEFLEX) 500 mg capsule, Take 1 capsule (500 mg total) by mouth every 6 (six) hours for 7 days, Disp: 28 capsule, Rfl: 0    ipratropium-albuterol (DUO-NEB) 0 5-2 5 mg/3 mL nebulizer solution, TAKE 3 ML BY NEBULIZATION 4 (FOUR) TIMES A DAY, Disp: 360 mL, Rfl: 3    lisinopril (ZESTRIL) 40 mg tablet, Take 1 tablet (40 mg total) by mouth daily, Disp: 90 tablet, Rfl: 3    montelukast (SINGULAIR) 10 mg tablet, Take 1 tablet (10 mg total) by mouth daily at bedtime, Disp: 90 tablet, Rfl: 1    pantoprazole (PROTONIX) 40 mg tablet, TAKE 1 TABLET (40 MG TOTAL) BY MOUTH DAILY 1/2 HOUR BEFORE BREAKFAST, Disp: 90 tablet, Rfl: 0    rosuvastatin (CRESTOR) 20 MG tablet, TAKE 1 TABLET BY MOUTH EVERY DAY, Disp: 90 tablet, Rfl: 3    Current Allergies     Allergies as of 09/08/2022 - Reviewed 09/08/2022   Allergen Reaction Noted    Pollen extract Nasal Congestion 06/24/2020            The following portions of the patient's history were reviewed and updated as appropriate: allergies, current medications, past family history, past medical history, past social history, past surgical history and problem list      Past Medical History:   Diagnosis Date    Asthma     Disc degeneration, lumbar     Peripheral vascular disease ([address] )     SOB (shortness of breath)     Wheezing        Past Surgical History:   Procedure Laterality Date    ESOPHAGOSCOPY         Family History   Problem Relation Age of Onset    Breast cancer Mother     Hip fracture Father          Medications have been verified  Objective   Pulse 87   Temp 99 5 °F (37 5 °C)   Resp 18   SpO2 97%        Physical Exam     Physical Exam  Constitutional:       Appearance: Normal appearance  Musculoskeletal:         General: No swelling, tenderness or deformity  Normal range of motion  Skin:     Capillary Refill: Capillary refill takes less than 2 seconds  Findings: Rash present  Comments: In the antecubital fossa of the left forearm there is to small scabbed over areas with surrounding blanchable erythema that is well demarcated measuring approximately 4 cm x 1 cm  No fluctuance or drainage noted  Neurological:      Mental Status: He is alert     Psychiatric:         Mood and Affect: Mood normal          Behavior: Behavior normal

## 2022-09-08 NOTE — PATIENT INSTRUCTIONS
Take medication as prescribed follow-up with your PCP in 7-10 days  If any new or worsening symptoms develop get re-evaluated sooner

## 2022-10-08 DIAGNOSIS — K22.10 EROSIVE ESOPHAGITIS: ICD-10-CM

## 2022-10-08 RX ORDER — PANTOPRAZOLE SODIUM 40 MG/1
40 TABLET, DELAYED RELEASE ORAL DAILY
Qty: 90 TABLET | Refills: 0 | Status: SHIPPED | OUTPATIENT
Start: 2022-10-08

## 2022-10-20 ENCOUNTER — TELEPHONE (OUTPATIENT)
Dept: GASTROENTEROLOGY | Facility: CLINIC | Age: 67
End: 2022-10-20

## 2022-10-20 NOTE — TELEPHONE ENCOUNTER
Spoke with patient  History of steatosis, lymphadenopathy, GERD    Patient c/o increased soft formed stool, belching, and lump near anus  Taking pantoprazole 40mg daily  Reassured patient to restart pepcid 20mg BID PRN, follow a BRAT diet  Denies n/v, fever, chills, SOB, weakness, black or bloody stools  He will follow-up with PCP tomorrow for evaluation  HE will call us back to schedule a follow-up if they feels necessary

## 2022-10-20 NOTE — TELEPHONE ENCOUNTER
Patients GI provider:  Dr Carmelo Leger    Number to return call: ([phone])    Reason for call: Pt calling because he is having a lot of bowel movements, gas and felt a lump in crack of buttocks  Please call, thank you!     Scheduled procedure/appointment date if applicable: Apt/procedure

## 2022-10-21 ENCOUNTER — OFFICE VISIT (OUTPATIENT)
Dept: INTERNAL MEDICINE CLINIC | Facility: CLINIC | Age: 67
End: 2022-10-21
Payer: COMMERCIAL

## 2022-10-21 VITALS
HEIGHT: 72 IN | WEIGHT: 220 LBS | DIASTOLIC BLOOD PRESSURE: 64 MMHG | TEMPERATURE: 98.3 F | OXYGEN SATURATION: 98 % | SYSTOLIC BLOOD PRESSURE: 122 MMHG | RESPIRATION RATE: 20 BRPM | BODY MASS INDEX: 29.8 KG/M2 | HEART RATE: 80 BPM

## 2022-10-21 DIAGNOSIS — R14.0 FLATULENCE/GAS PAIN/BELCHING: Primary | ICD-10-CM

## 2022-10-21 DIAGNOSIS — Z23 ENCOUNTER FOR IMMUNIZATION: ICD-10-CM

## 2022-10-21 PROCEDURE — G0008 ADMIN INFLUENZA VIRUS VAC: HCPCS

## 2022-10-21 PROCEDURE — 99213 OFFICE O/P EST LOW 20 MIN: CPT

## 2022-10-21 PROCEDURE — 90662 IIV NO PRSV INCREASED AG IM: CPT

## 2022-10-21 NOTE — PROGRESS NOTES
[facility]    NAME: Yuki Cowart  AGE: 75 y o  SEX: male  : 1955     DATE: 10/21/2022     Assessment and Plan:     1  Flatulence/gas pain/belching  Patient reports 1 week of increased bowel movements  States initially bowel movements were large in loose however they have become more formed in smaller  He still reports a increase amount of flatulence and reports belching  Patient is afebrile  Bowel sounds are normal active, abdomen is nontender nondistended  Counseled patient on using Maalox  Counseled on maintaining adequate hydration  Symptoms persist follow-up in the office  2  Encounter for immunization  Influenza vaccine administered  - influenza vaccine, high-dose, PF 0 7 mL (FLUZONE HIGH-DOSE)        No follow-ups on file  Chief Complaint:     Chief Complaint   Patient presents with   • GI Problem     Gas problems for approx 1 week        History of Present Illness:     Carlene Pandya presents to the office today for evaluation of increased bowel movements and feeling gassy  He states approximately 1 week ago he developed increased bowel movements that were looser than normal   He states he thought he had eaten something that disagreed with him  He states that he is continuing to have more bowel movements than normal daily  He states the quantity of stool has decreased  He denies liquid stools reports they are formed  Denies blood in his stool  Denies nausea or vomiting  Denies fever chills  Denies bloating  He states that he has also been gassy both belching and with flatulence  He was also concerned about a lump he felt at his rectum prior to the episode starting  No rectal pain or bleeding  Review of Systems:     Review of Systems   Constitutional: Negative  HENT: Negative  Eyes: Negative  Respiratory: Negative  Cardiovascular: Negative  Gastrointestinal: Positive for diarrhea (Increased bowel movement)  Negative for blood in stool, constipation, nausea, rectal pain and vomiting  Increased belching and flatulence   Endocrine: Negative  Genitourinary: Negative  Musculoskeletal: Negative  Skin: Negative  Allergic/Immunologic: Negative  Neurological: Negative  Hematological: Negative  Psychiatric/Behavioral: Negative  Problem List:     Patient Active Problem List   Diagnosis   • Allergic rhinitis   • Hyperlipidemia   • Hypertension   • Hyponatremia   • Impaired fasting glucose   • Urinary frequency   • Prostate cancer screening   • Mild aortic stenosis   • Mild mitral regurgitation   • Symptoms of upper respiratory infection (URI)   • ABUNDIO (obstructive sleep apnea)   • SOB (shortness of breath)   • Snoring   • Mild intermittent asthma without complication        Objective:     /64 (BP Location: Left arm, Patient Position: Sitting, Cuff Size: Large)   Pulse 80   Temp 98 3 °F (36 8 °C) (Tympanic)   Resp 20   Ht 6' (1 829 m)   Wt 99 8 kg (220 lb)   SpO2 98%   BMI 29 84 kg/m²     Physical Exam  Vitals and nursing note reviewed  Exam conducted with a chaperone present  Constitutional:       Appearance: He is well-developed  HENT:      Head: Normocephalic and atraumatic  Right Ear: External ear normal       Left Ear: External ear normal       Nose: Nose normal       Mouth/Throat:      Mouth: Mucous membranes are moist       Pharynx: Oropharynx is clear  Eyes:      Conjunctiva/sclera: Conjunctivae normal       Pupils: Pupils are equal, round, and reactive to light  Cardiovascular:      Rate and Rhythm: Normal rate and regular rhythm  Heart sounds: No murmur heard  Pulmonary:      Effort: Pulmonary effort is normal  No respiratory distress  Breath sounds: Normal breath sounds  Abdominal:      General: Bowel sounds are normal  There is no distension  Palpations: Abdomen is soft  There is no mass  Tenderness: There is no abdominal tenderness  There is no guarding  Genitourinary:     Rectum: Normal  No external hemorrhoid or internal hemorrhoid  Musculoskeletal:      Cervical back: Neck supple  Skin:     General: Skin is warm and dry  Capillary Refill: Capillary refill takes less than 2 seconds  Neurological:      General: No focal deficit present  Mental Status: He is alert and oriented to person, place, and time  Psychiatric:         Mood and Affect: Mood normal          Behavior: Behavior normal          Thought Content: Thought content normal          Judgment: Judgment normal          I spent 20 minutes with this patient      [address] [place] [address]

## 2022-10-21 NOTE — PATIENT INSTRUCTIONS
Maaolox over the counter for gas      Abdominal Pain   WHAT YOU NEED TO KNOW:   Abdominal pain can be dull, achy, or sharp  You may have pain in one area of your abdomen, or in your entire abdomen  Your pain may be caused by a condition such as constipation, food sensitivity or poisoning, infection, or a blockage  Abdominal pain can also be from a hernia, appendicitis, or an ulcer  Liver, gallbladder, or kidney conditions can also cause abdominal pain  The cause of your abdominal pain may not be known  DISCHARGE INSTRUCTIONS:   Call your local emergency number (911 in the [address] [facility]) if:   You have new chest pain or shortness of breath  Return to the emergency department if:   You have pulsing pain in your upper abdomen or lower back that suddenly becomes constant  Your pain is in the right lower abdominal area and worsens with movement  You have a fever over 100 4°F (38°C) or shaking chills  You are vomiting and cannot keep food or liquids down  Your pain does not improve or gets worse over the next 8 to 12 hours  You see blood in your vomit or bowel movements, or they look black and tarry  Your skin or the whites of your eyes turn yellow  You are a woman and have a large amount of vaginal bleeding that is not your monthly period  Call your doctor if:   You have pain in your lower back  You are a man and have pain in your testicles  You have pain when you urinate  You have questions or concerns about your condition or care  Medicines:   Prescription pain medicine  may be given  Ask your healthcare provider how to take this medicine safely  Some prescription pain medicines contain acetaminophen  Do not take other medicines that contain acetaminophen without talking to your healthcare provider  Too much acetaminophen may cause liver damage  Prescription pain medicine may cause constipation  Ask your healthcare provider how to prevent or treat constipation       Medicines  may be given to calm your stomach or prevent vomiting  Take your medicine as directed  Contact your healthcare provider if you think your medicine is not helping or if you have side effects  Tell him of her if you are allergic to any medicine  Keep a list of the medicines, vitamins, and herbs you take  Include the amounts, and when and why you take them  Bring the list or the pill bottles to follow-up visits  Carry your medicine list with you in case of an emergency  Manage your symptoms:   Apply heat  on your abdomen for 20 to 30 minutes every 2 hours for as many days as directed  Heat helps decrease pain and muscle spasms  Make changes to the food you eat, if needed  Do not eat foods that cause abdominal pain or other symptoms  Eat small meals more often  The following changes may also help:    Eat more high-fiber foods if you are constipated  High-fiber foods include fruits, vegetables, whole-grain foods, and legumes  Do not eat foods that cause gas if you have bloating  Examples include broccoli, cabbage, and cauliflower  Do not drink soda or carbonated drinks  These may also cause gas  Do not eat foods or drinks that contain sorbitol or fructose if you have diarrhea and bloating  Some examples are fruit juices, candy, jelly, and sugar-free gum  Do not eat high-fat foods  Examples include fried foods, cheeseburgers, hot dogs, and desserts  Limit or do not have caffeine  Caffeine may make symptoms such as heartburn or nausea worse  Drink more liquids to prevent dehydration from diarrhea or vomiting  Ask your healthcare provider how much liquid to drink each day and which liquids are best for you  Keep a diary of your abdominal pain  A diary may help your healthcare provider learn what is causing your abdominal pain  Include when the pain happens, how long it lasts, and what the pain feels like  Write down any other symptoms you have with abdominal pain   Also write down what you eat, and what symptoms you have after you eat  Manage your stress  Stress may cause abdominal pain  Your healthcare provider may recommend relaxation techniques and deep breathing exercises to help decrease your stress  Your healthcare provider may recommend you talk to someone about your stress or anxiety, such as a counselor or a trusted friend  Get plenty of sleep and exercise regularly  Limit or do not drink alcohol  Alcohol can make your abdominal pain worse  Ask your healthcare provider if it is safe for you to drink alcohol  Also ask how much is safe for you to drink  Do not smoke  Nicotine and other chemicals in cigarettes can damage your esophagus and stomach  Ask your healthcare provider for information if you currently smoke and need help to quit  E-cigarettes or smokeless tobacco still contain nicotine  Talk to your healthcare provider before you use these products  Follow up with your doctor within 24 hours or as directed:  Write down your questions so you remember to ask them during your visits  © [employer] 2022 Information is for End User's use only and may not be sold, redistributed or otherwise used for commercial purposes  All illustrations and images included in CareNotes® are the copyrighted property of [employer]  or [facility] Madelaine Reilly   The above information is an  only  It is not intended as medical advice for individual conditions or treatments  Talk to your doctor, nurse or pharmacist before following any medical regimen to see if it is safe and effective for you

## 2022-11-01 ENCOUNTER — OFFICE VISIT (OUTPATIENT)
Dept: INTERNAL MEDICINE CLINIC | Facility: CLINIC | Age: 67
End: 2022-11-01

## 2022-11-01 ENCOUNTER — NURSE TRIAGE (OUTPATIENT)
Dept: OTHER | Facility: OTHER | Age: 67
End: 2022-11-01

## 2022-11-01 VITALS
RESPIRATION RATE: 20 BRPM | HEART RATE: 96 BPM | SYSTOLIC BLOOD PRESSURE: 120 MMHG | OXYGEN SATURATION: 98 % | WEIGHT: 220 LBS | BODY MASS INDEX: 29.84 KG/M2 | TEMPERATURE: 97.8 F | DIASTOLIC BLOOD PRESSURE: 78 MMHG

## 2022-11-01 DIAGNOSIS — R19.4 FREQUENT BOWEL MOVEMENTS: Primary | ICD-10-CM

## 2022-11-01 RX ORDER — LOPERAMIDE HYDROCHLORIDE 2 MG/1
CAPSULE ORAL
Qty: 20 CAPSULE | Refills: 0 | Status: SHIPPED | OUTPATIENT
Start: 2022-11-01

## 2022-11-01 RX ORDER — SIMETHICONE 125 MG
125 CAPSULE ORAL EVERY 6 HOURS PRN
Qty: 28 CAPSULE | Refills: 0 | Status: SHIPPED | OUTPATIENT
Start: 2022-11-01

## 2022-11-01 NOTE — PROGRESS NOTES
FOLLOW-UP OFFICE VISIT  [facility]    NAME: Danica Wright  AGE: 75 y o  SEX: male  : 1955     DATE: 2022     Assessment and Plan:     Problem List Items Addressed This Visit    None     Visit Diagnoses     Frequent bowel movements    -  Primary    Relevant Medications    loperamide (IMODIUM) 2 mg capsule    simethicone (Gas-X Extra Strength) 125 MG CAPS        Frequent non diarrheal bowel movements  differential for stool change includes IBS, gastritis, malignancy, obstruction  Patient remains soft and nondistended  No significant abdominal tenderness on exam   No episodes of vomiting  Likely not obstruction  On PPI and additional OTC anti acids  Possibly refractory to these therapies  Would then include H pylori infection in differential   Follows with GI  Last colonoscopy was  with a recall for   Patient is status post EGD in January of this year which showed erosive gastritis and esophageal polyp which was removed  Likely patient dealing with refractory gastritis versus IBS  Patient to use Imodium b i d  x1 week and then once daily p r n   In addition to this he will use Gas-X p r n  daily with a belching  Also recommended a probiotic  Patient is still symptomatic in 2-3 weeks would recommend following with GI  Return if symptoms worsen or fail to improve  Chief Complaint:     Chief Complaint   Patient presents with   • Physical Exam     Pt states that he is having frequent bowl movement and feeling gassy and burping         History of Present Illness:     freqenty stooling belching  Semi form  No blood 5-6 x a day  For almost 2 weeks now  Tried malox last week  Had 2-3 days of a break then started again on Friday  Mild abdominal cramping that is resolved by the bowel movement  On PPI and takes every morning before breakfast   Additionally he takes OTC anti acid twice a day         Review of Systems:     Review of Systems   Constitutional: Negative for fever  Cardiovascular: Negative for chest pain  Gastrointestinal: Positive for abdominal pain (very mild cramp  1/10) and diarrhea  Negative for abdominal distention, blood in stool, constipation, nausea and vomiting  Burping         Problem List:     Patient Active Problem List   Diagnosis   • Allergic rhinitis   • Hyperlipidemia   • Hypertension   • Hyponatremia   • Impaired fasting glucose   • Urinary frequency   • Prostate cancer screening   • Mild aortic stenosis   • Mild mitral regurgitation   • Symptoms of upper respiratory infection (URI)   • ABUNDIO (obstructive sleep apnea)   • SOB (shortness of breath)   • Snoring   • Mild intermittent asthma without complication        Objective:     /78 (BP Location: Left arm, Patient Position: Sitting, Cuff Size: Standard)   Pulse 96   Temp 97 8 °F (36 6 °C) (Temporal)   Resp 20   Wt 99 8 kg (220 lb)   SpO2 98%   BMI 29 84 kg/m²     Physical Exam  HENT:      Head: Normocephalic  Cardiovascular:      Rate and Rhythm: Normal rate  Pulmonary:      Effort: Pulmonary effort is normal    Abdominal:      General: Abdomen is protuberant  Bowel sounds are increased  Palpations: Abdomen is soft  Tenderness: There is no abdominal tenderness  Neurological:      Mental Status: He is alert  Pertinent Laboratory/Diagnostic Studies:      Radiology/Other Diagnostic Testing Results: I have personally reviewed pertinent reports          Julissa Kay [facility]  11/1/2022 12:57 PM

## 2022-11-01 NOTE — TELEPHONE ENCOUNTER
Apt scheduled for today   Reason for Disposition  • [1] SEVERE diarrhea (e g , 7 or more times / day more than normal) AND [2] present > 24 hours (1 day)     "soft stools"    Answer Assessment - Initial Assessment Questions  1  DIARRHEA SEVERITY: "How bad is the diarrhea?" "How many extra stools have you had in the past 24 hours than normal?"     10 times   2  ONSET: "When did the diarrhea begin?"     " 2 week ago"   3  BM CONSISTENCY: "How loose or watery is the diarrhea?"        "soft solid"   4  VOMITING: "Are you also vomiting?" If Yes, ask: "How many times in the past 24 hours?"      Denies   5  ABDOMINAL PAIN: "Are you having any abdominal pain?" If Yes, ask: "What does it feel like?" (e g , crampy, dull, intermittent, constant)       Off and on stomach cramps 1/10  6  ABDOMINAL PAIN SEVERITY: If present, ask: "How bad is the pain?"  (e g , Scale 1-10; mild, moderate, or severe)    1/10 "only a couple minutes"     8  HYDRATION: "Any signs of dehydration?" (e g , dry mouth [not just dry lips], too weak to stand, dizziness, new weight loss) "When did you last urinate?"      Denies   9  EXPOSURE: "Have you traveled to a foreign country recently?" "Have you been exposed to anyone with diarrhea?" "Could you have eaten any food that was spoiled?"     denies  10  ANTIBIOTIC USE: "Are you taking antibiotics now or have you taken antibiotics in the past 2 months?"       Unsure   11   OTHER SYMPTOMS: "Do you have any other symptoms?" (e g , fever, blood in stool)       Denies    Protocols used: DIARRHEA-ADULT-

## 2022-11-01 NOTE — TELEPHONE ENCOUNTER
Regarding: gas / alot of BM / Appt    ----- Message from Rox William sent at 11/1/2022  7:32 AM EDT -----  "I'm having a lot of gas and a lot of bowl movements  I was told to call today to see if there are any cancellations   I'd like to get in today if I can"

## 2022-11-15 ENCOUNTER — OFFICE VISIT (OUTPATIENT)
Dept: PULMONOLOGY | Facility: CLINIC | Age: 67
End: 2022-11-15

## 2022-11-15 VITALS
HEIGHT: 72 IN | WEIGHT: 220 LBS | OXYGEN SATURATION: 97 % | TEMPERATURE: 95.3 F | BODY MASS INDEX: 29.8 KG/M2 | SYSTOLIC BLOOD PRESSURE: 122 MMHG | HEART RATE: 87 BPM | RESPIRATION RATE: 18 BRPM | DIASTOLIC BLOOD PRESSURE: 72 MMHG

## 2022-11-15 DIAGNOSIS — K21.9 GASTROESOPHAGEAL REFLUX DISEASE, UNSPECIFIED WHETHER ESOPHAGITIS PRESENT: ICD-10-CM

## 2022-11-15 DIAGNOSIS — J30.9 ALLERGIC RHINITIS, UNSPECIFIED SEASONALITY, UNSPECIFIED TRIGGER: ICD-10-CM

## 2022-11-15 DIAGNOSIS — J45.20 MILD INTERMITTENT ASTHMA WITHOUT COMPLICATION: Primary | ICD-10-CM

## 2022-11-15 NOTE — PROGRESS NOTES
Assessment/Plan:   Diagnoses and all orders for this visit:    Mild intermittent asthma without complication    Gastroesophageal reflux disease, unspecified whether esophagitis present    Allergic rhinitis, unspecified seasonality, unspecified trigger     Patient is here today for follow-up  He is overall stable with his breathing, does note dyspnea with hills and inclines but otherwise overall doing well  Also with some hoarseness of his voice  He continues with the David Claudia daily, nebulizer usually 2-3 times per day  He continues to have issues with GERD and persistent belching  This can likely exacerbate his asthma and may be contributing to the hoarseness of his voice  He will follow-up with GI soon  He can follow-up with us in about 6 months or sooner if necessary  Return in about 6 months (around 5/15/2023)  All questions are answered to the patient's satisfaction and understanding  He verbalizes understanding  He is encouraged to call with any further questions or concerns  Portions of the record may have been created with voice recognition software  Occasional wrong word or "sound a like" substitutions may have occurred due to the inherent limitations of voice recognition software  Read the chart carefully and recognize, using context, where substitutions have occurred  Electronically Signed by Thom Diop PA-C    ______________________________________________________________________    Chief Complaint:   Chief Complaint   Patient presents with   • Follow-up       Patient ID: Kaye Simpson is a 75 y o  y o  male has a past medical history of Asthma, Disc degeneration, lumbar, Peripheral vascular disease ([address] ), SOB (shortness of breath), and Wheezing  11/15/2022  Patient presents today for follow-up visit  Patient is a 65-year-old male nonsmoker with past medical history of asthma, hypertension, hyperlipidemia, mild aortic stenosis, GERD      He is here today for follow-up  He is overall stable with his breathing, using the Breo once per day, Singulair, nebulizer 2-3 times per day  He does note some dyspnea on exertion particularly with hills or inclines but otherwise doing well  He does note some hoarseness of his voice  Continues to have GI issues with persistent belching, following with GI  Review of Systems   Constitutional: Negative  HENT: Negative  Respiratory: Positive for shortness of breath  Cardiovascular: Negative  Gastrointestinal: Negative  Genitourinary: Negative  Musculoskeletal: Negative  Skin: Negative  Allergic/Immunologic: Negative  Neurological: Negative  Psychiatric/Behavioral: Negative  Smoking history: He reports that he has never smoked  He has never used smokeless tobacco     The following portions of the patient's history were reviewed and updated as appropriate: allergies, current medications, past family history, past medical history, past social history, past surgical history and problem list     Immunization History   Administered Date(s) Administered   • COVID-19 PFIZER VACCINE 0 3 ML IM 03/18/2021, 04/12/2021, 12/21/2021   • INFLUENZA 11/05/2014, 01/05/2016, 10/17/2017, 10/21/2022   • Influenza Quadrivalent, 6-35 Months IM 11/05/2014, 01/05/2016, 10/17/2017   • Influenza, high dose seasonal 0 7 mL 01/23/2020, 11/29/2021, 10/21/2022   • Influenza, seasonal, injectable 1955   • Pneumococcal Conjugate 13-Valent 07/12/2019   • Pneumococcal Polysaccharide PPV23 1955, 01/23/2020   • Tdap [birth year]   • Zoster 1955     Current Outpatient Medications   Medication Sig Dispense Refill   • Breo Ellipta 200-25 MCG/INH inhaler INHALE 1 PUFF DAILY RINSE MOUTH AFTER USE   60 each 3   • ipratropium-albuterol (DUO-NEB) 0 5-2 5 mg/3 mL nebulizer solution TAKE 3 ML BY NEBULIZATION 4 (FOUR) TIMES A  mL 3   • loperamide (IMODIUM) 2 mg capsule FOR THE NEXT WEEK USE TWICE A DAY THEN ONCE A DAY AS NEEDED  20 capsule 0   • pantoprazole (PROTONIX) 40 mg tablet TAKE 1 TABLET (40 MG TOTAL) BY MOUTH DAILY 1/2 HOUR BEFORE BREAKFAST 90 tablet 0   • rosuvastatin (CRESTOR) 20 MG tablet TAKE 1 TABLET BY MOUTH EVERY DAY 90 tablet 3   • simethicone (Gas-X Extra Strength) 125 MG CAPS Take 1 capsule (125 mg total) by mouth every 6 (six) hours as needed for flatulence 28 capsule 0   • amLODIPine (NORVASC) 5 mg tablet Take 1 tablet (5 mg total) by mouth daily 90 tablet 3   • lisinopril (ZESTRIL) 40 mg tablet Take 1 tablet (40 mg total) by mouth daily 90 tablet 3     No current facility-administered medications for this visit  Allergies: Pollen extract    Objective:  Vitals:    11/15/22 1344 11/15/22 1346   BP: 122/72    BP Location: Right arm    Patient Position: Sitting    Cuff Size: Large    Pulse: 87    Resp: 18    Temp: (!) 95 3 °F (35 2 °C)    TempSrc: Tympanic    SpO2: 97% 97%   Weight: 99 8 kg (220 lb)    Height: 6' (1 829 m)    Oxygen Therapy  SpO2: 97 %  Oxygen Therapy: None (Room air)    Wt Readings from Last 3 Encounters:   11/15/22 99 8 kg (220 lb)   11/01/22 99 8 kg (220 lb)   10/21/22 99 8 kg (220 lb)     Body mass index is 29 84 kg/m²  Physical Exam  Vitals reviewed  Constitutional:       Appearance: Normal appearance  HENT:      Head: Normocephalic and atraumatic  Mouth/Throat:      Pharynx: Oropharynx is clear  Eyes:      Conjunctiva/sclera: Conjunctivae normal    Cardiovascular:      Rate and Rhythm: Normal rate and regular rhythm  Pulmonary:      Effort: Pulmonary effort is normal  No respiratory distress  Breath sounds: Normal breath sounds  No decreased breath sounds, wheezing, rhonchi or rales  Abdominal:      General: Abdomen is flat  There is no distension  Musculoskeletal:         General: Normal range of motion  Cervical back: Normal range of motion  Right lower leg: No edema  Left lower leg: No edema     Skin:     General: Skin is warm and dry  Neurological:      Mental Status: He is alert and oriented to person, place, and time  Psychiatric:         Mood and Affect: Mood normal          Behavior: Behavior normal          Lab Review:   Lab Results   Component Value Date    K 4 1 06/23/2022     06/23/2022    CO2 25 06/23/2022    BUN 15 06/23/2022    CREATININE 1 29 06/23/2022    CALCIUM 9 7 06/23/2022     Lab Results   Component Value Date    WBC 8 77 06/23/2022    HGB 14 4 06/23/2022    HCT 43 0 06/23/2022    MCV 90 06/23/2022     06/23/2022       Diagnostics:  none pertinent  Office Spirometry Results:     ESS:    No results found

## 2022-11-18 DIAGNOSIS — J45.40 MODERATE PERSISTENT ASTHMA WITHOUT COMPLICATION: ICD-10-CM

## 2022-11-22 RX ORDER — IPRATROPIUM BROMIDE AND ALBUTEROL SULFATE 2.5; .5 MG/3ML; MG/3ML
3 SOLUTION RESPIRATORY (INHALATION) 4 TIMES DAILY
Qty: 360 ML | Refills: 3 | Status: SHIPPED | OUTPATIENT
Start: 2022-11-22

## 2022-11-25 DIAGNOSIS — K22.10 EROSIVE ESOPHAGITIS: ICD-10-CM

## 2022-11-25 RX ORDER — PANTOPRAZOLE SODIUM 40 MG/1
40 TABLET, DELAYED RELEASE ORAL DAILY
Qty: 90 TABLET | Refills: 0 | Status: SHIPPED | OUTPATIENT
Start: 2022-11-25

## 2022-11-28 ENCOUNTER — OFFICE VISIT (OUTPATIENT)
Dept: INTERNAL MEDICINE CLINIC | Facility: CLINIC | Age: 67
End: 2022-11-28

## 2022-11-28 ENCOUNTER — NURSE TRIAGE (OUTPATIENT)
Dept: OTHER | Facility: OTHER | Age: 67
End: 2022-11-28

## 2022-11-28 VITALS
SYSTOLIC BLOOD PRESSURE: 138 MMHG | DIASTOLIC BLOOD PRESSURE: 72 MMHG | HEART RATE: 88 BPM | RESPIRATION RATE: 18 BRPM | BODY MASS INDEX: 29.29 KG/M2 | TEMPERATURE: 97.4 F | OXYGEN SATURATION: 98 % | WEIGHT: 216 LBS

## 2022-11-28 DIAGNOSIS — J06.9 VIRAL UPPER RESPIRATORY TRACT INFECTION: Primary | ICD-10-CM

## 2022-11-28 NOTE — TELEPHONE ENCOUNTER
Patient has been experiencing cold/flu-like symptoms for over a week and would like to be seen in the office  His at home COVID test was negative  Patient would like a call back to advise  Reason for Disposition  • Patient wants to be seen    Answer Assessment - Initial Assessment Questions  1  ONSET: "When did the cough begin?"       Last weekend (over a week ago)  2  SEVERITY: "How bad is the cough today?"       Moderate-severe  3  SPUTUM: "Describe the color of your sputum" (none, dry cough; clear, white, yellow, green)      None  4  HEMOPTYSIS: "Are you coughing up any blood?" If so ask: "How much?" (flecks, streaks, tablespoons, etc )      Denies   5  DIFFICULTY BREATHING: "Are you having difficulty breathing?" If Yes, ask: "How bad is it?" (e g , mild, moderate, severe)     - MILD: No SOB at rest, mild SOB with walking, speaks normally in sentences, can lay down, no retractions, pulse < 100      - MODERATE: SOB at rest, SOB with minimal exertion and prefers to sit, cannot lie down flat, speaks in phrases, mild retractions, audible wheezing, pulse 100-120      - SEVERE: Very SOB at rest, speaks in single words, struggling to breathe, sitting hunched forward, retractions, pulse > 120       Mild, Asthmatic   6  FEVER: "Do you have a fever?" If Yes, ask: "What is your temperature, how was it measured, and when did it start?"      Denies   7  CARDIAC HISTORY: "Do you have any history of heart disease?" (e g , heart attack, congestive heart failure)       Denies   8  LUNG HISTORY: "Do you have any history of lung disease?"  (e g , pulmonary embolus, asthma, emphysema)      Asthma  9  PE RISK FACTORS: "Do you have a history of blood clots?" (or: recent major surgery, recent prolonged travel, bedridden)      Denies   10  OTHER SYMPTOMS: "Do you have any other symptoms?" (e g , runny nose, wheezing, chest pain)        Chills (resolved), loss of taste (resolving), decreased appetite   11   PREGNANCY: "Is there any chance you are pregnant?" "When was your last menstrual period?"        N/A  12   TRAVEL: "Have you traveled out of the country in the last month?" (e g , travel history, exposures)        N/A    Protocols used: COUGH-ADULT-[place]

## 2022-11-28 NOTE — TELEPHONE ENCOUNTER
Regarding: chills, cough and chest congestion  ----- Message from [facility] sent at 11/28/2022  7:24 AM EST -----  "I have the chills, cough and chest congestion   My covid test was negative "

## 2022-11-28 NOTE — PROGRESS NOTES
Assessment/Plan:       Diagnoses and all orders for this visit:    Viral upper respiratory tract infection                Subjective:      Patient ID: Te Dykes is a 75 y o  male  10 days upper respiratory symptoms  Scratchy throat   Slight cough   Sneezing  Nasal congestion  Brief loss of sense of smell and taste  He had tested for COVID 5 days into it and was negative  Improving     Benign exam         The following portions of the patient's history were reviewed and updated as appropriate:   He has a past medical history of Asthma, Disc degeneration, lumbar, Peripheral vascular disease ([address] ), SOB (shortness of breath), and Wheezing ,  does not have any pertinent problems on file  ,   has a past surgical history that includes Esophagoscopy  ,  family history includes Breast cancer in his mother; Hip fracture in his father  ,   reports that he has never smoked  He has never used smokeless tobacco  He reports that he does not drink alcohol and does not use drugs  ,  is allergic to pollen extract     Current Outpatient Medications   Medication Sig Dispense Refill   • amLODIPine (NORVASC) 5 mg tablet Take 1 tablet (5 mg total) by mouth daily 90 tablet 3   • Breo Ellipta 200-25 MCG/INH inhaler INHALE 1 PUFF DAILY RINSE MOUTH AFTER USE  60 each 3   • ipratropium-albuterol (DUO-NEB) 0 5-2 5 mg/3 mL nebulizer solution TAKE 3 ML BY NEBULIZATION 4 (FOUR) TIMES A  mL 3   • lisinopril (ZESTRIL) 40 mg tablet Take 1 tablet (40 mg total) by mouth daily 90 tablet 3   • loperamide (IMODIUM) 2 mg capsule FOR THE NEXT WEEK USE TWICE A DAY THEN ONCE A DAY AS NEEDED   20 capsule 0   • rosuvastatin (CRESTOR) 20 MG tablet TAKE 1 TABLET BY MOUTH EVERY DAY 90 tablet 3   • simethicone (Gas-X Extra Strength) 125 MG CAPS Take 1 capsule (125 mg total) by mouth every 6 (six) hours as needed for flatulence 28 capsule 0   • pantoprazole (PROTONIX) 40 mg tablet TAKE 1 TABLET (40 MG TOTAL) BY MOUTH DAILY 1/2 HOUR BEFORE BREAKFAST (Patient not taking: Reported on 11/28/2022) 90 tablet 0     No current facility-administered medications for this visit  Review of Systems   HENT: Positive for congestion and sore throat  Respiratory: Positive for cough  Objective:  Vitals:    11/28/22 1339   BP: 138/72   Pulse: 88   Resp: 18   Temp: (!) 97 4 °F (36 3 °C)   SpO2: 98%      Physical Exam  Constitutional:       General: He is not in acute distress  Appearance: Normal appearance  He is not ill-appearing, toxic-appearing or diaphoretic  Eyes:      General: No scleral icterus  Cardiovascular:      Rate and Rhythm: Normal rate and regular rhythm  Pulmonary:      Effort: Pulmonary effort is normal       Breath sounds: Normal breath sounds  Neurological:      General: No focal deficit present  Mental Status: He is alert  Patient Instructions   Resolving URI  Treat symptomatically    Even if covered, he is outside the window

## 2022-12-09 ENCOUNTER — OFFICE VISIT (OUTPATIENT)
Dept: INTERNAL MEDICINE CLINIC | Facility: CLINIC | Age: 67
End: 2022-12-09

## 2022-12-09 VITALS
WEIGHT: 213.8 LBS | HEIGHT: 72 IN | SYSTOLIC BLOOD PRESSURE: 106 MMHG | TEMPERATURE: 100.8 F | HEART RATE: 108 BPM | DIASTOLIC BLOOD PRESSURE: 70 MMHG | BODY MASS INDEX: 28.96 KG/M2 | OXYGEN SATURATION: 97 %

## 2022-12-09 DIAGNOSIS — M54.6 ACUTE BILATERAL THORACIC BACK PAIN: ICD-10-CM

## 2022-12-09 DIAGNOSIS — J45.20 MILD INTERMITTENT ASTHMA WITHOUT COMPLICATION: ICD-10-CM

## 2022-12-09 DIAGNOSIS — J01.10 ACUTE NON-RECURRENT FRONTAL SINUSITIS: Primary | ICD-10-CM

## 2022-12-09 DIAGNOSIS — Z13.9 ENCOUNTER FOR SCREENING: ICD-10-CM

## 2022-12-09 RX ORDER — AMOXICILLIN AND CLAVULANATE POTASSIUM 875; 125 MG/1; MG/1
1 TABLET, FILM COATED ORAL EVERY 12 HOURS SCHEDULED
Qty: 20 TABLET | Refills: 0 | Status: SHIPPED | OUTPATIENT
Start: 2022-12-09 | End: 2022-12-19

## 2022-12-09 RX ORDER — LORATADINE 10 MG/1
10 TABLET ORAL DAILY
Qty: 30 TABLET | Refills: 0 | Status: SHIPPED | OUTPATIENT
Start: 2022-12-09

## 2022-12-09 RX ORDER — PREDNISONE 20 MG/1
40 TABLET ORAL DAILY
Qty: 10 TABLET | Refills: 0 | Status: SHIPPED | OUTPATIENT
Start: 2022-12-09 | End: 2022-12-14

## 2022-12-09 RX ORDER — FLUTICASONE PROPIONATE 50 MCG
1 SPRAY, SUSPENSION (ML) NASAL DAILY
Qty: 15.8 ML | Refills: 0 | Status: SHIPPED | OUTPATIENT
Start: 2022-12-09

## 2022-12-09 NOTE — PATIENT INSTRUCTIONS
Seek care immediately if:   You have trouble breathing or wheezing that is getting worse  You have a stiff neck, a fever, or a bad headache  You cannot open your eye  Your eyeball bulges out or you cannot move your eye  You are more sleepy than normal, or you notice changes in your ability to think, move, or talk  You have swelling of your forehead or scalp

## 2022-12-09 NOTE — PROGRESS NOTES
Name: Danilo Reynoso      : 1955      MRN: [id number]  Encounter Provider: PATRICIA Puckett  Encounter Date: 2022   [facility]  [address]     1  Acute non-recurrent frontal sinusitis  Comments:  Use Tylenol for fever, presented to the emergency room for fever lasting more than 2 days or worsening symptoms while on antibiotic  Orders:  -     loratadine (CLARITIN) 10 mg tablet; Take 1 tablet (10 mg total) by mouth daily  -     fluticasone (FLONASE) 50 mcg/act nasal spray; 1 spray into each nostril daily  -     amoxicillin-clavulanate (AUGMENTIN) 875-125 mg per tablet; Take 1 tablet by mouth every 12 (twelve) hours for 10 days  -     dextromethorphan-guaifenesin (MUCINEX DM)  MG per 12 hr tablet; Take 1 tablet by mouth every 12 (twelve) hours    2  Mild intermittent asthma without complication  Comments:  Using ipratropium twice daily, positive wheezing  Continue Breo daily and albuterol ipratropium as needed  Orders:  -     predniSONE 20 mg tablet; Take 2 tablets (40 mg total) by mouth daily for 5 days    3  Acute bilateral thoracic back pain  Comments:   He will require further work-up if back pain is not related to the coughing spells  Will reevaluate at next follow-up in 2 weeks  4  Encounter for screening  -     Covid/Flu- Office Collect       Follow-up in 2 weeks or earlier if needed  Cristian Zaidi is here for evaluation of cough, fever, headache, and sinus pressure  Patient was seen in the office 1 week prior and diagnosed with URI  He reported that he was improving but symptoms worsen over the last 2 days  He also reports back pain and pleuritic chest pain post coughing spell  He denies dysuria, burning, frequency or hematuria  Cough  This is a new problem  The current episode started more than 1 month ago  The problem has been waxing and waning  The problem occurs constantly  The cough is non-productive  Associated symptoms include a fever, headaches and a sore throat  Pertinent negatives include no chest pain, nasal congestion, postnasal drip, shortness of breath or wheezing  The symptoms are aggravated by lying down, exercise and cold air  He has tried OTC cough suppressant (tylenol cold and flu) for the symptoms  The treatment provided no relief  His past medical history is significant for asthma  There is no history of environmental allergies  Review of Systems   Constitutional: Positive for appetite change, fatigue and fever  HENT: Positive for sinus pressure and sore throat  Negative for postnasal drip and sinus pain  Eyes: Negative  Respiratory: Positive for cough  Negative for shortness of breath and wheezing  Cardiovascular: Negative for chest pain  Gastrointestinal: Negative for diarrhea and nausea  Genitourinary: Negative for flank pain, frequency, hematuria and urgency  Allergic/Immunologic: Negative for environmental allergies  Neurological: Positive for headaches  Negative for dizziness and weakness  Hematological: Negative for adenopathy  Psychiatric/Behavioral: Negative  Current Outpatient Medications on File Prior to Visit   Medication Sig   • amLODIPine (NORVASC) 5 mg tablet Take 1 tablet (5 mg total) by mouth daily   • Breo Ellipta 200-25 MCG/INH inhaler INHALE 1 PUFF DAILY RINSE MOUTH AFTER USE     • ipratropium-albuterol (DUO-NEB) 0 5-2 5 mg/3 mL nebulizer solution TAKE 3 ML BY NEBULIZATION 4 (FOUR) TIMES A DAY   • lisinopril (ZESTRIL) 40 mg tablet Take 1 tablet (40 mg total) by mouth daily   • pantoprazole (PROTONIX) 40 mg tablet TAKE 1 TABLET (40 MG TOTAL) BY MOUTH DAILY 1/2 HOUR BEFORE BREAKFAST   • rosuvastatin (CRESTOR) 20 MG tablet TAKE 1 TABLET BY MOUTH EVERY DAY   • simethicone (Gas-X Extra Strength) 125 MG CAPS Take 1 capsule (125 mg total) by mouth every 6 (six) hours as needed for flatulence   • [DISCONTINUED] loperamide (IMODIUM) 2 mg capsule FOR THE NEXT WEEK USE TWICE A DAY THEN ONCE A DAY AS NEEDED  (Patient not taking: Reported on 12/9/2022)       Objective     /70   Pulse (!) 108   Temp (!) 100 8 °F (38 2 °C) (Tympanic)   Ht 6' (1 829 m)   Wt 97 kg (213 lb 12 8 oz)   SpO2 97%   BMI 29 00 kg/m²     Physical Exam  Vitals and nursing note reviewed  Constitutional:       General: He is not in acute distress  Appearance: Normal appearance  He is not ill-appearing  HENT:      Head: Normocephalic and atraumatic  Right Ear: Tympanic membrane normal       Left Ear: Tympanic membrane normal       Nose: Congestion and rhinorrhea present  Rhinorrhea is purulent  Right Sinus: Frontal sinus tenderness present  No maxillary sinus tenderness  Left Sinus: Frontal sinus tenderness present  No maxillary sinus tenderness  Mouth/Throat:      Pharynx: Posterior oropharyngeal erythema present  No oropharyngeal exudate  Eyes:      Conjunctiva/sclera: Conjunctivae normal    Cardiovascular:      Rate and Rhythm: Normal rate and regular rhythm  Pulses: Normal pulses  Heart sounds: Normal heart sounds  Pulmonary:      Effort: Pulmonary effort is normal       Breath sounds: Wheezing present  Musculoskeletal:         General: Normal range of motion  Skin:     General: Skin is warm and dry  Findings: No rash  Neurological:      General: No focal deficit present  Mental Status: He is alert and oriented to person, place, and time     Psychiatric:         Mood and Affect: Mood normal          Behavior: Behavior normal        PATRICIA Mariano

## 2022-12-10 LAB
FLUAV RNA RESP QL NAA+PROBE: POSITIVE
FLUBV RNA RESP QL NAA+PROBE: NEGATIVE
SARS-COV-2 RNA RESP QL NAA+PROBE: NEGATIVE

## 2022-12-12 ENCOUNTER — TELEPHONE (OUTPATIENT)
Dept: INTERNAL MEDICINE CLINIC | Facility: CLINIC | Age: 67
End: 2022-12-12

## 2022-12-12 NOTE — TELEPHONE ENCOUNTER
I spoke to pt he is aware of his results and he was asking that he had called the office and he was told that he was also poistive for covid he is aware of both results and providers message

## 2022-12-12 NOTE — TELEPHONE ENCOUNTER
----- Message from [address] sent at 12/12/2022  9:40 AM EST -----  Please let the Patient know he is positive for the flu  Encourage him to stay hydrated and take  vit C, D and zinc to boost his immune system

## 2022-12-20 ENCOUNTER — RA CDI HCC (OUTPATIENT)
Dept: OTHER | Facility: HOSPITAL | Age: 67
End: 2022-12-20

## 2022-12-20 NOTE — PROGRESS NOTES
[facility]  coding opportunities       Chart reviewed, no opportunity found:   [address]        Patients Insurance     Medicare Insurance: [employer]

## 2022-12-23 ENCOUNTER — OFFICE VISIT (OUTPATIENT)
Dept: INTERNAL MEDICINE CLINIC | Facility: CLINIC | Age: 67
End: 2022-12-23

## 2022-12-23 VITALS
OXYGEN SATURATION: 99 % | TEMPERATURE: 98 F | DIASTOLIC BLOOD PRESSURE: 74 MMHG | RESPIRATION RATE: 17 BRPM | HEIGHT: 72 IN | HEART RATE: 77 BPM | BODY MASS INDEX: 28.93 KG/M2 | SYSTOLIC BLOOD PRESSURE: 124 MMHG | WEIGHT: 213.6 LBS

## 2022-12-23 DIAGNOSIS — R49.0 HOARSENESS: Primary | ICD-10-CM

## 2022-12-23 DIAGNOSIS — H61.23 BILATERAL IMPACTED CERUMEN: ICD-10-CM

## 2022-12-23 DIAGNOSIS — L29.9 ITCHY SKIN: ICD-10-CM

## 2022-12-23 NOTE — PROGRESS NOTES
Name: Jayson Pina      : 1955      MRN: [id number]  Encounter Provider: PATRICIA Ferro  Encounter Date: 2022   [facility]   [address]     1  Hoarseness  Comments:  Encourage cool-mist humidifier in the bedroom, salt water gargles and vocal rest   Reevaluate in 2 weeks  Orders:  -     menthol-cetylpyridinium (CEPACOL) 3 MG lozenge; Take 1 lozenge (3 mg total) by mouth as needed for sore throat    2  Bilateral impacted cerumen  Comments:  Ear lavage performed in the office without complications  Patient denies dizziness, headache, ear fullness or pain  3  Itchy skin  Comments:  No rash or signs of dermatitis  Avoid hot showers/bath, increase hydration and applying thick ointment 2-3 times per day  f/u 2 Weeks or as needed  Cristian Zaidi is here for follow-up visit  Patient was seen on  and given the diagnosis acute frontal sinusitis   He was treated with Augmentin, Claritin, Mucinex  Patient got a short bursts of prednisone for mild intermittent asthma  He was slso experiencing bilateral thoracic pain  The patient reports his symptoms are much improved  He denies back pain, headache, sinus pressure or fever  His only concern is hoarseness, a mild dry cough and dry skin (right thigh)  Reassured patient this should resolve independently but if symptoms continue in 2 weeks we should reevaluate  Review of Systems   Constitutional: Negative  HENT: Positive for voice change  Negative for ear discharge, ear pain, hearing loss, rhinorrhea, sinus pressure, sinus pain and sore throat  Eyes: Negative  Respiratory: Negative  Cardiovascular: Negative  Musculoskeletal: Negative  Skin: Positive for rash (Itching right leg)  Neurological: Negative  Hematological: Negative  Psychiatric/Behavioral: Negative          Current Outpatient Medications on File Prior to Visit   Medication Sig   • amLODIPine (NORVASC) 5 mg tablet Take 1 tablet (5 mg total) by mouth daily   • Breo Ellipta 200-25 MCG/INH inhaler INHALE 1 PUFF DAILY RINSE MOUTH AFTER USE  • dextromethorphan-guaifenesin (MUCINEX DM)  MG per 12 hr tablet Take 1 tablet by mouth every 12 (twelve) hours   • fluticasone (FLONASE) 50 mcg/act nasal spray 1 spray into each nostril daily   • ipratropium-albuterol (DUO-NEB) 0 5-2 5 mg/3 mL nebulizer solution TAKE 3 ML BY NEBULIZATION 4 (FOUR) TIMES A DAY   • lisinopril (ZESTRIL) 40 mg tablet Take 1 tablet (40 mg total) by mouth daily   • pantoprazole (PROTONIX) 40 mg tablet TAKE 1 TABLET (40 MG TOTAL) BY MOUTH DAILY 1/2 HOUR BEFORE BREAKFAST   • rosuvastatin (CRESTOR) 20 MG tablet TAKE 1 TABLET BY MOUTH EVERY DAY   • simethicone (Gas-X Extra Strength) 125 MG CAPS Take 1 capsule (125 mg total) by mouth every 6 (six) hours as needed for flatulence   • loratadine (CLARITIN) 10 mg tablet Take 1 tablet (10 mg total) by mouth daily (Patient not taking: Reported on 12/23/2022)       Objective     /74 (BP Location: Left arm, Patient Position: Sitting, Cuff Size: Standard)   Pulse 77   Temp 98 °F (36 7 °C) (Temporal)   Resp 17   Ht 6' (1 829 m)   Wt 96 9 kg (213 lb 9 6 oz)   SpO2 99%   BMI 28 97 kg/m²     Physical Exam  Constitutional:       General: He is not in acute distress  Appearance: Normal appearance  He is not ill-appearing  HENT:      Head: Normocephalic and atraumatic  Right Ear: There is impacted cerumen  Left Ear: There is impacted cerumen  Nose: No congestion or rhinorrhea  Mouth/Throat:      Pharynx: No oropharyngeal exudate or posterior oropharyngeal erythema  Eyes:      Conjunctiva/sclera: Conjunctivae normal    Cardiovascular:      Rate and Rhythm: Normal rate and regular rhythm  Pulses: Normal pulses  Heart sounds: Normal heart sounds  Pulmonary:      Effort: Pulmonary effort is normal       Breath sounds: Normal breath sounds  Lymphadenopathy:      Cervical: No cervical adenopathy  Skin:     General: Skin is dry  Findings: No lesion or rash  Neurological:      General: No focal deficit present  Mental Status: He is alert and oriented to person, place, and time  Psychiatric:         Mood and Affect: Mood normal          Behavior: Behavior normal      Ear cerumen removal    Date/Time: 12/23/2022 11:30 AM  Performed by: PATRICIA Tony  Authorized by: PATRICIA Tony   [place] Protocol:  Procedure performed by:  Consent: Verbal consent obtained  Risks and benefits: risks, benefits and alternatives were discussed  Consent given by: patient  Patient understanding: patient states understanding of the procedure being performed  Patient identity confirmed: verbally with patient      Patient location:  Clinic  Indications / Diagnosis:  Cerumen impaction  Procedure details:     Local anesthetic:  None    Location:  L ear and R ear    Procedure type: irrigation only    Post-procedure details:     Complication:  None    Hearing quality:  Normal    Patient tolerance of procedure:   Tolerated well, no immediate complications        PATRICIA Mariano

## 2023-01-01 DIAGNOSIS — J01.10 ACUTE NON-RECURRENT FRONTAL SINUSITIS: ICD-10-CM

## 2023-01-01 RX ORDER — LORATADINE 10 MG/1
TABLET ORAL
Qty: 90 TABLET | Refills: 1 | Status: SHIPPED | OUTPATIENT
Start: 2023-01-01

## 2023-01-27 PROBLEM — J06.9 VIRAL UPPER RESPIRATORY TRACT INFECTION: Status: RESOLVED | Noted: 2018-01-30 | Resolved: 2023-01-27

## 2023-01-31 DIAGNOSIS — J01.10 ACUTE NON-RECURRENT FRONTAL SINUSITIS: ICD-10-CM

## 2023-01-31 RX ORDER — FLUTICASONE PROPIONATE 50 MCG
SPRAY, SUSPENSION (ML) NASAL
Qty: 24 ML | Refills: 1 | Status: SHIPPED | OUTPATIENT
Start: 2023-01-31 | End: 2023-02-04

## 2023-02-04 DIAGNOSIS — J01.10 ACUTE NON-RECURRENT FRONTAL SINUSITIS: ICD-10-CM

## 2023-02-04 RX ORDER — FLUTICASONE PROPIONATE 50 MCG
SPRAY, SUSPENSION (ML) NASAL
Qty: 24 ML | Refills: 2 | Status: SHIPPED | OUTPATIENT
Start: 2023-02-04

## 2023-02-19 ENCOUNTER — APPOINTMENT (EMERGENCY)
Dept: RADIOLOGY | Facility: HOSPITAL | Age: 68
End: 2023-02-19

## 2023-02-19 ENCOUNTER — HOSPITAL ENCOUNTER (EMERGENCY)
Facility: HOSPITAL | Age: 68
Discharge: HOME | End: 2023-02-19
Attending: EMERGENCY MEDICINE

## 2023-02-19 VITALS
RESPIRATION RATE: 17 BRPM | SYSTOLIC BLOOD PRESSURE: 141 MMHG | HEART RATE: 99 BPM | OXYGEN SATURATION: 97 % | DIASTOLIC BLOOD PRESSURE: 95 MMHG | TEMPERATURE: 99.6 F

## 2023-02-19 DIAGNOSIS — M25.512 ACUTE PAIN OF LEFT SHOULDER: Primary | ICD-10-CM

## 2023-02-19 RX ORDER — HYDROCODONE BITARTRATE AND ACETAMINOPHEN 5; 325 MG/1; MG/1
1 TABLET ORAL EVERY 6 HOURS PRN
Qty: 12 TABLET | Refills: 0 | Status: SHIPPED | OUTPATIENT
Start: 2023-02-19

## 2023-02-19 NOTE — ED PROVIDER NOTES
History  Chief Complaint   Patient presents with   • Shoulder Pain     C/o left shoulder pain for 3 days, states he was shoveling dirt when it begun hurting  75 y o  male with past medical history significant for asthma, peripheral vascular disease and degenerative disc disease presents to ED with chief complaint of left shoulder pain  Onset of symptoms reported as 3 days ago  Location of symptoms reported as left shoulder  Quality is reported as sharp pain  Severity is reported as moderate  Associated symptoms: Denies paralysis paresthesia or weakness to the left upper extremity  Denies left elbow, wrist or hand pain  Denies left upper extremity swelling  Denies rash  Denies chest pain, shortness of breath, dizziness or syncope  Modifying factors: Movement exacerbates pain    Context: Denies acute fall injury or trauma  Reports increasing pain in left shoulder for 3 days  Pain started after shoveling dirt  Denies prior shoulder surgery  History provided by:  Patient   used: No        Prior to Admission Medications   Prescriptions Last Dose Informant Patient Reported? Taking?    Breo Ellipta 200-25 MCG/INH inhaler   No No   Sig: INHALE 1 PUFF DAILY RINSE MOUTH AFTER USE    amLODIPine (NORVASC) 5 mg tablet   No No   Sig: Take 1 tablet (5 mg total) by mouth daily   dextromethorphan-guaifenesin (MUCINEX DM)  MG per 12 hr tablet   No No   Sig: Take 1 tablet by mouth every 12 (twelve) hours   fluticasone (FLONASE) 50 mcg/act nasal spray   No No   Sig: SPRAY 1 SPRAY INTO EACH NOSTRIL EVERY DAY   ipratropium-albuterol (DUO-NEB) 0 5-2 5 mg/3 mL nebulizer solution   No No   Sig: TAKE 3 ML BY NEBULIZATION 4 (FOUR) TIMES A DAY   lisinopril (ZESTRIL) 40 mg tablet   No No   Sig: Take 1 tablet (40 mg total) by mouth daily   loratadine (CLARITIN) 10 mg tablet   No No   Sig: TAKE 1 TABLET BY MOUTH EVERY DAY   menthol-cetylpyridinium (CEPACOL) 3 MG lozenge   No No   Sig: Take 1 lozenge (3 mg total) by mouth as needed for sore throat   pantoprazole (PROTONIX) 40 mg tablet   No No   Sig: TAKE 1 TABLET (40 MG TOTAL) BY MOUTH DAILY 1/2 HOUR BEFORE BREAKFAST   rosuvastatin (CRESTOR) 20 MG tablet   No No   Sig: TAKE 1 TABLET BY MOUTH EVERY DAY   simethicone (Gas-X Extra Strength) 125 MG CAPS   No No   Sig: Take 1 capsule (125 mg total) by mouth every 6 (six) hours as needed for flatulence      Facility-Administered Medications: None       Past Medical History:   Diagnosis Date   • Asthma    • Disc degeneration, lumbar    • Peripheral vascular disease (HCC)    • SOB (shortness of breath)    • Wheezing        Past Surgical History:   Procedure Laterality Date   • ESOPHAGOSCOPY         Family History   Problem Relation Age of Onset   • Breast cancer Mother    • Hip fracture Father      I have reviewed and agree with the history as documented  E-Cigarette/Vaping   • E-Cigarette Use Never User      E-Cigarette/Vaping Substances   • Nicotine No    • THC No    • CBD No    • Flavoring No      Social History     Tobacco Use   • Smoking status: Never   • Smokeless tobacco: Never   Vaping Use   • Vaping Use: Never used   Substance Use Topics   • Alcohol use: No   • Drug use: No       Review of Systems   Constitutional: Negative for fever  Respiratory: Negative for chest tightness, shortness of breath and stridor  Cardiovascular: Negative for chest pain  Musculoskeletal: Positive for arthralgias  Negative for gait problem, myalgias, neck pain and neck stiffness  Skin: Negative for rash  Neurological: Negative for seizures, syncope, facial asymmetry and numbness  All other systems reviewed and are negative  Physical Exam  Physical Exam  Vitals and nursing note reviewed  Constitutional:       General: He is not in acute distress  Appearance: Normal appearance        Comments: /95 (BP Location: Right arm)   Pulse 99   Temp 99 6 °F (37 6 °C) (Tympanic)   Resp 17   SpO2 97%      HENT: Head: Normocephalic and atraumatic  Right Ear: External ear normal       Left Ear: External ear normal       Nose: Nose normal    Eyes:      General: No scleral icterus  Right eye: No discharge  Left eye: No discharge  Cardiovascular:      Rate and Rhythm: Normal rate  Pulses: Normal pulses  Pulmonary:      Effort: Pulmonary effort is normal       Breath sounds: Normal breath sounds  Musculoskeletal:         General: Tenderness present  No deformity or signs of injury  Left shoulder: Tenderness present  No swelling, deformity, effusion or laceration  Normal strength  Normal pulse  Left elbow: Normal       Left hand: Normal       Cervical back: Normal range of motion and neck supple  Skin:     General: Skin is dry  Capillary Refill: Capillary refill takes less than 2 seconds  Coloration: Skin is not jaundiced  Findings: No erythema or rash  Neurological:      General: No focal deficit present  Mental Status: He is alert and oriented to person, place, and time  Mental status is at baseline  Sensory: No sensory deficit  Motor: No weakness  Gait: Gait normal    Psychiatric:         Mood and Affect: Mood normal          Behavior: Behavior normal          Thought Content:  Thought content normal          Vital Signs  ED Triage Vitals [02/19/23 1130]   Temperature Pulse Respirations Blood Pressure SpO2   99 6 °F (37 6 °C) 99 17 141/95 97 %      Temp Source Heart Rate Source Patient Position - Orthostatic VS BP Location FiO2 (%)   Tympanic -- Sitting Right arm --      Pain Score       --           Vitals:    02/19/23 1130   BP: 141/95   Pulse: 99   Patient Position - Orthostatic VS: Sitting         Visual Acuity      ED Medications  Medications - No data to display    Diagnostic Studies  Results Reviewed     None                 XR shoulder 2+ views LEFT    (Results Pending)              Procedures  Procedures         ED Course Identification of Seniors at [address] [place] [address] Most Recent Value   (ISAR) Identification of Seniors at Risk    Before the illness or injury that brought you to the Emergency, did you need someone to help you on a regular basis? 0 Filed at: 02/19/2023 1131   In the last 24 hours, have you needed more help than usual? 0 Filed at: 02/19/2023 1131   Have you been hospitalized for one or more nights during the past 6 months? 0 Filed at: 02/19/2023 1131   In general, do you see well? 0 Filed at: 02/19/2023 1131   In general, do you have serious problems with your memory? 0 Filed at: 02/19/2023 1131   Do you take more than three different medications every day? 1 Filed at: 02/19/2023 1131   ISAR Score 1 Filed at: 02/19/2023 1131                                    Medical Decision Making  MDM:   Differential diagnosis includes but is not limited to  Rotator Cuff Tendonitis  Shoulder impingement syndrome  Bursitis  Shoulder dislocation  Adhesive capsulitis (frozen shoulder)  Labrum tear/injury  Consider but doubt fracture  ED Plan check xray to rule out fracture/dislocation  ED results:   I have reviewed the patient's vital signs, nursing notes, and other relevant ancillary testing/information  I have had a detailed discussion with the patient regarding the historical points, examination findings, and any diagnostic results    Relevant results: Xray images left shoulder independently visualized and interpreted by me - no acute fracture or dislocation  ED coarse: Left-sided shoulder pain that started 3 days ago after shoveling dirt  Distal neurovascular intact on exam   ED exam is concerning for rotator cuff injury  X-rays demonstrate no acute fracture dislocation but degenerative changes noted  Discussed diagnosis of left shoulder pain  No indication for admission  Stable for discharge and outpatient treatment  Discussed rest, ice, use of NSAIDs    Discussed follow-up with primary care physician and orthopedics in 3 to 8 days recheck and further evaluation of symptoms  I discussed diagnosis and treatment plan with patient at bedside  Extended discussion with patient regarding the diagnosis, pathophysiology, expectant coarse and treatment plan  Instructed to follow up with pcp and recommended specialist in 3-5 days  Reviewed reasons to return to ed  Patient verbalized understanding of diagnosis and agreement with discharge plan of care as well as understanding of reasons to return to ed  Disclaimers:    I have reasonably determine that electronically prescribing a controlled substance would be impractical for the patient to obtain the controlled substance prescribed by electronic prescription or would cause an untimely delay resulting in an adverse impact on the patient's medical condition        Patient was seen during the outbreak of the corona virus epidemic   Resources are limited due to the severity of patient illnesses associated with virus   Testing is also limited at this time   Discussed with patient at the time of this evaluation   Due to the fact that limited resources are available -treatment options are limited  Acute pain of left shoulder: acute illness or injury  Amount and/or Complexity of Data Reviewed  Radiology: ordered  Risk  Prescription drug management  Disposition  Final diagnoses:   Acute pain of left shoulder     Time reflects when diagnosis was documented in both MDM as applicable and the Disposition within this note     Time User Action Codes Description Comment    2/19/2023 12:48 PM Rafat Ca Add [E68 800] Acute pain of left shoulder       ED Disposition     ED Disposition   Discharge    Condition   Stable    Date/Time   Sun Feb 19, 2023  1:25 PM    Comment   Prachi Floyd discharge to home/self care                 Follow-up Information     Follow up With Specialties Details Why Contact Info Additional Information    Michael Valdez, MD Internal Medicine Call in 3 days for further evaluation of symptoms [address] [facility] Emergency Department Emergency Medicine Go to  If symptoms worsen 34 [facility] 109 [facility] Emergency Department, [address] [place], [place], [address] [facility], DO Orthopedic Surgery Call in 1 week for further evaluation of symptoms [address] [address]  [phone]             Discharge Medication List as of 2/19/2023  1:25 PM      START taking these medications    Details   HYDROcodone-acetaminophen (Norco) 5-325 mg per tablet Take 1 tablet by mouth every 6 (six) hours as needed for pain (shoulder pain/initial rx ) Max Daily Amount: 4 tablets, Starting Sun 2/19/2023, Normal         CONTINUE these medications which have NOT CHANGED    Details   amLODIPine (NORVASC) 5 mg tablet Take 1 tablet (5 mg total) by mouth daily, Starting Mon 3/14/2022, Until Fri 12/23/2022, Normal      Breo Ellipta 200-25 MCG/INH inhaler INHALE 1 PUFF DAILY RINSE MOUTH AFTER USE , Starting Wed 10/19/2022, Until Fri 12/23/2022, Normal      dextromethorphan-guaifenesin (MUCINEX DM)  MG per 12 hr tablet Take 1 tablet by mouth every 12 (twelve) hours, Starting Fri 12/9/2022, Normal      fluticasone (FLONASE) 50 mcg/act nasal spray SPRAY 1 SPRAY INTO EACH NOSTRIL EVERY DAY, Normal      ipratropium-albuterol (DUO-NEB) 0 5-2 5 mg/3 mL nebulizer solution TAKE 3 ML BY NEBULIZATION 4 (FOUR) TIMES A DAY, Starting Tue 11/22/2022, Normal      lisinopril (ZESTRIL) 40 mg tablet Take 1 tablet (40 mg total) by mouth daily, Starting Mon 3/14/2022, Until Fri 12/23/2022, Normal      loratadine (CLARITIN) 10 mg tablet TAKE 1 TABLET BY MOUTH EVERY DAY, Normal      menthol-cetylpyridinium (CEPACOL) 3 MG lozenge Take 1 lozenge (3 mg total) by mouth as needed for sore throat, Starting Fri 12/23/2022, Normal      pantoprazole (PROTONIX) 40 mg tablet TAKE 1 TABLET (40 MG TOTAL) BY MOUTH DAILY 1/2 HOUR BEFORE BREAKFAST, Starting Fri 11/25/2022, Normal      rosuvastatin (CRESTOR) 20 MG tablet TAKE 1 TABLET BY MOUTH EVERY DAY, Normal      simethicone (Gas-X Extra Strength) 125 MG CAPS Take 1 capsule (125 mg total) by mouth every 6 (six) hours as needed for flatulence, Starting Tue 11/1/2022, Normal             No discharge procedures on file      PDMP Review     None          ED Provider  Electronically Signed by           Skip Roche PA-C  02/19/23 9996

## 2023-02-20 ENCOUNTER — TELEPHONE (OUTPATIENT)
Dept: OTHER | Facility: OTHER | Age: 68
End: 2023-02-20

## 2023-02-20 DIAGNOSIS — M25.512 CHRONIC LEFT SHOULDER PAIN: Primary | ICD-10-CM

## 2023-02-20 DIAGNOSIS — G89.29 CHRONIC LEFT SHOULDER PAIN: Primary | ICD-10-CM

## 2023-02-20 NOTE — TELEPHONE ENCOUNTER
Pt evaluated in ED yesterday for left shoulder pain  He needs to see an orthopedic surgeon  He is wondering if he needs to see Dr Nayeli Don first   Please call to advise

## 2023-03-06 DIAGNOSIS — I10 ESSENTIAL HYPERTENSION: ICD-10-CM

## 2023-03-06 RX ORDER — AMLODIPINE BESYLATE 5 MG/1
5 TABLET ORAL DAILY
Qty: 90 TABLET | Refills: 3 | Status: SHIPPED | OUTPATIENT
Start: 2023-03-06 | End: 2023-06-04

## 2023-03-06 RX ORDER — LISINOPRIL 40 MG/1
TABLET ORAL
Qty: 90 TABLET | Refills: 3 | Status: SHIPPED | OUTPATIENT
Start: 2023-03-06

## 2023-03-10 ENCOUNTER — HOSPITAL ENCOUNTER (EMERGENCY)
Facility: HOSPITAL | Age: 68
Discharge: HOME | End: 2023-03-11
Attending: EMERGENCY MEDICINE

## 2023-03-10 VITALS
TEMPERATURE: 98 F | OXYGEN SATURATION: 99 % | SYSTOLIC BLOOD PRESSURE: 171 MMHG | DIASTOLIC BLOOD PRESSURE: 103 MMHG | RESPIRATION RATE: 18 BRPM | HEART RATE: 99 BPM

## 2023-03-10 DIAGNOSIS — R09.89 GLOBUS SENSATION: Primary | ICD-10-CM

## 2023-03-10 LAB
BASOPHILS # BLD AUTO: 0.07 THOUSANDS/ÂΜL (ref 0–0.1)
BASOPHILS NFR BLD AUTO: 1 % (ref 0–1)
EOSINOPHIL # BLD AUTO: 0.74 THOUSAND/ÂΜL (ref 0–0.61)
EOSINOPHIL NFR BLD AUTO: 9 % (ref 0–6)
ERYTHROCYTE [DISTWIDTH] IN BLOOD BY AUTOMATED COUNT: 12.7 % (ref 11.6–15.1)
HCT VFR BLD AUTO: 42.2 % (ref 36.5–49.3)
HGB BLD-MCNC: 14.1 G/DL (ref 12–17)
IMM GRANULOCYTES # BLD AUTO: 0.03 THOUSAND/UL (ref 0–0.2)
IMM GRANULOCYTES NFR BLD AUTO: 0 % (ref 0–2)
LYMPHOCYTES # BLD AUTO: 3.32 THOUSANDS/ÂΜL (ref 0.6–4.47)
LYMPHOCYTES NFR BLD AUTO: 38 % (ref 14–44)
MCH RBC QN AUTO: 30.1 PG (ref 26.8–34.3)
MCHC RBC AUTO-ENTMCNC: 33.4 G/DL (ref 31.4–37.4)
MCV RBC AUTO: 90 FL (ref 82–98)
MONOCYTES # BLD AUTO: 0.75 THOUSAND/ÂΜL (ref 0.17–1.22)
MONOCYTES NFR BLD AUTO: 9 % (ref 4–12)
NEUTROPHILS # BLD AUTO: 3.84 THOUSANDS/ÂΜL (ref 1.85–7.62)
NEUTS SEG NFR BLD AUTO: 43 % (ref 43–75)
NRBC BLD AUTO-RTO: 0 /100 WBCS
PLATELET # BLD AUTO: 274 THOUSANDS/UL (ref 149–390)
PMV BLD AUTO: 8.7 FL (ref 8.9–12.7)
RBC # BLD AUTO: 4.69 MILLION/UL (ref 3.88–5.62)
WBC # BLD AUTO: 8.75 THOUSAND/UL (ref 4.31–10.16)

## 2023-03-10 RX ORDER — LIDOCAINE HYDROCHLORIDE 20 MG/ML
15 SOLUTION OROPHARYNGEAL ONCE
Status: COMPLETED | OUTPATIENT
Start: 2023-03-11 | End: 2023-03-10

## 2023-03-10 RX ORDER — MAGNESIUM HYDROXIDE/ALUMINUM HYDROXICE/SIMETHICONE 120; 1200; 1200 MG/30ML; MG/30ML; MG/30ML
30 SUSPENSION ORAL ONCE
Status: COMPLETED | OUTPATIENT
Start: 2023-03-11 | End: 2023-03-10

## 2023-03-10 RX ADMIN — ALUMINUM HYDROXIDE, MAGNESIUM HYDROXIDE, AND DIMETHICONE 30 ML: 200; 20; 200 SUSPENSION ORAL at 23:51

## 2023-03-10 RX ADMIN — LIDOCAINE HYDROCHLORIDE 15 ML: 20 SOLUTION ORAL; TOPICAL at 23:51

## 2023-03-10 RX ADMIN — SODIUM CHLORIDE 1000 ML: 0.9 INJECTION, SOLUTION INTRAVENOUS at 23:54

## 2023-03-11 ENCOUNTER — APPOINTMENT (EMERGENCY)
Dept: RADIOLOGY | Facility: HOSPITAL | Age: 68
End: 2023-03-11

## 2023-03-11 LAB
2HR DELTA HS TROPONIN: 0 NG/L
ALBUMIN SERPL BCP-MCNC: 4.4 G/DL (ref 3.5–5)
ALP SERPL-CCNC: 72 U/L (ref 34–104)
ALT SERPL W P-5'-P-CCNC: 46 U/L (ref 7–52)
ANION GAP SERPL CALCULATED.3IONS-SCNC: 8 MMOL/L (ref 4–13)
AST SERPL W P-5'-P-CCNC: 29 U/L (ref 13–39)
ATRIAL RATE: 83 BPM
BILIRUB SERPL-MCNC: 0.33 MG/DL (ref 0.2–1)
BUN SERPL-MCNC: 19 MG/DL (ref 5–25)
CALCIUM SERPL-MCNC: 9.7 MG/DL (ref 8.4–10.2)
CARDIAC TROPONIN I PNL SERPL HS: 5 NG/L
CARDIAC TROPONIN I PNL SERPL HS: 5 NG/L
CHLORIDE SERPL-SCNC: 101 MMOL/L (ref 96–108)
CO2 SERPL-SCNC: 24 MMOL/L (ref 21–32)
CREAT SERPL-MCNC: 1.16 MG/DL (ref 0.6–1.3)
GFR SERPL CREATININE-BSD FRML MDRD: 64 ML/MIN/1.73SQ M
GLUCOSE SERPL-MCNC: 185 MG/DL (ref 65–140)
MAGNESIUM SERPL-MCNC: 2.2 MG/DL (ref 1.9–2.7)
P AXIS: 45 DEGREES
POTASSIUM SERPL-SCNC: 4 MMOL/L (ref 3.5–5.3)
PR INTERVAL: 198 MS
PROT SERPL-MCNC: 8 G/DL (ref 6.4–8.4)
QRS AXIS: 79 DEGREES
QRSD INTERVAL: 98 MS
QT INTERVAL: 366 MS
QTC INTERVAL: 430 MS
SODIUM SERPL-SCNC: 133 MMOL/L (ref 135–147)
T WAVE AXIS: 11 DEGREES
VENTRICULAR RATE: 83 BPM

## 2023-03-11 RX ORDER — FAMOTIDINE 10 MG/ML
20 INJECTION, SOLUTION INTRAVENOUS ONCE
Status: COMPLETED | OUTPATIENT
Start: 2023-03-11 | End: 2023-03-11

## 2023-03-11 RX ADMIN — FAMOTIDINE 20 MG: 10 INJECTION, SOLUTION INTRAVENOUS at 00:47

## 2023-03-11 NOTE — ED PROVIDER NOTES
Pt Name: Juventino Chapman  MRN: [id number]  [place] 1955  Age/Sex: 75 y o  male  Date of evaluation: 3/10/2023  PCP: Jeanne Chakraborty MD    [address] [place] [address]    Chief Complaint   Patient presents with   • Difficulty Swallowing     Pt arrived ambulatory with c/o difficulty swallowing since this afternoon         HPI and MDM    75 y o  male presenting with difficulty swallowing  Patient states has been occurring intermittently for about a year now, started around 8 PM tonight and is still ongoing  He states he can swallow however his throat just feels dry and he has a difficult time clearing his throat especially when he is laying flat  He can eat and drink without any issues  Has history of reflux, states he started take his Protonix again  Denies any swelling in his throat, denies any pain, no chest pain or shortness of breath, no difficulty breathing  No nausea or vomiting  No weakness, numbness or tingling  Differential diagnosis considered includes but not limited to dry oropharynx, uvulitis, globus sensation, reflux, angioedema  Doubt infectious etiology such as RPA, peritonsillar abscess, tracheitis  No stridor, patient is tolerating secretions on examination  Not drooling, not in tripod position, does not appear to be in distress, lungs are clear to auscultation bilaterally  I did give him some ginger ale to drink and he was able to drink it without any issue  We will check blood work, EKG, chest x-ray  He states he had an EGD a couple years ago      I reviewed EGD note from 6/13/2019, mild edematous mucosa at the GE junction, biopsy was performed, stomach and duodenum appeared normal   I also reviewed telephone encounter from 6/25/2019 with Dr Marcos Sprague, biopsies were all normal     I have reviewed note from 12/23/2022, PATRICIA Hale, patient was seen for hoarseness, encouraged cool-mist humidifier in bedroom, salt water gargles and vocal rest       ED Course as of 03/11/23 0310   Sat Mar 11, 2023   0035 XR chest 1 view portable  Per my independent interpretation, no acute cardiopulmonary processes  Work-up overall reassuring, patient updated with results, symptoms better upon reevaluation, advised PCP follow-up, also advised him to follow-up with his GI doctor, return precautions discussed, patient verbalized understanding and is agreement with plan  Medications   sodium chloride 0 9 % bolus 1,000 mL (0 mL Intravenous Stopped 3/11/23 0220)   aluminum-magnesium hydroxide-simethicone (MYLANTA) oral suspension 30 mL (30 mL Oral Given 3/10/23 2351)   Lidocaine Viscous HCl (XYLOCAINE) 2 % mucosal solution 15 mL (15 mL Swish & Swallow Given 3/10/23 2351)   Famotidine (PF) (PEPCID) injection 20 mg (20 mg Intravenous Given 3/11/23 0047)         Past Medical and Surgical History    Past Medical History:   Diagnosis Date   • Asthma    • Disc degeneration, lumbar    • Peripheral vascular disease ([address] )    • SOB (shortness of breath)    • Wheezing        Past Surgical History:   Procedure Laterality Date   • ESOPHAGOSCOPY         Family History   Problem Relation Age of Onset   • Breast cancer Mother    • Hip fracture Father        Social History     Tobacco Use   • Smoking status: Never   • Smokeless tobacco: Never   Vaping Use   • Vaping Use: Never used   Substance Use Topics   • Alcohol use: No   • Drug use: No           Allergies    Allergies   Allergen Reactions   • Pollen Extract Nasal Congestion       Home Medications    Prior to Admission medications    Medication Sig Start Date End Date Taking? Authorizing Provider   amLODIPine (NORVASC) 5 mg tablet TAKE 1 TABLET (5 MG TOTAL) BY MOUTH DAILY   3/6/23 6/4/23  [id number] MCG/INH inhaler INHALE 1 PUFF DAILY RINSE MOUTH AFTER USE  10/19/22 12/23/22  Matt Rebolledo MD   dextromethorphan-guaifenesin (MUCINEX DM)  MG per 12 hr tablet Take 1 tablet by mouth every 12 (twelve) hours 12/9/22   [facility], PATRICIA   fluticasone (FLONASE) 50 mcg/act nasal spray SPRAY 1 SPRAY INTO EACH NOSTRIL EVERY DAY 2/4/23   [facility]   HYDROcodone-acetaminophen (Norco) 5-325 mg per tablet Take 1 tablet by mouth every 6 (six) hours as needed for pain (shoulder pain/initial rx ) Max Daily Amount: 4 tablets 2/19/23   Carroll Agrueta PA-C   ipratropium-albuterol (DUO-NEB) 0 5-2 5 mg/3 mL nebulizer solution TAKE 3 ML BY NEBULIZATION 4 (FOUR) TIMES A DAY 11/22/22   Amol Sher PA-C   lisinopril (ZESTRIL) 40 mg tablet TAKE 1 TABLET BY MOUTH EVERY DAY 3/6/23   Oral SlMD noreen   loratadine (CLARITIN) 10 mg tablet TAKE 1 TABLET BY MOUTH EVERY DAY 1/1/23   [facility]   menthol-cetylpyridinium (CEPACOL) 3 MG lozenge Take 1 lozenge (3 mg total) by mouth as needed for sore throat 12/23/22   [facility]   pantoprazole (PROTONIX) 40 mg tablet TAKE 1 TABLET (40 MG TOTAL) BY MOUTH DAILY 1/2 HOUR BEFORE BREAKFAST 11/25/22   Oral MD Jeremi   rosuvastatin (CRESTOR) 20 MG tablet TAKE 1 TABLET BY MOUTH EVERY DAY 9/7/22   Oral Sly, MD   simethicone (Gas-X Extra Strength) 125 MG CAPS Take 1 capsule (125 mg total) by mouth every 6 (six) hours as needed for flatulence 11/1/22   Rise Showers, DO           Physical Exam      ED Triage Vitals   Temperature Pulse Respirations Blood Pressure SpO2   03/10/23 2334 03/10/23 2334 03/10/23 2334 03/10/23 2334 03/10/23 2334   98 1 °F (36 7 °C) 96 21 (!) 171/103 97 %      Temp Source Heart Rate Source Patient Position - Orthostatic VS BP Location FiO2 (%)   03/10/23 2334 03/10/23 2334 03/10/23 2334 03/10/23 2334 --   Oral Monitor Sitting Right arm       Pain Score       03/10/23 2341       No Pain               Physical Exam  Constitutional:       General: He is not in acute distress  Appearance: He is not ill-appearing  HENT:      Head: Normocephalic and atraumatic        Nose: Nose normal       Mouth/Throat:      Mouth: Mucous membranes are dry  Pharynx: Oropharynx is clear  No oropharyngeal exudate or posterior oropharyngeal erythema  Comments: No angioedema, no uvulitis, no uvular deviation, floor mouth is not raised  Eyes:      Extraocular Movements: Extraocular movements intact  Pupils: Pupils are equal, round, and reactive to light  Cardiovascular:      Rate and Rhythm: Normal rate and regular rhythm  Pulmonary:      Effort: No respiratory distress  Breath sounds: Normal breath sounds  No stridor  No wheezing, rhonchi or rales  Abdominal:      General: There is no distension  Palpations: Abdomen is soft  Tenderness: There is no abdominal tenderness  Musculoskeletal:         General: No swelling or deformity  Normal range of motion  Cervical back: Normal range of motion and neck supple  No rigidity or tenderness  Lymphadenopathy:      Cervical: No cervical adenopathy  Skin:     General: Skin is warm  Findings: No erythema  Neurological:      Mental Status: He is alert and oriented to person, place, and time  Mental status is at baseline                Diagnostic Results      Labs:    Results Reviewed     Procedure Component Value Units Date/Time    HS Troponin I 2hr [[id number]]  (Normal) Collected: 03/11/23 0221    Lab Status: Final result Specimen: Blood from Arm, Right Updated: 03/11/23 0257     hs TnI 2hr 5 ng/L      Delta 2hr hsTnI 0 ng/L     HS Troponin I 4hr [[id number]]     Lab Status: No result Specimen: Blood     HS Troponin 0hr (reflex protocol) [[id number]]  (Normal) Collected: 03/10/23 2349    Lab Status: Final result Specimen: Blood from Arm, Right Updated: 03/11/23 0020     hs TnI 0hr 5 ng/L     Comprehensive metabolic panel [[id number]]  (Abnormal) Collected: 03/10/23 2349    Lab Status: Final result Specimen: Blood from Arm, Right Updated: 03/11/23 0012     Sodium 133 mmol/L      Potassium 4 0 mmol/L      Chloride 101 mmol/L      CO2 24 mmol/L      ANION GAP 8 mmol/L BUN 19 mg/dL      Creatinine 1 16 mg/dL      Glucose 185 mg/dL      Calcium 9 7 mg/dL      AST 29 U/L      ALT 46 U/L      Alkaline Phosphatase 72 U/L      Total Protein 8 0 g/dL      Albumin 4 4 g/dL      Total Bilirubin 0 33 mg/dL      eGFR 64 ml/min/1 73sq m     Narrative:      [place] guidelines for Chronic Kidney Disease (CKD):   •  Stage 1 with normal or high GFR (GFR > 90 mL/min/1 73 square meters)  •  Stage 2 Mild CKD (GFR = 60-89 mL/min/1 73 square meters)  •  Stage 3A Moderate CKD (GFR = 45-59 mL/min/1 73 square meters)  •  Stage 3B Moderate CKD (GFR = 30-44 mL/min/1 73 square meters)  •  Stage 4 Severe CKD (GFR = 15-29 mL/min/1 73 square meters)  •  Stage 5 End Stage CKD (GFR <15 mL/min/1 73 square meters)  Note: GFR calculation is accurate only with a steady state creatinine    Magnesium [[id number]]  (Normal) Collected: 03/10/23 2349    Lab Status: Final result Specimen: Blood from Arm, Right Updated: 03/11/23 0012     Magnesium 2 2 mg/dL     CBC and differential [[id number]]  (Abnormal) Collected: 03/10/23 2349    Lab Status: Final result Specimen: Blood from Arm, Right Updated: 03/10/23 2354     WBC 8 75 Thousand/uL      RBC 4 69 Million/uL      Hemoglobin 14 1 g/dL      Hematocrit 42 2 %      MCV 90 fL      MCH 30 1 pg      MCHC 33 4 g/dL      RDW 12 7 %      MPV 8 7 fL      Platelets 633 Thousands/uL      nRBC 0 /100 WBCs      Neutrophils Relative 43 %      Immat GRANS % 0 %      Lymphocytes Relative 38 %      Monocytes Relative 9 %      Eosinophils Relative 9 %      Basophils Relative 1 %      Neutrophils Absolute 3 84 Thousands/µL      Immature Grans Absolute 0 03 Thousand/uL      Lymphocytes Absolute 3 32 Thousands/µL      Monocytes Absolute 0 75 Thousand/µL      Eosinophils Absolute 0 74 Thousand/µL      Basophils Absolute 0 07 Thousands/µL           All labs reviewed and utilized in the medical decision making process    Radiology:    XR chest 1 view portable (Results Pending)       All radiology studies independently viewed by me and interpreted by the radiologist     Procedure    Procedures        FINAL IMPRESSION    Final diagnoses:   Globus sensation         DISPOSITION    Time reflects when diagnosis was documented in both MDM as applicable and the Disposition within this note     Time User Action Codes Description Comment    3/11/2023  3:06 AM Agueda Lloyd Add [R09 89] Globus sensation       ED Disposition     ED Disposition   Discharge    Condition   Stable    Date/Time   Sat Mar 11, 2023  3:06 AM    Comment   Luis F Lomeli discharge to home/self care  Follow-up Information     Follow up With Specialties Details Why Contact Info    Catalina Olivarez MD Internal Medicine Call on 3/13/2023  [address] [place] [address] [facility]              PATIENT REFERRED TO:    Catalina Olivarez MD  [address] [place], [place] [address] [address]  [phone]    Call on 3/13/2023        DISCHARGE MEDICATIONS:    Patient's Medications   Discharge Prescriptions    No medications on file       No discharge procedures on file  Olu Grande DO        This note was partially completed using voice recognition technology, and was scanned for gross errors; however some errors may still exist  Please contact the author with any questions or requests for clarification        Olu Grande DO  03/11/23 2482

## 2023-03-13 ENCOUNTER — OFFICE VISIT (OUTPATIENT)
Dept: OBGYN CLINIC | Facility: CLINIC | Age: 68
End: 2023-03-13

## 2023-03-13 VITALS
BODY MASS INDEX: 29.26 KG/M2 | HEART RATE: 87 BPM | SYSTOLIC BLOOD PRESSURE: 148 MMHG | HEIGHT: 72 IN | DIASTOLIC BLOOD PRESSURE: 82 MMHG | WEIGHT: 216 LBS

## 2023-03-13 DIAGNOSIS — M25.512 ACUTE PAIN OF LEFT SHOULDER: ICD-10-CM

## 2023-03-13 DIAGNOSIS — M75.82 TENDINITIS OF LEFT ROTATOR CUFF: Primary | ICD-10-CM

## 2023-03-13 DIAGNOSIS — S46.012A STRAIN OF LEFT ROTATOR CUFF CAPSULE, INITIAL ENCOUNTER: ICD-10-CM

## 2023-03-13 NOTE — PROGRESS NOTES
Patient Name:  Latonia Gautam  MRN:  [id number]    [address] [place] [address] [place]     1  Tendinitis of left rotator cuff  -     Ambulatory Referral to Physical Therapy; Future    2  Acute pain of left shoulder  -     Ambulatory Referral to Orthopedic Surgery  -     Ambulatory Referral to Physical Therapy; Future    3  Strain of left rotator cuff capsule, initial encounter  -     Ambulatory Referral to Physical Therapy; Future        75 y o  male with Left shoulder rotator cuff tendonitis, rotator cuff strain  X-rays reviewed in office today with patient  Overall, patient with moderate improvement of pain since injury in February  Suggested outpatient PT to work on motion and strength and patient agreeable and also discussed home exercises including weightless rows and shoulder shrugs  In light of his improvement and minimal pain today, will hold off on corticosteroid injection at this time  In the future if symptoms persist or worsen, can consider injection  Verbalized understanding of the above and will follow up in office in 3 months for reevaluation of Left shoulder  Chief Complaint     Left shoulder pain    History of the Present Illness     Latonia Gautam is a RHD 75 y o  male with Left shoulder pain since 02/18/2023  Patient reports he was shoveling dirt with his friend and the following day he was unable to lift up his Left arm  Patient did report to the ED secondary to pain and x-rays were taken without abnormality  Since, he is noted over 50% improvement and is able to lift his shoulder with mild discomfort  He was administering Tylenol for pain relief, but no longer taking  Review of Systems     Review of Systems   Constitutional: Negative for chills and fever  HENT: Negative for ear pain and sore throat  Eyes: Negative for pain and visual disturbance  Respiratory: Negative for cough and shortness of breath  Cardiovascular: Negative for chest pain and palpitations     Gastrointestinal: Negative for abdominal pain and vomiting  Genitourinary: Negative for dysuria and hematuria  Musculoskeletal: Negative for arthralgias and back pain  Skin: Negative for color change and rash  Neurological: Negative for seizures and syncope  All other systems reviewed and are negative  Physical Exam     /82   Pulse 87   Ht 6' (1 829 m)   Wt 98 kg (216 lb)   BMI 29 29 kg/m²     Left Shoulder: Active range of motion   160 degrees forward flexion  170 degrees abduction  40 degrees external rotation   Lower thoracic internal rotation    There is no tenderness present over the shoulder  There is 4+/5 strength with external rotation testing at the side  Empty can testing is negative   Belly press test is negative  Antonio test is negative   Waldo's test is negative    Speed's test is Negative  The patient is neurovascularly intact distally in the extremity  Eyes:  Anicteric sclerae  Neck:  Supple  Lungs:  Normal respiratory effort  Cardiovascular:  Capillary refill is less than 2 seconds  Skin:  Intact without erythema  Neurologic:  Sensation grossly intact to light touch  Psychiatric:  Mood and affect are appropriate  Data Review     I have personally reviewed pertinent films in PACS, and my interpretation follows:    X-rays taken 02/19/2023 of Left shoulder independently reviewed and demonstrate mild glenohumeral osteoarthritis with small calcific densities noted at greater tuberosity of humerus  No acute fracture or dislocation noted       Past Medical History:   Diagnosis Date   • Asthma    • Disc degeneration, lumbar    • Peripheral vascular disease ([address] )    • SOB (shortness of breath)    • Wheezing        Past Surgical History:   Procedure Laterality Date   • ESOPHAGOSCOPY         Allergies   Allergen Reactions   • Pollen Extract Nasal Congestion       Current Outpatient Medications on File Prior to Visit   Medication Sig Dispense Refill   • amLODIPine (NORVASC) 5 mg tablet TAKE 1 TABLET (5 MG TOTAL) BY MOUTH DAILY  90 tablet 3   • dextromethorphan-guaifenesin (MUCINEX DM)  MG per 12 hr tablet Take 1 tablet by mouth every 12 (twelve) hours 24 tablet 0   • fluticasone (FLONASE) 50 mcg/act nasal spray SPRAY 1 SPRAY INTO EACH NOSTRIL EVERY DAY 24 mL 2   • HYDROcodone-acetaminophen (Norco) 5-325 mg per tablet Take 1 tablet by mouth every 6 (six) hours as needed for pain (shoulder pain/initial rx ) Max Daily Amount: 4 tablets 12 tablet 0   • ipratropium-albuterol (DUO-NEB) 0 5-2 5 mg/3 mL nebulizer solution TAKE 3 ML BY NEBULIZATION 4 (FOUR) TIMES A  mL 3   • lisinopril (ZESTRIL) 40 mg tablet TAKE 1 TABLET BY MOUTH EVERY DAY 90 tablet 3   • loratadine (CLARITIN) 10 mg tablet TAKE 1 TABLET BY MOUTH EVERY DAY 90 tablet 1   • menthol-cetylpyridinium (CEPACOL) 3 MG lozenge Take 1 lozenge (3 mg total) by mouth as needed for sore throat 9 lozenge 0   • pantoprazole (PROTONIX) 40 mg tablet TAKE 1 TABLET (40 MG TOTAL) BY MOUTH DAILY 1/2 HOUR BEFORE BREAKFAST 90 tablet 0   • rosuvastatin (CRESTOR) 20 MG tablet TAKE 1 TABLET BY MOUTH EVERY DAY 90 tablet 3   • simethicone (Gas-X Extra Strength) 125 MG CAPS Take 1 capsule (125 mg total) by mouth every 6 (six) hours as needed for flatulence 28 capsule 0   • Breo Ellipta 200-25 MCG/INH inhaler INHALE 1 PUFF DAILY RINSE MOUTH AFTER USE  60 each 3     No current facility-administered medications on file prior to visit         Social History     Tobacco Use   • Smoking status: Never   • Smokeless tobacco: Never   Vaping Use   • Vaping Use: Never used   Substance Use Topics   • Alcohol use: No   • Drug use: No       Family History   Problem Relation Age of Onset   • Breast cancer Mother    • Hip fracture Father              Procedures Performed     Procedures  None       Caty Regalado DO

## 2023-03-27 DIAGNOSIS — K22.10 EROSIVE ESOPHAGITIS: ICD-10-CM

## 2023-03-27 RX ORDER — PANTOPRAZOLE SODIUM 40 MG/1
40 TABLET, DELAYED RELEASE ORAL DAILY
Qty: 90 TABLET | Refills: 0 | Status: SHIPPED | OUTPATIENT
Start: 2023-03-27

## 2023-03-30 ENCOUNTER — EVALUATION (OUTPATIENT)
Dept: PHYSICAL THERAPY | Facility: CLINIC | Age: 68
End: 2023-03-30

## 2023-03-30 VITALS — HEART RATE: 80 BPM | DIASTOLIC BLOOD PRESSURE: 85 MMHG | SYSTOLIC BLOOD PRESSURE: 142 MMHG

## 2023-03-30 DIAGNOSIS — M75.82 TENDINITIS OF LEFT ROTATOR CUFF: ICD-10-CM

## 2023-03-30 DIAGNOSIS — M25.512 ACUTE PAIN OF LEFT SHOULDER: ICD-10-CM

## 2023-03-30 DIAGNOSIS — S46.012D STRAIN OF LEFT ROTATOR CUFF CAPSULE, SUBSEQUENT ENCOUNTER: ICD-10-CM

## 2023-03-30 NOTE — PROGRESS NOTES
PT Evaluation     Today's date: 3/30/2023  Patient name: Chang Vides  : 1955  MRN: [id number]  Referring provider: Margret Washington DO  Dx:   Encounter Diagnosis     ICD-10-CM    1  Acute pain of left shoulder  M25 512 Ambulatory Referral to Physical Therapy      2  Tendinitis of left rotator cuff  M75 82 Ambulatory Referral to Physical Therapy      3  Strain of left rotator cuff capsule, subsequent encounter  S46 012D Ambulatory Referral to Physical Therapy                     Assessment  Assessment details: Pt is a 74 y/o male who presents to physical therapy with nociceptive pain associated with L rotator cuff tendinopathy in the environment of reduced cervical mobility  Pt does not present with any red flag symptoms at this time  Signs and symptoms consistent with this include acute KRUPA, improvement with rest and pain with loading of the RTC tendons  He also presents with reduced cervical mobility which improved with manual therapy today to almost equal to the contralateral side  Initiated RC tendon loading program (isometrics not indicated as he has low severity/irritability) and moved into isokinetics today  Discussed POC and HEP, along with acceptable pain levels following activity, pt verbalized understanding  Pt would benefit from skilled physical therapy in order to decrease deficits and return to prior level of function  Impairments: abnormal or restricted ROM, activity intolerance, impaired physical strength, pain with function and poor posture     Symptom irritability: lowUnderstanding of Dx/Px/POC: goodPrognosis details: Positive Prognostic Factors: Positive attitude toward therapy    Negative Prognostic Factors: chronicity of symptoms    Goals  STG (4 weeks):  Pt will be independent with HEP  Pt will demonstrate increase in MMT grade by 1/3 for ER  LTG (8 weeks): FOTO will be expected outcome  Pt will be able to lift 8-10 lbs above shoulder height without increase in pain    Pt will demonstrate MMT grade comparable to contralateral limb in all deficient muscle groups  Plan  Patient would benefit from: skilled physical therapy  Planned modality interventions: cryotherapy  Planned therapy interventions: manual therapy, neuromuscular re-education, patient education, self care, strengthening, stretching, therapeutic activities, therapeutic exercise and home exercise program  Frequency: 1-2x/week  Duration in weeks: 8  Treatment plan discussed with: patient        Subjective Evaluation    History of Present Illness  Mechanism of injury: Chief Complaint: Pt reports that ~1 month ago, he was helping a friend shovel dirt off of a truck bed  He states that he had no pain during and did not hear a pop  He states that the next morning, he woke up with a lot of pain in his L shld and was unable to lift his arm  It continued to be painful and therefore he went to the ED 5 days later  He was given pain meds but he did not take them  He reports that overall, he has noticed significant improvement since it occurred and that he is ~90% recovered  However, he has not been using his shoulder recently to do any manual labor       HPI: None    Severity: low  Irritability: low  Nature: nociceptive  Stage: subacute  Stability: progressing    P1: L lateral/ant shoulder, sharp, 0-0-2  Aggs/eases: bal-randall position, reaching behind the back, loading the arm; rest    Physical Activity: None  Sleep: Now able to sleep on the L side but sometimes it will get a little sore from laying on it    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, reports he has not been doing any physical activity since because 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)  GHS: none  Patient Goals  Patient goal: return to 100% use        Objective     Postural Observations    Additional Postural Observation Details  Reduced lumbar lordosis  Long thoracic kyphosis with significant forward head  Rounded shoulders    Cervical/Thoracic Screen   Cervical range of motion within normal limits with the following exceptions: R ROT: 54d  L ROT: 43d    Following C1-2 MET L ROT improved to 49d    Active Range of Motion     Additional Active Range of Motion Details  WNL and equal to the other side however, increased discomfort in last 25% of ROM into flexion and abduction    Passive Range of Motion     Additional Passive Range of Motion Details  WNL with pain in last 10% of ROM with flexion and ER at 90/90  Pain with horizontal abduction    Strength/Myotome Testing     Left Shoulder     Planes of Motion   Flexion: 5   Extension: 5   Abduction: 4+   External rotation at 0°: 4   Internal rotation at 0°: 4     Right Shoulder   Normal muscle strength    Additional Strength Details  MMT equivocal due to pain with these motions    Tests     Additional Tests Details  (+) Cervical flexion rotation test to the L  (-) Cervical rotation lateral flexion b/l  Normal CPA UPA throughout the cervical spine             Precautions: HTN      Manuals 3/30            Cervical flexion rotation MET LEE ANN                                                   Neuro Re-Ed             Pt edu LEE ANN                                                                                          Ther Ex             Scaption 2# 10x            Shld abd 2# 10x            Banded shld ER GTB 2x10                                                                             Ther Activity                                       Gait Training                                       Modalities

## 2023-04-12 ENCOUNTER — APPOINTMENT (OUTPATIENT)
Dept: PHYSICAL THERAPY | Facility: CLINIC | Age: 68
End: 2023-04-12
Payer: COMMERCIAL

## 2023-04-24 ENCOUNTER — OFFICE VISIT (OUTPATIENT)
Age: 68
End: 2023-04-24

## 2023-04-24 ENCOUNTER — APPOINTMENT (OUTPATIENT)
Age: 68
End: 2023-04-24

## 2023-04-24 VITALS
OXYGEN SATURATION: 99 % | BODY MASS INDEX: 29.39 KG/M2 | SYSTOLIC BLOOD PRESSURE: 132 MMHG | DIASTOLIC BLOOD PRESSURE: 80 MMHG | HEIGHT: 72 IN | WEIGHT: 217 LBS | HEART RATE: 86 BPM

## 2023-04-24 DIAGNOSIS — R07.81 RIB PAIN ON LEFT SIDE: ICD-10-CM

## 2023-04-24 DIAGNOSIS — R07.81 RIB PAIN ON LEFT SIDE: Primary | ICD-10-CM

## 2023-04-24 RX ORDER — LIDOCAINE 50 MG/G
1 PATCH TOPICAL DAILY
Qty: 6 PATCH | Refills: 0 | Status: SHIPPED | OUTPATIENT
Start: 2023-04-24

## 2023-04-24 NOTE — PROGRESS NOTES
Name: Anson Grant      : 1955      MRN: [id number]  Encounter Provider: PATRICIA Ayoub  Encounter Date: 2023   Encounter department: [address] [place] [address] St     1  Rib pain on left side  Pt complaining of left rib pain, post fall on   Pt states the pain seems to be getting worse in the area since fall, denies SOB, other breathing problems, or taking anything for pain  Tenderness, noted to left lateral rib area during exam, no breathing problems noted  Will obtain left rib X-ray  Discussed with pt healing from rib fractures may take a few weeks; to use heat as tolerated and apply pain patch as needed  Continue with physical therapy  -     XR ribs 2 vw left; Future; Expected date: 2023  -     lidocaine (LIDODERM) 5 %; Apply 1 patch topically over 12 hours daily Remove & Discard patch within 12 hours or as directed by MD     Subjective      Presents with left rib pain, started when pt fell about 2 wks ago  Pt went to the ER, had multiple imaging done  States the rib pain does not radiate anywhere else  States had back and shoulder pain which is better  Denies any breathing problems  Pt states the pain on the rib area is a little bit more since the fall incident in the left rib area, denies chest pain or any other pain  Pt states is going to Physical therapy, has an appointment coming up  Pt states has not taken any medication for pain  Review of Systems   Constitutional: Negative for activity change, chills, fatigue and fever  HENT: Negative for congestion, ear pain and sore throat  Eyes: Negative for pain and visual disturbance  Respiratory: Negative for cough and shortness of breath  Cardiovascular: Negative for chest pain and palpitations  Gastrointestinal: Negative for abdominal pain and vomiting  Genitourinary: Negative for dysuria and hematuria     Musculoskeletal: Positive for arthralgias (rib pain (acute) and shoulder pain (chronic)) and myalgias  Negative for back pain  Skin: Negative for color change and rash  Neurological: Negative for dizziness, seizures, syncope, weakness, light-headedness and headaches  Psychiatric/Behavioral: Negative for sleep disturbance  All other systems reviewed and are negative  Current Outpatient Medications on File Prior to Visit   Medication Sig   • amLODIPine (NORVASC) 5 mg tablet TAKE 1 TABLET (5 MG TOTAL) BY MOUTH DAILY  • Breo Ellipta 200-25 MCG/INH inhaler INHALE 1 PUFF DAILY RINSE MOUTH AFTER USE     • fluticasone (FLONASE) 50 mcg/act nasal spray SPRAY 1 SPRAY INTO EACH NOSTRIL EVERY DAY   • ipratropium-albuterol (DUO-NEB) 0 5-2 5 mg/3 mL nebulizer solution TAKE 3 ML BY NEBULIZATION 4 (FOUR) TIMES A DAY   • lisinopril (ZESTRIL) 40 mg tablet TAKE 1 TABLET BY MOUTH EVERY DAY   • pantoprazole (PROTONIX) 40 mg tablet TAKE 1 TABLET (40 MG TOTAL) BY MOUTH DAILY 1/2 HOUR BEFORE BREAKFAST   • rosuvastatin (CRESTOR) 20 MG tablet TAKE 1 TABLET BY MOUTH EVERY DAY   • [DISCONTINUED] dextromethorphan-guaifenesin (MUCINEX DM)  MG per 12 hr tablet Take 1 tablet by mouth every 12 (twelve) hours (Patient not taking: Reported on 4/24/2023)   • [DISCONTINUED] HYDROcodone-acetaminophen (Norco) 5-325 mg per tablet Take 1 tablet by mouth every 6 (six) hours as needed for pain (shoulder pain/initial rx ) Max Daily Amount: 4 tablets (Patient not taking: Reported on 4/24/2023)   • [DISCONTINUED] loratadine (CLARITIN) 10 mg tablet TAKE 1 TABLET BY MOUTH EVERY DAY (Patient not taking: Reported on 4/24/2023)   • [DISCONTINUED] menthol-cetylpyridinium (CEPACOL) 3 MG lozenge Take 1 lozenge (3 mg total) by mouth as needed for sore throat (Patient not taking: Reported on 4/24/2023)   • [DISCONTINUED] simethicone (Gas-X Extra Strength) 125 MG CAPS Take 1 capsule (125 mg total) by mouth every 6 (six) hours as needed for flatulence (Patient not taking: Reported on 4/24/2023)       Objective     /80 (BP Location: Left arm)   Pulse 86   Ht 6' (1 829 m)   Wt 98 4 kg (217 lb)   SpO2 99%   BMI 29 43 kg/m²     Physical Exam  Constitutional:       Appearance: Normal appearance  HENT:      Head: Normocephalic  Right Ear: Tympanic membrane normal       Left Ear: Tympanic membrane normal       Nose: Nose normal  No congestion  Eyes:      Extraocular Movements: Extraocular movements intact  Conjunctiva/sclera: Conjunctivae normal       Pupils: Pupils are equal, round, and reactive to light  Cardiovascular:      Rate and Rhythm: Normal rate and regular rhythm  Pulses: Normal pulses  Heart sounds: Normal heart sounds  Pulmonary:      Effort: Pulmonary effort is normal  No respiratory distress  Breath sounds: Normal breath sounds  No stridor  No wheezing  Abdominal:      General: Bowel sounds are normal    Musculoskeletal:         General: Tenderness (left lateral rib area) present  No swelling or deformity  Normal range of motion  Cervical back: Normal range of motion  Lymphadenopathy:      Cervical: No cervical adenopathy  Skin:     General: Skin is warm and dry  Findings: No bruising  Neurological:      Mental Status: He is alert and oriented to person, place, and time  Psychiatric:         Mood and Affect: Mood normal          Behavior: Behavior normal          Thought Content:  Thought content normal          Judgment: Judgment normal        PATRICIA Mauricio

## 2023-04-27 ENCOUNTER — TELEPHONE (OUTPATIENT)
Age: 68
End: 2023-04-27

## 2023-04-27 NOTE — TELEPHONE ENCOUNTER
----- Message from Bryant Asencio sent at 4/27/2023  8:08 AM EDT -----  Please call pt and let him know that the rib X-ray is back, it is normal, he has no fracture  The pain is likely muscular just from the fall, apply heat/cold as tolerated and use pain patch as needed   Thanks

## 2023-05-03 ENCOUNTER — APPOINTMENT (OUTPATIENT)
Dept: PHYSICAL THERAPY | Facility: CLINIC | Age: 68
End: 2023-05-03
Payer: COMMERCIAL

## 2023-05-08 ENCOUNTER — OFFICE VISIT (OUTPATIENT)
Age: 68
End: 2023-05-08

## 2023-05-08 VITALS
HEART RATE: 82 BPM | WEIGHT: 224 LBS | BODY MASS INDEX: 30.34 KG/M2 | HEIGHT: 72 IN | TEMPERATURE: 98.6 F | DIASTOLIC BLOOD PRESSURE: 84 MMHG | SYSTOLIC BLOOD PRESSURE: 110 MMHG | OXYGEN SATURATION: 97 %

## 2023-05-08 DIAGNOSIS — J45.40 MODERATE PERSISTENT ASTHMA WITHOUT COMPLICATION: Primary | ICD-10-CM

## 2023-05-08 DIAGNOSIS — E66.9 OBESITY (BMI 30-39.9): ICD-10-CM

## 2023-05-08 DIAGNOSIS — J30.9 ALLERGIC RHINITIS, UNSPECIFIED SEASONALITY, UNSPECIFIED TRIGGER: ICD-10-CM

## 2023-05-08 DIAGNOSIS — R06.02 SOB (SHORTNESS OF BREATH): ICD-10-CM

## 2023-05-08 RX ORDER — FLUTICASONE FUROATE AND VILANTEROL 200; 25 UG/1; UG/1
1 POWDER RESPIRATORY (INHALATION) DAILY
Qty: 60 EACH | Refills: 5 | Status: SHIPPED | OUTPATIENT
Start: 2023-05-08 | End: 2023-06-07

## 2023-05-08 NOTE — PROGRESS NOTES
"Assessment/Plan:   Diagnoses and all orders for this visit:    Moderate persistent asthma without complication  -     fluticasone-vilanterol (Breo Ellipta) 200-25 mcg/actuation inhaler; Inhale 1 puff daily Rinse mouth after use  -     Nebulizer Supplies    SOB (shortness of breath)    Obesity (BMI 30-39  9)    Allergic rhinitis, unspecified seasonality, unspecified trigger    PFTs in 2021 with combined moderate obstructive and mild restrictive airflow limitation on the spirometry with response to the bronchodilator  Decreased lung volumes mildly at 71% predicted  Normal diffusion  Discussed with the patient to continue with Breo 1 puff daily  Rinse mouth after use  No significant allergies environmental allergens on the respiratory allergy panel that was done more than a year ago  Continue with DuoNeb 4 times daily as needed only  Continue with Flonase nasal spray for further postnasal drip and allergic rhinitis  He recently had a fall and was in the ER CT of the chest report and images reviewed which was completely normal no evidence of any pneumonia  Follow-up in 1 year or as needed earlier as needed  Return in about 1 year (around 5/8/2024)  All questions are answered to the patient's satisfaction and understanding  He verbalizes understanding  He is encouraged to call with any further questions or concerns  Portions of the record may have been created with voice recognition software  Occasional wrong word or \"sound a like\" substitutions may have occurred due to the inherent limitations of voice recognition software  Read the chart carefully and recognize, using context, where substitutions have occurred      Electronically Signed by Matt De La Rosa MD    ______________________________________________________________________    Chief Complaint:   Chief Complaint   Patient presents with   • Follow-up       Patient ID: Farooq Tang is a 75 y o  y o  male has a past medical history of Asthma, Disc degeneration, " lumbar, Peripheral vascular disease ([facility] [address] ), SOB (shortness of breath), and Wheezing  5/8/2023  Patient presents today for follow-up visit  Patient is a 39-year-old male nonsmoker with past medical history of asthma, hypertension, hyperlipidemia, mild aortic stenosis, GERD  He is here today for follow-up  He is overall stable with his breathing, using the Breo once per day, Singulair, nebulizer 2-3 times per day  He does note some dyspnea on exertion particularly with hills or inclines but otherwise doing well  He does note some hoarseness of his voice  Continues to have GI issues with persistent belching, following with GI  Review of Systems   Constitutional: Negative  HENT: Negative  Respiratory: Positive for shortness of breath  Cardiovascular: Negative  Gastrointestinal: Negative  Genitourinary: Negative  Musculoskeletal: Negative  Skin: Negative  Allergic/Immunologic: Negative  Neurological: Negative  Psychiatric/Behavioral: Negative  Smoking history: He reports that he has never smoked   He has never used smokeless tobacco     The following portions of the patient's history were reviewed and updated as appropriate: allergies, current medications, past family history, past medical history, past social history, past surgical history and problem list     Immunization History   Administered Date(s) Administered   • COVID-19 PFIZER VACCINE 0 3 ML IM 03/18/2021, 04/12/2021, 12/21/2021   • INFLUENZA 11/05/2014, 01/05/2016, 10/17/2017, 10/21/2022   • Influenza Quadrivalent, 6-35 Months IM 11/05/2014, 01/05/2016, 10/17/2017   • Influenza, high dose seasonal 0 7 mL 01/23/2020, 11/29/2021, 10/21/2022   • Influenza, seasonal, injectable 1955   • Pneumococcal Conjugate 13-Valent 07/12/2019   • Pneumococcal Polysaccharide PPV23 1955, 01/23/2020   • Tdap [birth year]   • Zoster 1955     Current Outpatient Medications   Medication Sig Dispense Refill   • amLODIPine (NORVASC) 5 mg tablet TAKE 1 TABLET (5 MG TOTAL) BY MOUTH DAILY  90 tablet 3   • fluticasone (FLONASE) 50 mcg/act nasal spray SPRAY 1 SPRAY INTO EACH NOSTRIL EVERY DAY 24 mL 2   • fluticasone-vilanterol (Breo Ellipta) 200-25 mcg/actuation inhaler Inhale 1 puff daily Rinse mouth after use  60 each 5   • ipratropium-albuterol (DUO-NEB) 0 5-2 5 mg/3 mL nebulizer solution TAKE 3 ML BY NEBULIZATION 4 (FOUR) TIMES A  mL 3   • lidocaine (LIDODERM) 5 % Apply 1 patch topically over 12 hours daily Remove & Discard patch within 12 hours or as directed by MD 6 patch 0   • lisinopril (ZESTRIL) 40 mg tablet TAKE 1 TABLET BY MOUTH EVERY DAY 90 tablet 3   • rosuvastatin (CRESTOR) 20 MG tablet TAKE 1 TABLET BY MOUTH EVERY DAY 90 tablet 3   • pantoprazole (PROTONIX) 40 mg tablet TAKE 1 TABLET (40 MG TOTAL) BY MOUTH DAILY 1/2 HOUR BEFORE BREAKFAST 90 tablet 0     No current facility-administered medications for this visit  Allergies: Pollen extract    Objective:  Vitals:    05/08/23 0951   BP: 110/84   Pulse: 82   Temp: 98 6 °F (37 °C)   SpO2: 97%   Weight: 102 kg (224 lb)   Height: 6' (1 829 m)   Oxygen Therapy  SpO2: 97 %    Wt Readings from Last 3 Encounters:   05/08/23 102 kg (224 lb)   04/24/23 98 4 kg (217 lb)   03/13/23 98 kg (216 lb)     Body mass index is 30 38 kg/m²  Physical Exam  Vitals and nursing note reviewed  Constitutional:       Appearance: He is well-developed  HENT:      Head: Normocephalic and atraumatic  Eyes:      Conjunctiva/sclera: Conjunctivae normal       Pupils: Pupils are equal, round, and reactive to light  Neck:      Thyroid: No thyromegaly  Vascular: No JVD  Cardiovascular:      Rate and Rhythm: Normal rate and regular rhythm  Heart sounds: Normal heart sounds  No murmur heard  No friction rub  No gallop  Pulmonary:      Effort: Pulmonary effort is normal  No respiratory distress  Breath sounds: Normal breath sounds   No wheezing or rales    Chest:      Chest wall: No tenderness  Musculoskeletal:         General: No tenderness or deformity  Normal range of motion  Cervical back: Normal range of motion and neck supple  Lymphadenopathy:      Cervical: No cervical adenopathy  Skin:     General: Skin is warm and dry  Neurological:      Mental Status: He is alert and oriented to person, place, and time  Diagnostics:  I have personally reviewed pertinent reports  XR chest 1 view portable    Result Date: 4/14/2023  Narrative: CHEST INDICATION:   fall  COMPARISON:  5/10/2023 EXAM PERFORMED/VIEWS:  XR CHEST PORTABLE FINDINGS: Cardiomediastinal silhouette appears unremarkable  The lungs are clear  No pneumothorax or pleural effusion  Osseous structures appear within normal limits for patient age  Impression: No acute cardiopulmonary disease  Workstation performed: [id number]     XR ribs 2 vw left    Result Date: 4/26/2023  Narrative: LEFT RIBS INDICATION:   R07 81: Pleurodynia  Sourav Starch a week and half ago with pain at the 10th rib  COMPARISON: CXR and chest CT 4/13/2023  VIEWS:  XR RIBS 2 VW LEFT FRONTAL CHEST RADIOGRAPH  DUAL ENERGY SUBTRACTION  FINDINGS: No acute displaced rib fracture or pathologic bone lesion  Healed left clavicle fracture  Left hemithorax and included right hemithorax unremarkable  No pneumothorax or pleural effusion/hemothorax  Lungs clear  Soft tissues normal  Cardiomediastinal silhouette normal      Impression: No acute displaced rib fracture or pathologic bone lesion  No acute cardiopulmonary disease  Workstation performed: [id number]     XR shoulder 2+ views LEFT    Result Date: 4/14/2023  Narrative: LEFT SHOULDER INDICATION:   fall  COMPARISON:  2/19/2023 VIEWS:  XR SHOULDER 2+ VW LEFT FINDINGS: There is no acute fracture or dislocation  Mild osteoarthritis glenohumeral joint  No lytic or blastic osseous lesion   Calcifications along the superolateral humeral head which may be indicative of calcific tendinitis  Impression: No acute osseous abnormality  Degenerative changes as described  Workstation performed: [id number]     XR wrist 3+ views LEFT    Result Date: 4/14/2023  Narrative: LEFT WRIST INDICATION:   fall  COMPARISON:  None VIEWS:  XR WRIST 3+ VW LEFT FINDINGS: There is no acute fracture or dislocation  Cystic changes noted along the proximal carpal row  No lytic or blastic osseous lesion  Soft tissues are unremarkable  Impression: No acute osseous abnormality  Workstation performed: [id number]     XR femur 2 views LEFT    Result Date: 4/14/2023  Narrative: LEFT FEMUR INDICATION:   fall  COMPARISON:  None VIEWS:  XR FEMUR 2 VW LEFT FINDINGS: There is no acute fracture or dislocation  No significant degenerative changes  No lytic or blastic osseous lesion  There are atherosclerotic calcifications  Soft tissues are otherwise unremarkable  Impression: No acute fracture or dislocation identified  Workstation performed: [id number]     CT head without contrast    Result Date: 4/13/2023  Narrative: CT BRAIN - WITHOUT CONTRAST INDICATION:   fall  COMPARISON:  None  TECHNIQUE:  CT examination of the brain was performed  Multiplanar 2D reformatted images were created from the source data  Radiation dose length product (DLP) for this visit:  881 mGy-cm   This examination, like all CT scans performed in the [facility], was performed utilizing techniques to minimize radiation dose exposure, including the use of iterative reconstruction and automated exposure control  IMAGE QUALITY:  Diagnostic  FINDINGS: PARENCHYMA:  No intracranial mass, mass effect or midline shift  No CT signs of acute infarction  No acute parenchymal hemorrhage  VENTRICLES AND EXTRA-AXIAL SPACES:  Normal for the patient's age  VISUALIZED ORBITS: Normal visualized orbits  PARANASAL SINUSES: Normal visualized paranasal sinuses   CALVARIUM AND EXTRACRANIAL SOFT TISSUES:  Normal      Impression: No acute intracranial abnormality  Workstation performed: [id number]     CT spine cervical without contrast    Result Date: 4/13/2023  Narrative: CT CERVICAL SPINE - WITHOUT CONTRAST INDICATION:   fall  COMPARISON:  None  TECHNIQUE:  CT examination of the cervical spine was performed without intravenous contrast   Contiguous axial images were obtained  Multiplanar 2D reformatted images were created from the source data  Radiation dose length product (DLP) for this visit:  528 mGy-cm   This examination, like all CT scans performed in the [facility], was performed utilizing techniques to minimize radiation dose exposure, including the use of iterative reconstruction and automated exposure control  IMAGE QUALITY:  Diagnostic  FINDINGS: ALIGNMENT:  Normal alignment of the cervical spine  No subluxation  VERTEBRAE:  No fracture  DEGENERATIVE CHANGES:  Mild multilevel cervical degenerative changes are noted without critical central canal stenosis  PREVERTEBRAL AND PARASPINAL SOFT TISSUES: Unremarkable THORACIC INLET:  Please refer to the concurrent chest CT report for thoracic inlet findings  Impression: No cervical spine fracture or traumatic malalignment  Workstation performed: [id number]     CT chest abdomen pelvis w contrast    Result Date: 4/14/2023  Narrative: CT CHEST, ABDOMEN AND PELVIS WITH IV CONTRAST INDICATION:   fall  COMPARISON:  December 27, 2021 TECHNIQUE: CT examination of the chest, abdomen and pelvis was performed  Multiplanar 2D reformatted images were created from the source data  Radiation dose length product (DLP) for this visit:  1895 mGy-cm   This examination, like all CT scans performed in the [facility], was performed utilizing techniques to minimize radiation dose exposure, including the use of iterative reconstruction and automated exposure control  IV Contrast:  100 mL of iohexol (OMNIPAQUE) Enteric Contrast: Enteric contrast was administered   FINDINGS: CHEST LUNGS:  Lungs are clear  There is no tracheal or endobronchial lesion  PLEURA:  Unremarkable  HEART/GREAT VESSELS: Heavy atherosclerotic coronary artery calcification is noted  Heart is otherwise unremarkable  No thoracic aortic aneurysm  MEDIASTINUM AND VALDO:  Unremarkable  CHEST WALL AND LOWER NECK:  Unremarkable  ABDOMEN LIVER/BILIARY TREE:  Liver is diffusely decreased in density consistent with fatty change  No CT evidence of suspicious hepatic mass  Normal hepatic contours  No biliary dilatation  GALLBLADDER:  No calcified gallstones  No pericholecystic inflammatory change  SPLEEN:  Unremarkable  PANCREAS:  Unremarkable  ADRENAL GLANDS:  Unremarkable  KIDNEYS/URETERS:  No hydronephrosis or urinary tract calculus  One or more sharply circumscribed subcentimeter renal hypodensities are present, too small to accurately characterize, and statistically most likely benign findings  According to recent literature (Radiology 2019) no further workup of these findings is recommended  STOMACH AND BOWEL:  Small hiatal hernia  Colonic diverticulosis without evidence of acute diverticulitis  APPENDIX:  No findings to suggest appendicitis  ABDOMINOPELVIC CAVITY:  There is a portal caval lymph node measuring up to 1 6 cm similar to prior study VESSELS:  Unremarkable for patient's age  PELVIS REPRODUCTIVE ORGANS:  Unremarkable for patient's age  URINARY BLADDER:  Unremarkable  ABDOMINAL WALL/INGUINAL REGIONS:  Unremarkable  OSSEOUS STRUCTURES:  No acute fracture or destructive osseous lesion  Impression: No acute findings Workstation performed: [id number]     CT recon only lumbar spine (no charge)    Result Date: 4/14/2023  Narrative: CT RECON ONLY LUMBAR SPINE (NO CHARGE) INDICATION:   fall  COMPARISON:  None TECHNIQUE: Axial CT examination of the lumbar spine was obtained utilizing reconstructed images from CT of the chest, abdomen and pelvis performed the same day    Images were reformatted in the sagittal and coronal planes  This examination, like all CT scans performed in the [facility], was performed utilizing techniques to minimize radiation dose exposure, including the use of iterative reconstruction and automated exposure control  FINDINGS: ALIGNMENT: Normal alignment  No spondylolisthesis  No scoliosis  VERTEBRAE: No fracture  No acute osseous abnormality  DEGENERATIVE CHANGES: Mild multilevel degenerative disc disease  PREVERTEBRAL AND PARASPINAL SOFT TISSUES: Normal      Impression: No fracture or traumatic subluxation   Workstation performed: [id number]

## 2023-05-16 ENCOUNTER — EVALUATION (OUTPATIENT)
Dept: PHYSICAL THERAPY | Facility: CLINIC | Age: 68
End: 2023-05-16

## 2023-05-16 VITALS — SYSTOLIC BLOOD PRESSURE: 120 MMHG | DIASTOLIC BLOOD PRESSURE: 79 MMHG | OXYGEN SATURATION: 98 % | HEART RATE: 79 BPM

## 2023-05-16 DIAGNOSIS — M25.512 LEFT SHOULDER PAIN, UNSPECIFIED CHRONICITY: Primary | ICD-10-CM

## 2023-05-19 ENCOUNTER — OFFICE VISIT (OUTPATIENT)
Age: 68
End: 2023-05-19

## 2023-05-19 VITALS
WEIGHT: 220 LBS | TEMPERATURE: 98.3 F | RESPIRATION RATE: 18 BRPM | OXYGEN SATURATION: 99 % | DIASTOLIC BLOOD PRESSURE: 82 MMHG | SYSTOLIC BLOOD PRESSURE: 144 MMHG | HEART RATE: 90 BPM | HEIGHT: 72 IN | BODY MASS INDEX: 29.8 KG/M2

## 2023-05-19 DIAGNOSIS — R42 LIGHTHEADEDNESS: ICD-10-CM

## 2023-05-19 DIAGNOSIS — R49.0 HOARSENESS, CHRONIC: ICD-10-CM

## 2023-05-19 DIAGNOSIS — R06.02 SOB (SHORTNESS OF BREATH): Primary | ICD-10-CM

## 2023-05-19 NOTE — PROGRESS NOTES
Name: Giovanny Raza      : 1955      MRN: [id number]  Encounter Provider: PATRICIA Mohamud  Encounter Date: 2023   Encounter department: [address] [place] [address]     1  SOB (shortness of breath)  Continues to complain of chronic shortness of breath, follows with pulmonology  Recent CT of the chest, reviewed, normal, a month ago  Noted mostly with activity  Most recent cardiology work-up in , cardiology referral placed for evaluation  Instructed to follow-up with pulmonology, and to go to the ER for difficulty breathing   -     Ambulatory Referral to Cardiology; Future    2  Lightheadedness  Reports lightheadedness when getting up, started this week  Denies any spinning sensation  Neurological exam intact today  EKG done, normal sinus rhythm  Orthostatic vitals negative  discussed with patient getting up slowly, staying hydrated  Instructed to notify office for worsening symptoms, to go to ED if his blurred vision, dizziness, headache   -     POCT ECG    3  Hoarseness, chronic  Reports hoarseness on and off, this is a chronic problem, denies sore throat, coughing  No abnormalities noted on oropharynx  Instructed to use a cool-mist humidifier, voice rest      Depression Screening and Follow-up Plan: Patient was screened for depression during today's encounter  They screened negative with a PHQ-2 score of 0  Subjective      Patient presents with complaints of shortness of breath, which is a chronic problem, states he continues to feel short of breath with incline, and also when working out in the yard  Recently seen pulmonologist, on [address] [facility] [address] and [facility]  Patient also complaining of hoarseness in the voice which is also a chronic issue  Patient states over the last week, he noticed when he is getting up or changing position, he feels a little lightheaded  Breathing Problem  He complains of hoarse voice and shortness of breath   There is no chest tightness, cough, difficulty breathing or wheezing  This is a chronic problem  The current episode started more than 1 year ago  The problem occurs intermittently  The problem has been waxing and waning  Associated symptoms include dyspnea on exertion  Pertinent negatives include no chest pain, fever, headaches, myalgias, nasal congestion, rhinorrhea, sneezing, sore throat or trouble swallowing  His symptoms are aggravated by nothing  His past medical history is significant for asthma  There is no history of COPD  Review of Systems   Constitutional: Negative for fever  HENT: Positive for hoarse voice  Negative for rhinorrhea, sneezing, sore throat and trouble swallowing  Eyes: Negative for visual disturbance  Respiratory: Positive for shortness of breath  Negative for cough, chest tightness and wheezing  Cardiovascular: Positive for dyspnea on exertion  Negative for chest pain, palpitations and leg swelling  Gastrointestinal: Negative  Genitourinary: Negative  Musculoskeletal: Negative for myalgias  Neurological: Positive for light-headedness  Negative for dizziness, syncope, speech difficulty, weakness, numbness and headaches  Psychiatric/Behavioral: Negative for sleep disturbance  Current Outpatient Medications on File Prior to Visit   Medication Sig   • amLODIPine (NORVASC) 5 mg tablet TAKE 1 TABLET (5 MG TOTAL) BY MOUTH DAILY  • fluticasone (FLONASE) 50 mcg/act nasal spray SPRAY 1 SPRAY INTO EACH NOSTRIL EVERY DAY   • fluticasone-vilanterol (Breo Ellipta) 200-25 mcg/actuation inhaler Inhale 1 puff daily Rinse mouth after use     • ipratropium-albuterol (DUO-NEB) 0 5-2 5 mg/3 mL nebulizer solution TAKE 3 ML BY NEBULIZATION 4 (FOUR) TIMES A DAY   • lisinopril (ZESTRIL) 40 mg tablet TAKE 1 TABLET BY MOUTH EVERY DAY   • pantoprazole (PROTONIX) 40 mg tablet TAKE 1 TABLET (40 MG TOTAL) BY MOUTH DAILY 1/2 HOUR BEFORE BREAKFAST   • rosuvastatin (CRESTOR) 20 MG tablet TAKE 1 TABLET BY MOUTH EVERY DAY   • lidocaine (LIDODERM) 5 % Apply 1 patch topically over 12 hours daily Remove & Discard patch within 12 hours or as directed by MD (Patient not taking: Reported on 5/19/2023)       Objective     /82 (BP Location: Left arm, Patient Position: Sitting, Cuff Size: Standard)   Pulse 90   Temp 98 3 °F (36 8 °C) (Tympanic)   Resp 18   Ht 6' (1 829 m)   Wt 99 8 kg (220 lb)   SpO2 99%   BMI 29 84 kg/m²     Physical Exam  Constitutional:       General: He is not in acute distress  Appearance: Normal appearance  He is not ill-appearing or toxic-appearing  HENT:      Head: Normocephalic  Right Ear: Tympanic membrane normal       Left Ear: Tympanic membrane normal       Nose: Nose normal  No congestion  Mouth/Throat:      Mouth: Mucous membranes are moist       Pharynx: No oropharyngeal exudate or posterior oropharyngeal erythema  Eyes:      Extraocular Movements: Extraocular movements intact  Conjunctiva/sclera: Conjunctivae normal       Pupils: Pupils are equal, round, and reactive to light  Cardiovascular:      Rate and Rhythm: Normal rate and regular rhythm  Pulses: Normal pulses  Heart sounds: Murmur (not new) heard  Pulmonary:      Effort: Pulmonary effort is normal  No respiratory distress  Breath sounds: Normal breath sounds  Abdominal:      General: Bowel sounds are normal    Musculoskeletal:         General: Normal range of motion  Cervical back: Normal range of motion  Lymphadenopathy:      Cervical: No cervical adenopathy  Skin:     General: Skin is warm and dry  Neurological:      Mental Status: He is alert and oriented to person, place, and time  Sensory: No sensory deficit  Motor: No weakness  Coordination: Coordination normal       Gait: Gait normal    Psychiatric:         Mood and Affect: Mood normal          Behavior: Behavior normal          Thought Content:  Thought content normal        Omar Petit CRNP

## 2023-05-21 ENCOUNTER — APPOINTMENT (EMERGENCY)
Dept: RADIOLOGY | Facility: HOSPITAL | Age: 68
End: 2023-05-21

## 2023-05-21 ENCOUNTER — HOSPITAL ENCOUNTER (EMERGENCY)
Facility: HOSPITAL | Age: 68
Discharge: HOME | End: 2023-05-21
Attending: EMERGENCY MEDICINE | Admitting: EMERGENCY MEDICINE

## 2023-05-21 VITALS
SYSTOLIC BLOOD PRESSURE: 175 MMHG | DIASTOLIC BLOOD PRESSURE: 93 MMHG | OXYGEN SATURATION: 97 % | HEART RATE: 92 BPM | TEMPERATURE: 97.7 F | RESPIRATION RATE: 18 BRPM

## 2023-05-21 DIAGNOSIS — R42 LIGHTHEADED: Primary | ICD-10-CM

## 2023-05-21 LAB
ALBUMIN SERPL BCP-MCNC: 4.7 G/DL (ref 3.5–5)
ALP SERPL-CCNC: 74 U/L (ref 34–104)
ALT SERPL W P-5'-P-CCNC: 45 U/L (ref 7–52)
ANION GAP SERPL CALCULATED.3IONS-SCNC: 8 MMOL/L (ref 4–13)
AST SERPL W P-5'-P-CCNC: 26 U/L (ref 13–39)
ATRIAL RATE: 81 BPM
ATRIAL RATE: 94 BPM
BASOPHILS # BLD AUTO: 0.05 THOUSANDS/ÂΜL (ref 0–0.1)
BASOPHILS NFR BLD AUTO: 1 % (ref 0–1)
BILIRUB DIRECT SERPL-MCNC: 0.08 MG/DL (ref 0–0.2)
BILIRUB SERPL-MCNC: 0.38 MG/DL (ref 0.2–1)
BNP SERPL-MCNC: 30 PG/ML (ref 0–100)
BUN SERPL-MCNC: 13 MG/DL (ref 5–25)
CALCIUM SERPL-MCNC: 10.1 MG/DL (ref 8.4–10.2)
CARDIAC TROPONIN I PNL SERPL HS: 4 NG/L
CHLORIDE SERPL-SCNC: 99 MMOL/L (ref 96–108)
CO2 SERPL-SCNC: 26 MMOL/L (ref 21–32)
CREAT SERPL-MCNC: 1.04 MG/DL (ref 0.6–1.3)
EOSINOPHIL # BLD AUTO: 0.32 THOUSAND/ÂΜL (ref 0–0.61)
EOSINOPHIL NFR BLD AUTO: 5 % (ref 0–6)
ERYTHROCYTE [DISTWIDTH] IN BLOOD BY AUTOMATED COUNT: 12.5 % (ref 11.6–15.1)
GFR SERPL CREATININE-BSD FRML MDRD: 73 ML/MIN/1.73SQ M
GLUCOSE SERPL-MCNC: 264 MG/DL (ref 65–140)
HCT VFR BLD AUTO: 41.8 % (ref 36.5–49.3)
HGB BLD-MCNC: 14.3 G/DL (ref 12–17)
IMM GRANULOCYTES # BLD AUTO: 0.01 THOUSAND/UL (ref 0–0.2)
IMM GRANULOCYTES NFR BLD AUTO: 0 % (ref 0–2)
LYMPHOCYTES # BLD AUTO: 1.9 THOUSANDS/ÂΜL (ref 0.6–4.47)
LYMPHOCYTES NFR BLD AUTO: 27 % (ref 14–44)
MCH RBC QN AUTO: 30.7 PG (ref 26.8–34.3)
MCHC RBC AUTO-ENTMCNC: 34.2 G/DL (ref 31.4–37.4)
MCV RBC AUTO: 90 FL (ref 82–98)
MONOCYTES # BLD AUTO: 0.48 THOUSAND/ÂΜL (ref 0.17–1.22)
MONOCYTES NFR BLD AUTO: 7 % (ref 4–12)
NEUTROPHILS # BLD AUTO: 4.28 THOUSANDS/ÂΜL (ref 1.85–7.62)
NEUTS SEG NFR BLD AUTO: 60 % (ref 43–75)
NRBC BLD AUTO-RTO: 0 /100 WBCS
P AXIS: -27 DEGREES
P AXIS: 26 DEGREES
PLATELET # BLD AUTO: 256 THOUSANDS/UL (ref 149–390)
PMV BLD AUTO: 8.7 FL (ref 8.9–12.7)
POTASSIUM SERPL-SCNC: 4 MMOL/L (ref 3.5–5.3)
PR INTERVAL: 192 MS
PR INTERVAL: 228 MS
PROT SERPL-MCNC: 8.1 G/DL (ref 6.4–8.4)
QRS AXIS: 34 DEGREES
QRS AXIS: 54 DEGREES
QRSD INTERVAL: 100 MS
QRSD INTERVAL: 98 MS
QT INTERVAL: 344 MS
QT INTERVAL: 356 MS
QTC INTERVAL: 413 MS
QTC INTERVAL: 430 MS
RBC # BLD AUTO: 4.66 MILLION/UL (ref 3.88–5.62)
SODIUM SERPL-SCNC: 133 MMOL/L (ref 135–147)
T WAVE AXIS: -1 DEGREES
T WAVE AXIS: 7 DEGREES
VENTRICULAR RATE: 81 BPM
VENTRICULAR RATE: 94 BPM
WBC # BLD AUTO: 7.04 THOUSAND/UL (ref 4.31–10.16)

## 2023-05-21 NOTE — ED PROVIDER NOTES
"History  Chief Complaint   Patient presents with   • Shortness of Breath     Patient reports \"SOB with exertion since Monday, states he felt dizzy yesterday but not today, stiffness to left arm , denies cp\"      76 yo male with exertional lightheadedness since last Monday, still present  No cp, vomiting, diaphoresis, syncope or near syncope  No leg pain or swelling  No h/o similar sxs in past  He notes long-standing heart murmur and a prior \"leaky valve  \"           Prior to Admission Medications   Prescriptions Last Dose Informant Patient Reported? Taking? amLODIPine (NORVASC) 5 mg tablet   No No   Sig: TAKE 1 TABLET (5 MG TOTAL) BY MOUTH DAILY  fluticasone (FLONASE) 50 mcg/act nasal spray   No No   Sig: SPRAY 1 SPRAY INTO EACH NOSTRIL EVERY DAY   fluticasone-vilanterol (Breo Ellipta) 200-25 mcg/actuation inhaler   No No   Sig: Inhale 1 puff daily Rinse mouth after use     ipratropium-albuterol (DUO-NEB) 0 5-2 5 mg/3 mL nebulizer solution   No No   Sig: TAKE 3 ML BY NEBULIZATION 4 (FOUR) TIMES A DAY   lidocaine (LIDODERM) 5 %   No No   Sig: Apply 1 patch topically over 12 hours daily Remove & Discard patch within 12 hours or as directed by MD   Patient not taking: Reported on 5/19/2023   lisinopril (ZESTRIL) 40 mg tablet   No No   Sig: TAKE 1 TABLET BY MOUTH EVERY DAY   pantoprazole (PROTONIX) 40 mg tablet   No No   Sig: TAKE 1 TABLET (40 MG TOTAL) BY MOUTH DAILY 1/2 HOUR BEFORE BREAKFAST   rosuvastatin (CRESTOR) 20 MG tablet   No No   Sig: TAKE 1 TABLET BY MOUTH EVERY DAY      Facility-Administered Medications: None       Past Medical History:   Diagnosis Date   • Asthma    • Disc degeneration, lumbar    • Peripheral vascular disease (HCC)    • SOB (shortness of breath)    • Wheezing        Past Surgical History:   Procedure Laterality Date   • ESOPHAGOSCOPY         Family History   Problem Relation Age of Onset   • Breast cancer Mother    • Hip fracture Father      I have reviewed and agree with the history as " documented  E-Cigarette/Vaping   • E-Cigarette Use Never User      E-Cigarette/Vaping Substances   • Nicotine No    • THC No    • CBD No    • Flavoring No      Social History     Tobacco Use   • Smoking status: Never   • Smokeless tobacco: Never   Vaping Use   • Vaping Use: Never used   Substance Use Topics   • Alcohol use: No   • Drug use: No       Review of Systems   Constitutional: Negative for chills, fatigue and fever  HENT: Negative for congestion and rhinorrhea  Eyes: Negative for photophobia and visual disturbance  Respiratory: Negative for apnea, chest tightness and shortness of breath  Cardiovascular: Negative for chest pain and leg swelling  Gastrointestinal: Negative for abdominal distention, abdominal pain and vomiting  Genitourinary: Negative for difficulty urinating and dysuria  Musculoskeletal: Negative for arthralgias, back pain and gait problem  Skin: Negative for color change and pallor  Neurological: Positive for dizziness, weakness and light-headedness  Negative for numbness  Hematological: Negative for adenopathy  Does not bruise/bleed easily  Physical Exam  Physical Exam  Vitals and nursing note reviewed  Constitutional:       General: He is not in acute distress  Appearance: He is well-developed  He is not ill-appearing, toxic-appearing or diaphoretic  HENT:      Head: Normocephalic and atraumatic  Eyes:      Conjunctiva/sclera: Conjunctivae normal       Pupils: Pupils are equal, round, and reactive to light  Neck:      Vascular: No JVD  Cardiovascular:      Rate and Rhythm: Normal rate and regular rhythm  Pulses: Normal pulses  Heart sounds: Murmur heard  No friction rub  No gallop  Pulmonary:      Effort: Pulmonary effort is normal  No respiratory distress  Breath sounds: Normal breath sounds  No stridor  No wheezing or rales  Comments: Speaking full sentences without difficulty or limitation     Chest:      Chest wall: No tenderness  Abdominal:      General: There is no distension  Palpations: Abdomen is soft  Tenderness: There is no abdominal tenderness  Musculoskeletal:         General: No tenderness or deformity  Normal range of motion  Cervical back: Normal range of motion and neck supple  Skin:     General: Skin is warm and dry  Capillary Refill: Capillary refill takes less than 2 seconds  Neurological:      General: No focal deficit present  Mental Status: He is alert and oriented to person, place, and time  Cranial Nerves: No cranial nerve deficit  Sensory: No sensory deficit  Motor: No weakness or abnormal muscle tone        Coordination: Coordination normal       Gait: Gait normal          Vital Signs  ED Triage Vitals [05/21/23 1254]   Temperature Pulse Respirations Blood Pressure SpO2   97 7 °F (36 5 °C) 92 18 (!) 175/93 97 %      Temp Source Heart Rate Source Patient Position - Orthostatic VS BP Location FiO2 (%)   Tympanic Monitor Sitting Left arm --      Pain Score       --           Vitals:    05/21/23 1254   BP: (!) 175/93   Pulse: 92   Patient Position - Orthostatic VS: Sitting         Visual Acuity      ED Medications  Medications - No data to display    Diagnostic Studies  Results Reviewed     Procedure Component Value Units Date/Time    HS Troponin 0hr (reflex protocol) [[id number]]  (Normal) Collected: 05/21/23 1337    Lab Status: Final result Specimen: Blood from Arm, Left Updated: 05/21/23 1409     hs TnI 0hr 4 ng/L     HS Troponin I 2hr [[id number]]     Lab Status: No result Specimen: Blood     B-Type Natriuretic Peptide(BNP) [[id number]]  (Normal) Collected: 05/21/23 1337    Lab Status: Final result Specimen: Blood from Arm, Left Updated: 05/21/23 1409     BNP 30 pg/mL     Basic metabolic panel [[id number]]  (Abnormal) Collected: 05/21/23 1337    Lab Status: Final result Specimen: Blood from Arm, Left Updated: 05/21/23 1401     Sodium 133 mmol/L      Potassium 4 0 mmol/L      Chloride 99 mmol/L      CO2 26 mmol/L      ANION GAP 8 mmol/L      BUN 13 mg/dL      Creatinine 1 04 mg/dL      Glucose 264 mg/dL      Calcium 10 1 mg/dL      eGFR 73 ml/min/1 73sq m     Narrative:      [place] guidelines for Chronic Kidney Disease (CKD):   •  Stage 1 with normal or high GFR (GFR > 90 mL/min/1 73 square meters)  •  Stage 2 Mild CKD (GFR = 60-89 mL/min/1 73 square meters)  •  Stage 3A Moderate CKD (GFR = 45-59 mL/min/1 73 square meters)  •  Stage 3B Moderate CKD (GFR = 30-44 mL/min/1 73 square meters)  •  Stage 4 Severe CKD (GFR = 15-29 mL/min/1 73 square meters)  •  Stage 5 End Stage CKD (GFR <15 mL/min/1 73 square meters)  Note: GFR calculation is accurate only with a steady state creatinine    Hepatic function panel [[id number]]  (Normal) Collected: 05/21/23 1337    Lab Status: Final result Specimen: Blood from Arm, Left Updated: 05/21/23 1401     Total Bilirubin 0 38 mg/dL      Bilirubin, Direct 0 08 mg/dL      Alkaline Phosphatase 74 U/L      AST 26 U/L      ALT 45 U/L      Total Protein 8 1 g/dL      Albumin 4 7 g/dL     CBC and differential [[id number]]  (Abnormal) Collected: 05/21/23 1337    Lab Status: Final result Specimen: Blood from Arm, Left Updated: 05/21/23 1344     WBC 7 04 Thousand/uL      RBC 4 66 Million/uL      Hemoglobin 14 3 g/dL      Hematocrit 41 8 %      MCV 90 fL      MCH 30 7 pg      MCHC 34 2 g/dL      RDW 12 5 %      MPV 8 7 fL      Platelets 877 Thousands/uL      nRBC 0 /100 WBCs      Neutrophils Relative 60 %      Immat GRANS % 0 %      Lymphocytes Relative 27 %      Monocytes Relative 7 %      Eosinophils Relative 5 %      Basophils Relative 1 %      Neutrophils Absolute 4 28 Thousands/µL      Immature Grans Absolute 0 01 Thousand/uL      Lymphocytes Absolute 1 90 Thousands/µL      Monocytes Absolute 0 48 Thousand/µL      Eosinophils Absolute 0 32 Thousand/µL      Basophils Absolute 0 05 Thousands/µL                  XR chest 2 views   ED Interpretation by Lotus Campa MD (05/21 0898)   No acute abnormality  Procedures  ECG 12 Lead Documentation Only    Date/Time: 5/21/2023 2:17 PM  Performed by: Lotus Campa MD  Authorized by: Lotus Campa MD     Indications / Diagnosis:  Exertional dyspnea  ECG reviewed by me, the ED Provider: yes    Patient location:  ED  Previous ECG:     Previous ECG:  Unavailable  Interpretation:     Interpretation: normal    Rate:     ECG rate:  94    ECG rate assessment: normal    Rhythm:     Rhythm: sinus rhythm    Comments:      Normal ekg  ED Course                               SBIRT 20yo+    Flowsheet Row Most Recent Value   Initial Alcohol Screen: US AUDIT-C     1  How often do you have a drink containing alcohol? 0 Filed at: 05/21/2023 1259   2  How many drinks containing alcohol do you have on a typical day you are drinking? 0 Filed at: 05/21/2023 1259   3a  Male UNDER 65: How often do you have five or more drinks on one occasion? 0 Filed at: 05/21/2023 1259   3b  FEMALE Any Age, or MALE 65+: How often do you have 4 or more drinks on one occassion? 0 Filed at: 05/21/2023 1259   Audit-C Score 0 Filed at: 05/21/2023 1259   SHEELA: How many times in the past year have you    Used an illegal drug or used a prescription medication for non-medical reasons? Never Filed at: 05/21/2023 1259                    Medical Decision Making  Lightheaded: complicated acute illness or injury with systemic symptoms that poses a threat to life or bodily functions     Details: ddx include acute coronary syndrome, CHF, arrhythmia, pleural effusion, ptx, valvular heart failure, severe aortic stenosis, etc    Amount and/or Complexity of Data Reviewed  Labs: ordered  Radiology: ordered and independent interpretation performed            Disposition  Final diagnoses:   Lightheaded     Time reflects when diagnosis was documented in both MDM as applicable and the Disposition within this note Time User Action Codes Description Comment    5/21/2023  2:16 PM Bobsayda Ghosh Add [R42] Lightheaded       ED Disposition     ED Disposition   Discharge    Condition   Stable    Date/Time   Sun May 21, 2023  2:15 PM    Comment   Leonidas Dunaway discharge to home/self care                 Follow-up Information     Follow up With Specialties Details Why Contact Info Additional Information    [facility]  If symptoms worsen [address] Emergency Department, [address] [place] [address]          Patient's Medications   Discharge Prescriptions    No medications on file           PDMP Review     None          ED Provider  Electronically Signed by           Álvaro Anand MD  05/21/23 25 [id number]

## 2023-05-22 ENCOUNTER — CLINICAL SUPPORT (OUTPATIENT)
Age: 68
End: 2023-05-22

## 2023-05-22 VITALS — SYSTOLIC BLOOD PRESSURE: 146 MMHG | DIASTOLIC BLOOD PRESSURE: 80 MMHG

## 2023-05-22 DIAGNOSIS — I10 PRIMARY HYPERTENSION: Primary | ICD-10-CM

## 2023-05-23 ENCOUNTER — APPOINTMENT (OUTPATIENT)
Age: 68
End: 2023-05-23

## 2023-05-23 ENCOUNTER — OFFICE VISIT (OUTPATIENT)
Age: 68
End: 2023-05-23

## 2023-05-23 VITALS
HEART RATE: 79 BPM | OXYGEN SATURATION: 99 % | BODY MASS INDEX: 29.39 KG/M2 | WEIGHT: 217 LBS | DIASTOLIC BLOOD PRESSURE: 84 MMHG | SYSTOLIC BLOOD PRESSURE: 130 MMHG | HEIGHT: 72 IN

## 2023-05-23 DIAGNOSIS — R06.09 DYSPNEA ON EXERTION: Primary | ICD-10-CM

## 2023-05-23 DIAGNOSIS — Z13.29 SCREENING FOR THYROID DISORDER: ICD-10-CM

## 2023-05-23 DIAGNOSIS — I35.0 MILD AORTIC STENOSIS: ICD-10-CM

## 2023-05-23 DIAGNOSIS — R20.2 PARESTHESIA OF BOTH FEET: ICD-10-CM

## 2023-05-23 DIAGNOSIS — R73.03 PREDIABETES: ICD-10-CM

## 2023-05-23 LAB
EST. AVERAGE GLUCOSE BLD GHB EST-MCNC: 154 MG/DL
HBA1C MFR BLD: 7 %
TSH SERPL DL<=0.05 MIU/L-ACNC: 1.36 UIU/ML (ref 0.45–4.5)

## 2023-05-23 NOTE — PATIENT INSTRUCTIONS
Schedule echo stress test  Schedule artery ultrasound of legs    Follow up with cardiology    Flonase 1 spray each nostril at bedtime   Claritin, allegra, or zyrtec (generic is ok) 1 tablet daily

## 2023-05-23 NOTE — PROGRESS NOTES
"[facility] - [facility] [place]    NAME: Dania Mendoza  AGE: 75 y o  SEX: male  : 1955     DATE: 2023     Assessment and Plan:     1  Dyspnea on exertion  Reports ongoing shortness of breath with minimal exertion  States it is getting worse  States at times he gets pain between his shoulder blades with the shortness of breath  Has also described that at times it has \"buckled him\"  Denies wheezing or cough with symptoms  Symptoms are consistently brought on by exertion which he reports is taking less and less  Symptoms improved at rest   Was seen in the emergency department recently with similar symptoms and dizziness  Cardiac work-up was negative he was referred to cardiology  Has an appointment scheduled in August   Patient has a history of murmur and mild aortic and mitral valve disease on a previous echo  That was completed 2 years ago  His EF at that time was 60%  We will repeat echo and stress test   Patient does not not believe he would be able to complete exercise portion therefore dobutamine test was ordered  We will follow-up with him with results  Will reach out to cardiology to see if we can get him evaluated sooner  He knows to return to the emergency room if symptoms worsen or or change  - Echo stress test, dobutamine; Future    2  Mild aortic stenosis  Reports increased dyspnea on exertion with associated mid scapular back pain  - Echo stress test, dobutamine; Future    3  Paresthesia of both feet  Patient states that his toes are feeling \"funny\"  Unable to describe  No discoloration or swelling  Patient reports family history of arterial disease  Bilateral DP and PT pulses are diminished  Sensation decreased bilateral feet  Vascular study ordered  We will also check hemoglobin A1c as previous blood work notes prediabetes  - VAS lower limb arterial duplex, complete bilateral; Future    4   Prediabetes  - HEMOGLOBIN A1C W/ EAG ESTIMATION; " "Future    5  Screening for thyroid disorder  - TSH, 3rd generation with Free T4 reflex; Future        No follow-ups on file  Chief Complaint:     Chief Complaint   Patient presents with   • Hypertension        History of Present Illness:     Kimani Chan presents for a follow-up visit  He reports ongoing shortness of breath with less and less exertion  States that he gets pain between his shoulder blades at times with this shortness of breath  States at times the shortness of breath \"abelino him\" symptoms resolved at rest   Also concerned that blood pressures have been elevated though his blood pressure today is within normal parameters  Was seen in the emergency department 2 days ago for symptoms including elevated BP and dizziness  Patient also has concerns about bilateral numbness in his toes  Review of Systems:     Review of Systems   Constitutional: Negative  Negative for chills and fever  HENT: Positive for postnasal drip and voice change (hoarse)  Respiratory: Positive for shortness of breath  Negative for cough and wheezing  Cardiovascular: Negative for palpitations and leg swelling  Gastrointestinal: Negative  Musculoskeletal: Positive for back pain (mid thoracic pain when SOB)  Skin: Negative  Neurological: Positive for dizziness and numbness (b/l toes)          Problem List:     Patient Active Problem List   Diagnosis   • Allergic rhinitis   • Hyperlipidemia   • Hypertension   • Hyponatremia   • Impaired fasting glucose   • Urinary frequency   • Prostate cancer screening   • Mild aortic stenosis   • Mild mitral regurgitation   • Symptoms of upper respiratory infection (URI)   • ABUNDIO (obstructive sleep apnea)   • SOB (shortness of breath)   • Snoring   • Mild intermittent asthma without complication   • Moderate persistent asthma without complication        Objective:     /84 (BP Location: Left arm, Patient Position: Sitting, Cuff Size: Standard)   Pulse 79   Ht 6' (1 829 m)  " Wt 98 4 kg (217 lb)   SpO2 99%   BMI 29 43 kg/m²     Physical Exam  Constitutional:       Appearance: Normal appearance  He is normal weight  HENT:      Nose: Nose normal       Mouth/Throat:      Pharynx: Posterior oropharyngeal erythema (postnasal drip) present  Eyes:      Conjunctiva/sclera: Conjunctivae normal    Neck:      Thyroid: No thyromegaly  Vascular: No carotid bruit  Cardiovascular:      Rate and Rhythm: Normal rate and regular rhythm  Pulses:           Radial pulses are 2+ on the right side and 2+ on the left side  Dorsalis pedis pulses are 1+ on the right side and 1+ on the left side  Heart sounds: S1 normal and S2 normal  Murmur heard  Pulmonary:      Effort: Pulmonary effort is normal  No respiratory distress  Breath sounds: Normal breath sounds  No stridor  No wheezing or rales  Abdominal:      General: Bowel sounds are normal  There is no distension  Palpations: Abdomen is soft  Musculoskeletal:         General: Normal range of motion  Cervical back: Normal range of motion and neck supple  Right lower leg: No edema  Left lower leg: No edema  Feet:      Right foot:      Protective Sensation: 10 sites tested  6 sites sensed  Skin integrity: Skin integrity normal       Toenail Condition: Right toenails are abnormally thick  Left foot:      Protective Sensation: 10 sites tested  4 sites sensed  Skin integrity: Skin integrity normal       Toenail Condition: Left toenails are abnormally thick  Lymphadenopathy:      Cervical: No cervical adenopathy  Skin:     General: Skin is warm and dry  Capillary Refill: Capillary refill takes less than 2 seconds  Neurological:      General: No focal deficit present  Mental Status: He is alert and oriented to person, place, and time  Psychiatric:         Mood and Affect: Mood normal          Behavior: Behavior normal          Thought Content:  Thought content normal  Judgment: Judgment normal          I spent 25 minutes with this patient      [address] [place] [address], [address] PRIMARY CARE [place]

## 2023-05-24 ENCOUNTER — TELEPHONE (OUTPATIENT)
Age: 68
End: 2023-05-24

## 2023-05-24 ENCOUNTER — OFFICE VISIT (OUTPATIENT)
Age: 68
End: 2023-05-24

## 2023-05-24 VITALS
HEIGHT: 72 IN | HEART RATE: 76 BPM | DIASTOLIC BLOOD PRESSURE: 80 MMHG | OXYGEN SATURATION: 98 % | WEIGHT: 217 LBS | SYSTOLIC BLOOD PRESSURE: 144 MMHG | BODY MASS INDEX: 29.39 KG/M2 | RESPIRATION RATE: 16 BRPM | TEMPERATURE: 97.6 F

## 2023-05-24 DIAGNOSIS — E11.9 TYPE 2 DIABETES MELLITUS WITHOUT COMPLICATION, WITHOUT LONG-TERM CURRENT USE OF INSULIN (HCC): Primary | ICD-10-CM

## 2023-05-24 RX ORDER — BLOOD SUGAR DIAGNOSTIC
STRIP MISCELLANEOUS
Qty: 100 EACH | Refills: 3 | Status: SHIPPED | OUTPATIENT
Start: 2023-05-24

## 2023-05-24 RX ORDER — METFORMIN HYDROCHLORIDE 500 MG/1
1000 TABLET, EXTENDED RELEASE ORAL
Qty: 60 TABLET | Refills: 5 | Status: SHIPPED | OUTPATIENT
Start: 2023-05-24 | End: 2023-11-20

## 2023-05-24 RX ORDER — BLOOD-GLUCOSE METER
KIT MISCELLANEOUS
Qty: 1 KIT | Refills: 0 | Status: SHIPPED | OUTPATIENT
Start: 2023-05-24

## 2023-05-24 RX ORDER — LANCETS 33 GAUGE
EACH MISCELLANEOUS
Qty: 100 EACH | Refills: 3 | Status: SHIPPED | OUTPATIENT
Start: 2023-05-24

## 2023-05-24 NOTE — TELEPHONE ENCOUNTER
----- Message from [address] [place] sent at 5/24/2023  8:11 AM EDT -----  Please call the patient regarding his lab work  His HgbA1c shows he is diabetic   He needs an appointment to discuss treatment

## 2023-05-24 NOTE — PATIENT INSTRUCTIONS
No soda!! Drink water  Start metformin 1 tablet daily for 5 days then increase 2 tablets aily  Check blood sugar every morning keep log and bring with you to next visit      Type 2 Diabetes in Adults: New Diagnosis   AMBULATORY CARE:   Type 2 diabetes  is a disease that affects how your body uses glucose (sugar)  Normally, when the blood sugar level increases, the pancreas makes more insulin  Insulin helps move sugar out of the blood so it can be used for energy  Type 2 diabetes develops because either the body cannot make enough insulin, or it cannot use the insulin correctly  Type 2 diabetes can be controlled to prevent damage to your heart, blood vessels, and other organs  Common symptoms include the following:   Numbness in your fingers or toes    Blurred vision    Urinating often    More hunger or thirst than usual    Have someone call your local emergency number (911 in the [facility]) if:   You have any of the following signs of a stroke:      Numbness or drooping on one side of your face     Weakness in an arm or leg    Confusion or difficulty speaking    Dizziness, a severe headache, or vision loss    You have any of the following signs of a heart attack:      Squeezing, pressure, or pain in your chest    You may  also have any of the following:     Discomfort or pain in your back, neck, jaw, stomach, or arm    Shortness of breath    Nausea or vomiting    Lightheadedness or a sudden cold sweat    You have trouble breathing  Seek care immediately if:   You have severe abdominal pain  You vomit for more than 2 hours  You have trouble staying awake or focusing  You are shaking or sweating  You feel weak or more tired than usual     You have blurred or double vision  Your breath has a fruity, sweet smell  Call your doctor or diabetes care team provider if:   Your arms and legs are swollen  You have an upset stomach and cannot eat the foods on your meal plan      You feel dizzy, have headaches, or are easily irritated  Your skin is red, warm, dry, or swollen  You have a wound that does not heal     You have numbness in your arms or legs  You have trouble coping with your illness, or you feel anxious or depressed  You have questions or concerns about your condition or care  Treatment for type 2 diabetes  helps prevent or delay complications, including heart and kidney disease  You must eat healthy meals and do regular physical activity  You may  need any of the following:  Diabetes medicines or insulin  may be given to decrease the amount of sugar in your blood  Blood pressure medicine  may be given to lower your blood pressure  Cholesterol-lowering medicine  may be given to prevent heart disease  Antiplatelets , such as aspirin, help prevent blood clots  Take your antiplatelet medicine exactly as directed  These medicines make it more likely for you to bleed or bruise  If you are told to take aspirin, do not take acetaminophen or ibuprofen instead  Take your medicine as directed  Contact your healthcare provider if you think your medicine is not helping or if you have side effects  Tell your provider if you are allergic to any medicine  Keep a list of the medicines, vitamins, and herbs you take  Include the amounts, and when and why you take them  Bring the list or the pill bottles to follow-up visits  Carry your medicine list with you in case of an emergency  Tests  may be used to check for type 2 diabetes starting at age 28  Any of the following may be used to diagnose diabetes or check that it is well controlled: An A1c test  shows the average amount of sugar in your blood over the past 2 to 3 months  Your diabetes care team provider will tell you the A1c level that is right for you  A fasting plasma glucose test  is when your blood sugar level is tested after you have not eaten for 8 hours      A 2-hour plasma glucose test  starts with a blood sugar level check after you have not eaten for 8 hours  You are then given a glucose drink  Your blood sugar level is checked after 2 hours  A random glucose test  may be done any time of day, no matter how long ago you ate  Diabetes education:  Diabetes education will start right away  Diabetes education may also happen later to refresh your memory  Your diabetes care team may include physicians, nurse practitioners, and physician assistants  It may also include nurses, dietitians, exercise specialists, pharmacists, dentists, and podiatrists  Family members, or others who are close to you, may also be part of the team  You and your team will make goals and plans to manage diabetes and other health problems  The plans and goals will be specific to your needs  Members of your diabetes care team will teach you the following:  About nutrition:  A dietitian will help you make a meal plan to keep your blood sugar level steady  You will learn how food affects your blood sugar levels  You will also learn to keep track of sugar and starchy foods (carbohydrates)  Do not skip meals  Your blood sugar level may drop too low if you have taken diabetes medicine and do not eat  You may be taught to use the plate method for portion control  With the plate method, ½ of your plate contains vegetables  The other half is divided so that ¼ contains protein or meat, and ¼ contains starches, such as potatoes  About physical activity and diabetes: You will learn why physical activity, such as walking, is important  You and your care team provider will make a plan for your activity  Your care team provider will tell you what a healthy weight is for you  He or she will help you make a plan to get to that weight and stay there  Maintain a healthy weight to help delay or prevent complications of diabetes  About your blood sugar level: You will learn what your blood sugar level should be   You will be given information on when and how to check your blood sugar level  You may need to check by testing a drop of blood in a glucose monitor  You may instead be given a continuous glucose monitoring (CGM) device  A sensor is placed in your abdomen or on your arm  You put a transmitter on the sensor to get a reading that shows up on the monitor  You will learn what to do if your level is too high or too low  Write down the times of your checks and your levels  Take them to all follow-up appointments  About diabetes medicine: You may need oral diabetes medicine, insulin, or both to help control your blood sugar levels  Your healthcare provider will teach you how and when to take oral diabetes medicine  You will also be taught about side effects oral diabetes medicine can cause  Insulin may be added if oral diabetes medicine becomes less effective over time  Insulin may be injected, or given through a pump or pen  You and your care team will discuss which method is best for you  An insulin pump  is an implanted device that gives your insulin 24 hours a day  An insulin pump prevents the need for multiple insulin injections in a day  An insulin pen  is a device prefilled with the right amount of insulin  You and your family members will be taught how to draw up and give insulin  if this is the best method for you  Your education team will also teach you how to dispose of needles and syringes  You will learn how much insulin you need  and when to give it  You will be taught when not to give insulin  You will also be taught what to do if your blood sugar level drops too low  This may happen if you take insulin and do not eat the right amount of carbohydrates  Other ways to help manage type 2 diabetes:   Talk to your care team if you have increased stress about your diagnosis  Stress about your diagnosis can keep you from taking care of yourself properly  Your care team can help by offering tips about self-care   Your care team may suggest you talk to a mental health provider  The provider can listen and offer help with self-care issues  Check your feet each day for sores  Wear shoes and socks that fit correctly  Do not trim your toenails  Go to a podiatrist  Ask your care team for more information about foot care  Do not smoke  Nicotine and other chemicals in cigarettes and cigars can cause lung damage and make diabetes harder to manage  Ask your care team provider for information if you currently smoke and need help to quit  E-cigarettes or smokeless tobacco still contain nicotine  Talk to your care team provider before you use these products  Drink water instead of sugary drinks such as soft drinks and fruit juices  Sugary drinks increase your blood sugar level and weight  Your healthcare provider may tell you that diet drinks do not help with weight loss  Know the risks if you choose to drink alcohol  Alcohol can cause your blood sugar levels to be low if you use insulin  Alcohol can cause high blood sugar levels and weight gain if you drink too much  A drink of alcohol is 12 ounces of beer, 5 ounces of wine, or 1½ ounces of liquor  Your care team can tell you how many drinks are okay to have in 24 hours and in 1 week  Check your blood pressure as directed  If you have high blood pressure (BP), talk to your care team about your BP goals  Together you can create a plan to lower your BP if needed and keep it in a healthy range  The plan may include lifestyle changes or medicines  A normal BP is 119/79 or lower  A normal blood pressure can help prevent or delay certain complications from diabetes  Examples include retinopathy (eye damage) and kidney damage  Wear medical alert identification  Wear medical alert jewelry or carry a card that says you have diabetes  Ask your care team provider where to get these items  Ask about vaccines you may need    You have a higher risk for serious illness if you get the flu, pneumonia, COVID-19, or hepatitis  Ask your provider if you should get vaccines to prevent these or other diseases, and when to get the vaccines  Risks of type 2 diabetes: It is important to learn to keep your diabetes in control  Uncontrolled diabetes can damage your nerves, veins, and arteries  Your risk for dementia increases faster the longer your diabetes is not controlled  High blood sugar levels may damage other body tissues and organs over time  Damage to arteries may increase your risk for heart attack and stroke  Nerve damage may also lead to other heart, stomach, and nerve problems  Diabetes can become life-threatening if it is not controlled  Follow up with your care team providers as directed: You may need to return to have your A1c checked every 3 months  You will need to have your feet checked during at least 1 visit each year  You will need an eye exam 1 time each year to check for retinopathy  You will also need tests to check for kidney or heart disease, and high blood pressure  Write down your questions so you remember to ask them during your visits  © Copyright [facility] Patient 2022 Information is for End User's use only and may not be sold, redistributed or otherwise used for commercial purposes  The above information is an  only  It is not intended as medical advice for individual conditions or treatments  Talk to your doctor, nurse or pharmacist before following any medical regimen to see if it is safe and effective for you  Basic Carbohydrate Counting   AMBULATORY CARE:   Carbohydrate counting  is a way to plan your meals by counting the amount of carbohydrate in foods  Carbohydrates are the sugars, starches, and fiber found in fruit, grains, vegetables, and milk products  Carbohydrates increase your blood sugar levels  Carbohydrate counting can help you eat the right amount of carbohydrate to keep your blood sugar levels under control     What you need to know about planning meals using carbohydrate counting:  A dietitian or healthcare provider will help you develop a healthy meal plan that works best for you  You will be taught how much carbohydrate to eat or drink for each meal and snack  Your meal plan will be based on your age, weight, usual food intake, and physical activity level  If you have diabetes, it will also include your blood sugar levels and diabetes medicine  Once you know how much carbohydrate you should eat, you can decide what type of food you want to eat  You will need to know what foods contain carbohydrate and how much they contain  Keep track of the amount of carbohydrate in meals and snacks in order to follow your meal plan  Do not avoid carbohydrates or skip meals  Your blood sugar may fall too low if you do not eat enough carbohydrate or you skip meals  Foods that contain carbohydrate:   Breads:  Each serving of food listed below contains about 15 g of carbohydrate   1 slice of bread (1 ounce) or 1 flour or corn tortilla (6 inch)    ½ of a hamburger bun or ¼ of a large bagel (about 1 ounce)    1 pancake (about 4 inches across and ¼ inch thick)    Cereals and grains:  Serving sizes of ready-to-eat cereals vary  Look at the serving size and the total carbohydrate amount listed on the food label  Each serving of food listed below contains about 15 g of carbohydrate   ¾ cup of dry, unsweetened, ready-to-eat cereal or ¼ cup of low-fat granola     ½ cup of oatmeal or other cooked cereal     ? cup of cooked rice or pasta    Starchy vegetables and beans:  Each serving of food listed below contains about 15 g of carbohydrate   ½ cup of corn, green peas, sweet potatoes, or mashed potatoes    ¼ of a large baked potato    ½ cup of beans, lentils, and peas (garbanzo, kimball, kidney, white, split, black-eyed)    Crackers and snacks:  Each serving of food listed below contains about 15 g of carbohydrate       3 adebayo cracker squares or 8 animal crackers     6 saltine-type crackers    3 cups of popcorn or ¾ ounce of pretzels, potato chips, or tortilla chips    Fruit:  Each serving of food listed below contains about 15 g of carbohydrate   1 small (4 ounce) piece of fresh fruit or ¾ to 1 cup of fresh fruit    ½ cup of canned or frozen fruit, packed in natural juice    ½ cup (4 ounces) of unsweetened fruit juice    2 tablespoons of dried fruit    Desserts or sugary foods:  Each serving of food listed below contains about 15 g of carbohydrate   2-inch square unfrosted cake or brownie     2 small cookies    ½ cup of ice cream, frozen yogurt, or nondairy frozen yogurt    ¼ cup of sherbet or sorbet    1 tablespoon of regular syrup, jam, or jelly    2 tablespoons of light syrup    Milk and yogurt:  Foods from the milk group contain about 12 g of carbohydrate per serving  1 cup of fat-free or low-fat milk    1 cup of soy milk    ? cup of fat-free, yogurt sweetened with artificial sweetener    Non-starchy vegetables:  Each serving contains about 5 g of carbohydrate   Three servings of non-starch vegetables count as 1 carbohydrate serving  ½ cup of cooked vegetables or 1 cup of raw vegetables  This includes beets, broccoli, cabbage, cauliflower, cucumber, mushrooms, tomatoes, and zucchini    ½ cup of vegetable juice    How to use carbohydrate counting to plan meals:   Count carbohydrate amounts using serving sizes:      Pasta dinner example: You plan to have pasta, tossed salad, and an 8-ounce glass of milk  Your healthcare provider tells you that you may have 4 carbohydrate servings for dinner  One carbohydrate serving of pasta is ? cup  One cup of pasta will equal 3 carbohydrate servings  An 8-ounce glass of milk will count as 1 carbohydrate serving  These amounts of food would equal 4 carbohydrate servings  One cup of tossed salad does not count toward your carbohydrate servings         Count carbohydrate amounts using food labels:  Find the total amount of carbohydrate in a packaged food by reading the food label  Food labels tell you the serving size of the food and the total carbohydrate amount in each serving  Find the serving size on the food label and then decide how many servings you will eat  Multiply the number of servings you plan to eat by the carbohydrate amount per serving  Granola bar snack example: Your meal plan allows you to have 2 carbohydrate servings (30 grams) of carbohydrate for a snack  You plan to eat 1 package of granola bars, which contains 2 bars  According to the food label, the serving size of food in this package is 1 bar  Each serving (1 bar) contains 25 grams of carbohydrate  The total amount of carbohydrate in this package of granola bars would be 50 g  Based on your meal plan, you should eat only 1 bar  Follow up with your doctor as directed:  Write down your questions so you remember to ask them during your visits  © Copyright Felipe Massimo 2022 Information is for End User's use only and may not be sold, redistributed or otherwise used for commercial purposes  The above information is an  only  It is not intended as medical advice for individual conditions or treatments  Talk to your doctor, nurse or pharmacist before following any medical regimen to see if it is safe and effective for you

## 2023-05-24 NOTE — PROGRESS NOTES
[facility] [place]    NAME: Zakiya Franco  AGE: 75 y o  SEX: male  : 1955     DATE: 2023     Assessment and Plan:     Problem List Items Addressed This Visit        Endocrine    Type 2 diabetes mellitus without complication, without long-term current use of insulin (HCC) - Primary    Relevant Medications    metFORMIN (GLUCOPHAGE-XR) 500 mg 24 hr tablet    Blood Glucose Monitoring Suppl (OneTouch Verio Reflect) w/Device KIT    glucose blood (OneTouch Verio) test strip    OneTouch Delica Lancets 81R MISC    Other Relevant Orders    Ambulatory referral to Diabetic Education - use to refer for diabetes group classes, individual diabetes education, medical nutrition therapy, device training           No follow-ups on file  Chief Complaint:     Chief Complaint   Patient presents with   • Follow-up     Labs          History of Present Illness:     Nae Edmonds presents to the office today to review recent lab work and to discuss new diagnosis of diabetes type 2  He offers no new complaints or concerns  Review of Systems:     Review of Systems   Constitutional: Negative  HENT: Positive for postnasal drip  Respiratory: Positive for shortness of breath  Cardiovascular: Negative  Gastrointestinal: Negative  Neurological: Negative           Problem List:     Patient Active Problem List   Diagnosis   • Allergic rhinitis   • Hyperlipidemia   • Hypertension   • Hyponatremia   • Impaired fasting glucose   • Urinary frequency   • Prostate cancer screening   • Mild aortic stenosis   • Mild mitral regurgitation   • Symptoms of upper respiratory infection (URI)   • ABUNDIO (obstructive sleep apnea)   • SOB (shortness of breath)   • Snoring   • Mild intermittent asthma without complication   • Moderate persistent asthma without complication        Objective:     /80 (BP Location: Right arm, Patient Position: Sitting, Cuff Size: Standard)   Pulse 76   Temp 97 6 °F (36 4 °C) (Tympanic)   Resp 16   Ht 6' (1 829 m)   Wt 98 4 kg (217 lb)   SpO2 98%   BMI 29 43 kg/m²     Physical Exam  Constitutional:       Appearance: Normal appearance  HENT:      Head: Normocephalic and atraumatic  Nose: Nose normal       Mouth/Throat:      Pharynx: Oropharynx is clear  Eyes:      Conjunctiva/sclera: Conjunctivae normal    Cardiovascular:      Rate and Rhythm: Normal rate and regular rhythm  Pulmonary:      Effort: Pulmonary effort is normal       Breath sounds: Normal breath sounds  Musculoskeletal:         General: Normal range of motion  Skin:     General: Skin is warm and dry  Capillary Refill: Capillary refill takes less than 2 seconds  Neurological:      General: No focal deficit present  Mental Status: He is alert and oriented to person, place, and time  Psychiatric:         Mood and Affect: Mood normal          Behavior: Behavior normal          Thought Content: Thought content normal          Judgment: Judgment normal          I spent 25 minutes with this patient      [address] [place] [address], [address] [place] [address],[address] PRIMARY CARE [place]

## 2023-05-25 ENCOUNTER — TELEPHONE (OUTPATIENT)
Age: 68
End: 2023-05-25

## 2023-05-25 NOTE — TELEPHONE ENCOUNTER
Pt notified
SAW   JENNI    YESERDAY   REFERRED  HIM  TO DIABETIC        CAN'T   MAKE  AN  APPT  TILL  JULY   WANTS  TO  KNOW  IF  THAT  IS  OK OR  SHOULD  HE  SEE  SOMEONE  ELSE
Walking

## 2023-05-26 ENCOUNTER — TELEPHONE (OUTPATIENT)
Age: 68
End: 2023-05-26

## 2023-05-26 PROBLEM — E11.9 TYPE 2 DIABETES MELLITUS WITHOUT COMPLICATION, WITHOUT LONG-TERM CURRENT USE OF INSULIN (HCC): Status: ACTIVE | Noted: 2023-05-26

## 2023-05-26 NOTE — TELEPHONE ENCOUNTER
Saray Hico has been taking 2 tablets per day for the last 2 days should he continue to take 2 or start taking 1 tablet for 5 days

## 2023-05-26 NOTE — TELEPHONE ENCOUNTER
Patient states he received a message from [place]  - that he should call back the office ASAP. ??     Please call home ph # [phone]

## 2023-05-26 NOTE — ASSESSMENT & PLAN NOTE
Lab Results   Component Value Date    HGBA1C 7 0 (H) 05/23/2023     Patient reports he drinks quite a bit of soda  Patient was instructed to eliminate soda from his diet encouraged to drink more water or sugar-free sparkling beverages  Discussed low carbohydrate diet with high fiber and healthy fat  We will start on metformin 500 mg daily x5 days and increase to 1000 mg daily  Referral to diabetic educator  Patient also monitor his FBS daily keep a log follow-up in 1 month with log and review of progress

## 2023-05-26 NOTE — TELEPHONE ENCOUNTER
I spoke to arabella he is aware of providers message he also has a question he want to know how many times he should take metformin a day

## 2023-05-26 NOTE — TELEPHONE ENCOUNTER
S/w Pt diet and testing glucose time reviewed. Pt verbalized understanding will call Tuesday with an update.

## 2023-05-31 ENCOUNTER — TELEPHONE (OUTPATIENT)
Age: 68
End: 2023-05-31

## 2023-05-31 ENCOUNTER — OFFICE VISIT (OUTPATIENT)
Dept: CARDIOLOGY CLINIC | Facility: CLINIC | Age: 68
End: 2023-05-31

## 2023-05-31 ENCOUNTER — TELEPHONE (OUTPATIENT)
Dept: CARDIOLOGY CLINIC | Facility: CLINIC | Age: 68
End: 2023-05-31

## 2023-05-31 VITALS
HEART RATE: 96 BPM | DIASTOLIC BLOOD PRESSURE: 78 MMHG | BODY MASS INDEX: 28.99 KG/M2 | OXYGEN SATURATION: 98 % | SYSTOLIC BLOOD PRESSURE: 146 MMHG | WEIGHT: 214 LBS | HEIGHT: 72 IN | RESPIRATION RATE: 16 BRPM

## 2023-05-31 DIAGNOSIS — E78.2 MIXED HYPERLIPIDEMIA: ICD-10-CM

## 2023-05-31 DIAGNOSIS — I51.7 LVH (LEFT VENTRICULAR HYPERTROPHY): ICD-10-CM

## 2023-05-31 DIAGNOSIS — I10 ESSENTIAL HYPERTENSION: ICD-10-CM

## 2023-05-31 DIAGNOSIS — I35.0 NON-RHEUMATIC AORTIC STENOSIS: ICD-10-CM

## 2023-05-31 DIAGNOSIS — R06.02 SOB (SHORTNESS OF BREATH): Primary | ICD-10-CM

## 2023-05-31 DIAGNOSIS — R07.9 CHEST PAIN, UNSPECIFIED TYPE: ICD-10-CM

## 2023-05-31 RX ORDER — ASPIRIN 81 MG/1
81 TABLET, CHEWABLE ORAL DAILY
Start: 2023-05-31

## 2023-05-31 NOTE — TELEPHONE ENCOUNTER
Called pt due to Dr. Licona having an opening today at [facility]. Pt took appointment and will be in at 3 pm..

## 2023-05-31 NOTE — TELEPHONE ENCOUNTER
----- Message from Annelise Licona MD sent at 5/30/2023  5:44 PM EDT -----  Can this patient be seen sooner by any provider?  Thanks        ----- Message -----  From: PATRCIIA Brooks  Sent: 5/23/2023   9:34 AM EDT  To: Annelise Licona MD    Good morning Dr Licona,     I saw this patient this morning for ER follow up of dizziness, elevated BP and SOB. He has had worsening FIGUEROA and associated pain between shoulder blades progressing over last couple months. The soonest he could get an appointment with you is mid- August. He has hx of aortic valve disease. I scheduled him to get an echo stress, dobutamine test. Would you recommend any other testing before he sees you? Or perhaps would he be able to see you or someone in your office sooner?    Kindest Regards,    PATRICIA Lucio

## 2023-05-31 NOTE — TELEPHONE ENCOUNTER
Pt is on metformin for his diabetes- he's New with how to take the pills    He supposed to take 2 pills of metformin with breakfast- he called at [facility] and wondered if he can take them now      Please give him a call

## 2023-05-31 NOTE — PROGRESS NOTES
[address] [facility]  [address], [facility], [address] [facility] [place]  Tel: [phone]      NAME: Nikky Moraes  AGE: 75 y o  SEX: male  : 1955  MRN: [id number]      Chief Complaint:  Chief Complaint   Patient presents with   • Establish Care     consult   • Follow-up         History of Present Illness:   Ref by PCP    68-year-old male with asthma, hypertension, hyperlipidemia who is here for complaints of shortness of breath on exertion and chest pain on exertion  States he has moderate asthma for which he is being treated by the pulmonologist   But he has noticed that whenever he exerts such as goes on a hike or walks uphill, he gets pain in the upper back between his shoulder blades and he gets very short of breath  Pain and shortness of breath make him stop and rest and then the symptoms get better  They come back whenever he exerts again  The upper back pain has never occurred at rest  Pt denies palpitations, syncope, swelling feet, orthopnea, PND, claudication  Essential hypertension, LVH -  Has been hypertensive for many years  Taking medications regularly  Denies lightheadedness, headache, medication side effects  Mixed hyperlipidemia -  Has had hyperlipidemia for many years  Taking statin regularly along with diet control  Denies myalgia  PCP closely monitoring the blood work       Aortic stenosis - pt is due for a f/u echo    Past Medical History:  Past Medical History:   Diagnosis Date   • Asthma    • Disc degeneration, lumbar    • Peripheral vascular disease ([address] )    • SOB (shortness of breath)    • Wheezing          Past Surgical History:  Past Surgical History:   Procedure Laterality Date   • ESOPHAGOSCOPY           Family History:  Family History   Problem Relation Age of Onset   • Breast cancer Mother    • Hip fracture Father          Social History:  Social History     Socioeconomic History • Marital status: Single     Spouse name: None   • Number of children: None   • Years of education: None   • Highest education level: None   Occupational History   • Occupation: retired   Tobacco Use   • Smoking status: Never   • Smokeless tobacco: Never   Vaping Use   • Vaping Use: Never used   Substance and Sexual Activity   • Alcohol use: No   • Drug use: No   • Sexual activity: Not Currently   Other Topics Concern   • None   Social History Narrative   • None     Social Determinants of Health     Financial Resource Strain: Not on file   Food Insecurity: Not on file   Transportation Needs: Not on file   Physical Activity: Inactive (5/17/2021)    Exercise Vital Sign    • Days of Exercise per Week: 0 days    • Minutes of Exercise per Session: 0 min   Stress: No Stress Concern Present (12/23/2022)    [address]    • Feeling of Stress : Not at all   Social Connections: Not on file   Intimate Partner Violence: Not on file   Housing Stability: Not on file         Active Problems:  Patient Active Problem List   Diagnosis   • Allergic rhinitis   • Hyperlipidemia   • Hypertension   • Hyponatremia   • Impaired fasting glucose   • Urinary frequency   • Prostate cancer screening   • Mild aortic stenosis   • Mild mitral regurgitation   • Symptoms of upper respiratory infection (URI)   • ABUNDIO (obstructive sleep apnea)   • SOB (shortness of breath)   • Snoring   • Mild intermittent asthma without complication   • Moderate persistent asthma without complication   • Type 2 diabetes mellitus without complication, without long-term current use of insulin (HCC)         The following portions of the patient's history were reviewed and updated as appropriate: past medical history, past surgical history, past family history,  past social history, current medications, allergies and problem list       Review of Systems:  Constitutional: Denies fever, chills  Eyes: Denies eye redness, eye discharge  ENT: Denies hearing loss, sneezing, nasal discharge, sore throat   Respiratory: Denies cough, expectoration  +shortness of breath  Cardiovascular: Denies palpitations, lower extremity swelling  +CP  Gastrointestinal: Denies abdominal pain, nausea, vomiting, diarrhea  Genito-Urinary: Denies dysuria, incontinence  Musculoskeletal: Denies back pain  +Ambulatory dysfunction  Neurologic: Denies syncope, headache, seizures  Endocrine: Denies polydipsia, temperature intolerance  Allergy and Immunology: Denies hives, insect bite sensitivity  Hematological and Lymphatic: Denies bleeding problems, swollen glands   Psychological: Denies depression, suicidal ideation, anxiety, panic  Dermatological: Denies pruritus, rash, skin lesion changes      Vitals:  Vitals:    05/31/23 1457   BP: 146/78   Pulse: 96   Resp: 16   SpO2: 98%       Body mass index is 29 02 kg/m²  Weight (last 2 days)     Date/Time Weight    05/31/23 1457 97 1 (214)            Physical Examination:  General: Patient is not in acute distress  Awake, alert, oriented in time, place and person  Responding to commands  Head: Normocephalic  Atraumatic  Eyes: Both pupils normal sized, round and reactive to light  Nonicteric  ENT: Normal external ear canals  Neck: Supple  JVP not raised  Trachea central  No thyromegaly  Lungs: Bilateral bronchovascular breath sounds with no crackles or rhonchi  Chest wall: No tenderness  Cardiovascular: RRR  S1 and S2 normal  Grade 3/6 KATERINE at base+  Gastrointestinal: Abdomen soft, nontender  No guarding or rigidity  Liver and spleen not palpable  Bowel sounds present  Neurologic: Patient is awake, alert, oriented in time, place and person  Responding to commands  Moving all extremities  Integumentary:  No skin rash  Lymphatic: No cervical lymphadenopathy  Back: Symmetric   No CVA tenderness  Extremities: No clubbing, cyanosis or edema      Laboratory Results:  CBC with diff:   Lab Results   Component Value "Date    HCT 41 8 2023    HGB 14 3 2023    MCH 30 7 2023    MCV 90 2023     2023    RBC 4 66 2023    RDW 12 5 2023    WBC 7 04 2023       CMP:  Lab Results   Component Value Date    ALKPHOS 74 2023    ALT 45 2023    AST 26 2023    BILIDIR 0 08 2023    BUN 13 2023    CL 99 2023    CO2 26 2023    CREATININE 1 04 2023    K 4 0 2023       Lab Results   Component Value Date    HGBA1C 7 0 (H) 2023    HGBA1C 6 3 (H) 2018    MG 2 2 03/10/2023       Lab Results   Component Value Date    TROPONINI <0 02 2021       Lipid Profile:   No results found for: \"CHOL\"  Lab Results   Component Value Date    HDL 37 (L) 2021    HDL 39 (L) 2020    HDL 42 2019     Lab Results   Component Value Date    LDLCALC 103 (H) 2021    LDLCALC 105 (H) 2020    LDLCALC 108 (H) 2019     Lab Results   Component Value Date    TRIG 227 (H) 2021    TRIG 172 (H) 2020    TRIG 162 (H) 2019       Results for orders placed during the hospital encounter of 20    Stress test only, exercise    Narrative   [facility] [address] [place] [address]  [phone]    Stress Electrocardiography during Exercise    Name: Alvaro Foote  MR #: [id number]  Account #: [de-identified]  Study date: 2020  : 1955  Age: 71 years  Gender: Male  Height: 72 in  Weight: 219 lb  BSA: 2 22 mï¾²    Allergies: NO KNOWN ALLERGIES    Diagnosis: R06 09 - Other forms of dyspnea    Technician:  Kai De Jesus MS, CCT  GROUP:  [facility]  Interpreting Physician:  Praveena Vance MD  Referring Physician:  Katelyn Whyte MD  Primary Physician:  Katelyn Whyte MD    CLINICAL QUESTION: Detection of coronary artery disease  HISTORY: The patient is a 71year old  male  Chest pain status: no chest pain  Other symptoms: dyspnea of recent onset   " Coronary artery disease risk factors: dyslipidemia and hypertension  Cardiovascular history: none significant  Medications: an ACE inhibitor/ARB, a lipid lowering agent, and an antihypertensive agent  REST ECG: Normal sinus rhythm  Normal baseline ECG  PROCEDURE: Treadmill exercise testing was performed, using the Bobbi protocol  Stress electrocardiographic evaluation was performed  BOBBI PROTOCOL:  HR bpm SBP mmHg DBP mmHg Symptoms Rhythm/conduct  Baseline 75 142 86 none NSR  Stage 1 88 162 78 -- NSR  Stage 2 95 168 78 -- NSR  Stage 3 123 190 78 -- sinus tach  Stage 4 137 -- -- mild fatigue sinus tach  Immediate 139 -- -- -- sinus tach  Recovery 1 123 178 62 -- sinus tach  Recovery 2 104 -- -- -- sinus tach  Recovery 3 99 -- -- -- NSR  Recovery 5 84 148 70 -- NSR  No medications or fluids given  STRESS SUMMARY: Duration of exercise was 10 min  The patient exercised to protocol stage 4  Maximal work rate was 13 4 METs  Functional capacity was above normal (greater than 20%)  Maximal heart rate during stress was 139 bpm ( 90 % of  maximal predicted heart rate)  The heart rate response to stress was normal  There was resting hypertension with an appropriate blood pressure response to stress  The rate-pressure product for the peak heart rate and blood pressure was  32405  There was no chest pain during stress  The stress test was terminated due to achievement of target heart rate and mild fatigue  The stress ECG was negative for ischemia  There were no stress arrhythmias or conduction abnormalities  SUMMARY:  -  Stress results: Duration of exercise was 10 min  Target heart rate was achieved  There was resting hypertension with an appropriate blood pressure response to stress  There was no chest pain during stress  -  ECG conclusions: The stress ECG was negative for ischemia  IMPRESSIONS: Normal study at the work load achieved      Prepared and signed by    Andreas Gibson MD  Signed 02/03/2020 15:44:44      EKG: Reviewed by me   5/31/2023  Sinus rhythm with first-degree AV block  Cannot rule out inferior infarct, age undetermined    Medications:    Current Outpatient Medications:   •  amLODIPine (NORVASC) 5 mg tablet, TAKE 1 TABLET (5 MG TOTAL) BY MOUTH DAILY  , Disp: 90 tablet, Rfl: 3  •  aspirin 81 mg chewable tablet, Chew 1 tablet (81 mg total) daily, Disp: , Rfl:   •  Blood Glucose Monitoring Suppl (OneTouch Verio Reflect) w/Device KIT, Check blood sugars once daily  Please substitute with appropriate alternative as covered by patient's insurance  Dx: E11 65, Disp: 1 kit, Rfl: 0  •  fluticasone-vilanterol (Breo Ellipta) 200-25 mcg/actuation inhaler, Inhale 1 puff daily Rinse mouth after use , Disp: 60 each, Rfl: 5  •  glucose blood (OneTouch Verio) test strip, Check blood sugars once daily  Please substitute with appropriate alternative as covered by patient's insurance  Dx: E11 65, Disp: 100 each, Rfl: 3  •  lisinopril (ZESTRIL) 40 mg tablet, TAKE 1 TABLET BY MOUTH EVERY DAY, Disp: 90 tablet, Rfl: 3  •  metFORMIN (GLUCOPHAGE-XR) 500 mg 24 hr tablet, Take 2 tablets (1,000 mg total) by mouth daily with breakfast, Disp: 60 tablet, Rfl: 5  •  OneTouch Delica Lancets 06E MISC, Check blood sugars once daily  Please substitute with appropriate alternative as covered by patient's insurance   Dx: E11 65, Disp: 100 each, Rfl: 3  •  pantoprazole (PROTONIX) 40 mg tablet, TAKE 1 TABLET (40 MG TOTAL) BY MOUTH DAILY 1/2 HOUR BEFORE BREAKFAST, Disp: 90 tablet, Rfl: 0  •  rosuvastatin (CRESTOR) 20 MG tablet, TAKE 1 TABLET BY MOUTH EVERY DAY, Disp: 90 tablet, Rfl: 3  •  fluticasone (FLONASE) 50 mcg/act nasal spray, SPRAY 1 SPRAY INTO EACH NOSTRIL EVERY DAY (Patient not taking: Reported on 5/31/2023), Disp: 24 mL, Rfl: 2  •  ipratropium-albuterol (DUO-NEB) 0 5-2 5 mg/3 mL nebulizer solution, TAKE 3 ML BY NEBULIZATION 4 (FOUR) TIMES A DAY (Patient not taking: Reported on 5/31/2023), Disp: 360 mL, Rfl: 3      Allergies: Allergies   Allergen Reactions   • Pollen Extract Nasal Congestion         Assessment and Plan:  1  SOB (shortness of breath)  2  Chest pain, unspecified type  EKG done in the clinic reviewed with the patient  2D echocardiogram ordered for EF, RWMA, aortic stenosis severity  Pharmacological nuclear stress test ordered for evaluation  Patient states he will not be able to walk on a treadmill due to ambulatory dysfunction    - NM myocardial perfusion spect (rx stress and/or rest); Future  - Echo complete w/ contrast if indicated; Future    3  Non-rheumatic aortic stenosis  Follow-up echocardiogram ordered    4  Essential hypertension  BP acceptable for now  Will repeat on next visit and adjust medication if needed    5  LVH (left ventricular hypertrophy)  Tight BP control    6  Mixed hyperlipidemia  Continue statin and diet control  His PCP closely monitors his blood work    Kindly start aspirin 81 mg daily as you are a diabetic with hypertension and hyperlipidemia    Recommend aggressive risk factor modification and therapeutic lifestyle changes  Low-salt, low-calorie, low-fat, low-cholesterol diet with regular exercise and to optimize weight  I will defer the ordering and monitoring of necessity lab studies to you, but I am available and happy to review and manage any of the data at your request in the future  Discussed concepts of atherosclerosis, including signs and symptoms of cardiac disease  Previous studies were reviewed  Safety measures were reviewed  Questions were entertained and answered  Patient was advised to report any problems requiring medical attention  Follow-up with PCP and appropriate specialist and lab work as discussed  Return for follow up visit as scheduled or earlier, if needed  Thank you for allowing me to participate in the care and evaluation of your patient  Should you have any questions, please feel free to contact me        Peter Valencia, MD  5/31/2023,3:45 PM

## 2023-06-15 ENCOUNTER — TELEPHONE (OUTPATIENT)
Age: 68
End: 2023-06-15

## 2023-06-15 DIAGNOSIS — E11.9 TYPE 2 DIABETES MELLITUS WITHOUT COMPLICATION, WITHOUT LONG-TERM CURRENT USE OF INSULIN (HCC): ICD-10-CM

## 2023-06-15 RX ORDER — METFORMIN HYDROCHLORIDE 500 MG/1
TABLET, EXTENDED RELEASE ORAL
Qty: 180 TABLET | Refills: 2 | Status: SHIPPED | OUTPATIENT
Start: 2023-06-15

## 2023-06-15 NOTE — TELEPHONE ENCOUNTER
NICHELLEI: Dr Roderick Wolff and Nidhi Cain is doing ECHO and NM Myocardial ordered by Dr Sonal Grider; the cardiologist told him not to do the Stress ECHO with dubutamine because it's a test with a tread mill

## 2023-06-16 ENCOUNTER — HOSPITAL ENCOUNTER (OUTPATIENT)
Dept: NON INVASIVE DIAGNOSTICS | Facility: CLINIC | Age: 68
Discharge: HOME | End: 2023-06-16
Payer: COMMERCIAL

## 2023-06-16 VITALS
DIASTOLIC BLOOD PRESSURE: 82 MMHG | BODY MASS INDEX: 28.99 KG/M2 | SYSTOLIC BLOOD PRESSURE: 150 MMHG | WEIGHT: 214 LBS | HEART RATE: 68 BPM | HEIGHT: 72 IN | OXYGEN SATURATION: 99 %

## 2023-06-16 DIAGNOSIS — R06.02 SOB (SHORTNESS OF BREATH): ICD-10-CM

## 2023-06-16 DIAGNOSIS — R07.9 CHEST PAIN, UNSPECIFIED TYPE: ICD-10-CM

## 2023-06-16 LAB
AORTIC ROOT: 3.6 CM
AORTIC VALVE MEAN VELOCITY: 21.1 M/S
APICAL FOUR CHAMBER EJECTION FRACTION: 64 %
AV AREA BY CONTINUOUS VTI: 1 CM2
AV AREA PEAK VELOCITY: 1 CM2
AV LVOT MEAN GRADIENT: 2 MMHG
AV LVOT PEAK GRADIENT: 3 MMHG
AV MEAN GRADIENT: 19 MMHG
AV PEAK GRADIENT: 31 MMHG
AV VALVE AREA: 0.97 CM2
AV VELOCITY RATIO: 0.32
CHEST PAIN STATEMENT: NORMAL
DOP CALC AO PEAK VEL: 2.8 M/S
DOP CALC AO VTI: 63.55 CM
DOP CALC LVOT AREA: 3.14 CM2
DOP CALC LVOT DIAMETER: 2 CM
DOP CALC LVOT PEAK VEL VTI: 19.54 CM
DOP CALC LVOT PEAK VEL: 0.89 M/S
DOP CALC LVOT STROKE INDEX: 27.4 ML/M2
DOP CALC LVOT STROKE VOLUME: 61.36 CM3
E WAVE DECELERATION TIME: 239 MS
FRACTIONAL SHORTENING: 30 % (ref 28–44)
INTERVENTRICULAR SEPTUM IN DIASTOLE (PARASTERNAL SHORT AXIS VIEW): 1.2 CM
INTERVENTRICULAR SEPTUM: 1.2 CM (ref 0.6–1.1)
LAAS-AP2: 15.6 CM2
LAAS-AP4: 16.9 CM2
LEFT ATRIUM AREA SYSTOLE SINGLE PLANE A4C: 16.7 CM2
LEFT ATRIUM SIZE: 3.8 CM
LEFT INTERNAL DIMENSION IN SYSTOLE: 3 CM (ref 2.1–4)
LEFT VENTRICULAR INTERNAL DIMENSION IN DIASTOLE: 4.3 CM (ref 3.5–6)
LEFT VENTRICULAR POSTERIOR WALL IN END DIASTOLE: 1.1 CM
LEFT VENTRICULAR STROKE VOLUME: 49 ML
LVSV (TEICH): 49 ML
MAX DIASTOLIC BP: 82 MMHG
MAX HEART RATE: 102 BPM
MAX PREDICTED HEART RATE: 152 BPM
MAX. SYSTOLIC BP: 150 MMHG
MV E'TISSUE VEL-SEP: 6 CM/S
MV PEAK A VEL: 0.95 M/S
MV PEAK E VEL: 87 CM/S
MV STENOSIS PRESSURE HALF TIME: 69 MS
MV VALVE AREA P 1/2 METHOD: 3.19 CM2
NUC STRESS EJECTION FRACTION: 70 %
PROTOCOL NAME: NORMAL
RATE PRESSURE PRODUCT: NORMAL
REASON FOR TERMINATION: NORMAL
RIGHT ATRIUM AREA SYSTOLE A4C: 12.6 CM2
RIGHT VENTRICLE ID DIMENSION: 2.9 CM
SL CV LEFT ATRIUM LENGTH A2C: 4.8 CM
SL CV LV EF: 60
SL CV PED ECHO LEFT VENTRICLE DIASTOLIC VOLUME (MOD BIPLANE) 2D: 84 ML
SL CV PED ECHO LEFT VENTRICLE SYSTOLIC VOLUME (MOD BIPLANE) 2D: 35 ML
SL CV REST NUCLEAR ISOTOPE DOSE: 11 MCI
SL CV STRESS NUCLEAR ISOTOPE DOSE: 32.2 MCI
SL CV STRESS RECOVERY BP: NORMAL MMHG
SL CV STRESS RECOVERY HR: 77 BPM
SL CV STRESS RECOVERY O2 SAT: 99 %
STRESS ANGINA INDEX: 0
STRESS BASELINE BP: NORMAL MMHG
STRESS BASELINE HR: 68 BPM
STRESS O2 SAT REST: 99 %
STRESS PEAK HR: 102 BPM
STRESS POST O2 SAT PEAK: 100 %
STRESS POST PEAK BP: 140 MMHG
TARGET HR FORMULA: NORMAL
TIME IN EXERCISE PHASE: NORMAL
TRICUSPID ANNULAR PLANE SYSTOLIC EXCURSION: 1.9 CM

## 2023-06-16 PROCEDURE — 93017 CV STRESS TEST TRACING ONLY: CPT

## 2023-06-16 PROCEDURE — 93018 CV STRESS TEST I&R ONLY: CPT | Performed by: INTERNAL MEDICINE

## 2023-06-16 PROCEDURE — 78452 HT MUSCLE IMAGE SPECT MULT: CPT

## 2023-06-16 PROCEDURE — A9502 TC99M TETROFOSMIN: HCPCS

## 2023-06-16 PROCEDURE — G1004 CDSM NDSC: HCPCS

## 2023-06-16 PROCEDURE — 93306 TTE W/DOPPLER COMPLETE: CPT | Performed by: INTERNAL MEDICINE

## 2023-06-16 PROCEDURE — 93016 CV STRESS TEST SUPVJ ONLY: CPT | Performed by: INTERNAL MEDICINE

## 2023-06-16 PROCEDURE — 78452 HT MUSCLE IMAGE SPECT MULT: CPT | Performed by: INTERNAL MEDICINE

## 2023-06-16 PROCEDURE — 93306 TTE W/DOPPLER COMPLETE: CPT

## 2023-06-16 RX ORDER — REGADENOSON 0.08 MG/ML
0.4 INJECTION, SOLUTION INTRAVENOUS ONCE
Status: COMPLETED | OUTPATIENT
Start: 2023-06-16 | End: 2023-06-16

## 2023-06-16 RX ADMIN — REGADENOSON 0.4 MG: 0.08 INJECTION, SOLUTION INTRAVENOUS at 08:50

## 2023-06-19 ENCOUNTER — TELEPHONE (OUTPATIENT)
Dept: CARDIOLOGY CLINIC | Facility: CLINIC | Age: 68
End: 2023-06-19

## 2023-06-19 DIAGNOSIS — E78.2 MIXED HYPERLIPIDEMIA: ICD-10-CM

## 2023-06-19 NOTE — TELEPHONE ENCOUNTER
----- Message from Colt Leonard MD sent at 6/16/2023  5:07 PM EDT -----  Please call and inform the patient that the echocardiogram showed mild to moderate aortic stenosis

## 2023-06-19 NOTE — TELEPHONE ENCOUNTER
----- Message from Izyz Dumont MD sent at 6/16/2023  5:06 PM EDT -----  Please call and inform the patient that the stress test was normal

## 2023-06-20 DIAGNOSIS — E78.2 MIXED HYPERLIPIDEMIA: ICD-10-CM

## 2023-06-20 LAB
CHEST PAIN STATEMENT: NORMAL
MAX DIASTOLIC BP: 82 MMHG
MAX HEART RATE: 102 BPM
MAX PREDICTED HEART RATE: 152 BPM
MAX. SYSTOLIC BP: 150 MMHG
PROTOCOL NAME: NORMAL
REASON FOR TERMINATION: NORMAL
TARGET HR FORMULA: NORMAL
TIME IN EXERCISE PHASE: NORMAL

## 2023-06-20 RX ORDER — ATORVASTATIN CALCIUM 40 MG/1
TABLET, FILM COATED ORAL
Qty: 90 TABLET | Refills: 1 | Status: CANCELLED | OUTPATIENT
Start: 2023-06-20

## 2023-06-20 NOTE — TELEPHONE ENCOUNTER
Please now send to [facility]    Please send New script to [facility] [address]-   atorvastatin (LIPITOR) 40 mg tablet

## 2023-06-21 ENCOUNTER — TELEPHONE (OUTPATIENT)
Age: 68
End: 2023-06-21

## 2023-06-21 DIAGNOSIS — E78.2 MIXED HYPERLIPIDEMIA: ICD-10-CM

## 2023-06-21 RX ORDER — ATORVASTATIN CALCIUM 40 MG/1
40 TABLET, FILM COATED ORAL DAILY
Qty: 100 TABLET | Refills: 1 | Status: SHIPPED | OUTPATIENT
Start: 2023-06-21

## 2023-06-22 ENCOUNTER — OFFICE VISIT (OUTPATIENT)
Age: 68
End: 2023-06-22
Payer: COMMERCIAL

## 2023-06-22 ENCOUNTER — APPOINTMENT (OUTPATIENT)
Age: 68
End: 2023-06-22
Payer: COMMERCIAL

## 2023-06-22 ENCOUNTER — NURSE TRIAGE (OUTPATIENT)
Dept: OTHER | Facility: OTHER | Age: 68
End: 2023-06-22

## 2023-06-22 VITALS
OXYGEN SATURATION: 98 % | RESPIRATION RATE: 18 BRPM | WEIGHT: 205.6 LBS | BODY MASS INDEX: 27.85 KG/M2 | HEIGHT: 72 IN | HEART RATE: 83 BPM | DIASTOLIC BLOOD PRESSURE: 78 MMHG | TEMPERATURE: 97.1 F | SYSTOLIC BLOOD PRESSURE: 126 MMHG
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
## 2023-06-22 DIAGNOSIS — E11.9 TYPE 2 DIABETES MELLITUS WITHOUT COMPLICATION, WITHOUT LONG-TERM CURRENT USE OF INSULIN (HCC): ICD-10-CM

## 2023-06-22 DIAGNOSIS — S80.861A TICK BITE OF RIGHT LOWER LEG, INITIAL ENCOUNTER: ICD-10-CM

## 2023-06-22 DIAGNOSIS — S80.861A TICK BITE OF RIGHT LOWER LEG, INITIAL ENCOUNTER: Primary | ICD-10-CM

## 2023-06-22 PROCEDURE — 99213 OFFICE O/P EST LOW 20 MIN: CPT | Performed by: INTERNAL MEDICINE

## 2023-06-22 PROCEDURE — 36415 COLL VENOUS BLD VENIPUNCTURE: CPT

## 2023-06-22 PROCEDURE — 86618 LYME DISEASE ANTIBODY: CPT

## 2023-06-22 RX ORDER — DOXYCYCLINE HYCLATE 100 MG
100 TABLET ORAL 2 TIMES DAILY
Qty: 14 TABLET | Refills: 0 | Status: SHIPPED | OUTPATIENT
Start: 2023-06-22 | End: 2023-06-29

## 2023-06-22 NOTE — TELEPHONE ENCOUNTER
"Patient states he was bit by a tick a week ago on right lower leg  Patient states the area is red and sore  Scheduled for appointment today for eval   Reason for Disposition  • [1] 2 to 14 days following tick bite AND [2] widespread rash or headache AND [3] no fever    Answer Assessment - Initial Assessment Questions  1  TYPE of TICK: \"Is it a wood tick or a deer tick? \" If unsure, ask: \"What size was the tick? \" \"Did it look more like a watermelon seed or a poppy seed? \"       Unknown    2  LOCATION: \"Where is the tick bite located? \"      Lower right leg    3  ONSET: \"How long do you think the tick was attached before you removed it? \" (Hours or days)       At least 6 hours    4  TETANUS: \"When was the last tetanus booster? \"       Unknown    5  PREGNANCY: \"Is there any chance you are pregnant? \" \"When was your last menstrual period? \"      N/A    Redness    Protocols used: TICK BITE-ADULT-AH    "

## 2023-06-22 NOTE — PROGRESS NOTES
Assessment/Plan:       Diagnoses and all orders for this visit:    Tick bite of right lower leg, initial encounter  -     doxycycline hyclate (VIBRA-TABS) 100 mg tablet; Take 1 tablet (100 mg total) by mouth 2 (two) times a day for 7 days  -     Lyme Total Antibody Profile with reflex to WB; Future    Type 2 diabetes mellitus without complication, without long-term current use of insulin ([facility])                Subjective:      Patient ID: Ngozi Gaona is a 75 y o  male  He found a tick on his leg about a week ago  He pulled it off  He comes in today  He feels well but he has had a persistent erythematous blotches approximately three quarters of a centimeter right medial calf about 20 cm above the malleolus    We will check a baseline Lyme titer and treat x1 week with doxycycline  The following portions of the patient's history were reviewed and updated as appropriate:   He has a past medical history of Asthma, Disc degeneration, lumbar, Peripheral vascular disease ([address] ), SOB (shortness of breath), and Wheezing ,  does not have any pertinent problems on file  ,   has a past surgical history that includes Esophagoscopy  ,  family history includes Breast cancer in his mother; Hip fracture in his father  ,   reports that he has never smoked  He has never used smokeless tobacco  He reports that he does not drink alcohol and does not use drugs  ,  is allergic to pollen extract     Current Outpatient Medications   Medication Sig Dispense Refill   • amLODIPine (NORVASC) 5 mg tablet TAKE 1 TABLET (5 MG TOTAL) BY MOUTH DAILY  90 tablet 3   • aspirin 81 mg chewable tablet Chew 1 tablet (81 mg total) daily     • atorvastatin (LIPITOR) 40 mg tablet Take 1 tablet (40 mg total) by mouth daily 100 tablet 1   • Blood Glucose Monitoring Suppl (OneTouch Verio Reflect) w/Device KIT Check blood sugars once daily  Please substitute with appropriate alternative as covered by patient's insurance   Dx: E11 65 1 kit 0   • doxycycline hyclate (VIBRA-TABS) 100 mg tablet Take 1 tablet (100 mg total) by mouth 2 (two) times a day for 7 days 14 tablet 0   • fluticasone-vilanterol (Breo Ellipta) 200-25 mcg/actuation inhaler Inhale 1 puff daily Rinse mouth after use  60 each 5   • glucose blood (OneTouch Verio) test strip Check blood sugars once daily  Please substitute with appropriate alternative as covered by patient's insurance  Dx: E11 65 100 each 3   • lisinopril (ZESTRIL) 40 mg tablet TAKE 1 TABLET BY MOUTH EVERY DAY 90 tablet 3   • metFORMIN (GLUCOPHAGE-XR) 500 mg 24 hr tablet TAKE 2 TABLETS BY MOUTH EVERY DAY WITH BREAKFAST 180 tablet 2   • OneTouch Delica Lancets 67K MISC Check blood sugars once daily  Please substitute with appropriate alternative as covered by patient's insurance  Dx: E11 65 100 each 3   • pantoprazole (PROTONIX) 40 mg tablet TAKE 1 TABLET (40 MG TOTAL) BY MOUTH DAILY 1/2 HOUR BEFORE BREAKFAST 30 tablet 0   • fluticasone (FLONASE) 50 mcg/act nasal spray SPRAY 1 SPRAY INTO EACH NOSTRIL EVERY DAY (Patient not taking: Reported on 5/31/2023) 24 mL 2   • ipratropium-albuterol (DUO-NEB) 0 5-2 5 mg/3 mL nebulizer solution TAKE 3 ML BY NEBULIZATION 4 (FOUR) TIMES A DAY (Patient not taking: Reported on 5/31/2023) 360 mL 3     No current facility-administered medications for this visit  Review of Systems   Skin: Positive for rash and wound  Objective:  Vitals:    06/22/23 1513   BP: 126/78   Pulse: 83   Resp: 18   Temp: (!) 97 1 °F (36 2 °C)   SpO2: 98%      Physical Exam  Constitutional:       Appearance: Normal appearance  Cardiovascular:      Rate and Rhythm: Normal rate  Skin:     General: Skin is warm  Findings: Lesion present  Comments: Erythematous strawberry colored cervical right lower anteromedial calf  Neurological:      Mental Status: He is alert  Patient Instructions   Tick bite  Check Lyme titer  Doxy for a week

## 2023-06-22 NOTE — TELEPHONE ENCOUNTER
"Regarding: tick bite x 1 week ago, redness today  ----- Message from Carrie Garcia sent at 6/22/2023  7:32 AM EDT -----  \"I had a tick bite last week and I just noticed there is redness  \"    "

## 2023-06-23 ENCOUNTER — HOSPITAL ENCOUNTER (OUTPATIENT)
Dept: RADIOLOGY | Facility: MEDICAL CENTER | Age: 68
Discharge: HOME | End: 2023-06-23
Payer: COMMERCIAL

## 2023-06-23 DIAGNOSIS — R20.2 PARESTHESIA OF BOTH FEET: ICD-10-CM

## 2023-06-23 LAB — B BURGDOR IGG+IGM SER-ACNC: <0.2 AI

## 2023-06-23 PROCEDURE — 93923 UPR/LXTR ART STDY 3+ LVLS: CPT

## 2023-06-23 PROCEDURE — 93925 LOWER EXTREMITY STUDY: CPT

## 2023-06-23 PROCEDURE — 93925 LOWER EXTREMITY STUDY: CPT | Performed by: SURGERY

## 2023-06-23 PROCEDURE — 93923 UPR/LXTR ART STDY 3+ LVLS: CPT | Performed by: SURGERY

## 2023-06-26 ENCOUNTER — OFFICE VISIT (OUTPATIENT)
Age: 68
End: 2023-06-26
Payer: COMMERCIAL

## 2023-06-26 VITALS
WEIGHT: 204.6 LBS | DIASTOLIC BLOOD PRESSURE: 72 MMHG | HEIGHT: 72 IN | OXYGEN SATURATION: 97 % | TEMPERATURE: 97.6 F | BODY MASS INDEX: 27.71 KG/M2 | RESPIRATION RATE: 16 BRPM | HEART RATE: 76 BPM | SYSTOLIC BLOOD PRESSURE: 110 MMHG

## 2023-06-26 DIAGNOSIS — E11.9 TYPE 2 DIABETES MELLITUS WITHOUT COMPLICATION, WITHOUT LONG-TERM CURRENT USE OF INSULIN (HCC): Primary | ICD-10-CM
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
## 2023-06-26 DIAGNOSIS — R06.02 SOB (SHORTNESS OF BREATH): ICD-10-CM

## 2023-06-26 DIAGNOSIS — S80.861A TICK BITE OF RIGHT LOWER LEG, INITIAL ENCOUNTER: ICD-10-CM

## 2023-06-26 PROCEDURE — 92250 FUNDUS PHOTOGRAPHY W/I&R: CPT

## 2023-06-26 PROCEDURE — 99214 OFFICE O/P EST MOD 30 MIN: CPT

## 2023-06-26 NOTE — ASSESSMENT & PLAN NOTE
Lab Results   Component Value Date    HGBA1C 7 0 (H) 05/23/2023     Dejuan has been taking metformin XR 1000 mg daily  He is checking his blood sugar every morning and has brought his log for review  Blood sugars have been between 105 and 120  Patient has eliminated soda from his diet he has had a 14 pound weight loss  Due for hemoglobin A1c in 2 months  Diabetic foot exam was conducted today patient was counseled on protecting feet he does see a podiatrist for toenail care are ready  Iris exam completed  Patient has an appointment scheduled diabetic educator next month

## 2023-06-26 NOTE — ASSESSMENT & PLAN NOTE
Still reports some shortness of breath with exertion  Was seen by cardiology  Stress test was normal   Echo does show mild to moderate aortic stenosis  He has follow-up with cardiology in August   Patient was recommended to do activities within his capabilities at this time  If symptoms worsen to follow-up with office

## 2023-06-26 NOTE — PATIENT INSTRUCTIONS
Foot Care for People with Diabetes   AMBULATORY CARE:   What you need to know about foot care:  Long-term high blood sugar levels can damage the blood vessels and nerves in your legs and feet  This damage makes it hard to feel pressure, pain, temperature, and touch  You may not be able to feel a cut or sore, or shoes that are too tight  Foot care is needed to prevent serious problems, such as an infection or amputation  Diabetes may cause your toes to become crooked or curved under  These changes may affect the way you walk and can lead to increased pressure on your foot  The pressure can decrease blood flow to your feet  Lack of blood flow increases your risk for a foot ulcer  Call your care team provider if:   Your feet become numb, weak, or hard to move  You have pus draining from a sore on your foot  You have a wound on your foot that gets bigger, deeper, or does not heal     You see blisters, cuts, scratches, calluses, or sores on your foot  You have a fever, and your feet become red, warm, and swollen  Your toenails become thick, curled, or yellow  You find it hard to check your feet because your vision is poor  You have questions or concerns about your condition or care  How to care for your feet:   Check your feet each day  Look at your whole foot, including the bottom, and between and under your toes  Check for wounds, corns, and calluses  Use a mirror to see the bottom of your feet  The skin on your feet may be shiny, tight, or darker than normal  Your feet may also be cold and pale  Feel your feet by running your hands along the tops, bottoms, sides, and between your toes  Redness, swelling, and warmth are signs of blood flow problems that can lead to a foot ulcer  Do not try to remove corns or calluses yourself  Do not ignore small problems, such as dry skin or small wounds  These can become life-threatening over time without proper care           Wash your feet each day with soap and warm water  Do not use hot water, because this can injure your foot  Dry your feet gently with a towel after you wash them  Dry between and under your toes  Apply lotion or a moisturizer on your dry feet  Ask your care team provider what lotions are best to use  Do not put lotion or moisturizer between your toes  Moisture between your toes could lead to skin breakdown  Cut your toenails correctly  File or cut your toenails straight across  Use a soft brush to clean around your toenails  If your toenails are very thick, you may need to have a care team provider or specialist cut them  Protect your feet  Do not walk barefoot or wear your shoes without socks  Check your shoes for rocks or other objects that can hurt your feet  Wear cotton socks to help keep your feet dry  Wear socks without toe seams, or wear them with the seams inside out  Change your socks each day  Do not wear socks that are dirty or damp  Wear shoes that fit well  Wear shoes that do not rub against any area of your feet  Your shoes should be ½ to ¾ inch (1 to 2 centimeters) longer than your feet  Your shoes should also have extra space around the widest part of your feet  Walking or athletic shoes with laces or straps that adjust are best  Ask your care team provider for help to choose shoes that fit you best  Ask your provider if you need to wear an insert, orthotic, or bandage on your feet  Go to your follow-up visits  Your care team provider will do a foot exam at least 1 time each year  You may need a foot exam more often if you have nerve damage, foot deformities, or ulcers  Your provider will check for nerve damage and how well you can feel your feet  Your provider will check your shoes to see if they fit well  Do not smoke  Smoking can damage your blood vessels and put you at increased risk for foot ulcers  Ask your care team provider for information if you currently smoke and need help to quit  E-cigarettes or smokeless tobacco still contain nicotine  Talk to your care team provider before you use these products  Follow up with your diabetes care team provider or foot specialist as directed: You will need to have your feet checked at least 1 time each year  You may need a foot exam more often if you have nerve damage, foot deformities, or ulcers  Write down your questions so you remember to ask them during your visits  © Copyright Olinda Haile 2022 Information is for End User's use only and may not be sold, redistributed or otherwise used for commercial purposes  The above information is an  only  It is not intended as medical advice for individual conditions or treatments  Talk to your doctor, nurse or pharmacist before following any medical regimen to see if it is safe and effective for you

## 2023-06-26 NOTE — PROGRESS NOTES
[facility] - [facility] PRIMARY CARE [place]    NAME: Stephanie Gong  AGE: 75 y o  SEX: male  : 1955     DATE: 2023     Assessment and Plan:     Problem List Items Addressed This Visit        Endocrine    Type 2 diabetes mellitus without complication, without long-term current use of insulin ([facility] [address] ) - Primary       Lab Results   Component Value Date    HGBA1C 7 0 (H) 2023     Dejuan has been taking metformin XR 1000 mg daily  He is checking his blood sugar every morning and has brought his log for review  Blood sugars have been between 105 and 120  Patient has eliminated soda from his diet he has had a 14 pound weight loss  Due for hemoglobin A1c in 2 months  Diabetic foot exam was conducted today patient was counseled on protecting feet he does see a podiatrist for toenail care are ready  Iris exam completed  Patient has an appointment scheduled diabetic educator next month  Relevant Orders    IRIS Diabetic eye exam    Albumin / creatinine urine ratio    HEMOGLOBIN A1C W/ EAG ESTIMATION    Basic metabolic panel       Other    SOB (shortness of breath)     Still reports some shortness of breath with exertion  Was seen by cardiology  Stress test was normal   Echo does show mild to moderate aortic stenosis  He has follow-up with cardiology in August   Patient was recommended to do activities within his capabilities at this time  If symptoms worsen to follow-up with office  Other Visit Diagnoses     Tick bite of right lower leg, initial encounter        On doxycycline for tick bite  Positive bull's-eye rash on right lower extremity healing  Lyme titer done last week negative  We will repeat in 4 weeks  Relevant Orders    Lyme Total Antibody Profile with reflex to WB              No follow-ups on file       Chief Complaint:     Chief Complaint   Patient presents with   • Follow-up     Testing          History of Present Illness:     Sanjuanita Valencia presents to the office today for follow-up visit  Reviewed his blood sugar logs  Also reviewed recent testing done by cardiology  Patient offers no new complaints or concerns  Patient actually excited he has lost weight and blood sugars have been well controlled  Patient denies chest pain or back pain  He still reports getting shortness of breath though with mild exertion  Review of Systems:     Review of Systems   Constitutional: Negative  HENT: Positive for voice change  Respiratory: Positive for shortness of breath  Negative for chest tightness  Cardiovascular: Negative for chest pain  Gastrointestinal: Negative  Genitourinary: Negative  Musculoskeletal: Negative  Skin: Positive for rash (tick bite rash on rle)  Neurological: Positive for numbness (and tingling into toes)  Psychiatric/Behavioral: Negative  Problem List:     Patient Active Problem List   Diagnosis   • Allergic rhinitis   • Hyperlipidemia   • Hypertension   • Hyponatremia   • Impaired fasting glucose   • Urinary frequency   • Prostate cancer screening   • Mild aortic stenosis   • Mild mitral regurgitation   • Symptoms of upper respiratory infection (URI)   • ABUNDIO (obstructive sleep apnea)   • SOB (shortness of breath)   • Snoring   • Mild intermittent asthma without complication   • Moderate persistent asthma without complication   • Type 2 diabetes mellitus without complication, without long-term current use of insulin (HCC)        Objective:     /72 (BP Location: Left arm, Patient Position: Sitting, Cuff Size: Large)   Pulse 76   Temp 97 6 °F (36 4 °C) (Tympanic)   Resp 16   Ht 6' (1 829 m)   Wt 92 8 kg (204 lb 9 6 oz)   SpO2 97%   BMI 27 75 kg/m²     Physical Exam  Constitutional:       General: He is not in acute distress  Appearance: Normal appearance  HENT:      Head: Normocephalic and atraumatic        Right Ear: External ear normal       Left Ear: External ear normal       Nose: Nose normal  Mouth/Throat:      Pharynx: Oropharynx is clear  Eyes:      Conjunctiva/sclera: Conjunctivae normal    Cardiovascular:      Rate and Rhythm: Normal rate and regular rhythm  Pulses: Normal pulses  no weak pulses          Dorsalis pedis pulses are 2+ on the right side and 2+ on the left side  Heart sounds: Murmur heard  Pulmonary:      Effort: Pulmonary effort is normal  No respiratory distress  Breath sounds: Normal breath sounds  Abdominal:      Palpations: Abdomen is soft  Musculoskeletal:         General: Normal range of motion  Cervical back: Neck supple  Feet:    Feet:      Right foot:      Skin integrity: No ulcer, skin breakdown, erythema, warmth, callus or dry skin  Toenail Condition: Right toenails are abnormally thick  Left foot:      Skin integrity: No ulcer, skin breakdown, erythema, warmth, callus or dry skin  Toenail Condition: Left toenails are abnormally thick  Skin:     General: Skin is warm and dry  Capillary Refill: Capillary refill takes less than 2 seconds  Neurological:      Mental Status: He is alert and oriented to person, place, and time  Psychiatric:         Mood and Affect: Mood normal          Behavior: Behavior normal          Thought Content: Thought content normal          Judgment: Judgment normal      Diabetic Foot Exam    Patient's shoes and socks removed  Right Foot/Ankle   Right Foot Inspection  Skin Exam: skin normal and skin intact  No dry skin, no warmth, no callus, no erythema, no maceration, no abnormal color, no pre-ulcer, no ulcer and no callus  Toe Exam: ROM and strength within normal limits  Sensory   Vibration: intact  Monofilament testing: diminished    Vascular  Capillary refills: < 3 seconds  The right DP pulse is 2+  Left Foot/Ankle  Left Foot Inspection  Skin Exam: skin normal and skin intact   No dry skin, no warmth, no erythema, no maceration, normal color, no pre-ulcer, no ulcer and no callus  Toe Exam: ROM and strength within normal limits  Sensory   Vibration: intact  Monofilament testing: diminished    Vascular  Capillary refills: < 3 seconds  The left DP pulse is 2+  Assign Risk Category  No deformity present  Loss of protective sensation  No weak pulses  Risk: 1          I spent 20 minutes with this patient      [facility], [address] [facility] [address] [facility] PRIMARY CARE [place]

## 2023-06-27 LAB
LEFT EYE DIABETIC RETINOPATHY: ABNORMAL
LEFT EYE IMAGE QUALITY: ABNORMAL
LEFT EYE MACULAR EDEMA: ABNORMAL
LEFT EYE OTHER RETINOPATHY: ABNORMAL
RIGHT EYE DIABETIC RETINOPATHY: ABNORMAL
RIGHT EYE IMAGE QUALITY: ABNORMAL
RIGHT EYE MACULAR EDEMA: ABNORMAL
RIGHT EYE OTHER RETINOPATHY: ABNORMAL
SEVERITY (EYE EXAM): ABNORMAL

## 2023-07-05 ENCOUNTER — OFFICE VISIT (OUTPATIENT)
Dept: DIABETES SERVICES | Facility: CLINIC | Age: 68
End: 2023-07-05
Payer: COMMERCIAL

## 2023-07-05 VITALS — WEIGHT: 200 LBS | BODY MASS INDEX: 27.12 KG/M2

## 2023-07-05 DIAGNOSIS — E11.9 TYPE 2 DIABETES MELLITUS WITHOUT COMPLICATION, WITHOUT LONG-TERM CURRENT USE OF INSULIN (HCC): Primary | ICD-10-CM

## 2023-07-05 PROCEDURE — 97802 MEDICAL NUTRITION INDIV IN: CPT

## 2023-07-05 NOTE — PROGRESS NOTES
Medical Nutrition Therapy        Assessment    Visit Type: Initial visit  Chief complaint/Medical Diagnosis/reason for visit E11.9 Type 2 Diabetes Mellitus without complication without long term current use of insulin    HPI Dejuan came in today for his initial Medical Nutrition Therapy (MNT) appointment. Angelina Cross stated his goal is to lower his blood sugar and A1C. Dejuan's most current A1C= 7.0 on 5/23/23. Obtained a 24 hour food recall. Problems identified in food recall include inconsistent carbohydrate intake,.going long period of time without eating, and unbalanced meals. Please Note: Angelina Cross stated he was drinking at least 1 liter/day of regular soda, either Pepsi or Coke, prior to his DM diagnosis. Provided patient with a 2200 calorie balanced individualized meal plan to assist with consistency, balance and portion control. Encouraged the consumption of balanced meals at appropriate times. Advised patient to keep carbohydrate intake to 45 grams per meal and 15 grams per snack to assist with glycemic control. Portion booklet and food labels were used to teach basic carbohydrate counting. Patient stated he will call at a later date if he desires additional MNT education. RD will remain available for further dietary questions/concerns.      Ht Readings from Last 1 Encounters:   06/26/23 6' (1.829 m)     Wt Readings from Last 3 Encounters:   07/05/23 90.7 kg (200 lb)   06/26/23 92.8 kg (204 lb 9.6 oz)   06/22/23 93.3 kg (205 lb 9.6 oz)     Weight Change: Yes lost 4 lbs since his last encounter    Barriers to Learning: no barriers    Do you follow any special diet presently?: Yes - has cut out the 1 liter of regular soda/day, and the "sweets."   Who shops: patient and sister  Who cooks: patient and sister    Food Log: Completed via the method of food recall    Breakfast:7:30AM - 2 eggs, barros, home fries with gravy; or pancakes with sausage; or Cheerios 3/4 c. with 1/2 milk  Morning Snack:None  Lunch:12:30PM - cheese sandwich with 6 thin slices of cheese, apple, water  Afternoon Snack: None  AYGWVZ:2CG - 3 thin slices strip steak, 3 small potatoes  Evening Snack:None  Beverages: water, coffee  Eating out/Take out:at least 1x/week  Exercise walks    Calorie needs 2200kcals/day Carbs: 45g/meal, 15g/snack        Nutrition Diagnosis:  Food and nutrition related knowledge deficit  related to Lack of prior exposure to accurate nutrition related information as evidenced by Verbalizes inaccurate or incomplete information    Intervention: plate method, label reading, carbohydrate counting, increased protein intake, meal timing, weight/ BMI goals, meal planning, individualized meal plan and monitoring portion control     Treatment Goals: Patient will consume 3 meals a day, Patient will consume snacks and Patient will count carbohydrates    Monitoring and evaluation:    Term code indicator  FH 1.3.2 Food Intake Criteria: Consume 3 balanced meals/day at appropriate times. Term code indicator  FH 1.6.3 Carbohydrate Intake Criteria: 45 grams carbohydrate/meal and 15 grams carbohydrate/snack. Term code indicator  FH 1.3.2 Food Intake Criteria: Consume 2 balanced snacks/day at appropriate times. Materials Provided: CHO counting, individualized balanced meal plan, portion booklet    Patient’s Response to Instruction:  Comprehensiongood  Motivationgood  Expected Compliancegood    Begin Time: 9:10AM  End Time: 10:10AM  Referring Provider: Kelton Miner    Thank you for coming to the [address] [place] [address]  for education today. Please feel free to call with any questions or concerns.     [address] [place] [address]  [address] [address] [id number]

## 2023-07-05 NOTE — PATIENT INSTRUCTIONS
Consume 3 balanced meals/day at appropriate times. 30 grams carbohydrate/meal and 15 grams carbohydrate/snack. Consume at least  1 balanced snack/day at appropriate time.

## 2023-07-24 ENCOUNTER — APPOINTMENT (OUTPATIENT)
Age: 68
End: 2023-07-24
Payer: COMMERCIAL

## 2023-07-24 ENCOUNTER — OFFICE VISIT (OUTPATIENT)
Age: 68
End: 2023-07-24
Payer: COMMERCIAL

## 2023-07-24 VITALS
BODY MASS INDEX: 26.76 KG/M2 | TEMPERATURE: 97.5 F | SYSTOLIC BLOOD PRESSURE: 116 MMHG | WEIGHT: 197.6 LBS | OXYGEN SATURATION: 98 % | HEIGHT: 72 IN | DIASTOLIC BLOOD PRESSURE: 78 MMHG | HEART RATE: 72 BPM

## 2023-07-24 DIAGNOSIS — Z12.5 PROSTATE CANCER SCREENING: Primary | ICD-10-CM

## 2023-07-24 DIAGNOSIS — E11.9 TYPE 2 DIABETES MELLITUS WITHOUT COMPLICATION, WITHOUT LONG-TERM CURRENT USE OF INSULIN (HCC): ICD-10-CM

## 2023-07-24 DIAGNOSIS — Z12.5 PROSTATE CANCER SCREENING: ICD-10-CM

## 2023-07-24 DIAGNOSIS — Z23 ENCOUNTER FOR IMMUNIZATION: ICD-10-CM

## 2023-07-24 LAB — PSA SERPL-MCNC: 0.94 NG/ML (ref 0–4)

## 2023-07-24 PROCEDURE — 36415 COLL VENOUS BLD VENIPUNCTURE: CPT

## 2023-07-24 PROCEDURE — 99213 OFFICE O/P EST LOW 20 MIN: CPT | Performed by: INTERNAL MEDICINE

## 2023-07-24 PROCEDURE — G0439 PPPS, SUBSEQ VISIT: HCPCS | Performed by: INTERNAL MEDICINE

## 2023-07-24 PROCEDURE — G0103 PSA SCREENING: HCPCS

## 2023-07-24 NOTE — PROGRESS NOTES
Assessment and Plan:     Problem List Items Addressed This Visit        Endocrine    Type 2 diabetes mellitus without complication, without long-term current use of insulin ([address])   Other Visit Diagnoses     Encounter for immunization               Preventive health issues were discussed with patient, and age appropriate screening tests were ordered as noted in patient's After Visit Summary. Personalized health advice and appropriate referrals for health education or preventive services given if needed, as noted in patient's After Visit Summary. History of Present Illness:     Patient presents for a Medicare Wellness Visit    A well visit for a 39-year-old with atypical collection of issues with somebody in his age bracket. Diabetic well controlled  Recent tick bite. Baseline Lyme negative. He got Doxy for a week and we will recheck a Lyme titer at 6 weeks out from the tick bite which is coming up on now anyway. Stable asthma    Shortness of breath on exertion; was referred to cardiology. Abnormal cardiac work-up. Gets occasional lightheadedness with exertion. Patient Care Team:  Olimpia Calvo MD as PCP - General (Internal Medicine)  Olimpia Calvo MD as PCP - [address] [place] [address] (RTE)  Olimpia Calvo MD as PCP - PCP-[facility] (RTE)  Leigh Ann Ruelas as Diabetes Educator (Diabetes Services)     Review of Systems:     Review of Systems   Musculoskeletal: Positive for arthralgias. Neurological: Positive for light-headedness. All other systems reviewed and are negative.        Problem List:     Patient Active Problem List   Diagnosis   • Allergic rhinitis   • Hyperlipidemia   • Hypertension   • Hyponatremia   • Impaired fasting glucose   • Urinary frequency   • Prostate cancer screening   • Mild aortic stenosis   • Mild mitral regurgitation   • Symptoms of upper respiratory infection (URI)   • ABUNDIO (obstructive sleep apnea)   • SOB (shortness of breath)   • Snoring   • Mild intermittent asthma without complication   • Moderate persistent asthma without complication   • Type 2 diabetes mellitus without complication, without long-term current use of insulin (HCC)      Past Medical and Surgical History:     Past Medical History:   Diagnosis Date   • Asthma    • Disc degeneration, lumbar    • Peripheral vascular disease ([address])    • SOB (shortness of breath)    • Wheezing      Past Surgical History:   Procedure Laterality Date   • ESOPHAGOSCOPY        Family History:     Family History   Problem Relation Age of Onset   • Breast cancer Mother    • Hip fracture Father       Social History:     Social History     Socioeconomic History   • Marital status: Single     Spouse name: None   • Number of children: None   • Years of education: None   • Highest education level: None   Occupational History   • Occupation: retired   Tobacco Use   • Smoking status: Never   • Smokeless tobacco: Never   Vaping Use   • Vaping Use: Never used   Substance and Sexual Activity   • Alcohol use: No   • Drug use: No   • Sexual activity: Not Currently   Other Topics Concern   • None   Social History Narrative   • None     Social Determinants of Health     Financial Resource Strain: Medium Risk (7/24/2023)    Overall Financial Resource Strain (CARDIA)    • Difficulty of Paying Living Expenses: Somewhat hard   Food Insecurity: Not on file   Transportation Needs: No Transportation Needs (7/24/2023)    PRAPARE - Transportation    • Lack of Transportation (Medical): No    • Lack of Transportation (Non-Medical):  No   Physical Activity: Inactive (5/17/2021)    Exercise Vital Sign    • Days of Exercise per Week: 0 days    • Minutes of Exercise per Session: 0 min   Stress: No Stress Concern Present (7/24/2023)    [facility]    • Feeling of Stress : Not at all   Social Connections: Not on file   Intimate Partner Violence: Not on file   Housing Stability: Not on file      Medications and Allergies:     Current Outpatient Medications   Medication Sig Dispense Refill   • amLODIPine (NORVASC) 5 mg tablet TAKE 1 TABLET (5 MG TOTAL) BY MOUTH DAILY. 90 tablet 3   • aspirin 81 mg chewable tablet Chew 1 tablet (81 mg total) daily     • atorvastatin (LIPITOR) 40 mg tablet Take 1 tablet (40 mg total) by mouth daily 100 tablet 1   • Blood Glucose Monitoring Suppl (OneTouch Verio Reflect) w/Device KIT Check blood sugars once daily. Please substitute with appropriate alternative as covered by patient's insurance. Dx: E11.65 1 kit 0   • fluticasone-vilanterol (Breo Ellipta) 200-25 mcg/actuation inhaler Inhale 1 puff daily Rinse mouth after use. 60 each 5   • glucose blood (OneTouch Verio) test strip Check blood sugars once daily. Please substitute with appropriate alternative as covered by patient's insurance. Dx: E11.65 100 each 3   • ipratropium-albuterol (DUO-NEB) 0.5-2.5 mg/3 mL nebulizer solution TAKE 3 ML BY NEBULIZATION 4 (FOUR) TIMES A  mL 3   • lisinopril (ZESTRIL) 40 mg tablet TAKE 1 TABLET BY MOUTH EVERY DAY 90 tablet 3   • metFORMIN (GLUCOPHAGE-XR) 500 mg 24 hr tablet TAKE 2 TABLETS BY MOUTH EVERY DAY WITH BREAKFAST 180 tablet 2   • OneTouch Delica Lancets 93F MISC Check blood sugars once daily. Please substitute with appropriate alternative as covered by patient's insurance. Dx: E11.65 100 each 3   • pantoprazole (PROTONIX) 40 mg tablet TAKE 1 TABLET (40 MG TOTAL) BY MOUTH DAILY 1/2 HOUR BEFORE BREAKFAST 30 tablet 0   • fluticasone (FLONASE) 50 mcg/act nasal spray SPRAY 1 SPRAY INTO EACH NOSTRIL EVERY DAY (Patient not taking: Reported on 5/31/2023) 24 mL 2     No current facility-administered medications for this visit.      Allergies   Allergen Reactions   • Pollen Extract Nasal Congestion      Immunizations:     Immunization History   Administered Date(s) Administered   • COVID-19 PFIZER VACCINE 0.3 ML IM 03/18/2021, 04/12/2021, 12/21/2021   • INFLUENZA 11/05/2014, 01/05/2016, 10/17/2017, 10/21/2022   • Influenza Quadrivalent, 6-35 Months IM 11/05/2014, 01/05/2016, 10/17/2017   • Influenza, high dose seasonal 0.7 mL 01/23/2020, 11/29/2021, 10/21/2022   • Influenza, seasonal, injectable 1955   • Pneumococcal Conjugate 13-Valent 07/12/2019   • Pneumococcal Polysaccharide PPV23 1955, 01/23/2020   • Tdap [birth year]   • Zoster 1955      Health Maintenance:         Topic Date Due   • Colorectal Cancer Screening  06/17/2024   • Hepatitis C Screening  Completed         Topic Date Due   • COVID-19 Vaccine (4 - Pfizer series) 02/15/2022   • Influenza Vaccine (1) 09/01/2023      Medicare Screening Tests and Risk Assessments:     Annual Wellness Visit  No results found. Physical Exam:     /78 (BP Location: Left arm, Patient Position: Sitting, Cuff Size: Standard)   Pulse 72   Temp 97.5 °F (36.4 °C) (Temporal)   Ht 6' (1.829 m)   Wt 89.6 kg (197 lb 9.6 oz)   SpO2 98%   BMI 26.80 kg/m²     Physical Exam  Cardiovascular:      Rate and Rhythm: Normal rate. Skin:     General: Skin is warm. Neurological:      General: No focal deficit present. Mental Status: He is alert.    Psychiatric:         Mood and Affect: Mood normal.          Patrice Dougherty MD

## 2023-07-24 NOTE — PROGRESS NOTES
Assessment and Plan:     Problem List Items Addressed This Visit        Endocrine    Type 2 diabetes mellitus without complication, without long-term current use of insulin ([address])   Other Visit Diagnoses     Encounter for immunization            BMI Counseling: Body mass index is 26.8 kg/m². The BMI is above normal. Nutrition recommendations include decreasing portion sizes and moderation in carbohydrate intake. Rationale for BMI follow-up plan is due to patient being overweight or obese. Depression Screening and Follow-up Plan: Patient was screened for depression during today's encounter. They screened negative with a PHQ-2 score of 0. Preventive health issues were discussed with patient, and age appropriate screening tests were ordered as noted in patient's After Visit Summary. Personalized health advice and appropriate referrals for health education or preventive services given if needed, as noted in patient's After Visit Summary.      History of Present Illness:     Patient presents for a Medicare Wellness Visit    HPI   Patient Care Team:  Holly Cheema MD as PCP - General (Internal Medicine)  Holly Cheema MD as PCP - [address] [place] [address] (RTE)  Holly Cheema MD as PCP - PCP-[facility] ([facility])  Jaclyn Marshall as Diabetes Educator (Diabetes Services)     Review of Systems:     Review of Systems     Problem List:     Patient Active Problem List   Diagnosis   • Allergic rhinitis   • Hyperlipidemia   • Hypertension   • Hyponatremia   • Impaired fasting glucose   • Urinary frequency   • Prostate cancer screening   • Mild aortic stenosis   • Mild mitral regurgitation   • Symptoms of upper respiratory infection (URI)   • ABUNDIO (obstructive sleep apnea)   • SOB (shortness of breath)   • Snoring   • Mild intermittent asthma without complication   • Moderate persistent asthma without complication   • Type 2 diabetes mellitus without complication, without long-term current use of insulin (HCC)      Past Medical and Surgical History:     Past Medical History:   Diagnosis Date   • Asthma    • Disc degeneration, lumbar    • Peripheral vascular disease ([address])    • SOB (shortness of breath)    • Wheezing      Past Surgical History:   Procedure Laterality Date   • ESOPHAGOSCOPY        Family History:     Family History   Problem Relation Age of Onset   • Breast cancer Mother    • Hip fracture Father       Social History:     Social History     Socioeconomic History   • Marital status: Single     Spouse name: None   • Number of children: None   • Years of education: None   • Highest education level: None   Occupational History   • Occupation: retired   Tobacco Use   • Smoking status: Never   • Smokeless tobacco: Never   Vaping Use   • Vaping Use: Never used   Substance and Sexual Activity   • Alcohol use: No   • Drug use: No   • Sexual activity: Not Currently   Other Topics Concern   • None   Social History Narrative   • None     Social Determinants of Health     Financial Resource Strain: Not on file   Food Insecurity: Not on file   Transportation Needs: Not on file   Physical Activity: Inactive (5/17/2021)    Exercise Vital Sign    • Days of Exercise per Week: 0 days    • Minutes of Exercise per Session: 0 min   Stress: No Stress Concern Present (7/24/2023)    [address] [place] [address]    • Feeling of Stress : Not at all   Social Connections: Not on file   Intimate Partner Violence: Not on file   Housing Stability: Not on file      Medications and Allergies:     Current Outpatient Medications   Medication Sig Dispense Refill   • amLODIPine (NORVASC) 5 mg tablet TAKE 1 TABLET (5 MG TOTAL) BY MOUTH DAILY.  90 tablet 3   • aspirin 81 mg chewable tablet Chew 1 tablet (81 mg total) daily     • atorvastatin (LIPITOR) 40 mg tablet Take 1 tablet (40 mg total) by mouth daily 100 tablet 1   • Blood Glucose Monitoring Suppl (OneTouch Verio Reflect) w/Device KIT Check blood sugars once daily. Please substitute with appropriate alternative as covered by patient's insurance. Dx: E11.65 1 kit 0   • fluticasone-vilanterol (Breo Ellipta) 200-25 mcg/actuation inhaler Inhale 1 puff daily Rinse mouth after use. 60 each 5   • glucose blood (OneTouch Verio) test strip Check blood sugars once daily. Please substitute with appropriate alternative as covered by patient's insurance. Dx: E11.65 100 each 3   • ipratropium-albuterol (DUO-NEB) 0.5-2.5 mg/3 mL nebulizer solution TAKE 3 ML BY NEBULIZATION 4 (FOUR) TIMES A  mL 3   • lisinopril (ZESTRIL) 40 mg tablet TAKE 1 TABLET BY MOUTH EVERY DAY 90 tablet 3   • metFORMIN (GLUCOPHAGE-XR) 500 mg 24 hr tablet TAKE 2 TABLETS BY MOUTH EVERY DAY WITH BREAKFAST 180 tablet 2   • OneTouch Delica Lancets 26O MISC Check blood sugars once daily. Please substitute with appropriate alternative as covered by patient's insurance. Dx: E11.65 100 each 3   • pantoprazole (PROTONIX) 40 mg tablet TAKE 1 TABLET (40 MG TOTAL) BY MOUTH DAILY 1/2 HOUR BEFORE BREAKFAST 30 tablet 0   • fluticasone (FLONASE) 50 mcg/act nasal spray SPRAY 1 SPRAY INTO EACH NOSTRIL EVERY DAY (Patient not taking: Reported on 5/31/2023) 24 mL 2     No current facility-administered medications for this visit.      Allergies   Allergen Reactions   • Pollen Extract Nasal Congestion      Immunizations:     Immunization History   Administered Date(s) Administered   • COVID-19 PFIZER VACCINE 0.3 ML IM 03/18/2021, 04/12/2021, 12/21/2021   • INFLUENZA 11/05/2014, 01/05/2016, 10/17/2017, 10/21/2022   • Influenza Quadrivalent, 6-35 Months IM 11/05/2014, 01/05/2016, 10/17/2017   • Influenza, high dose seasonal 0.7 mL 01/23/2020, 11/29/2021, 10/21/2022   • Influenza, seasonal, injectable 1955   • Pneumococcal Conjugate 13-Valent 07/12/2019   • Pneumococcal Polysaccharide PPV23 1955, 01/23/2020   • Tdap 1955, 1955   • Zoster 1955      Health Maintenance:         Topic Date Due   • Colorectal Cancer Screening  06/17/2024   • Hepatitis C Screening  Completed         Topic Date Due   • COVID-19 Vaccine (4 - Pfizer series) 02/15/2022   • Influenza Vaccine (1) 09/01/2023      Medicare Screening Tests and Risk Assessments:     Dion Romero is here for his Subsequent Wellness visit. Last Medicare Wellness visit information reviewed, patient interviewed and updates made to the record today. Health Risk Assessment:   Patient rates overall health as fair. Patient feels that their physical health rating is same. Patient is satisfied with their life. Eyesight was rated as same. Hearing was rated as same. Patient feels that their emotional and mental health rating is same. Patients states they are sometimes angry. Patient states they are sometimes unusually tired/fatigued. Pain experienced in the last 7 days has been none. Patient states that he has experienced no weight loss or gain in last 6 months. Depression Screening:   PHQ-2 Score: 0      Fall Risk Screening: In the past year, patient has experienced: history of falling in past year    Number of falls: 2 or more  Injured during fall?: Yes    Feels unsteady when standing or walking?: No    Worried about falling?: No      Home Safety:  Patient does not have trouble with stairs inside or outside of their home. Patient has working smoke alarms Home safety hazards include: none. Nutrition:   Current diet is Regular and Limited junk food. Medications:   Patient is not currently taking any over-the-counter supplements. Patient is able to manage medications. Activities of Daily Living (ADLs)/Instrumental Activities of Daily Living (IADLs):   Walk and transfer into and out of bed and chair?: Yes  Dress and groom yourself?: Yes    Bathe or shower yourself?: Yes    Feed yourself?  Yes  Do your laundry/housekeeping?: Yes  Manage your money, pay your bills and track your expenses?: Yes  Make your own meals?: Yes    Do your own shopping?: Yes    Previous Hospitalizations:   Any hospitalizations or ED visits within the last 12 months?: Yes    How many hospitalizations have you had in the last year?: 1-2    Advance Care Planning:   Living will: No    Advanced directive: No    Patient declined ACP directive: Yes      Cognitive Screening:   Provider or family/friend/caregiver concerned regarding cognition?: No    PREVENTIVE SCREENINGS      Cardiovascular Screening:    General: Screening Not Indicated and History Lipid Disorder      Diabetes Screening:     General: Screening Not Indicated and History Diabetes      Colorectal Cancer Screening:     General: Screening Current      Abdominal Aortic Aneurysm (AAA) Screening:    Risk factors include: age between 70-75 yo        Lung Cancer Screening:     General: Screening Not Indicated      Hepatitis C Screening:    General: Screening Current    Screening, Brief Intervention, and Referral to Treatment (SBIRT)    Screening  Typical number of drinks in a day: 0  Typical number of drinks in a week: 0  Interpretation: Low risk drinking behavior. AUDIT-C Screenin) How often did you have a drink containing alcohol in the past year? never  2) How many drinks did you have on a typical day when you were drinking in the past year? 0  3) How often did you have 6 or more drinks on one occasion in the past year? never    AUDIT-C Score: 0  Interpretation: Score 0-3 (male): Negative screen for alcohol misuse    Single Item Drug Screening:  How often have you used an illegal drug (including marijuana) or a prescription medication for non-medical reasons in the past year? never    Single Item Drug Screen Score: 0  Interpretation: Negative screen for possible drug use disorder    Review of Current Opioid Use    Opioid Risk Tool (ORT) Interpretation: Complete Opioid Risk Tool (ORT)    No results found. Physical Exam:     There were no vitals taken for this visit.     Physical Exam     Christina Nath MD

## 2023-07-27 DIAGNOSIS — K22.10 EROSIVE ESOPHAGITIS: ICD-10-CM

## 2023-07-27 RX ORDER — PANTOPRAZOLE SODIUM 40 MG/1
40 TABLET, DELAYED RELEASE ORAL DAILY
Qty: 90 TABLET | Refills: 0 | Status: SHIPPED | OUTPATIENT
Start: 2023-07-27 | End: 2023-09-11 | Stop reason: SDUPTHER

## 2023-08-03 ENCOUNTER — TELEPHONE (OUTPATIENT)
Age: 68
End: 2023-08-03

## 2023-08-03 NOTE — TELEPHONE ENCOUNTER
Patient is being charged a co-pay for PCP and shouldn't be I have sent this issues to billing for adjustment for [facility] 4/24, 5/19, 5/23, & 5/24

## 2023-08-16 ENCOUNTER — OFFICE VISIT (OUTPATIENT)
Dept: CARDIOLOGY CLINIC | Facility: CLINIC | Age: 68
End: 2023-08-16
Payer: COMMERCIAL

## 2023-08-16 VITALS
SYSTOLIC BLOOD PRESSURE: 110 MMHG | WEIGHT: 196 LBS | DIASTOLIC BLOOD PRESSURE: 78 MMHG | OXYGEN SATURATION: 97 % | HEART RATE: 78 BPM | BODY MASS INDEX: 25.98 KG/M2 | HEIGHT: 73 IN | RESPIRATION RATE: 16 BRPM

## 2023-08-16 DIAGNOSIS — E78.2 MIXED HYPERLIPIDEMIA: ICD-10-CM

## 2023-08-16 DIAGNOSIS — I10 ESSENTIAL HYPERTENSION: ICD-10-CM

## 2023-08-16 DIAGNOSIS — I35.0 NONRHEUMATIC AORTIC (VALVE) STENOSIS: Primary | ICD-10-CM

## 2023-08-16 DIAGNOSIS — I51.7 LVH (LEFT VENTRICULAR HYPERTROPHY): ICD-10-CM

## 2023-08-16 PROCEDURE — 99214 OFFICE O/P EST MOD 30 MIN: CPT | Performed by: INTERNAL MEDICINE

## 2023-08-16 RX ORDER — LISINOPRIL 30 MG/1
30 TABLET ORAL DAILY
Qty: 90 TABLET | Refills: 2 | Status: SHIPPED | OUTPATIENT
Start: 2023-08-16

## 2023-08-16 NOTE — PROGRESS NOTES
CARDIOLOGY OFFICE VISIT  [facility]  [address], [place], [address] [facility]  [address] [facility], [address], [address] [facility]  Tel: [phone]      NAME: Malik Ann  AGE: 70 y.o. SEX: male  : 1955  MRN: [id number]      Chief Complaint:  Chief Complaint   Patient presents with   • Follow-up         History of Present Illness:   Patient comes for follow up. SOB is better. Denies any further episodes of chest pain. Pt denies palpitations, lightheadedness, syncope, swelling feet, orthopnea, PND, claudication. Essential hypertension, LVH -  Has been hypertensive for many years. Taking medications regularly. Denies lightheadedness, headache, medication side effects. Mixed hyperlipidemia -  Has had hyperlipidemia for many years. Taking statin regularly along with diet control. Denies myalgia. PCP closely monitoring the blood work.     Mild to moderate aortic stenosis per echo 2023      Past Medical History:  Past Medical History:   Diagnosis Date   • Asthma    • Disc degeneration, lumbar    • Peripheral vascular disease ([address])    • SOB (shortness of breath)    • Wheezing          Past Surgical History:  Past Surgical History:   Procedure Laterality Date   • ESOPHAGOSCOPY           Family History:  Family History   Problem Relation Age of Onset   • Breast cancer Mother    • Hip fracture Father          Social History:  Social History     Socioeconomic History   • Marital status: Single     Spouse name: None   • Number of children: None   • Years of education: None   • Highest education level: None   Occupational History   • Occupation: retired   Tobacco Use   • Smoking status: Never   • Smokeless tobacco: Never   Vaping Use   • Vaping Use: Never used   Substance and Sexual Activity   • Alcohol use: No   • Drug use: No   • Sexual activity: Not Currently   Other Topics Concern   • None   Social History Narrative   • None     Social Determinants of Health     Financial Resource Strain: Medium Risk (7/24/2023)    Overall Financial Resource Strain (CARDIA)    • Difficulty of Paying Living Expenses: Somewhat hard   Food Insecurity: Not on file   Transportation Needs: No Transportation Needs (7/24/2023)    PRAPARE - Transportation    • Lack of Transportation (Medical): No    • Lack of Transportation (Non-Medical): No   Physical Activity: Inactive (5/17/2021)    Exercise Vital Sign    • Days of Exercise per Week: 0 days    • Minutes of Exercise per Session: 0 min   Stress: No Stress Concern Present (7/24/2023)    [facility]    • Feeling of Stress : Not at all   Social Connections: Not on file   Intimate Partner Violence: Not on file   Housing Stability: Not on file         Active Problems:  Patient Active Problem List   Diagnosis   • Allergic rhinitis   • Hyperlipidemia   • Hypertension   • Hyponatremia   • Impaired fasting glucose   • Urinary frequency   • Prostate cancer screening   • Mild aortic stenosis   • Mild mitral regurgitation   • Symptoms of upper respiratory infection (URI)   • ABUNDIO (obstructive sleep apnea)   • SOB (shortness of breath)   • Snoring   • Mild intermittent asthma without complication   • Moderate persistent asthma without complication   • Type 2 diabetes mellitus without complication, without long-term current use of insulin (HCC)         The following portions of the patient's history were reviewed and updated as appropriate: past medical history, past surgical history, past family history,  past social history, current medications, allergies and problem list.      Review of Systems:  Constitutional: Denies fever, chills  Eyes: Denies eye redness, eye discharge  ENT: Denies hearing loss, sneezing, nasal discharge, sore throat   Respiratory: Denies cough, expectoration.  +shortness of breath  Cardiovascular: Denies chest pain, palpitations, lower extremity swelling  Gastrointestinal: Denies abdominal pain, nausea, vomiting, diarrhea  Genito-Urinary: Denies dysuria, incontinence  Musculoskeletal: Denies back pain, joint pain, muscle pain  Neurologic: Denies lightheadedness, syncope, headache, seizures  Endocrine: Denies polydipsia, temperature intolerance  Allergy and Immunology: Denies hives, insect bite sensitivity  Hematological and Lymphatic: Denies bleeding problems, swollen glands   Psychological: Denies depression, suicidal ideation, anxiety, panic  Dermatological: Denies pruritus, rash, skin lesion changes      Vitals:  Vitals:    08/16/23 1310   BP: 110/78   Pulse: 78   Resp: 16   SpO2: 97%       Body mass index is 25.86 kg/m². Weight (last 2 days)     Date/Time Weight    08/16/23 1310 88.9 (196)            Physical Examination:  General: Patient is not in acute distress. Awake, alert, oriented in time, place and person. Responding to commands  Head: Normocephalic. Atraumatic  Eyes: Both pupils normal sized, round and reactive to light. Nonicteric  ENT: Normal external ear canals  Neck: Supple. JVP not raised. Trachea central. No thyromegaly  Lungs: Bilateral bronchovascular breath sounds with no crackles or rhonchi  Chest wall: No tenderness  Cardiovascular: RRR. S1 and S2 normal. Grade 3/6 KATERINE at base+  Gastrointestinal: Abdomen soft, nontender. No guarding or rigidity. Liver and spleen not palpable. Bowel sounds present  Neurologic: Patient is awake, alert, oriented in time, place and person. Responding to commands. Moving all extremities  Integumentary:  No skin rash  Lymphatic: No cervical lymphadenopathy  Back: Symmetric.  No CVA tenderness  Extremities: No clubbing, cyanosis or edema      Laboratory Results:  CBC with diff:   Lab Results   Component Value Date    WBC 7.04 05/21/2023    RBC 4.66 05/21/2023    HGB 14.3 05/21/2023    HCT 41.8 05/21/2023    MCV 90 05/21/2023    MCH 30.7 05/21/2023    RDW 12.5 05/21/2023     05/21/2023       CMP:  Lab Results   Component Value Date CREATININE 1.04 05/21/2023    BUN 13 05/21/2023    K 4.0 05/21/2023    CL 99 05/21/2023    CO2 26 05/21/2023    ALKPHOS 74 05/21/2023    ALT 45 05/21/2023    AST 26 05/21/2023    BILIDIR 0.08 05/21/2023       Lab Results   Component Value Date    HGBA1C 7.0 (H) 05/23/2023    HGBA1C 6.3 (H) 12/07/2018    MG 2.2 03/10/2023       Lab Results   Component Value Date    TROPONINI <0.02 04/11/2021       Lipid Profile:   No results found for: "CHOL"  Lab Results   Component Value Date    HDL 37 (L) 03/16/2021    HDL 39 (L) 07/06/2020    HDL 42 06/22/2019     Lab Results   Component Value Date    LDLCALC 103 (H) 03/16/2021    LDLCALC 105 (H) 07/06/2020    LDLCALC 108 (H) 06/22/2019     Lab Results   Component Value Date    TRIG 227 (H) 03/16/2021    TRIG 172 (H) 07/06/2020    TRIG 162 (H) 06/22/2019       Cardiac testing:   Results for orders placed during the hospital encounter of 06/16/23    Echo complete w/ contrast if indicated    Interpretation Summary  •  Left Ventricle: Left ventricular cavity size is normal. Wall thickness is mildly increased. There is mild concentric hypertrophy. The left ventricular ejection fraction is 60%. Systolic function is normal. Wall motion is normal. Diastolic function is normal.  •  Aortic Valve: The aortic valve is trileaflet. The leaflets are moderately calcified. There is mild to moderate stenosis. Peak gradient 34 mmHg. Mean gradient 21 mmHg. Calculated aortic valve area approximately 0.8 to 0.9 cm². •  Mitral Valve: There is mild annular calcification. Results for orders placed during the hospital encounter of 06/16/23    NM myocardial perfusion spect (rx stress and/or rest)    Interpretation Summary  •  Stress ECG: No ST deviation is noted. The ECG was negative for ischemia. •  Perfusion Defect Conclusion: There is no evidence of transient ischemic dilation (TID). TID index 1.04.  •  Perfusion: There are no perfusion defects.   •  Stress Function: Left ventricular function post-stress is normal. Stress ejection fraction is 70 %. •  Stress Combined Conclusion: Left ventricular perfusion is normal.    Results for orders placed during the hospital encounter of 20    Stress test only, exercise    Narrative  [address] [place] [address], [address] [place] [address]  [phone]    Stress Electrocardiography during Exercise    Name: Kathi Joseph  MR #: [id number]  Account #: [de-identified]  Study date: 2020  : 1955  Age: 71 years  Gender: Male  Height: 72 in  Weight: 219 lb  BSA: 2.22 mï¾²    Allergies: NO KNOWN ALLERGIES    Diagnosis: R06.09 - Other forms of dyspnea    Technician:  Swathi Gustafson MS, CCT  GROUP:  [facility]  Interpreting Physician:  Ana Powell MD  Referring Physician:  Devonte Hawthorne MD  Primary Physician:  Devonte Hawthorne MD    CLINICAL QUESTION: Detection of coronary artery disease. HISTORY: The patient is a 71year old  male. Chest pain status: no chest pain. Other symptoms: dyspnea of recent onset. Coronary artery disease risk factors: dyslipidemia and hypertension. Cardiovascular history: none significant. Medications: an ACE inhibitor/ARB, a lipid lowering agent, and an antihypertensive agent. REST ECG: Normal sinus rhythm. Normal baseline ECG. PROCEDURE: Treadmill exercise testing was performed, using the Dejuan protocol. Stress electrocardiographic evaluation was performed. DEJUAN PROTOCOL:  HR bpm SBP mmHg DBP mmHg Symptoms Rhythm/conduct  Baseline 75 142 86 none NSR  Stage 1 88 162 78 -- NSR  Stage 2 95 168 78 -- NSR  Stage 3 123 190 78 -- sinus tach  Stage 4 137 -- -- mild fatigue sinus tach  Immediate 139 -- -- -- sinus tach  Recovery 1 123 178 62 -- sinus tach  Recovery 2 104 -- -- -- sinus tach  Recovery 3 99 -- -- -- NSR  Recovery 5 84 148 70 -- NSR  No medications or fluids given. STRESS SUMMARY: Duration of exercise was 10 min. The patient exercised to protocol stage 4.  Maximal work rate was 13.4 METs. Functional capacity was above normal (greater than 20%). Maximal heart rate during stress was 139 bpm ( 90 % of  maximal predicted heart rate). The heart rate response to stress was normal. There was resting hypertension with an appropriate blood pressure response to stress. The rate-pressure product for the peak heart rate and blood pressure was  [id number]. There was no chest pain during stress. The stress test was terminated due to achievement of target heart rate and mild fatigue. The stress ECG was negative for ischemia. There were no stress arrhythmias or conduction abnormalities. SUMMARY:  -  Stress results: Duration of exercise was 10 min. Target heart rate was achieved. There was resting hypertension with an appropriate blood pressure response to stress. There was no chest pain during stress. -  ECG conclusions: The stress ECG was negative for ischemia. IMPRESSIONS: Normal study at the work load achieved. Prepared and signed by    Gurvinder Sanford MD  Signed 02/03/2020 15:44:44      Medications:    Current Outpatient Medications:   •  lisinopril (ZESTRIL) 30 mg tablet, Take 1 tablet (30 mg total) by mouth daily, Disp: 90 tablet, Rfl: 2  •  amLODIPine (NORVASC) 5 mg tablet, TAKE 1 TABLET (5 MG TOTAL) BY MOUTH DAILY. , Disp: 90 tablet, Rfl: 3  •  aspirin 81 mg chewable tablet, Chew 1 tablet (81 mg total) daily, Disp: , Rfl:   •  atorvastatin (LIPITOR) 40 mg tablet, Take 1 tablet (40 mg total) by mouth daily, Disp: 100 tablet, Rfl: 1  •  Blood Glucose Monitoring Suppl (OneTouch Verio Reflect) w/Device KIT, Check blood sugars once daily. Please substitute with appropriate alternative as covered by patient's insurance.  Dx: E11.65, Disp: 1 kit, Rfl: 0  •  fluticasone (FLONASE) 50 mcg/act nasal spray, SPRAY 1 SPRAY INTO EACH NOSTRIL EVERY DAY (Patient not taking: Reported on 5/31/2023), Disp: 24 mL, Rfl: 2  •  fluticasone-vilanterol (Breo Ellipta) 200-25 mcg/actuation inhaler, Inhale 1 puff daily Rinse mouth after use., Disp: 60 each, Rfl: 5  •  glucose blood (OneTouch Verio) test strip, Check blood sugars once daily. Please substitute with appropriate alternative as covered by patient's insurance. Dx: E11.65, Disp: 100 each, Rfl: 3  •  ipratropium-albuterol (DUO-NEB) 0.5-2.5 mg/3 mL nebulizer solution, TAKE 3 ML BY NEBULIZATION 4 (FOUR) TIMES A DAY, Disp: 360 mL, Rfl: 3  •  metFORMIN (GLUCOPHAGE-XR) 500 mg 24 hr tablet, TAKE 2 TABLETS BY MOUTH EVERY DAY WITH BREAKFAST, Disp: 180 tablet, Rfl: 2  •  OneTouch Delica Lancets 53B MISC, Check blood sugars once daily. Please substitute with appropriate alternative as covered by patient's insurance. Dx: E11.65, Disp: 100 each, Rfl: 3  •  pantoprazole (PROTONIX) 40 mg tablet, TAKE 1 TABLET (40 MG TOTAL) BY MOUTH DAILY 1/2 HOUR BEFORE BREAKFAST, Disp: 90 tablet, Rfl: 0      Allergies: Allergies   Allergen Reactions   • Pollen Extract Nasal Congestion         Assessment and Plan:  1. Nonrheumatic aortic (valve) stenosis  2D echo findings discussed with the patient. Eventual future need for valve replacement discussed. To be followed up with serial echocardiograms at recommended intervals  - Ambulatory Referral to Cardiology    2. Essential hypertension  BP soft. Decrease lisinopril from 40 mg to 30 mg daily. Continue amlodipine same as before. Continue to monitor BP at home and call if abnormal  - lisinopril (ZESTRIL) 30 mg tablet; Take 1 tablet (30 mg total) by mouth daily  Dispense: 90 tablet; Refill: 2    3. LVH (left ventricular hypertrophy)  Tight BP control    4. Mixed hyperlipidemia  Continue statin and diet control. His PCP closely monitors his blood work    Recommend aggressive risk factor modification and therapeutic lifestyle changes. Low-salt, low-calorie, low-fat, low-cholesterol diet with regular exercise and to optimize weight.   I will defer the ordering and monitoring of necessity lab studies to you, but I am available and happy to review and manage any of the data at your request in the future. Discussed concepts of atherosclerosis, including signs and symptoms of cardiac disease. Previous studies were reviewed. Safety measures were reviewed. Questions were entertained and answered. Patient was advised to report any problems requiring medical attention. Follow-up with PCP and appropriate specialist and lab work as discussed. Return for follow up visit as scheduled or earlier, if needed. Thank you for allowing me to participate in the care and evaluation of your patient. Should you have any questions, please feel free to contact me.       Robert Koroma MD  7/00/5207,0:00 PM

## 2023-08-25 ENCOUNTER — APPOINTMENT (OUTPATIENT)
Age: 68
End: 2023-08-25
Payer: COMMERCIAL

## 2023-08-25 DIAGNOSIS — S80.861A TICK BITE OF RIGHT LOWER LEG, INITIAL ENCOUNTER: ICD-10-CM
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
## 2023-08-25 DIAGNOSIS — E11.9 TYPE 2 DIABETES MELLITUS WITHOUT COMPLICATION, WITHOUT LONG-TERM CURRENT USE OF INSULIN (HCC): ICD-10-CM

## 2023-08-25 LAB
ANION GAP SERPL CALCULATED.3IONS-SCNC: 9 MMOL/L
B BURGDOR IGG+IGM SER QL IA: NEGATIVE
BUN SERPL-MCNC: 20 MG/DL (ref 5–25)
CALCIUM SERPL-MCNC: 10.1 MG/DL (ref 8.4–10.2)
CHLORIDE SERPL-SCNC: 99 MMOL/L (ref 96–108)
CO2 SERPL-SCNC: 27 MMOL/L (ref 21–32)
CREAT SERPL-MCNC: 1.18 MG/DL (ref 0.6–1.3)
CREAT UR-MCNC: 66.6 MG/DL
EST. AVERAGE GLUCOSE BLD GHB EST-MCNC: 131 MG/DL
GFR SERPL CREATININE-BSD FRML MDRD: 62 ML/MIN/1.73SQ M
GLUCOSE SERPL-MCNC: 116 MG/DL (ref 65–140)
HBA1C MFR BLD: 6.2 %
MICROALBUMIN UR-MCNC: <7 MG/L
MICROALBUMIN/CREAT 24H UR: <11 MG/G CREATININE (ref 0–30)
POTASSIUM SERPL-SCNC: 4.8 MMOL/L (ref 3.5–5.3)
SODIUM SERPL-SCNC: 135 MMOL/L (ref 135–147)

## 2023-08-25 PROCEDURE — 36415 COLL VENOUS BLD VENIPUNCTURE: CPT

## 2023-08-25 PROCEDURE — 82570 ASSAY OF URINE CREATININE: CPT

## 2023-08-25 PROCEDURE — 83036 HEMOGLOBIN GLYCOSYLATED A1C: CPT

## 2023-08-25 PROCEDURE — 82043 UR ALBUMIN QUANTITATIVE: CPT

## 2023-08-25 PROCEDURE — 86618 LYME DISEASE ANTIBODY: CPT

## 2023-08-25 PROCEDURE — 80048 BASIC METABOLIC PNL TOTAL CA: CPT

## 2023-08-28 ENCOUNTER — TELEPHONE (OUTPATIENT)
Age: 68
End: 2023-08-28

## 2023-08-28 NOTE — TELEPHONE ENCOUNTER
I spoke to arabella he is aware of his results and providers message he was also asking what were his PSA and eye exam

## 2023-08-29 NOTE — TELEPHONE ENCOUNTER
PSA was normal, 0.94. Eye exam left normal right they couldn"t see the retina.  He needs to see an opthamologist; Charla Maynard

## 2023-09-06 ENCOUNTER — TELEPHONE (OUTPATIENT)
Age: 68
End: 2023-09-06

## 2023-09-07 ENCOUNTER — APPOINTMENT (OUTPATIENT)
Age: 68
End: 2023-09-07
Payer: COMMERCIAL

## 2023-09-07 ENCOUNTER — TELEPHONE (OUTPATIENT)
Age: 68
End: 2023-09-07

## 2023-09-07 DIAGNOSIS — G70.00 MYASTHENIA GRAVIS (HCC): ICD-10-CM

## 2023-09-07 DIAGNOSIS — G70.00 MYASTHENIA GRAVIS (HCC): Primary | ICD-10-CM

## 2023-09-07 PROCEDURE — 83519 RIA NONANTIBODY: CPT

## 2023-09-07 PROCEDURE — 36415 COLL VENOUS BLD VENIPUNCTURE: CPT

## 2023-09-07 PROCEDURE — 86255 FLUORESCENT ANTIBODY SCREEN: CPT

## 2023-09-07 NOTE — TELEPHONE ENCOUNTER
Lmovm as per [facility]. Advised Pt of apt and orders for labs to be completed before apt as per . Phone number provided for questions and concerns.

## 2023-09-07 NOTE — TELEPHONE ENCOUNTER
S/w Dr. Alexandra Mejia who saw pt in office due to [facility] exam. He requests a myasthenia work up and apt with you for Ptosis, dysphagia noted at his apt. Pt is scheduled for 09/21/2023.

## 2023-09-11 DIAGNOSIS — K22.10 EROSIVE ESOPHAGITIS: ICD-10-CM

## 2023-09-11 LAB — ACHR BIND AB SER-SCNC: 0.15 NMOL/L (ref 0–0.24)

## 2023-09-11 RX ORDER — PANTOPRAZOLE SODIUM 40 MG/1
40 TABLET, DELAYED RELEASE ORAL EVERY MORNING
Qty: 60 TABLET | Refills: 0 | Status: SHIPPED | OUTPATIENT
Start: 2023-09-11

## 2023-09-11 RX ORDER — PANTOPRAZOLE SODIUM 40 MG/1
40 TABLET, DELAYED RELEASE ORAL DAILY
Qty: 90 TABLET | Refills: 0 | OUTPATIENT
Start: 2023-09-11

## 2023-09-11 NOTE — TELEPHONE ENCOUNTER
Requested medication(s) are due for refill today: Yes  Patient has already received a courtesy refill: Yes  Other reason request has been forwarded to provider: Courtesy refill previously given. Please review.

## 2023-09-12 LAB — ACHR BLOCK AB/ACHR TOTAL SFR SER: 8 % (ref 0–25)

## 2023-09-13 LAB — ACHR MOD AB/ACHR TOTAL SFR SER: 0 % (ref 0–45)

## 2023-09-14 LAB — MUSK AB SER IA-ACNC: <1 U/ML

## 2023-09-21 ENCOUNTER — OFFICE VISIT (OUTPATIENT)
Age: 68
End: 2023-09-21
Payer: COMMERCIAL

## 2023-09-21 VITALS
HEART RATE: 73 BPM | WEIGHT: 192.8 LBS | OXYGEN SATURATION: 97 % | BODY MASS INDEX: 26.11 KG/M2 | SYSTOLIC BLOOD PRESSURE: 118 MMHG | TEMPERATURE: 97.3 F | DIASTOLIC BLOOD PRESSURE: 76 MMHG | RESPIRATION RATE: 18 BRPM | HEIGHT: 72 IN

## 2023-09-21 DIAGNOSIS — E11.69 MIXED HYPERLIPIDEMIA DUE TO TYPE 2 DIABETES MELLITUS: ICD-10-CM

## 2023-09-21 DIAGNOSIS — Z23 ENCOUNTER FOR IMMUNIZATION: ICD-10-CM

## 2023-09-21 DIAGNOSIS — E78.2 MIXED HYPERLIPIDEMIA DUE TO TYPE 2 DIABETES MELLITUS: ICD-10-CM

## 2023-09-21 DIAGNOSIS — R13.10 DYSPHAGIA, UNSPECIFIED TYPE: ICD-10-CM

## 2023-09-21 DIAGNOSIS — E11.9 TYPE 2 DIABETES MELLITUS WITHOUT COMPLICATION, WITHOUT LONG-TERM CURRENT USE OF INSULIN (HCC): Primary | ICD-10-CM

## 2023-09-21 DIAGNOSIS — H02.401 PTOSIS, RIGHT EYELID: ICD-10-CM

## 2023-09-21 DIAGNOSIS — R49.9 CHANGE IN VOICE: ICD-10-CM

## 2023-09-21 DIAGNOSIS — I10 PRIMARY HYPERTENSION: ICD-10-CM

## 2023-09-21 PROBLEM — J45.40 MODERATE PERSISTENT ASTHMA WITHOUT COMPLICATION: Status: RESOLVED | Noted: 2023-05-08 | Resolved: 2023-09-21

## 2023-09-21 PROBLEM — I35.0 MILD AORTIC STENOSIS: Chronic | Status: ACTIVE | Noted: 2019-04-05

## 2023-09-21 PROBLEM — J45.20 MILD INTERMITTENT ASTHMA WITHOUT COMPLICATION: Chronic | Status: ACTIVE | Noted: 2022-07-07

## 2023-09-21 PROBLEM — Z12.5 PROSTATE CANCER SCREENING: Status: RESOLVED | Noted: 2019-03-11 | Resolved: 2023-09-21

## 2023-09-21 PROBLEM — R35.0 URINARY FREQUENCY: Status: RESOLVED | Noted: 2018-12-24 | Resolved: 2023-09-21

## 2023-09-21 PROBLEM — R09.89 SYMPTOMS OF UPPER RESPIRATORY INFECTION (URI): Status: RESOLVED | Noted: 2021-03-09 | Resolved: 2023-09-21

## 2023-09-21 PROBLEM — I34.0 MILD MITRAL REGURGITATION: Chronic | Status: ACTIVE | Noted: 2019-04-05

## 2023-09-21 PROCEDURE — G0008 ADMIN INFLUENZA VIRUS VAC: HCPCS | Performed by: INTERNAL MEDICINE

## 2023-09-21 PROCEDURE — 99214 OFFICE O/P EST MOD 30 MIN: CPT | Performed by: INTERNAL MEDICINE

## 2023-09-21 PROCEDURE — 90662 IIV NO PRSV INCREASED AG IM: CPT | Performed by: INTERNAL MEDICINE

## 2023-09-21 NOTE — PROGRESS NOTES
Name: Kings Cnoley      : 1955      MRN: [id number]  Encounter Provider: Eliezer Marlow DO  Encounter Date: 2023   Encounter department: [address]     1. Type 2 diabetes mellitus without complication, without long-term current use of insulin ([address])  2. Mixed hyperlipidemia due to type 2 diabetes mellitus ([address])    Most recent A1c was 6.2 % on 2023. He has done great making significant dietary and lifestyle changes. He is down 20 to 25 pounds. His A1c has come down nicely. Continue to stay motivated and will continue the current treatment plan. Repeat labs in 6 months. - Albumin / creatinine urine ratio; Future  - Comprehensive metabolic panel; Future  - Hemoglobin A1C; Future  - CBC and Platelet; Future  - Lipid Panel with Direct LDL reflex; Future    3. Primary hypertension    Blood pressure is well controlled in the office today. Continue current antihypertensives. 4. Ptosis, right eyelid  5. Dysphagia  6. Change in voice    Did not notice that much ptosis in the office today. Did review paperwork from his ophthalmologist who noted ptosis in the right upper eyelid. He is also having complaints of dysphagia. He had noticed a change in his voice. He did know his antibodies were negative for myasthenia gravis, I would still like him to see a neurologist for completeness. - Ambulatory Referral to Neurology; Future    7. Encounter for immunization  - influenza vaccine, high-dose, PF 0.7 mL (FLUZONE HIGH-DOSE)        No follow-ups on file. Subjective      Patient presents for follow-up. He was a former patient of Dr. Attila Gerard and establish with me today. Before the appointment, his ophthalmologist called us and was concerned about the possibility of myasthenia gravis. Noted patient to have a right upper eyelid ptosis and complaints of dysphagia. He was going through a lot earlier year with shortness of breath.   He felt like little exertion was causing him significant shortness of breath. He got multiple tests and saw cardiology and pulmonary. He did make significant changes with his diet and has lost some weight. His A1c has come down nicely. He has been feeling better. He is to follow-up with gastroenterology due to dysphagia. Review of Systems   Constitutional: Negative. HENT:  Positive for trouble swallowing and voice change. Ptosis   Respiratory: Negative. Cardiovascular: Negative. Gastrointestinal: Negative. Musculoskeletal: Negative. Neurological:  Negative for dizziness, facial asymmetry, speech difficulty, weakness and headaches. Objective     /76 (BP Location: Right arm, Patient Position: Sitting, Cuff Size: Large)   Pulse 73   Temp (!) 97.3 °F (36.3 °C) (Temporal)   Resp 18   Ht 6' (1.829 m)   Wt 87.5 kg (192 lb 12.8 oz)   SpO2 97%   BMI 26.15 kg/m²     Physical Exam  Constitutional:       General: He is not in acute distress. Appearance: He is not ill-appearing. Cardiovascular:      Rate and Rhythm: Normal rate and regular rhythm. Heart sounds: No murmur heard. Pulmonary:      Effort: Pulmonary effort is normal. No respiratory distress. Abdominal:      General: Bowel sounds are normal. There is no distension. Tenderness: There is no abdominal tenderness. Musculoskeletal:      Right lower leg: No edema. Left lower leg: No edema. Neurological:      Mental Status: He is alert.        Noel Lemus DO

## 2023-10-26 ENCOUNTER — TELEPHONE (OUTPATIENT)
Dept: NEUROLOGY | Facility: CLINIC | Age: 68
End: 2023-10-26

## 2023-11-06 NOTE — PROGRESS NOTES
Patient ID: Patricia Mccelllan is a 70 y.o. male. Assessment/Plan:    Hoarseness  Mr. Luna Munoz has hoarseness of his voice which is worsened over time. It is now more consistent but still has a diurnal variation. He also has dysphagia and his optometrist noted right ptosis which is not visible today. The fluctuating nature of the symptoms was concerning for neuromuscular junction disorder such as myasthenia gravis. However, acetylcholine receptor binding, blocking, modulating antibodies and MuSK antibodies were already tested and are negative. Given several cranial neuropathies, a structural etiology should also be excluded. Recommendations:  -Check MRI brain, IAC with and without contrast  -If MRI brain unrevealing, we will proceed with EMG with repetitive nerve stimulation and consider paraneoplastic antibody panel  -BUN and creatinine prior to MRI  -patient is very claustrophobic and requested open MRI  -diazepam 2 mg prior to MRI. He understands that he needs a ride to and from MRI    Dysphagia  He continues to have dysphagia. In the past, he had an esophageal polyp, but in the lower esophagus and did not have significant dysphagia at that time. He is due to follow-up with GI. Recommendation:  -As above, check MRI brain IAC with and without contrast  -Speech swallow therapy evaluation    Ptosis, right eyelid  His optometrist noted right ptosis with a difference of 1.5 mm from side to side. I did not note ptosis today. He did not develop ptosis with fatigue. This does not completely exclude the possibility of a neuromuscular junction disorder nor does the negative antibodies. As above, we will check MRI brain/IAC with and without contrast to evaluate the multiple cranial neuropathies. If negative, we will consider repetitive nerve stimulation for the possibility of seronegative myasthenia gravis and also consider checking paraneoplastic antibody panel.       Follow-up after MRI brain/IAC with and without contrast or sooner if difficulties. I have spent a total time of 60 minutes on 11/07/23 in caring for this patient including Diagnostic results, Impressions, Documenting in the medical record, Reviewing / ordering tests, medicine, procedures  , and Obtaining or reviewing history  . Subjective:    Mr Tj Marlow is a 70 yr old handed right gentleman with PMH HTN, HLD, esophageal polypectomy, GERD, asthma, who presents for new neurology evaluation for eyelid droop and weakness. He has had a hoarse voice for about a year. It has been daily for a while. Worsens as he talks. It gets worse as the day goes on. He has had dysphagia. He has trouble swallowing if he is reclined. He lost about 20 pounds. Last year, he had trouble breathing while doing yard work. He had stiffness between the shoulder blades. Cardiac work up was negative per patient. Since the weight loss, he has been able to do the yard work. He may still get a little winded but nothing like before the weight loss. Last winter, he had a really hard time swallowing. Also was worse in the evening. He is not certain if it occurred with liquids. He follows with GI. He had GERD. He belches frequently. He had esophageal polyp removed in Jan 2022. His optometrist told him his right eyelid was droopy (1.5 mm). Arms, legs and neck are strong. Objective:    Blood pressure 135/77, pulse 72, temperature 98.1 °F (36.7 °C), temperature source Temporal, weight 85.8 kg (189 lb 1.6 oz), SpO2 99 %. Physical Exam  Constitutional:       Appearance: Normal appearance. HENT:      Ears:      Comments: Small amount of cerumen in the left ear     Mouth/Throat:      Mouth: Mucous membranes are moist.      Pharynx: Oropharynx is clear. Eyes:      Conjunctiva/sclera: Conjunctivae normal.   Neck:      Vascular: No carotid bruit. Cardiovascular:      Rate and Rhythm: Normal rate and regular rhythm. Heart sounds: Normal heart sounds. Pulmonary:      Effort: Pulmonary effort is normal.      Breath sounds: Normal breath sounds. Musculoskeletal:      Comments: Flatfeet   Skin:     General: Skin is warm and dry. Psychiatric:         Mood and Affect: Mood normal.         Behavior: Behavior normal.         Neurological Exam  Mental status: Awake, alert, oriented to person place and time. Naming, knowledge, repetition, concentration intact. Recall 3/3 immediately and delayed recall 2/3 with prompts. Cranial nerves: Extraocular movements intact. No ptosis. No fatigable ptosis. Pupils equally round and reactive to light. Visual fields full to confrontation. Facial sensation intact. Face symmetric. Hearing loss in the left ear. Tongue, uvula, palate midline and intact. Gag reflex intact bilaterally. Voice hoarse. Sternocleidomastoid intact. Motor: Neck flexion 5/5, extension 5-/5. Strength 5/5 in all 4 extremities. Cerebellar: No dysmetria. Tremor on finger-to-nose bilaterally. Heel-to-shin intact. Gait normal.    Reflexes: 2+ in the arms, 2-3+ at the knees and 2+ at the ankles. Plantar responses equivocal bilaterally. Sensory: Light touch, temperature, intact. Vibration 4 seconds at the great toes. ROS:    Review of Systems  Constitutional:  Negative for appetite change, fatigue and fever. HENT:  Positive for trouble swallowing (better since losing 20 lbs since June-discovered he was pre diabetic). Negative for hearing loss, tinnitus and voice change. Eyes: Negative. Negative for photophobia, pain and visual disturbance. Respiratory: Negative. Negative for shortness of breath. Cardiovascular: Negative. Negative for palpitations. Gastrointestinal: Negative. Negative for nausea and vomiting. Endocrine: Negative. Negative for cold intolerance. Genitourinary: Negative. Negative for dysuria, frequency and urgency. Musculoskeletal:  Negative for back pain, gait problem, myalgias and neck pain. Skin: Negative. Negative for rash. Allergic/Immunologic: Negative. Neurological:  Positive for speech difficulty (voice gets hoarse). Negative for dizziness, tremors, seizures, syncope, facial asymmetry, weakness, light-headedness, numbness and headaches. Hematological: Negative. Does not bruise/bleed easily. Psychiatric/Behavioral:  Positive for sleep disturbance. Negative for confusion and hallucinations.         DATA:  Sept 2023:  AchR binding, blocking, modulating Ab negative  MuSK ab negative    8/25/2023:  BUN 20  Creatinine 1.18  Glucose 116  HbA1c 6.2  Lyme antibody negative  PSA 0.94    5/23/2023:  TSH 1.361

## 2023-11-07 ENCOUNTER — CONSULT (OUTPATIENT)
Dept: NEUROLOGY | Facility: CLINIC | Age: 68
End: 2023-11-07
Payer: COMMERCIAL

## 2023-11-07 VITALS
WEIGHT: 189.1 LBS | OXYGEN SATURATION: 99 % | DIASTOLIC BLOOD PRESSURE: 77 MMHG | BODY MASS INDEX: 25.65 KG/M2 | SYSTOLIC BLOOD PRESSURE: 135 MMHG | TEMPERATURE: 98.1 F | HEART RATE: 72 BPM

## 2023-11-07 DIAGNOSIS — R13.10 DYSPHAGIA, UNSPECIFIED TYPE: ICD-10-CM

## 2023-11-07 DIAGNOSIS — R49.0 HOARSENESS: Primary | ICD-10-CM

## 2023-11-07 DIAGNOSIS — R49.9 CHANGE IN VOICE: ICD-10-CM

## 2023-11-07 DIAGNOSIS — H02.401 PTOSIS, RIGHT EYELID: ICD-10-CM

## 2023-11-07 PROCEDURE — 99205 OFFICE O/P NEW HI 60 MIN: CPT | Performed by: PSYCHIATRY & NEUROLOGY

## 2023-11-07 RX ORDER — DIAZEPAM 2 MG/1
TABLET ORAL
Qty: 2 TABLET | Refills: 0 | Status: SHIPPED | OUTPATIENT
Start: 2023-11-07

## 2023-11-07 NOTE — ASSESSMENT & PLAN NOTE
Mr. Emmy Wills has hoarseness of his voice which is worsened over time. It is now more consistent but still has a diurnal variation. He also has dysphagia and his optometrist noted right ptosis which is not visible today. The fluctuating nature of the symptoms was concerning for neuromuscular junction disorder such as myasthenia gravis. However, acetylcholine receptor binding, blocking, modulating antibodies and MuSK antibodies were already tested and are negative. Given several cranial neuropathies, a structural etiology should also be excluded. Recommendations:  -Check MRI brain, IAC with and without contrast  -If MRI brain unrevealing, we will proceed with EMG with repetitive nerve stimulation and consider paraneoplastic antibody panel  -BUN and creatinine prior to MRI  -patient is very claustrophobic and requested open MRI  -diazepam 2 mg prior to MRI.  He understands that he needs a ride to and from MRI

## 2023-11-07 NOTE — ASSESSMENT & PLAN NOTE
His optometrist noted right ptosis with a difference of 1.5 mm from side to side. I did not note ptosis today. He did not develop ptosis with fatigue. This does not completely exclude the possibility of a neuromuscular junction disorder nor does the negative antibodies. As above, we will check MRI brain/IAC with and without contrast to evaluate the multiple cranial neuropathies. If negative, we will consider repetitive nerve stimulation for the possibility of seronegative myasthenia gravis and also consider checking paraneoplastic antibody panel.

## 2023-11-07 NOTE — PROGRESS NOTES
Patient ID: Zayra Mckeon is a 70 y.o. male. Assessment/Plan:    Hoarseness  Mr. Cathy Hager has hoarseness of his voice which is worsened over time. It is now more consistent but still has a diurnal variation. He also has dysphagia and his optometrist noted right ptosis which is not visible today. The fluctuating nature of the symptoms was concerning for neuromuscular junction disorder such as myasthenia gravis. However, acetylcholine receptor binding, blocking, modulating antibodies and MuSK antibodies were already tested and are negative. Given several cranial neuropathies, a structural etiology should also be excluded. Recommendations:  -Check MRI brain, IAC with and without contrast  -If MRI brain unrevealing, we will proceed with EMG with repetitive nerve stimulation and consider paraneoplastic antibody panel  -BUN and creatinine prior to MRI  -patient is very claustrophobic and requested open MRI  -diazepam 2 mg prior to MRI. He understands that he needs a ride to and from MRI    Dysphagia  He continues to have dysphagia. In the past, he had an esophageal polyp, but in the lower esophagus and did not have significant dysphagia at that time. He is due to follow-up with GI. Recommendation:  -As above, check MRI brain IAC with and without contrast  -Speech swallow therapy evaluation    Ptosis, right eyelid  His optometrist noted right ptosis with a difference of 1.5 mm from side to side. I did not note ptosis today. He did not develop ptosis with fatigue. This does not completely exclude the possibility of a neuromuscular junction disorder nor does the negative antibodies. As above, we will check MRI brain/IAC with and without contrast to evaluate the multiple cranial neuropathies. If negative, we will consider repetitive nerve stimulation for the possibility of seronegative myasthenia gravis and also consider checking paraneoplastic antibody panel.        {Assess/PlanSmartLinks:23524} Subjective:    HPI    {[facility] HPI texts:20125}    {Common ambulatory SmartLinks:48854}         Objective:    Blood pressure 135/77, pulse 72, temperature 98.1 °F (36.7 °C), temperature source Temporal, weight 85.8 kg (189 lb 1.6 oz), SpO2 99 %. Physical Exam    Neurological Exam      ROS:    Review of Systems   Constitutional:  Negative for appetite change, fatigue and fever. HENT:  Positive for trouble swallowing (better since losing 20 lbs since June-discovered he was pre diabetic). Negative for hearing loss, tinnitus and voice change. Eyes: Negative. Negative for photophobia, pain and visual disturbance. Respiratory: Negative. Negative for shortness of breath. Cardiovascular: Negative. Negative for palpitations. Gastrointestinal: Negative. Negative for nausea and vomiting. Endocrine: Negative. Negative for cold intolerance. Genitourinary: Negative. Negative for dysuria, frequency and urgency. Musculoskeletal:  Negative for back pain, gait problem, myalgias and neck pain. Skin: Negative. Negative for rash. Allergic/Immunologic: Negative. Neurological:  Positive for speech difficulty (voice gets hoarse). Negative for dizziness, tremors, seizures, syncope, facial asymmetry, weakness, light-headedness, numbness and headaches. Hematological: Negative. Does not bruise/bleed easily. Psychiatric/Behavioral:  Positive for sleep disturbance. Negative for confusion and hallucinations.

## 2023-11-07 NOTE — ASSESSMENT & PLAN NOTE
He continues to have dysphagia. In the past, he had an esophageal polyp, but in the lower esophagus and did not have significant dysphagia at that time. He is due to follow-up with GI.     Recommendation:  -As above, check MRI brain IAC with and without contrast  -Speech swallow therapy evaluation

## 2023-11-08 ENCOUNTER — APPOINTMENT (OUTPATIENT)
Age: 68
End: 2023-11-08
Payer: COMMERCIAL

## 2023-11-08 DIAGNOSIS — H02.401 PTOSIS, RIGHT EYELID: ICD-10-CM

## 2023-11-08 DIAGNOSIS — R13.10 DYSPHAGIA, UNSPECIFIED TYPE: ICD-10-CM

## 2023-11-08 DIAGNOSIS — R49.0 HOARSENESS: ICD-10-CM

## 2023-11-08 LAB
BUN SERPL-MCNC: 18 MG/DL (ref 5–25)
CREAT SERPL-MCNC: 0.94 MG/DL (ref 0.6–1.3)
GFR SERPL CREATININE-BSD FRML MDRD: 82 ML/MIN/1.73SQ M

## 2023-11-08 PROCEDURE — 82565 ASSAY OF CREATININE: CPT

## 2023-11-08 PROCEDURE — 84520 ASSAY OF UREA NITROGEN: CPT

## 2023-11-08 PROCEDURE — 36415 COLL VENOUS BLD VENIPUNCTURE: CPT

## 2023-11-09 ENCOUNTER — OFFICE VISIT (OUTPATIENT)
Age: 68
End: 2023-11-09
Payer: COMMERCIAL

## 2023-11-09 VITALS
RESPIRATION RATE: 18 BRPM | WEIGHT: 190 LBS | HEART RATE: 89 BPM | HEIGHT: 72 IN | SYSTOLIC BLOOD PRESSURE: 138 MMHG | BODY MASS INDEX: 25.73 KG/M2 | DIASTOLIC BLOOD PRESSURE: 80 MMHG | OXYGEN SATURATION: 98 %

## 2023-11-09 DIAGNOSIS — K21.00 GASTROESOPHAGEAL REFLUX DISEASE WITH ESOPHAGITIS WITHOUT HEMORRHAGE: Primary | ICD-10-CM

## 2023-11-09 PROCEDURE — 99214 OFFICE O/P EST MOD 30 MIN: CPT | Performed by: PHYSICIAN ASSISTANT

## 2023-11-09 RX ORDER — PANTOPRAZOLE SODIUM 40 MG/1
40 TABLET, DELAYED RELEASE ORAL 2 TIMES DAILY
Qty: 60 TABLET | Refills: 1 | Status: SHIPPED | OUTPATIENT
Start: 2023-11-09 | End: 2024-01-08

## 2023-11-09 NOTE — PROGRESS NOTES
[facility] - Outpatient Follow-up Note  Richa Ruggiero 70 y.o. male MRN: [id number]  Encounter: [id number]          ASSESSMENT AND PLAN:      1. Gastroesophageal reflux disease with esophagitis    Patient presents for follow up of his chronic GERD. He had EGDs in 2021 and 2019 which showed esophagitis. Biopsies were negative for intestinal metaplasia. He is on Pantoprazole 40mg po daily but has been experiencing some breakthrough reflux and belching. He also is seeing Neurology for an evaluation of hoarseness. He is not having esophageal dysphagia. Will increase Pantoprazole to 40mg po BID x 2 months for better control of his GERD (note: his hoarseness may be related to his acid reflux). GERD modifications reviewed. Recommend he also see an ENT for evaluation of his hoarseness. He reports he will talk to his physician about it. If he were to develop worsening reflux despite the BID dose or alarm symptoms such as dysphagia, he was instructed to call the office and would plan for a repeat EGD.  ______________________________________________________________________    SUBJECTIVE:  Patient is a pleasant 70year old male who presents to the office for follow up of his GERD. He reports recently he has been having some increased belching and reflux despite taking Pantoprazole daily. No complaints of esophageal dysphagia: he reports foods and liquids go down just fine. He does report sometimes feeling as though he has an issue swallowing his saliva when he is laying down and needs to clear his throat. He does note some hoarseness on and off recently. He is seeing Neurology. No melena. He reports intentional weight loss through diet. No family history of esophageal or stomach cancer. REVIEW OF SYSTEMS IS OTHERWISE NEGATIVE.       Historical Information   Past Medical History:   Diagnosis Date    Asthma     Disc degeneration, lumbar     Peripheral vascular disease ([address]) SOB (shortness of breath)     Wheezing      Past Surgical History:   Procedure Laterality Date    ESOPHAGOSCOPY       Social History   Social History     Substance and Sexual Activity   Alcohol Use No     Social History     Substance and Sexual Activity   Drug Use No     Social History     Tobacco Use   Smoking Status Never   Smokeless Tobacco Never     Family History   Problem Relation Age of Onset    Breast cancer Mother     Hip fracture Father        Meds/Allergies       Current Outpatient Medications:     pantoprazole (PROTONIX) 40 mg tablet    amLODIPine (NORVASC) 5 mg tablet    aspirin 81 mg chewable tablet    atorvastatin (LIPITOR) 40 mg tablet    Blood Glucose Monitoring Suppl (OneTouch Verio Reflect) w/Device KIT    diazepam (VALIUM) 2 mg tablet    fluticasone (FLONASE) 50 mcg/act nasal spray    fluticasone-vilanterol (Breo Ellipta) 200-25 mcg/actuation inhaler    glucose blood (OneTouch Verio) test strip    ipratropium-albuterol (DUO-NEB) 0.5-2.5 mg/3 mL nebulizer solution    lisinopril (ZESTRIL) 30 mg tablet    metFORMIN (GLUCOPHAGE-XR) 500 mg 24 hr tablet    OneTouch Delica Lancets 64G MISC    pantoprazole (PROTONIX) 40 mg tablet    Allergies   Allergen Reactions    Pollen Extract Nasal Congestion           Objective     Blood pressure 138/80, pulse 89, resp. rate 18, height 6' (1.829 m), weight 86.2 kg (190 lb), SpO2 98 %. Body mass index is 25.77 kg/m². PHYSICAL EXAM:      General Appearance:   Alert, cooperative, no distress   HEENT:   Normocephalic, atraumatic, anicteric   Neck:  Supple, symmetrical, trachea midline   Lungs:   Clear to auscultation bilaterally; no rales, rhonchi or wheezing; respirations unlabored    Heart[de-identified]   Regular rate and rhythm; no murmur, rub, or gallop.    Abdomen:   Soft, non-tender, non-distended; normal bowel sounds; no masses, no organomegaly    Genitalia:   Deferred    Rectal:   Deferred    Extremities:  No cyanosis, clubbing or edema    Pulses:  2+ and symmetric Skin:  No jaundice, rashes, or lesions    Lymph nodes:  No palpable cervical lymphadenopathy        Lab Results:   No visits with results within 1 Day(s) from this visit. Latest known visit with results is:   Appointment on 11/08/2023   Component Date Value    BUN 11/08/2023 18     Creatinine 11/08/2023 0.94     eGFR 11/08/2023 82          Radiology Results:   No results found.

## 2023-11-14 ENCOUNTER — TELEPHONE (OUTPATIENT)
Dept: NEUROLOGY | Facility: CLINIC | Age: 68
End: 2023-11-14

## 2023-11-14 NOTE — TELEPHONE ENCOUNTER
[facility]04 am, taken off 11/14:    Hi my name is Renee Joy. I'm calling about the 2 valium pills to be filled. My birthday is [birth year]. My phone number is #[phone].

## 2023-11-16 NOTE — TELEPHONE ENCOUNTER
Pt called in today stating that the pharmacy will not release the valium stating that they must have pre-authorization from insurance. Pt stated the pre-auth ph# is [phone]. MRI is scheduled on 11/27/23 at 12:45. Pls call pt when it completed so he can  the valium at [phone].

## 2023-11-16 NOTE — TELEPHONE ENCOUNTER
MAKAYLA 11/15/23 at 10:19 am:    My name is Pravin Cobian, [birth year]. I'm calling about 2 valium pills that got to be authorized. My phone number is [phone].  ____________________________________________________________    This was already routed to clinical team to follow up.

## 2023-11-21 NOTE — TELEPHONE ENCOUNTER
[facility]. Must appeal. Sending form to our [address] fax# [phone]. Will appeal As soon as form is recv'd. [place] staff--Please scan form into patient's chart once fax is recv'd for this patient. Thank you!

## 2023-11-21 NOTE — TELEPHONE ENCOUNTER
Appeal completed. Sent to [facility] for faxing to [employer] along with patient's OV notes. Will await Appeal determination.  Marked as Urgent Appeal Request

## 2023-11-24 NOTE — TELEPHONE ENCOUNTER
Called [employer]. and was not able to get through. Left a detailed message if patient can use a Good Rx coupon since quantity is 2 tabs only. I found online Good Rx coupon for $4.66 for [employer]..    Appeal is still in process and patient's MRI is Monday 11/27/23. Addendum 15:30 : Spoke with [facility]. [facility]. Appeal has been approved. Diazepam 2 mg tabs Qty of 2 tabs has been approved 11/22/23 through 12/22/23.

## 2023-11-29 ENCOUNTER — VBI (OUTPATIENT)
Dept: ADMINISTRATIVE | Facility: OTHER | Age: 68
End: 2023-11-29

## 2023-11-29 NOTE — TELEPHONE ENCOUNTER
11/29/23 12:33 PM     [employer] SmartForm used to document caregap status.     Connor Connell Spoke with patient.  Dora Childs, 's assistant, is out of the office today and will call her tomorrow on her return regarding an appointment.

## 2023-12-02 DIAGNOSIS — K21.00 GASTROESOPHAGEAL REFLUX DISEASE WITH ESOPHAGITIS WITHOUT HEMORRHAGE: ICD-10-CM

## 2023-12-04 RX ORDER — PANTOPRAZOLE SODIUM 40 MG/1
40 TABLET, DELAYED RELEASE ORAL 2 TIMES DAILY
Qty: 180 TABLET | Refills: 1 | Status: SHIPPED | OUTPATIENT
Start: 2023-12-04

## 2023-12-05 ENCOUNTER — TELEPHONE (OUTPATIENT)
Dept: NEUROLOGY | Facility: CLINIC | Age: 68
End: 2023-12-05

## 2023-12-05 NOTE — TELEPHONE ENCOUNTER
Patient called back he was not able to do the MRI because [facility] didn't have an open MRI. He cannot do a close one. Insurance will not pay if its outside [employer]. Please advise if he need to r/s appointment with Dr Mckenna Weaver.

## 2023-12-05 NOTE — TELEPHONE ENCOUNTER
Pt has appt on 12/12 qi Dr Reema Mac in [facility] office to discuss MRI results, however per chart does not look like MRI was ever done, notes in chart state valium was ordered for MRI, also note on order states pt needs open MRI, pt did have MRI scheduled for 11/27, but was cancelled and does not appear to have ever been r/s.  I LMOM for pt so we can discuss, will also check with Dr Reema Mac to see if appt should be r/s until after MRI done

## 2023-12-06 DIAGNOSIS — R49.0 HOARSENESS: Primary | ICD-10-CM

## 2023-12-06 DIAGNOSIS — R13.10 DYSPHAGIA: ICD-10-CM

## 2023-12-06 DIAGNOSIS — H02.409 PTOSIS: ICD-10-CM

## 2023-12-12 ENCOUNTER — TELEPHONE (OUTPATIENT)
Dept: NEUROLOGY | Facility: CLINIC | Age: 68
End: 2023-12-12

## 2023-12-12 NOTE — TELEPHONE ENCOUNTER
----- Message from Carmela Guy MD sent at 12/6/2023 11:55 AM EST -----  Regarding: RE: MRI/APPT 12/12  Adia,  Please let patient know we can do CT head with and without contrast. It won't give us as much detail as MRI but will still  be helpful. BUN/creatinine were done in early Nov. Please let me know if I need to reorder. Clerical Team,  Please obtain prior auth if necessary and assist with scheduling. Thanks! RC        ----- Message -----  From: Candi Hammans  Sent: 12/5/2023   1:06 PM EST  To: Carmela Guy MD  Subject: MRI/APPT 12/12                                   Was doing your chart prep for next week and I am a little confused re this pt's MRI-he does have appt on 12/12 to go over results, but does not look like it was ever done, and is not scheduled that I can see. Several notes in chart, 1 for a valium authorization, then I see the notes on order stating pt needs open MRI-previously had appt to have MRI done on 11/27 but was cxl'd since site does not do open MRI-so my question is should I r/s him until after MRI is done-not even sure central scheduling is in contact with pt re this-I did LMOM for pt to call me back as well. Thanks!  Sorry if I confused you as well

## 2023-12-13 ENCOUNTER — HOSPITAL ENCOUNTER (OUTPATIENT)
Dept: CT IMAGING | Facility: CLINIC | Age: 68
Discharge: HOME | End: 2023-12-13
Payer: COMMERCIAL

## 2023-12-13 DIAGNOSIS — R13.10 DYSPHAGIA: ICD-10-CM

## 2023-12-13 DIAGNOSIS — R49.0 HOARSENESS: ICD-10-CM

## 2023-12-13 DIAGNOSIS — H02.409 PTOSIS: ICD-10-CM

## 2023-12-13 PROCEDURE — G1004 CDSM NDSC: HCPCS

## 2023-12-13 PROCEDURE — 70470 CT HEAD/BRAIN W/O & W/DYE: CPT

## 2023-12-13 RX ADMIN — IOHEXOL 85 ML: 350 INJECTION, SOLUTION INTRAVENOUS at 12:46

## 2023-12-14 ENCOUNTER — OFFICE VISIT (OUTPATIENT)
Age: 68
End: 2023-12-14
Payer: COMMERCIAL

## 2023-12-14 VITALS
HEIGHT: 72 IN | TEMPERATURE: 97.8 F | DIASTOLIC BLOOD PRESSURE: 70 MMHG | RESPIRATION RATE: 18 BRPM | HEART RATE: 76 BPM | SYSTOLIC BLOOD PRESSURE: 120 MMHG | OXYGEN SATURATION: 98 % | WEIGHT: 194.6 LBS | BODY MASS INDEX: 26.36 KG/M2

## 2023-12-14 DIAGNOSIS — R05.2 SUBACUTE COUGH: Primary | ICD-10-CM

## 2023-12-14 PROCEDURE — 99213 OFFICE O/P EST LOW 20 MIN: CPT

## 2023-12-14 NOTE — PROGRESS NOTES
Name: King Pagan      : 1955      MRN: [id number]  Encounter Provider: Cait Funk  Encounter Date: 2023   Encounter department: [address]     1. Subacute cough    Pt's cough is nearly resolved. VSS, Lungs CTA. No fevers or other symptoms concerning for pneumonia. Pt feels much better. Will continue to watch, treat symptomatically. Come back if worsening symptoms to get CXR and antibiotics. Try to avoid steroids due to his diabetes. Depression Screening and Follow-up Plan: Patient was screened for depression during today's encounter. They screened negative with a PHQ-2 score of 0. Subjective      Cough  Pertinent negatives include no chest pain, chills, fever, headaches, postnasal drip, rash, rhinorrhea, sore throat or shortness of breath. Pt is here for a cough. Has been going on for 3 weeks, it is improved today. It is congestion with some production of phlegm. Denies myalgias, nasal congestion, PND, fevers, chills, wt loss, nausea, diarrhea. Pt did not take any medication for this. Feeling fine today. Review of Systems   Constitutional:  Negative for chills, diaphoresis, fever and unexpected weight change. HENT:  Negative for congestion, drooling, postnasal drip, rhinorrhea, sore throat and trouble swallowing (has baseline dysphagia). Respiratory:  Positive for cough. Negative for shortness of breath. Cardiovascular:  Negative for chest pain. Gastrointestinal:  Negative for abdominal distention, abdominal pain, constipation, diarrhea, nausea and vomiting. Musculoskeletal:  Negative for gait problem. Skin:  Negative for rash. Neurological:  Negative for facial asymmetry and headaches. All other systems reviewed and are negative. Current Outpatient Medications on File Prior to Visit   Medication Sig   • amLODIPine (NORVASC) 5 mg tablet TAKE 1 TABLET (5 MG TOTAL) BY MOUTH DAILY.    • aspirin 81 mg chewable tablet Chew 1 tablet (81 mg total) daily   • atorvastatin (LIPITOR) 40 mg tablet Take 1 tablet (40 mg total) by mouth daily   • Blood Glucose Monitoring Suppl (OneTouch Verio Reflect) w/Device KIT Check blood sugars once daily. Please substitute with appropriate alternative as covered by patient's insurance. Dx: E11.65   • diazepam (VALIUM) 2 mg tablet Take 1 tablet oral 30 minutes prior to MRI. Can repeat x1. Must have ride to and from MRI   • fluticasone-vilanterol (Breo Ellipta) 200-25 mcg/actuation inhaler Inhale 1 puff daily Rinse mouth after use. • glucose blood (OneTouch Verio) test strip Check blood sugars once daily. Please substitute with appropriate alternative as covered by patient's insurance. Dx: E11.65   • lisinopril (ZESTRIL) 30 mg tablet Take 1 tablet (30 mg total) by mouth daily   • metFORMIN (GLUCOPHAGE-XR) 500 mg 24 hr tablet TAKE 2 TABLETS BY MOUTH EVERY DAY WITH BREAKFAST   • OneTouch Delica Lancets 84Z MISC Check blood sugars once daily. Please substitute with appropriate alternative as covered by patient's insurance. Dx: E11.65   • pantoprazole (PROTONIX) 40 mg tablet Take 1 tablet (40 mg total) by mouth every morning 1/2 hour before breakfast   • pantoprazole (PROTONIX) 40 mg tablet TAKE 1 TABLET BY MOUTH TWICE A DAY   • fluticasone (FLONASE) 50 mcg/act nasal spray SPRAY 1 SPRAY INTO EACH NOSTRIL EVERY DAY (Patient not taking: Reported on 5/31/2023)   • ipratropium-albuterol (DUO-NEB) 0.5-2.5 mg/3 mL nebulizer solution TAKE 3 ML BY NEBULIZATION 4 (FOUR) TIMES A DAY (Patient not taking: Reported on 11/7/2023)       Objective     /70 (BP Location: Left arm, Patient Position: Sitting, Cuff Size: Standard)   Pulse 76   Temp 97.8 °F (36.6 °C) (Tympanic)   Resp 18   Ht 6' (1.829 m)   Wt 88.3 kg (194 lb 9.6 oz)   SpO2 98%   BMI 26.39 kg/m²     Physical Exam  Constitutional:       Appearance: Normal appearance. He is not toxic-appearing or diaphoretic.    HENT: Head: Normocephalic and atraumatic. Right Ear: Hearing normal.      Left Ear: Hearing normal.      Nose: Nose normal. No congestion or rhinorrhea. Right Sinus: No maxillary sinus tenderness or frontal sinus tenderness. Left Sinus: No maxillary sinus tenderness or frontal sinus tenderness. Mouth/Throat:      Lips: Pink. Mouth: Mucous membranes are moist.      Dentition: Normal dentition. Pharynx: Oropharynx is clear. Uvula midline. Eyes:      General: Lids are normal.      Conjunctiva/sclera: Conjunctivae normal.   Cardiovascular:      Rate and Rhythm: Normal rate and regular rhythm. Pulses:           Radial pulses are 2+ on the right side and 2+ on the left side. Heart sounds: Murmur heard. Pulmonary:      Effort: Pulmonary effort is normal. No respiratory distress. Breath sounds: Normal breath sounds. Abdominal:      General: Bowel sounds are normal.      Palpations: Abdomen is soft. Tenderness: There is no abdominal tenderness. Musculoskeletal:      Cervical back: Full passive range of motion without pain. Right lower leg: No edema. Left lower leg: No edema. Skin:     General: Skin is warm and dry. Capillary Refill: Capillary refill takes less than 2 seconds. Neurological:      General: No focal deficit present. Mental Status: He is alert. Gait: Gait is intact.    Psychiatric:         Mood and Affect: Mood normal.         Behavior: Behavior normal.       Carmen Sofia PA-C

## 2023-12-16 DIAGNOSIS — E78.2 MIXED HYPERLIPIDEMIA: ICD-10-CM

## 2023-12-18 RX ORDER — ATORVASTATIN CALCIUM 40 MG/1
40 TABLET, FILM COATED ORAL DAILY
Qty: 100 TABLET | Refills: 1 | Status: SHIPPED | OUTPATIENT
Start: 2023-12-18

## 2023-12-21 ENCOUNTER — TELEPHONE (OUTPATIENT)
Age: 68
End: 2023-12-21

## 2023-12-21 NOTE — TELEPHONE ENCOUNTER
Pt would like to join the [facility] does he have any restrictions?  Call pt [phone]

## 2024-01-02 NOTE — PROGRESS NOTES
"Speech-Language Pathology Initial Evaluation    Today's date: 2024   Patient’s name: Dejuan Acosta  : 1955  MRN: [id number]  Safety measures: HTN  Referring provider: Sanjuana Alfredo MD    Encounter Diagnosis     ICD-10-CM    1. Dysphagia, unspecified type  R13.10       2. Dysphonia  R49.0       3. Dysphagia  R13.10 Ambulatory Referral to Speech Therapy        Visit tracking:  POC expires Unit limit Auth Expiration date PT/OT + Visit Limit?   3/4/24 N/a 24 N/a                           Visit/Unit Tracking  AUTH Status:  Date 24              none Used 1               Remaining  0                      Subjective comments: Patient attended today's session based upon referral from neurologist. Patient denies difficulties swallowing, however, patient reports of throat clearing while eating. Patient reports he does not cough while eating but afterwards. Patient denies low grade fever, increase in WBC, change in chest x-ray, increased congestion, increased coughing with P.O. intake. Denies pain while eating. Patient's diet is currently regular with thin liquids. Patient reports difficulties with voice. Patient reports voice is hoarse and the more he talks the more difficulties he has. No pattern detected by patient of when his voice is the best. Onset possibly around last year and was gradual.  Over the summer patient had dificulties coughing up secretions.      Why did the patient choose us? Physician    Patient's goal(s): \"to see if anything is wrong\"    Reason for referral: Difficulty swallowing solids or liquids  Prior functional status: Swallowing WNL  Clinically complex situations: N/A    History: Patient is a 68 y.o. male who was referred to outpatient skilled Speech Therapy services for a dysphagia evaluation. PMH: HTN, HLD, esophageal polypectomy,  GERD, asthma.   Patient attended neurologist on 23. Patient presented with eyelid droop and weakness. Patient reported difficulties with " swallowing . He is not concerned if occurred with liquids F/U with GI for esophageal polypectomy in January 2022. Noted in the neurology appointment hoarseness of voice which has worsened  over time.     Hearing: WFL for testing  Vision: WFL for testing (patient suspects he may have cataracts) (R ptosis)    Home environment/lifestyle: lives with 2 sisters  Highest level of education: High school  Vocational status: retired     Mental status: Alert  Behavior status: Cooperative  Patient reported symptoms of:  n/a  Communication modalities: Verbal  Rehabilitation prognosis: Good rehab potential to reach the established goals    Assessments    DYSPHAGIA EVALUATION:    -Reason for referral: Signs/symptoms of dysphagia    -Subjective report of swallowing difficulty:  clearing throat    -Eating Assessment Tool (EAT-10) Swallowing Screening Tool is a patient self-assessment tool that rates the percieved impairment a swallowing disorder has on the Functional, Physical, and Emotional aspects of one's life.  EAT-10 score: 2/40    -Difficulty swallowing: Patient is uncertain    -Current diet (solids): Regular  -Current diet (liquids): Thin  -Current pill intake method: pills whole without liquids  -Alternative Feeding Method?: No    -Facial appearance Abnormal movements   -Mandible function Abnormal movement R sided   -Dentition Partial dentures   -Labial function WFL   -Lingual function Decreased ROM (right side), Decreased strength (right side), and Decreased strength (protrusion)   -Velar function Unable to visualize   -Oral apraxia? Absent   -Vocal quality Dysphonic and Hoarse   -Volitional cough Strong/productive   -Respiration WFL   -Drooling? No   -Tremor/involuntary movement? N.a     LIQUID CONSISTENCY TESTING:   Item(s) tested: (Thin) - water     Administered by: Cup and Self-fed    Clinical findings:  -Oral phase impairments: N/A, patient WFL  -Pharyngeal phase impairments: overt s/x of  penetration/aspiration (Throat clear and belching)    Other notes: rapid rate of P.O. intake    Strategies, attempts, and responses: multiple swallow      SOLID CONSISTENCY TESTING:  Item(s) tested: (Regular, Mixed, and Puree) - soft fruit bar, diced peaches in thin liquid juice, and pudding   Administered by: Self-fed    Clinical findings:  -Oral phase impairments: N/A, patient WFL  -Pharyngeal phase impairments: overt s/x of penetration/aspiration (Throat clear, Coughing, and belching)    Other notes: rapid rate of P.O. intake    Strategies, attempts, and responses: multiple swallow      Treatment  Provided patient education of the swallowing anatomy with visual aid. Provided education of safe swallowing compared to aspiration swallowing. Provided education of what aspiration is and how it can led to URI/PNA. Provided handouts to patient of [employer] targeting education of swallowing anatomy, aspiration precautions (reviewed together), safe swallowing strategies (reviewed together), and how the voice works.      Goals    Short-term goals:  Patient will receive a videofluoroscopic swallow study (VFSS) to assess his current swallowing function to r/o penetration or aspiration, to determine the safest, least restrictive diet, and to recommended the appropriate rehabilitation exercises (to be achieved in 2-3 weeks).      Patient will be educated on safe swallowing compensatory strategies (e.g., upright posture, small bites/sips, slow rate, alternation of consistencies, multiple swallows, effortful swallow, etc.) to facilitate increased safety with P.O. intake (to be achieved in 4-6 weeks).      Patient will tolerate P.O. trials of his recommended diet with the implementation of safe swallowing strategies without overt s/sx of penetration and/or aspiration during skilled therapy sessions in this certification period (to be achieved in 4-6 weeks).      Patient will independently complete oral motor exercises daily to  increase lingual range of motion, strength, and coordination to facilitate improved bolus formation/control and AP transfer (to be achieved in 4-6 weeks).      Patient will independently complete pharyngeal strengthening exercises (e.g., Effortful swallow, Mendelsohn, Susan) daily to facilitate increased pharyngeal strength and coordination (to be achieved in 4-6 weeks).      Long-term goals:   Patient will maintain adequate nutrition and hydration with optimum safety and efficacy of swallowing function on P.O. intake without overt s/sx of penetration or aspiration (to be achieved by discharge).      Patient will independently utilize compensatory strategies with optimum safety and efficacy of swallowing function on P.O. intake without overt s/sx of penetration or aspiration (to be achieved by discharge).        SWALLOWING SAFETY PRECAUTIONS:  -Recommended solids: Regular    -Recommended liquids: Thin    -Recommended medication form: 1 @ a time with water      -Aspiration precautions   *Monitor for signs/symptoms concerning for aspiration (e.g., low grade fever, increase in WBC, change in chest x-ray, increased congestion, increased coughing with P.O. intake)    -Reflux precautions     -Strategies: Small sips and bites when eating, Slow rate, swallow between bites, and Alternate liquids and solids    -Positioning: Upright position during meals    -Compensatory strategies: Effortful swallow, Multiple swallows, and Throat clear    -Supervision: Independent     -Referrals: Videofluroscopic Swallow Study and Other ENT and f/u neurologist      Impressions/Recommendations    Impressions:   -Patient presents with mild dysphagia (unspecific of the phase of swallow) symptoms c/b s/sx of aspiration and/penetration. Patient trialed thin liquids with overt s/sx of aspiration and/or penetration. Noted in the oral stage: WFL  Noted in the pharyngeal stage: clearing throat and belching   Patient trialed purees, mixed  consistencies, and regular solids. Noted in the oral stage: WFL Noted in the pharyngeal stage: learing throat and belching t is recommended that patient receive a videofluoroscopic swallow study (VFSS) to assess his current swallowing function to r/o penetration or aspiration and to determine the safest, least restrictive diet. Education was also provided on the purpose of a videofluoroscopic swallow study (VFSS), which provides a direct, dynamic view of oral, pharyngeal, and upper esophageal function during swallowing. Central Scheduling contact information to schedule appointment was provided to patient prior to leaving today's appointment. Patient to be placed on hold until VFSS is completed. Patient would benefit from outpatient skilled Speech Therapy services for dysphagia to determine the safest, least restrictive diet, determine safe swallowing strategies, and potentially receive education/perform oropharyngeal strengthening/coordination exercises.    Patient self reports with mild complaints of dysphonia c/b by hoarseness of voice. Provided education to f/u with ENT services to determine vf status. Provided education to continue f/u with neurologist to r/o potential underlying neurological condition causing dysphagia and dysphonia.         -Factors affecting performance: None    -Safety concerns: Risk for aspiration    -Risk factors: None    Recommendations:  -Patient would benefit from outpatient skilled Speech Therapy services: Dysphagia therapy, Continue seeking neurology, ENT evaluation & VFSS.. 30 day hold to receive VFSS  Recommendations:       -Frequency: 1-2x weekly (HOLD UNTIL VFSS)  -Duration: 6-8 weeks(HOLD UNTIL VFSS)     -Intervention certification from: 01/8/2024  -Intervention certification to: 03/04/2024     -Intervention comments:   40 mins dysphagia evaluation  10 mins of dysphagia treatment

## 2024-01-08 ENCOUNTER — EVALUATION (OUTPATIENT)
Age: 69
End: 2024-01-08
Payer: COMMERCIAL

## 2024-01-08 DIAGNOSIS — R13.10 DYSPHAGIA, UNSPECIFIED TYPE: ICD-10-CM

## 2024-01-08 DIAGNOSIS — R49.0 DYSPHONIA: ICD-10-CM

## 2024-01-08 DIAGNOSIS — R13.10 DYSPHAGIA, UNSPECIFIED TYPE: Primary | ICD-10-CM

## 2024-01-08 PROCEDURE — 92610 EVALUATE SWALLOWING FUNCTION: CPT

## 2024-01-08 PROCEDURE — 92526 ORAL FUNCTION THERAPY: CPT

## 2024-01-09 ENCOUNTER — APPOINTMENT (OUTPATIENT)
Age: 69
End: 2024-01-09
Payer: COMMERCIAL

## 2024-01-09 DIAGNOSIS — E78.2 MIXED HYPERLIPIDEMIA DUE TO TYPE 2 DIABETES MELLITUS: ICD-10-CM

## 2024-01-09 DIAGNOSIS — E11.69 MIXED HYPERLIPIDEMIA DUE TO TYPE 2 DIABETES MELLITUS: ICD-10-CM

## 2024-01-09 LAB
ALBUMIN SERPL BCP-MCNC: 4.5 G/DL (ref 3.5–5)
ALP SERPL-CCNC: 76 U/L (ref 34–104)
ALT SERPL W P-5'-P-CCNC: 46 U/L (ref 7–52)
ANION GAP SERPL CALCULATED.3IONS-SCNC: 9 MMOL/L
AST SERPL W P-5'-P-CCNC: 27 U/L (ref 13–39)
BILIRUB SERPL-MCNC: 0.55 MG/DL (ref 0.2–1)
BUN SERPL-MCNC: 20 MG/DL (ref 5–25)
CALCIUM SERPL-MCNC: 10.1 MG/DL (ref 8.4–10.2)
CHLORIDE SERPL-SCNC: 100 MMOL/L (ref 96–108)
CHOLEST SERPL-MCNC: 182 MG/DL
CO2 SERPL-SCNC: 28 MMOL/L (ref 21–32)
CREAT SERPL-MCNC: 0.89 MG/DL (ref 0.6–1.3)
CREAT UR-MCNC: 82.9 MG/DL
ERYTHROCYTE [DISTWIDTH] IN BLOOD BY AUTOMATED COUNT: 12.8 % (ref 11.6–15.1)
EST. AVERAGE GLUCOSE BLD GHB EST-MCNC: 128 MG/DL
GFR SERPL CREATININE-BSD FRML MDRD: 87 ML/MIN/1.73SQ M
GLUCOSE P FAST SERPL-MCNC: 107 MG/DL (ref 65–99)
HBA1C MFR BLD: 6.1 %
HCT VFR BLD AUTO: 43 % (ref 36.5–49.3)
HDLC SERPL-MCNC: 47 MG/DL
HGB BLD-MCNC: 14 G/DL (ref 12–17)
LDLC SERPL CALC-MCNC: 116 MG/DL (ref 0–100)
MCH RBC QN AUTO: 30.6 PG (ref 26.8–34.3)
MCHC RBC AUTO-ENTMCNC: 32.6 G/DL (ref 31.4–37.4)
MCV RBC AUTO: 94 FL (ref 82–98)
MICROALBUMIN UR-MCNC: <7 MG/L
MICROALBUMIN/CREAT 24H UR: <8 MG/G CREATININE (ref 0–30)
PLATELET # BLD AUTO: 248 THOUSANDS/UL (ref 149–390)
PMV BLD AUTO: 9.8 FL (ref 8.9–12.7)
POTASSIUM SERPL-SCNC: 4.8 MMOL/L (ref 3.5–5.3)
PROT SERPL-MCNC: 7.6 G/DL (ref 6.4–8.4)
RBC # BLD AUTO: 4.58 MILLION/UL (ref 3.88–5.62)
SODIUM SERPL-SCNC: 137 MMOL/L (ref 135–147)
TRIGL SERPL-MCNC: 93 MG/DL
WBC # BLD AUTO: 6.05 THOUSAND/UL (ref 4.31–10.16)

## 2024-01-09 PROCEDURE — 80061 LIPID PANEL: CPT

## 2024-01-09 PROCEDURE — 82570 ASSAY OF URINE CREATININE: CPT

## 2024-01-09 PROCEDURE — 85027 COMPLETE CBC AUTOMATED: CPT

## 2024-01-09 PROCEDURE — 82043 UR ALBUMIN QUANTITATIVE: CPT

## 2024-01-09 PROCEDURE — 36415 COLL VENOUS BLD VENIPUNCTURE: CPT

## 2024-01-09 PROCEDURE — 83036 HEMOGLOBIN GLYCOSYLATED A1C: CPT

## 2024-01-09 PROCEDURE — 80053 COMPREHEN METABOLIC PANEL: CPT

## 2024-01-10 ENCOUNTER — APPOINTMENT (EMERGENCY)
Dept: RADIOLOGY | Facility: HOSPITAL | Age: 69
End: 2024-01-10
Payer: COMMERCIAL

## 2024-01-10 ENCOUNTER — HOSPITAL ENCOUNTER (EMERGENCY)
Facility: HOSPITAL | Age: 69
Discharge: HOME | End: 2024-01-10
Attending: EMERGENCY MEDICINE
Payer: COMMERCIAL

## 2024-01-10 ENCOUNTER — TELEPHONE (OUTPATIENT)
Age: 69
End: 2024-01-10

## 2024-01-10 VITALS
BODY MASS INDEX: 26.31 KG/M2 | DIASTOLIC BLOOD PRESSURE: 118 MMHG | RESPIRATION RATE: 18 BRPM | WEIGHT: 194 LBS | SYSTOLIC BLOOD PRESSURE: 168 MMHG | HEART RATE: 97 BPM | OXYGEN SATURATION: 100 % | TEMPERATURE: 98.3 F

## 2024-01-10 DIAGNOSIS — S61.419A HAND LACERATION: Primary | ICD-10-CM

## 2024-01-10 PROCEDURE — 99284 EMERGENCY DEPT VISIT MOD MDM: CPT | Performed by: EMERGENCY MEDICINE

## 2024-01-10 PROCEDURE — 90715 TDAP VACCINE 7 YRS/> IM: CPT | Performed by: EMERGENCY MEDICINE

## 2024-01-10 PROCEDURE — 90471 IMMUNIZATION ADMIN: CPT

## 2024-01-10 PROCEDURE — 12002 RPR S/N/AX/GEN/TRNK2.6-7.5CM: CPT | Performed by: EMERGENCY MEDICINE

## 2024-01-10 PROCEDURE — 99283 EMERGENCY DEPT VISIT LOW MDM: CPT

## 2024-01-10 PROCEDURE — 73130 X-RAY EXAM OF HAND: CPT

## 2024-01-10 RX ORDER — GINSENG 100 MG
1 CAPSULE ORAL ONCE
Status: COMPLETED | OUTPATIENT
Start: 2024-01-10 | End: 2024-01-10

## 2024-01-10 RX ORDER — LIDOCAINE HYDROCHLORIDE AND EPINEPHRINE 10; 10 MG/ML; UG/ML
20 INJECTION, SOLUTION INFILTRATION; PERINEURAL ONCE
Status: COMPLETED | OUTPATIENT
Start: 2024-01-10 | End: 2024-01-10

## 2024-01-10 RX ADMIN — BACITRACIN 1 SMALL APPLICATION: 500 OINTMENT TOPICAL at 19:34

## 2024-01-10 RX ADMIN — LIDOCAINE HYDROCHLORIDE,EPINEPHRINE BITARTRATE 20 ML: 10; .01 INJECTION, SOLUTION INFILTRATION; PERINEURAL at 18:59

## 2024-01-10 RX ADMIN — TETANUS TOXOID, REDUCED DIPHTHERIA TOXOID AND ACELLULAR PERTUSSIS VACCINE, ADSORBED 0.5 ML: 5; 2.5; 8; 8; 2.5 SUSPENSION INTRAMUSCULAR at 18:56

## 2024-01-10 NOTE — TELEPHONE ENCOUNTER
----- [facility] sent at 1/10/2024  7:17 AM EST -----  No concerns on labs. A1c was 6.1%

## 2024-01-11 NOTE — DISCHARGE INSTRUCTIONS
Please do not get your wound wet for the next 24 hours.  Please do not soak the wound for the next 48 hours.  Please make appointment with your primary care provider in the next 10 to 14 days to have sutures removed.  You may also have the sutures removed at urgent care, primary care office, or return to the emergency department.

## 2024-01-11 NOTE — ED PROVIDER NOTES
History  Chief Complaint   Patient presents with    Hand Laceration     R hand lac from glass, needs tetanus      HPI  68-year-old right-hand-dominant male presents with laceration to right hand when reaching into a glass, glass broke.  Tetanus not up-to-date.  Normal range of motion.  Prior to Admission Medications   Prescriptions Last Dose Informant Patient Reported? Taking?   Blood Glucose Monitoring Suppl (OneTouch Verio Reflect) w/Device KIT  Self No No   Sig: Check blood sugars once daily. Please substitute with appropriate alternative as covered by patient's insurance. Dx: E11.65   OneTouch Delica Lancets 33G MISC  Self No No   Sig: Check blood sugars once daily. Please substitute with appropriate alternative as covered by patient's insurance. Dx: E11.65   amLODIPine (NORVASC) 5 mg tablet  Self No No   Sig: TAKE 1 TABLET (5 MG TOTAL) BY MOUTH DAILY.   aspirin 81 mg chewable tablet  Self No No   Sig: Chew 1 tablet (81 mg total) daily   atorvastatin (LIPITOR) 40 mg tablet   No No   Sig: TAKE 1 TABLET BY MOUTH EVERY DAY   diazepam (VALIUM) 2 mg tablet   No No   Sig: Take 1 tablet oral 30 minutes prior to MRI. Can repeat x1. Must have ride to and from MRI   fluticasone (FLONASE) 50 mcg/act nasal spray  Self No No   Sig: SPRAY 1 SPRAY INTO EACH NOSTRIL EVERY DAY   Patient not taking: Reported on 5/31/2023   fluticasone-vilanterol (Breo Ellipta) 200-25 mcg/actuation inhaler  Self No No   Sig: Inhale 1 puff daily Rinse mouth after use.   glucose blood (OneTouch Verio) test strip  Self No No   Sig: Check blood sugars once daily. Please substitute with appropriate alternative as covered by patient's insurance. Dx: E11.65   ipratropium-albuterol (DUO-NEB) 0.5-2.5 mg/3 mL nebulizer solution  Self No No   Sig: TAKE 3 ML BY NEBULIZATION 4 (FOUR) TIMES A DAY   Patient not taking: Reported on 11/7/2023   lisinopril (ZESTRIL) 30 mg tablet  Self No No   Sig: Take 1 tablet (30 mg total) by mouth daily   metFORMIN  (GLUCOPHAGE-XR) 500 mg 24 hr tablet  Self No No   Sig: TAKE 2 TABLETS BY MOUTH EVERY DAY WITH BREAKFAST   pantoprazole (PROTONIX) 40 mg tablet  Self No No   Sig: Take 1 tablet (40 mg total) by mouth every morning 1/2 hour before breakfast   pantoprazole (PROTONIX) 40 mg tablet   No No   Sig: TAKE 1 TABLET BY MOUTH TWICE A DAY      Facility-Administered Medications: None       Past Medical History:   Diagnosis Date    Asthma     Disc degeneration, lumbar     Peripheral vascular disease (HCC)     SOB (shortness of breath)     Wheezing        Past Surgical History:   Procedure Laterality Date    ESOPHAGOSCOPY         Family History   Problem Relation Age of Onset    Breast cancer Mother     Hip fracture Father      I have reviewed and agree with the history as documented.    E-Cigarette/Vaping    E-Cigarette Use Never User      E-Cigarette/Vaping Substances    Nicotine No     THC No     CBD No     Flavoring No      Social History     Tobacco Use    Smoking status: Never    Smokeless tobacco: Never   Vaping Use    Vaping status: Never Used   Substance Use Topics    Alcohol use: No    Drug use: No       Review of Systems   Constitutional:  Negative for chills and fever.   HENT:  Negative for dental problem and ear pain.    Eyes:  Negative for pain and redness.   Respiratory:  Negative for cough and shortness of breath.    Cardiovascular:  Negative for chest pain and palpitations.   Gastrointestinal:  Negative for abdominal pain and nausea.   Endocrine: Negative for polydipsia and polyphagia.   Genitourinary:  Negative for dysuria and frequency.   Musculoskeletal:  Negative for arthralgias and joint swelling.   Skin:  Positive for wound. Negative for color change and rash.   Neurological:  Negative for dizziness and headaches.   Psychiatric/Behavioral:  Negative for behavioral problems and confusion.    All other systems reviewed and are negative.      Physical Exam  Physical Exam  Constitutional:       General: He is not  in acute distress.     Appearance: He is well-developed. He is not diaphoretic.   HENT:      Head: Normocephalic and atraumatic.   Eyes:      Conjunctiva/sclera: Conjunctivae normal.      Pupils: Pupils are equal, round, and reactive to light.   Neck:      Vascular: No JVD.      Trachea: No tracheal deviation.   Cardiovascular:      Rate and Rhythm: Normal rate.   Pulmonary:      Effort: Pulmonary effort is normal.   Musculoskeletal:      Cervical back: Normal range of motion and neck supple.      Comments: Laceration over right second MCP joint, normal range of motion of second finger, normal strength and sensation, radial pulse 2+   Skin:     General: Skin is warm and dry.         Vital Signs  ED Triage Vitals [01/10/24 1812]   Temperature Pulse Respirations Blood Pressure SpO2   98.3 °F (36.8 °C) 97 18 (!) 168/118 100 %      Temp src Heart Rate Source Patient Position - Orthostatic VS BP Location FiO2 (%)   -- Monitor -- -- --      Pain Score       --           Vitals:    01/10/24 1812   BP: (!) 168/118   Pulse: 97         Visual Acuity      ED Medications  Medications   tetanus-diphtheria-acellular pertussis (BOOSTRIX) IM injection 0.5 mL (0.5 mL Intramuscular Given 1/10/24 1856)   lidocaine-epinephrine (XYLOCAINE/EPINEPHRINE) 1 %-1:100,000 injection 20 mL (20 mL Infiltration Given by Other 1/10/24 1859)   bacitracin topical ointment 1 small application (1 small application Topical Given 1/10/24 1934)       Diagnostic Studies  Results Reviewed       None                   XR hand 3+ views RIGHT    (Results Pending)              Procedures  Universal Protocol:  Consent given by: patient  Patient identity confirmed: verbally with patient and arm band  Laceration repair    Date/Time: 1/10/2024 8:25 PM    Performed by: Kay Chavez MD  Authorized by: Kay Chavez MD  Body area: upper extremity  Location details: right hand  Laceration length: 3 cm  Foreign bodies: no foreign bodies  Tendon involvement:  none  Nerve involvement: none  Anesthesia: local infiltration    Anesthesia:  Local Anesthetic: lidocaine 1% with epinephrine      Procedure Details:  Irrigation solution: saline  Irrigation method: jet lavage  Amount of cleaning: standard  Skin closure: 4-0 nylon  Number of sutures: 4  Technique: simple  Approximation: close  Approximation difficulty: simple  Dressing: antibiotic ointment and gauze roll               ED Course                               SBIRT 20yo+      Flowsheet Row Most Recent Value   Initial Alcohol Screen: US AUDIT-C     1. How often do you have a drink containing alcohol? 0 Filed at: 01/10/2024 1813   2. How many drinks containing alcohol do you have on a typical day you are drinking?  0 Filed at: 01/10/2024 1813   3a. Male UNDER 65: How often do you have five or more drinks on one occasion? 0 Filed at: 01/10/2024 1813   3b. FEMALE Any Age, or MALE 65+: How often do you have 4 or more drinks on one occassion? 0 Filed at: 01/10/2024 1813   Audit-C Score 0 Filed at: 01/10/2024 1813   SHEELA: How many times in the past year have you...    Used an illegal drug or used a prescription medication for non-medical reasons? Never Filed at: 01/10/2024 1813                      Medical Decision Making  Amount and/or Complexity of Data Reviewed  Radiology: ordered.    Risk  OTC drugs.  Prescription drug management.           No foreign body seen on x-ray per my read, he has normal strength and sensation, doubt tendon injury, superficial, no tendon injury seen.  Disposition  Final diagnoses:   Hand laceration     Time reflects when diagnosis was documented in both MDM as applicable and the Disposition within this note       Time User Action Codes Description Comment    1/10/2024  7:31 PM Kay Chavez Add [S61.419A] Hand laceration           ED Disposition       ED Disposition   Discharge    Condition   Stable    Date/Time   Wed Osman 10, 2024 1931    Comment   Dejuan Acosta discharge to home/self care.                    Follow-up Information       Follow up With Specialties Details Why Contact Vandana Pearson, DO Internal Medicine In 10 days For suture removal [address]  [address]  [place] [id number]  [phone]              Discharge Medication List as of 1/10/2024  7:32 PM        CONTINUE these medications which have NOT CHANGED    Details   amLODIPine (NORVASC) 5 mg tablet TAKE 1 TABLET (5 MG TOTAL) BY MOUTH DAILY., Starting Mon 3/6/2023, Until Thu 12/14/2023, Normal      aspirin 81 mg chewable tablet Chew 1 tablet (81 mg total) daily, Starting Wed 5/31/2023, No Print      atorvastatin (LIPITOR) 40 mg tablet TAKE 1 TABLET BY MOUTH EVERY DAY, Starting Mon 12/18/2023, Normal      Blood Glucose Monitoring Suppl (OneTouch Verio Reflect) w/Device KIT Check blood sugars once daily. Please substitute with appropriate alternative as covered by patient's insurance. Dx: E11.65, Normal      diazepam (VALIUM) 2 mg tablet Take 1 tablet oral 30 minutes prior to MRI. Can repeat x1. Must have ride to and from MRI, Normal      fluticasone (FLONASE) 50 mcg/act nasal spray SPRAY 1 SPRAY INTO EACH NOSTRIL EVERY DAY, Normal      fluticasone-vilanterol (Breo Ellipta) 200-25 mcg/actuation inhaler Inhale 1 puff daily Rinse mouth after use., Starting Mon 5/8/2023, Until Thu 12/14/2023, Normal      glucose blood (OneTouch Verio) test strip Check blood sugars once daily. Please substitute with appropriate alternative as covered by patient's insurance. Dx: E11.65, Normal      ipratropium-albuterol (DUO-NEB) 0.5-2.5 mg/3 mL nebulizer solution TAKE 3 ML BY NEBULIZATION 4 (FOUR) TIMES A DAY, Starting Tue 11/22/2022, Normal      lisinopril (ZESTRIL) 30 mg tablet Take 1 tablet (30 mg total) by mouth daily, Starting Wed 8/16/2023, Normal      metFORMIN (GLUCOPHAGE-XR) 500 mg 24 hr tablet TAKE 2 TABLETS BY MOUTH EVERY DAY WITH BREAKFAST, Normal      OneTouch Delica Lancets 33G MISC Check blood sugars once daily. Please  substitute with appropriate alternative as covered by patient's insurance. Dx: E11.65, Normal      !! pantoprazole (PROTONIX) 40 mg tablet Take 1 tablet (40 mg total) by mouth every morning 1/2 hour before breakfast, Starting Mon 9/11/2023, Normal      !! pantoprazole (PROTONIX) 40 mg tablet TAKE 1 TABLET BY MOUTH TWICE A DAY, Starting Mon 12/4/2023, Normal       !! - Potential duplicate medications found. Please discuss with provider.          No discharge procedures on file.    PDMP Review       None            ED Provider  Electronically Signed by             Kay Chavez MD  01/10/24 2027

## 2024-01-12 ENCOUNTER — TELEPHONE (OUTPATIENT)
Dept: NEUROLOGY | Facility: CLINIC | Age: 69
End: 2024-01-12

## 2024-01-22 ENCOUNTER — OFFICE VISIT (OUTPATIENT)
Age: 69
End: 2024-01-22
Payer: COMMERCIAL

## 2024-01-22 VITALS
HEIGHT: 72 IN | SYSTOLIC BLOOD PRESSURE: 110 MMHG | DIASTOLIC BLOOD PRESSURE: 64 MMHG | HEART RATE: 63 BPM | TEMPERATURE: 97.1 F | OXYGEN SATURATION: 98 % | WEIGHT: 194 LBS | BODY MASS INDEX: 26.28 KG/M2 | RESPIRATION RATE: 18 BRPM

## 2024-01-22 DIAGNOSIS — Z48.02 VISIT FOR SUTURE REMOVAL: Primary | ICD-10-CM

## 2024-01-22 PROCEDURE — 99214 OFFICE O/P EST MOD 30 MIN: CPT

## 2024-01-22 NOTE — PROGRESS NOTES
Name: Dejuan Acosta      : 1955      MRN: [id number]  Encounter Provider: PATRICIA Rogel  Encounter Date: 2024   Encounter department: [facility] [place]    Assessment & Plan     1. Visit for suture removal  Pt states was washing the glass, and it broke and cut his right knuckle, went to ED, had 4 stitches. Presents for removal. Denies any complaints. No signs of infection. Suture removal tolerated with no difficulty. Instructed on signs and symptoms of infection. 2 steri strips placed, on the knuckle for extra support.   -     Suture removal       Subjective      Suture / Staple Removal      Review of Systems   Constitutional:  Negative for chills and fever.   HENT:  Negative for ear pain and sore throat.    Eyes:  Negative for pain and visual disturbance.   Respiratory:  Negative for cough and shortness of breath.    Cardiovascular:  Negative for chest pain and palpitations.   Gastrointestinal:  Negative for abdominal pain and vomiting.   Genitourinary:  Negative for dysuria and hematuria.   Musculoskeletal:  Negative for arthralgias and back pain.   Skin:  Positive for wound. Negative for color change and rash.        Has sutures   Neurological:  Negative for seizures and syncope.   All other systems reviewed and are negative.      Current Outpatient Medications on File Prior to Visit   Medication Sig   • aspirin 81 mg chewable tablet Chew 1 tablet (81 mg total) daily   • atorvastatin (LIPITOR) 40 mg tablet TAKE 1 TABLET BY MOUTH EVERY DAY   • Blood Glucose Monitoring Suppl (OneTouch Verio Reflect) w/Device KIT Check blood sugars once daily. Please substitute with appropriate alternative as covered by patient's insurance. Dx: E11.65   • diazepam (VALIUM) 2 mg tablet Take 1 tablet oral 30 minutes prior to MRI. Can repeat x1. Must have ride to and from MRI   • glucose blood (OneTouch Verio) test strip Check blood sugars once daily. Please substitute with appropriate  alternative as covered by patient's insurance. Dx: E11.65   • lisinopril (ZESTRIL) 30 mg tablet Take 1 tablet (30 mg total) by mouth daily   • metFORMIN (GLUCOPHAGE-XR) 500 mg 24 hr tablet TAKE 2 TABLETS BY MOUTH EVERY DAY WITH BREAKFAST   • OneTouch Delica Lancets 33G MISC Check blood sugars once daily. Please substitute with appropriate alternative as covered by patient's insurance. Dx: E11.65   • pantoprazole (PROTONIX) 40 mg tablet Take 1 tablet (40 mg total) by mouth every morning 1/2 hour before breakfast   • pantoprazole (PROTONIX) 40 mg tablet TAKE 1 TABLET BY MOUTH TWICE A DAY   • amLODIPine (NORVASC) 5 mg tablet TAKE 1 TABLET (5 MG TOTAL) BY MOUTH DAILY.   • fluticasone (FLONASE) 50 mcg/act nasal spray SPRAY 1 SPRAY INTO EACH NOSTRIL EVERY DAY (Patient not taking: Reported on 5/31/2023)   • fluticasone-vilanterol (Breo Ellipta) 200-25 mcg/actuation inhaler Inhale 1 puff daily Rinse mouth after use.   • ipratropium-albuterol (DUO-NEB) 0.5-2.5 mg/3 mL nebulizer solution TAKE 3 ML BY NEBULIZATION 4 (FOUR) TIMES A DAY (Patient not taking: Reported on 11/7/2023)       Objective     /64 (BP Location: Left arm, Patient Position: Sitting, Cuff Size: Standard)   Pulse 63   Temp (!) 97.1 °F (36.2 °C) (Tympanic)   Resp 18   Ht 6' (1.829 m)   Wt 88 kg (194 lb)   SpO2 98%   BMI 26.31 kg/m²     Physical Exam  Constitutional:       Appearance: Normal appearance.   HENT:      Head: Normocephalic.      Right Ear: Tympanic membrane normal.      Left Ear: Tympanic membrane normal.      Nose: Nose normal.      Mouth/Throat:      Mouth: Mucous membranes are moist.   Eyes:      Conjunctiva/sclera: Conjunctivae normal.      Pupils: Pupils are equal, round, and reactive to light.   Cardiovascular:      Rate and Rhythm: Normal rate and regular rhythm.      Pulses: Normal pulses.      Heart sounds: Normal heart sounds.   Pulmonary:      Effort: Pulmonary effort is normal.      Breath sounds: Normal breath sounds.    Abdominal:      General: Bowel sounds are normal.   Musculoskeletal:         General: Normal range of motion.   Skin:     General: Skin is warm and dry.      Capillary Refill: Capillary refill takes less than 2 seconds.      Findings: No erythema (mild by the healing scar) or lesion.      Comments: No signs of infection. Edges approximated, intact, dry.    Neurological:      Mental Status: He is alert and oriented to person, place, and time.   Psychiatric:         Mood and Affect: Mood normal.         Behavior: Behavior normal.         Thought Content: Thought content normal.       Suture removal    Date/Time: 1/22/2024 10:20 AM    Performed by: PATRICIA Rogel  Authorized by: PATRICIA Rogel  Universal Protocol:  Consent: Verbal consent obtained.  Risks and benefits: risks, benefits and alternatives were discussed  Consent given by: patient  Patient understanding: patient states understanding of the procedure being performed  Patient identity confirmed: verbally with patient      Patient location:  Clinic  Location:     Laterality:  Right    Location:  Upper extremity    Upper extremity location:  Hand    Hand location:  R index finger  Procedure details:     Tools used:  Suture removal kit    Wound appearance:  No sign(s) of infection and nonpurulent    Number of sutures removed:  4  Post-procedure details:     Post-removal:  Steri-Strips applied and antibiotic ointment applied    Patient tolerance of procedure:  Tolerated well, no immediate complications      PATRICIA Rogel

## 2024-01-23 ENCOUNTER — HOSPITAL ENCOUNTER (OUTPATIENT)
Dept: RADIOLOGY | Facility: HOSPITAL | Age: 69
Discharge: HOME | End: 2024-01-23
Attending: PSYCHIATRY & NEUROLOGY
Payer: COMMERCIAL

## 2024-01-23 DIAGNOSIS — R49.0 DYSPHONIA: ICD-10-CM

## 2024-01-23 DIAGNOSIS — R13.10 DYSPHAGIA, UNSPECIFIED TYPE: ICD-10-CM

## 2024-01-23 PROCEDURE — 74230 X-RAY XM SWLNG FUNCJ C+: CPT

## 2024-01-23 PROCEDURE — 92611 MOTION FLUOROSCOPY/SWALLOW: CPT

## 2024-01-23 NOTE — PROCEDURES
Speech Pathology - Modified Barium Swallow Study    Patient Name: Dejuan Acosta    Today's Date: 1/23/2024     Problem List  Active Problems:  There are no active Hospital Problems.      Past Medical History  Past Medical History:   Diagnosis Date    Asthma     Disc degeneration, lumbar     Peripheral vascular disease (HCC)     SOB (shortness of breath)     Wheezing        Past Surgical History  Past Surgical History:   Procedure Laterality Date    ESOPHAGOSCOPY         Assessment Summary:    Pt presents with mild oropharyngeal dysphagia characterized by delayed swallow initiation, reduced BOT retraction, reduced hyolaryngeal excursion and protraction.  Airway closure is incomplete w/ penetration of thin and nectar thick liquids.  Diffuse pharyngeal retention (BOT, valleculae, pyriforms) which is mostly cleared with cues for secondary swallow.  Patient appears unaware otherwise.  Chin tuck attempted though ineffective in preventing penetration - resulting in aspiration w/ thins. Weak grunt noted, no attempt to eject otherwise.  No eli aspiration during standard PO trials in lateral view (ie w/o chin tuck).  No obstruction in bolus flow, retropulsion or retrograde flow through the UES.  Given incomplete airway closure, may consider ENT referral to r/o laryngeal pathology (note, vocal quality appears breathy and hoarse as well).       Note: Images are available for review in PACS as desired.    Recommendations:   Recommended Diet: regular diet and thin liquids   Recommended Form of Medications:  as tolerated     Aspiration precautions and compensatory swallowing strategies: upright posture, small bites/sips, and double swallows   Consider referral to:  ENT re: laryngeal function   SLP Dysphagia therapy recommended: Y- continue SLP tx in the outpatient setting     Results Reviewed with: patient and MD   Pt/Family Education: initiated. Pt would benefit from continued education.    General Information;  Pt is a 68 y.o.  "male with a PMH remarkable for HTN, HLD, esophageal polypectomy,  GERD, asthma.   Patient attended neurologist on 11/7/23. Patient presented with eyelid droop and weakness. Patient reported difficulties with swallowing . He is not concerned if occurred with liquids F/U with GI for esophageal polypectomy in January 2022. Noted in the neurology appointment hoarseness of voice which has worsened  over time. .    Current concerns for dysphagia include coughing/choking at random.  May be with saliva, liquids, or at rest \"breathing\" as reported by patient.  Patient is followed by SLP in the outpatient setting.  Upcoming appointment with neurology as well.    MBS was recommended to assess oropharyngeal stage swallowing skills at this time.     Prior MBS: None on file     Oral Mechanism Exam  Facial: symmetrical  Labial: decreased strength (subtle) L-side   Lingual: decreased ROM  Velum: unable to visualize  Mandible: adequate ROM  Dentition: adequate  Vocal quality: breathy and hoarse   Volitional Cough: weak   Respiratory Status: on RA      Pt was viewed sitting upright in the lateral and AP positions. Trials administered were consistent with MBSImP Validated Protocol: Pt was given 5-mL thin liquid x2, 20-mL cup sip thin, 40-mL sequential swallow thin, 5-mL nectar thick, 20-mL cup sip nectar thick, 40-mL sequential swallow nectar thick, 5-mL honey thick, 5-mL pudding, ½ cookie coated with 3-mL pudding, and 5-mL pudding in the AP position.    Initial view observations/comments: Clear view of the upper airway      8-Point Penetration-Aspiration Scale   Thin liquid 4 - Material enters the airway, contacts the vocal folds, and is ejected from the airway    Nectar thick liquid 2 - Material enters the airway, remains above the vocal folds, and is ejected  from the airway   Honey thick liquid 1 - Material does not enter the airway   Puree (pudding) 1 - Material does not enter the airway   Solid 1 - Material does not enter the " airway     Strategies and Efficacy: chin tuck - ineffective and causes aspiration (worsens airway invasion),  Secondary swallow effective in clearing majority though not all of solid boluses  from pharynx     Aspiration Response and Efficacy:  Weak throat clear; no coughing or true attempt to eject observed.     MBS IMP Rating    ORAL Impairment  Compinent 1--Lip Closure  Judged at any point during the swallow.  0 - No labial escape    Component 2--Tongue Control During Bolus Hold  Judged on held liquid boluses only and prior to productive tongue movement.   2 - Posterior escape of less than half of bolus    Component 3--Bolus Preparation/Mastication  Judged only during presentation of 1/2 shortbread cookie coated in pudding.   1 - Slow prolonged chewing/mashing with complete re-collection    Component 4--Bolus Transport/Lingual Motion  Judged after first productive tongue movement for oral bolus transport.  2 - Slowed tongue motion     Component 5--Oral Residue  Judged after first swallow or after the last swallow of the sequential swallow task.  2 - Residue collection on oral structures   Location   C - Tongue    Component 6--Initiation of Pharyngeal Swallow  Judged at first movement of the brisk superior-anterior hyoid trajectory.  3 - Bolus head in pyriforms      PHARYNGEAL Impairment  Component 7--Soft Palate Elevation  Judged during maximum displacement of soft palate.  0 - No bolus between the soft palate (SP)/pharyngeal wall (PW)    Component 8--Laryngeal Elevation  Judged when epiglottis is in its most horizontal position.  1 - Partial superior movement of thyroid cartilage/partial approximation of arytenoids to epiglottic petiole    Component 9--Anterior Hyoid Excursion  Judged at height of swallow/maximal anterior hyoid displacement.  1 - Partial anterior movement    Component 10--Epiglottic Movement  Judged at height of swallow/maximal anterior hyoid displacement.  1 - Partial inversion    Component  11--Laryngeal Vestibular Closure  Judged at height of swallow/maximal anterior hyoid displacement.  1 - Incomplete; narrow column air/contrast in laryngeal vestibule    Component 12--Pharyngeal Stripping Wave  Judged during the full duration of the pharyngeal swallow.  0 - Present - complete    Component 13--Pharyngeal Contraction  Judged in AP view at rest and throughout maximum movement of structures.  0 - Complete    Component 14--Pharyngoesophageal Segment Opening  Judged during maximum distension of PES and throughout opening and closure.  1 - Partial distension/partial duration; partial obstruction to flow    Component 15--Tongue Base (TB) Retraction  Judged during maximum retraction of the tongue base.  2 - Narrow column of contrast or air between TB and PW    Component 16--Pharyngeal Residue  Judged after first swallow or after the last swallow of the sequential swallow task.  2 - Collection of residue within or on pharyngeal structures   Location   F - Diffuse (>3 areas)      ESOPHAGEAL Impairment  Component 17--Esophageal Clearance Upright Position  Judged in AP view during bolus transit through the oral cavity to the LES  2 - Esophageal retention with retrograde flow below pharyngoesophageal segment (PES)      Magnolia Wang MS, CCC-SLP  Speech-Language Pathologist  [place] #[id number]  [place] #[id number]

## 2024-01-24 DIAGNOSIS — J45.40 MODERATE PERSISTENT ASTHMA WITHOUT COMPLICATION: ICD-10-CM

## 2024-01-24 RX ORDER — FLUTICASONE FUROATE AND VILANTEROL TRIFENATATE 200; 25 UG/1; UG/1
POWDER RESPIRATORY (INHALATION)
Qty: 60 EACH | Refills: 5 | Status: SHIPPED | OUTPATIENT
Start: 2024-01-24

## 2024-01-26 ENCOUNTER — TELEPHONE (OUTPATIENT)
Dept: NEUROLOGY | Facility: CLINIC | Age: 69
End: 2024-01-26

## 2024-01-26 ENCOUNTER — OFFICE VISIT (OUTPATIENT)
Age: 69
End: 2024-01-26
Payer: COMMERCIAL

## 2024-01-26 VITALS
SYSTOLIC BLOOD PRESSURE: 118 MMHG | OXYGEN SATURATION: 96 % | HEART RATE: 61 BPM | HEIGHT: 72 IN | WEIGHT: 194 LBS | BODY MASS INDEX: 26.28 KG/M2 | DIASTOLIC BLOOD PRESSURE: 70 MMHG

## 2024-01-26 DIAGNOSIS — R14.2 BELCHING: ICD-10-CM

## 2024-01-26 DIAGNOSIS — K21.9 GASTROESOPHAGEAL REFLUX DISEASE, UNSPECIFIED WHETHER ESOPHAGITIS PRESENT: ICD-10-CM

## 2024-01-26 DIAGNOSIS — R93.3 ABNORMAL BARIUM SWALLOW: Primary | ICD-10-CM

## 2024-01-26 PROCEDURE — 99214 OFFICE O/P EST MOD 30 MIN: CPT | Performed by: PHYSICIAN ASSISTANT

## 2024-01-26 NOTE — H&P (VIEW-ONLY)
[facility] - Outpatient Follow-up Note  Dejuan Acosta 68 y.o. male MRN: [id number]  Encounter: [id number]          ASSESSMENT AND PLAN:      1. Abnormal barium swallow  2. Belching  3. Gastroesophageal reflux disease    Patient presents for follow up.  He had a barium swallow yesterday for his oropharyngeal dysphagia and the esophageal component did mention esophageal retention.  He had an EGD 2 years ago which showed erosions and an esophageal polyp was removed.  He is on Pantoprazole BID at present.    He reports belching.  He is also going to see an ENT regarding his hoarseness complaints.    Will plan for EGD to investigate.  Continue the PPI.  Note: He reports difficulty initiating a swallow but no difficulties after he swallows (no complaints of food sticking).  Recommended he slow down when he eats and drinks.  He is following with Neurology for evaluation.  He is also going to see ENT for an evaluation.    ______________________________________________________________________    SUBJECTIVE:  Patient is a pleasant 68 year old male who presents to the office for follow up.  He reports of issues belching. He reports difficulty initiating a swallow and clearing his saliva.  No issues after he swallow (no food sticking).  No abdominal pain.  No melena.  He follows with Neurology.  He has had issues with hoarseness.  He is also going to see ENT.      REVIEW OF SYSTEMS IS OTHERWISE NEGATIVE.      Historical Information   Past Medical History:   Diagnosis Date    Asthma     Disc degeneration, lumbar     Peripheral vascular disease (HCC)     SOB (shortness of breath)     Wheezing      Past Surgical History:   Procedure Laterality Date    ESOPHAGOSCOPY       Social History   Social History     Substance and Sexual Activity   Alcohol Use No     Social History     Substance and Sexual Activity   Drug Use No     Social History     Tobacco Use   Smoking Status Never   Smokeless Tobacco Never      Family History   Problem Relation Age of Onset    Breast cancer Mother     Hip fracture Father        Meds/Allergies       Current Outpatient Medications:     aspirin 81 mg chewable tablet    atorvastatin (LIPITOR) 40 mg tablet    Blood Glucose Monitoring Suppl (OneTouch Verio Reflect) w/Device KIT    Breo Ellipta 200-25 MCG/ACT inhaler    diazepam (VALIUM) 2 mg tablet    fluticasone (FLONASE) 50 mcg/act nasal spray    glucose blood (OneTouch Verio) test strip    lisinopril (ZESTRIL) 30 mg tablet    metFORMIN (GLUCOPHAGE-XR) 500 mg 24 hr tablet    OneTouch Delica Lancets 33G MISC    pantoprazole (PROTONIX) 40 mg tablet    pantoprazole (PROTONIX) 40 mg tablet    amLODIPine (NORVASC) 5 mg tablet    ipratropium-albuterol (DUO-NEB) 0.5-2.5 mg/3 mL nebulizer solution    Allergies   Allergen Reactions    Pollen Extract Nasal Congestion           Objective     Blood pressure 118/70, pulse 61, height 6' (1.829 m), weight 88 kg (194 lb), SpO2 96%. Body mass index is 26.31 kg/m².      PHYSICAL EXAM:      General Appearance:   Alert, cooperative, no distress   HEENT:   Normocephalic, atraumatic, anicteric    Neck:  Supple, symmetrical, trachea midline   Lungs:   Clear to auscultation bilaterally; no rales, rhonchi or wheezing; respirations unlabored    Heart::   Regular rate and rhythm; no murmur, rub, or gallop.   Abdomen:   Soft, non-tender, non-distended; normal bowel sounds; no masses, no organomegaly    Genitalia:   Deferred    Rectal:   Deferred    Extremities:  No cyanosis, clubbing or edema    Pulses:  2+ and symmetric    Skin:  No jaundice, rashes, or lesions    Lymph nodes:  No palpable cervical lymphadenopathy        Lab Results:   No visits with results within 1 Day(s) from this visit.   Latest known visit with results is:   Appointment on 01/09/2024   Component Date Value    Creatinine, Ur 01/09/2024 82.9     Albumin,U,Random 01/09/2024 <7.0     Albumin Creat Ratio 01/09/2024 <8     Sodium 01/09/2024 137      Potassium 01/09/2024 4.8     Chloride 01/09/2024 100     CO2 01/09/2024 28     ANION GAP 01/09/2024 9     BUN 01/09/2024 20     Creatinine 01/09/2024 0.89     Glucose, Fasting 01/09/2024 107 (H)     Calcium 01/09/2024 10.1     AST 01/09/2024 27     ALT 01/09/2024 46     Alkaline Phosphatase 01/09/2024 76     Total Protein 01/09/2024 7.6     Albumin 01/09/2024 4.5     Total Bilirubin 01/09/2024 0.55     eGFR 01/09/2024 87     Hemoglobin A1C 01/09/2024 6.1 (H)     EAG 01/09/2024 128     WBC 01/09/2024 6.05     RBC 01/09/2024 4.58     Hemoglobin 01/09/2024 14.0     Hematocrit 01/09/2024 43.0     MCV 01/09/2024 94     MCH 01/09/2024 30.6     MCHC 01/09/2024 32.6     RDW 01/09/2024 12.8     Platelets 01/09/2024 248     MPV 01/09/2024 9.8     Cholesterol 01/09/2024 182     Triglycerides 01/09/2024 93     HDL, Direct 01/09/2024 47     LDL Calculated 01/09/2024 116 (H)          Radiology Results:   FL barium swallow video w speech    Result Date: 1/23/2024  Narrative: A video barium swallow study was performed by the Department of Speech Pathology. Please refer to the report for the official interpretation. The images are stored for archival purposes only. Study images were not formally reviewed by the Radiology Department.    XR hand 3+ views RIGHT    Result Date: 1/11/2024  Narrative: RIGHT HAND INDICATION:   hand laceration. COMPARISON:  None VIEWS:  XR HAND 3+ VW RIGHT For the purposes of institution wide universal language the following terms will apply: (thumb=1st digit/finger, index finger=2nd digit/finger, long finger=3rd digit/finger, ring=4th digit/finger and small finger=5th digit/finger) FINDINGS: There is no acute fracture or dislocation. 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 No lytic or blastic osseous lesion. Soft tissues are unremarkable.     Impression: No acute osseous abnormality. Workstation performed: [id number]

## 2024-01-26 NOTE — PROGRESS NOTES
[facility] - Outpatient Follow-up Note  Dejuan Acosat 68 y.o. male MRN: [id number]  Encounter: [id number]          ASSESSMENT AND PLAN:      1. Abnormal barium swallow  2. Belching  3. Gastroesophageal reflux disease    Patient presents for follow up.  He had a barium swallow yesterday for his oropharyngeal dysphagia and the esophageal component did mention esophageal retention.  He had an EGD 2 years ago which showed erosions and an esophageal polyp was removed.  He is on Pantoprazole BID at present.    He reports belching.  He is also going to see an ENT regarding his hoarseness complaints.    Will plan for EGD to investigate.  Continue the PPI.  Note: He reports difficulty initiating a swallow but no difficulties after he swallows (no complaints of food sticking).  Recommended he slow down when he eats and drinks.  He is following with Neurology for evaluation.  He is also going to see ENT for an evaluation.    ______________________________________________________________________    SUBJECTIVE:  Patient is a pleasant 68 year old male who presents to the office for follow up.  He reports of issues belching. He reports difficulty initiating a swallow and clearing his saliva.  No issues after he swallow (no food sticking).  No abdominal pain.  No melena.  He follows with Neurology.  He has had issues with hoarseness.  He is also going to see ENT.      REVIEW OF SYSTEMS IS OTHERWISE NEGATIVE.      Historical Information   Past Medical History:   Diagnosis Date    Asthma     Disc degeneration, lumbar     Peripheral vascular disease (HCC)     SOB (shortness of breath)     Wheezing      Past Surgical History:   Procedure Laterality Date    ESOPHAGOSCOPY       Social History   Social History     Substance and Sexual Activity   Alcohol Use No     Social History     Substance and Sexual Activity   Drug Use No     Social History     Tobacco Use   Smoking Status Never   Smokeless Tobacco Never      Family History   Problem Relation Age of Onset    Breast cancer Mother     Hip fracture Father        Meds/Allergies       Current Outpatient Medications:     aspirin 81 mg chewable tablet    atorvastatin (LIPITOR) 40 mg tablet    Blood Glucose Monitoring Suppl (OneTouch Verio Reflect) w/Device KIT    Breo Ellipta 200-25 MCG/ACT inhaler    diazepam (VALIUM) 2 mg tablet    fluticasone (FLONASE) 50 mcg/act nasal spray    glucose blood (OneTouch Verio) test strip    lisinopril (ZESTRIL) 30 mg tablet    metFORMIN (GLUCOPHAGE-XR) 500 mg 24 hr tablet    OneTouch Delica Lancets 33G MISC    pantoprazole (PROTONIX) 40 mg tablet    pantoprazole (PROTONIX) 40 mg tablet    amLODIPine (NORVASC) 5 mg tablet    ipratropium-albuterol (DUO-NEB) 0.5-2.5 mg/3 mL nebulizer solution    Allergies   Allergen Reactions    Pollen Extract Nasal Congestion           Objective     Blood pressure 118/70, pulse 61, height 6' (1.829 m), weight 88 kg (194 lb), SpO2 96%. Body mass index is 26.31 kg/m².      PHYSICAL EXAM:      General Appearance:   Alert, cooperative, no distress   HEENT:   Normocephalic, atraumatic, anicteric    Neck:  Supple, symmetrical, trachea midline   Lungs:   Clear to auscultation bilaterally; no rales, rhonchi or wheezing; respirations unlabored    Heart::   Regular rate and rhythm; no murmur, rub, or gallop.   Abdomen:   Soft, non-tender, non-distended; normal bowel sounds; no masses, no organomegaly    Genitalia:   Deferred    Rectal:   Deferred    Extremities:  No cyanosis, clubbing or edema    Pulses:  2+ and symmetric    Skin:  No jaundice, rashes, or lesions    Lymph nodes:  No palpable cervical lymphadenopathy        Lab Results:   No visits with results within 1 Day(s) from this visit.   Latest known visit with results is:   Appointment on 01/09/2024   Component Date Value    Creatinine, Ur 01/09/2024 82.9     Albumin,U,Random 01/09/2024 <7.0     Albumin Creat Ratio 01/09/2024 <8     Sodium 01/09/2024 137      Potassium 01/09/2024 4.8     Chloride 01/09/2024 100     CO2 01/09/2024 28     ANION GAP 01/09/2024 9     BUN 01/09/2024 20     Creatinine 01/09/2024 0.89     Glucose, Fasting 01/09/2024 107 (H)     Calcium 01/09/2024 10.1     AST 01/09/2024 27     ALT 01/09/2024 46     Alkaline Phosphatase 01/09/2024 76     Total Protein 01/09/2024 7.6     Albumin 01/09/2024 4.5     Total Bilirubin 01/09/2024 0.55     eGFR 01/09/2024 87     Hemoglobin A1C 01/09/2024 6.1 (H)     EAG 01/09/2024 128     WBC 01/09/2024 6.05     RBC 01/09/2024 4.58     Hemoglobin 01/09/2024 14.0     Hematocrit 01/09/2024 43.0     MCV 01/09/2024 94     MCH 01/09/2024 30.6     MCHC 01/09/2024 32.6     RDW 01/09/2024 12.8     Platelets 01/09/2024 248     MPV 01/09/2024 9.8     Cholesterol 01/09/2024 182     Triglycerides 01/09/2024 93     HDL, Direct 01/09/2024 47     LDL Calculated 01/09/2024 116 (H)          Radiology Results:   FL barium swallow video w speech    Result Date: 1/23/2024  Narrative: A video barium swallow study was performed by the Department of Speech Pathology. Please refer to the report for the official interpretation. The images are stored for archival purposes only. Study images were not formally reviewed by the Radiology Department.    XR hand 3+ views RIGHT    Result Date: 1/11/2024  Narrative: RIGHT HAND INDICATION:   hand laceration. COMPARISON:  None VIEWS:  XR HAND 3+ VW RIGHT For the purposes of institution wide universal language the following terms will apply: (thumb=1st digit/finger, index finger=2nd digit/finger, long finger=3rd digit/finger, ring=4th digit/finger and small finger=5th digit/finger) FINDINGS: There is no acute fracture or dislocation. 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 No lytic or blastic osseous lesion. Soft tissues are unremarkable.     Impression: No acute osseous abnormality. Workstation performed: [id number]

## 2024-01-26 NOTE — PATIENT INSTRUCTIONS
Scheduled date of EGD(as of today): 2/6/24  Physician performing EGD: Vaibhav  Location of EGD: [facility]  Instructions reviewed with patient by: Noemi ZIMMERMAN  Clearances:

## 2024-01-29 NOTE — PROGRESS NOTES
Patient ID: Dejuan Acosta is a 68 y.o. male.    Assessment/Plan:    Dysphagia  He had abnormal swallow eval.  He subsequently saw GI and will undergo EGD next week.  If negative, advised patient to see ENT.    He was unable to tolerate open MRI.  He underwent CT head with and without contrast which did not show evidence of enhancement.    Recommendations:  -EGD next week  -ENT evaluation if EGD negative  -If both of the above negative, EMG with repetitive nerve stimulation to evaluate for neuromuscular junction disorder  -Paraneoplastic antibody panel    Hoarseness  He continues to have hoarseness.  There is a diurnal variation.  Recommendations are same as above for dysphagia.    Ptosis  None detected today    Recommendations:  -As above, with check EMG with rep stim if above work up negative    Peripheral neuropathy  Likely due to underlying diabetes    Follow-up in 4 months or sooner if difficulties      I have spent a total time of 30 minutes on 1/30/24 in caring for this patient including Diagnostic results, Instructions for management, Impressions, Counseling / Coordination of care, Documenting in the medical record, Reviewing / ordering tests, medicine, procedures  , and Obtaining or reviewing history  .     Total time 30 minutes    Hoarseness  Mr. Acosta has hoarseness of his voice which is worsened over time.  It is now more consistent but still has a diurnal variation.  He also has dysphagia and his optometrist noted right ptosis which is not visible today.  The fluctuating nature of the symptoms was concerning for neuromuscular junction disorder such as myasthenia gravis.  However, acetylcholine receptor binding, blocking, modulating antibodies and MuSK antibodies were already tested and are negative.  Given several cranial neuropathies, a structural etiology should also be excluded.     Recommendations:  -Check MRI brain, IAC with and without contrast  -If MRI brain unrevealing, we will proceed with EMG  with repetitive nerve stimulation and consider paraneoplastic antibody panel  -BUN and creatinine prior to MRI  -patient is very claustrophobic and requested open MRI  -diazepam 2 mg prior to MRI. He understands that he needs a ride to and from MRI     Dysphagia  He continues to have dysphagia.  In the past, he had an esophageal polyp, but in the lower esophagus and did not have significant dysphagia at that time.  He is due to follow-up with GI.     Recommendation:  -As above, check MRI brain IAC with and without contrast  -Speech swallow therapy evaluation     Ptosis, right eyelid  His optometrist noted right ptosis with a difference of 1.5 mm from side to side.  I did not note ptosis today.  He did not develop ptosis with fatigue.  This does not completely exclude the possibility of a neuromuscular junction disorder nor does the negative antibodies.  As above, we will check MRI brain/IAC with and without contrast to evaluate the multiple cranial neuropathies.  If negative, we will consider repetitive nerve stimulation for the possibility of seronegative myasthenia gravis and also consider checking paraneoplastic antibody panel.        Follow-up after MRI brain/IAC with and without contrast or sooner if difficulties.           Subjective:    Mr Acosta is a 68 yr old handed right gentleman with PMH HTN, HLD, esophageal polypectomy, GERD, asthma, who presents for follow up for eyelid droop and weakness. Last seen in Nov 2023.     His optometrist told him his right eyelid was droopy (1.5 mm). He has not noticed ptosis since last visit, even when looking in the mirror.     He has had a hoarse voice for about a year. + diurnal variation, and worse as he talks more.     He has had dysphagia. He has trouble swallowing if he is reclined. He had abnormal swallow eval (see results below). He saw GI on Fri and is scheduled for EGD next week. He belches frequently. He had esophageal polyp removed in Jan 2022. He plans to  schedule with ENT after EGD. He has to return to see speech therapist in 2 days.        He lost about 20 pounds. Last year, he had trouble breathing while doing yard work. He had stiffness between the shoulder blades. Cardiac work up was negative per patient. Since the weight loss, he has been able to do the yard work. He may still get a little winded but nothing like before the weight loss.      Last winter, he had a really hard time swallowing. Also was worse in the evening. He is not certain if it occurred with liquids. He follows with GI. He had GERD.      No issues with strength. No problem with chewing. He can get out of a chair with no difficulty.     He joined the local [employer] and he plans to swim and bike.     At last visit, I ordered MRI brain w/wo contrast due to symptoms of multiple cranial nerves. He could not tolerate MRI, so we dud CT head with contrast which was unremarkable.          Objective:    Blood pressure 122/74, pulse 73, temperature 97.7 °F (36.5 °C), temperature source Temporal, weight 87.8 kg (193 lb 9.6 oz), SpO2 95%.    Physical Exam  Constitutional:       Appearance: Normal appearance.   HENT:      Mouth/Throat:      Mouth: Mucous membranes are moist.      Pharynx: Oropharynx is clear.   Eyes:      Conjunctiva/sclera: Conjunctivae normal.   Neck:      Vascular: No carotid bruit.   Cardiovascular:      Rate and Rhythm: Normal rate and regular rhythm.      Heart sounds: Normal heart sounds.   Pulmonary:      Effort: Pulmonary effort is normal.      Breath sounds: Normal breath sounds.   Skin:     General: Skin is warm and dry.   Psychiatric:         Mood and Affect: Mood normal.         Behavior: Behavior normal.         Neurological Exam  Mental status: Awake, alert, oriented to person place and time.  Naming, knowledge, repetition, concentration intact.  Recall 3/3 immediately and delayed recall 3/3 with prompts.     Cranial nerves: Extraocular movements intact.  No ptosis.  No fatigable  ptosis.  Pupils equally round and reactive to light.  Visual fields full to confrontation.  Facial sensation intact.  Face symmetric.  Hearing loss in the left ear.  Tongue, uvula, palate midline and intact.   Voice hoarse.  Sternocleidomastoid intact.  (Gag reflex was not tested today, but was intact bilaterally last visit)     Motor: Neck flexion 5/5, extension 5/5.  Strength 5/5 in all 4 extremities.       Cerebellar: No dysmetria.  Tremor on finger-to-nose bilaterally.  Heel-to-shin intact.  Gait normal.     Reflexes: 2+ in the arms, 2-3+ at the knees and 2+ at the ankles.       Sensory: Light touch, temperature, intact.  Vibration 5 seconds at the great toes.    ROS:    Review of Systems  Constitutional:  Negative for appetite change, fatigue and fever.   HENT:  Positive for trouble swallowing and voice change (ongoing hoarseness). Negative for hearing loss and tinnitus.    Eyes: Negative.  Negative for photophobia, pain and visual disturbance.   Respiratory: Negative.  Negative for shortness of breath.    Cardiovascular: Negative.  Negative for palpitations.   Gastrointestinal: Negative.  Negative for nausea and vomiting.   Endocrine: Negative.  Negative for cold intolerance.   Genitourinary: Negative.  Negative for dysuria, frequency and urgency.   Musculoskeletal:  Negative for back pain, gait problem, myalgias, neck pain and neck stiffness.   Skin: Negative.  Negative for rash.   Allergic/Immunologic: Negative.    Neurological:  Positive for speech difficulty. Negative for dizziness, tremors, seizures, syncope, facial asymmetry, weakness, light-headedness, numbness and headaches.   Hematological: Negative.  Does not bruise/bleed easily.   Psychiatric/Behavioral:  Positive for sleep disturbance (doesn't sleep well). Negative for confusion and hallucinations.      DATA:  CT head w/wo contrast 12/13/23:  No mass effect, acute intracranial hemorrhage or evidence of recent infarction.     Barium swallow  1/23/24:  Assessment Summary:    Pt presents with mild oropharyngeal dysphagia characterized by delayed swallow initiation, reduced BOT retraction, reduced hyolaryngeal excursion and protraction.  Airway closure is incomplete w/ penetration of thin and nectar thick liquids.  Diffuse pharyngeal retention (BOT, valleculae, pyriforms) which is mostly cleared with cues for secondary swallow.  Patient appears unaware otherwise.  Chin tuck attempted though ineffective in preventing penetration - resulting in aspiration w/ thins. Weak grunt noted, no attempt to eject otherwise.  No eli aspiration during standard PO trials in lateral view (ie w/o chin tuck).  No obstruction in bolus flow, retropulsion or retrograde flow through the UES.  Given incomplete airway closure, may consider ENT referral to r/o laryngeal pathology (note, vocal quality appears breathy and hoarse as well).      Recommendations:   Recommended Diet: regular diet and thin liquids   Recommended Form of Medications:  as tolerated     Aspiration precautions and compensatory swallowing strategies: upright posture, small bites/sips, and double swallows   Consider referral to:  ENT re: laryngeal function   SLP Dysphagia therapy recommended: Y- continue SLP tx in the outpatient setting

## 2024-01-30 ENCOUNTER — OFFICE VISIT (OUTPATIENT)
Dept: NEUROLOGY | Facility: CLINIC | Age: 69
End: 2024-01-30
Payer: COMMERCIAL

## 2024-01-30 VITALS
WEIGHT: 193.6 LBS | DIASTOLIC BLOOD PRESSURE: 74 MMHG | OXYGEN SATURATION: 95 % | HEART RATE: 73 BPM | TEMPERATURE: 97.7 F | BODY MASS INDEX: 26.26 KG/M2 | SYSTOLIC BLOOD PRESSURE: 122 MMHG

## 2024-01-30 DIAGNOSIS — H02.401 PTOSIS, RIGHT: ICD-10-CM

## 2024-01-30 DIAGNOSIS — R13.10 DYSPHAGIA: ICD-10-CM

## 2024-01-30 DIAGNOSIS — R49.0 HOARSENESS: Primary | ICD-10-CM

## 2024-01-30 PROCEDURE — 1160F RVW MEDS BY RX/DR IN RCRD: CPT | Performed by: PSYCHIATRY & NEUROLOGY

## 2024-01-30 PROCEDURE — 99214 OFFICE O/P EST MOD 30 MIN: CPT | Performed by: PSYCHIATRY & NEUROLOGY

## 2024-01-30 PROCEDURE — 1159F MED LIST DOCD IN RCRD: CPT | Performed by: PSYCHIATRY & NEUROLOGY

## 2024-01-30 NOTE — PROGRESS NOTES
Patient ID: Dejuan Acosta is a 68 y.o. male.    Assessment/Plan:    Dysphagia  He had abnormal swallow eval.  He subsequently saw GI and will undergo EGD next week.  If negative, advised patient to see ENT.    He was unable to tolerate open MRI.  He underwent CT head with and without contrast which did not show evidence of enhancement.    Recommendations:  -EGD next week  -ENT evaluation if EGD negative  -If both of the above negative, EMG with repetitive nerve stimulation to evaluate for neuromuscular junction disorder  -Paraneoplastic antibody panel    Hoarseness  He continues to have hoarseness.  There is a diurnal variation.  Recommendations are same as above for dysphagia.       {Assess/PlanSmartLinks:62234}       Subjective:    HPI    {[facility] HPI texts:27860}    {Common ambulatory SmartLinks:03596}         Objective:    Blood pressure 122/74, pulse 73, temperature 97.7 °F (36.5 °C), temperature source Temporal, weight 87.8 kg (193 lb 9.6 oz), SpO2 95%.    Physical Exam    Neurological Exam      ROS:    Review of Systems   Constitutional:  Negative for appetite change, fatigue and fever.   HENT:  Positive for trouble swallowing and voice change (ongoing hoarseness). Negative for hearing loss and tinnitus.    Eyes: Negative.  Negative for photophobia, pain and visual disturbance.   Respiratory: Negative.  Negative for shortness of breath.    Cardiovascular: Negative.  Negative for palpitations.   Gastrointestinal: Negative.  Negative for nausea and vomiting.   Endocrine: Negative.  Negative for cold intolerance.   Genitourinary: Negative.  Negative for dysuria, frequency and urgency.   Musculoskeletal:  Negative for back pain, gait problem, myalgias, neck pain and neck stiffness.   Skin: Negative.  Negative for rash.   Allergic/Immunologic: Negative.    Neurological:  Positive for speech difficulty. Negative for dizziness, tremors, seizures, syncope, facial asymmetry, weakness, light-headedness,  numbness and headaches.   Hematological: Negative.  Does not bruise/bleed easily.   Psychiatric/Behavioral:  Positive for sleep disturbance (doesn't sleep well). Negative for confusion and hallucinations.

## 2024-01-31 ENCOUNTER — APPOINTMENT (OUTPATIENT)
Age: 69
End: 2024-01-31
Payer: COMMERCIAL

## 2024-01-31 DIAGNOSIS — H02.401 PTOSIS, RIGHT: ICD-10-CM

## 2024-01-31 DIAGNOSIS — R49.0 HOARSENESS: ICD-10-CM

## 2024-01-31 DIAGNOSIS — R13.10 DYSPHAGIA: ICD-10-CM

## 2024-01-31 PROCEDURE — 86051 AQUAPORIN-4 ANTB ELISA: CPT

## 2024-01-31 PROCEDURE — 36415 COLL VENOUS BLD VENIPUNCTURE: CPT

## 2024-01-31 PROCEDURE — 86596 VOLTAGE-GTD CA CHNL ANTB EA: CPT

## 2024-01-31 PROCEDURE — 86255 FLUORESCENT ANTIBODY SCREEN: CPT

## 2024-01-31 PROCEDURE — 86341 ISLET CELL ANTIBODY: CPT

## 2024-01-31 NOTE — ASSESSMENT & PLAN NOTE
He had abnormal swallow eval.  He subsequently saw GI and will undergo EGD next week.  If negative, advised patient to see ENT.    He was unable to tolerate open MRI.  He underwent CT head with and without contrast which did not show evidence of enhancement.    Recommendations:  -EGD next week  -ENT evaluation if EGD negative  -If both of the above negative, EMG with repetitive nerve stimulation to evaluate for neuromuscular junction disorder  -Paraneoplastic antibody panel

## 2024-01-31 NOTE — ASSESSMENT & PLAN NOTE
He continues to have hoarseness.  There is a diurnal variation.  Recommendations are same as above for dysphagia.

## 2024-02-01 ENCOUNTER — OFFICE VISIT (OUTPATIENT)
Age: 69
End: 2024-02-01
Payer: COMMERCIAL

## 2024-02-01 DIAGNOSIS — R13.12 OROPHARYNGEAL DYSPHAGIA: Primary | ICD-10-CM

## 2024-02-01 PROCEDURE — 92526 ORAL FUNCTION THERAPY: CPT

## 2024-02-01 NOTE — PROGRESS NOTES
"Daily Speech Treatment Note    Today's date: 2024  Patient’s name: Dejuan Acosta  : 1955  MRN: [id number]  Safety measures: gerd, htn, asthma  Referring provider: Sanjuana Alfredo MD    Encounter Diagnosis     ICD-10-CM    1. Oropharyngeal dysphagia  R13.12         Visit tracking:  POC expires Unit limit Auth Expiration date PT/OT + Visit Limit?   3/4/24 N/a 24 N/a                           Visit/Unit Tracking  AUTH Status:  Date 24             none Used 1 2              Remaining  0 8                 Subjective/Behavioral:  -Patient reports he is receiving EGD next week. ENT evaluation if EGD is negative is recommended. Recommended to schedule ENT for vocal cord possible pathology due to ongoing voice deficits.     Objective/Assessment:  -Reviewed testing results and goals in plan care with patient. Patient is in agreement at this time.    Short-term goals:  Patient will receive a videofluoroscopic swallow study (VFSS) to assess his current swallowing function to r/o penetration or aspiration, to determine the safest, least restrictive diet, and to recommended the appropriate rehabilitation exercises (to be achieved in 2-3 weeks).   VFSS     \"Pt presents with mild oropharyngeal dysphagia characterized by delayed swallow initiation, reduced BOT retraction, reduced hyolaryngeal excursion and protraction.  Airway closure is incomplete w/ penetration of thin and nectar thick liquids.  Diffuse pharyngeal retention (BOT, valleculae, pyriforms) which is mostly cleared with cues for secondary swallow.  Patient appears unaware otherwise.  Chin tuck attempted though ineffective in preventing penetration - resulting in aspiration w/ thins. Weak grunt noted, no attempt to eject otherwise.  No eli aspiration during standard PO trials in lateral view (ie w/o chin tuck).  No obstruction in bolus flow, retropulsion or retrograde flow through the UES.  Given incomplete airway closure, may consider ENT " "referral to r/o laryngeal pathology (note, vocal quality appears breathy and hoarse as well).         Recommendations:   Recommended Diet: regular diet and thin liquids   Recommended Form of Medications:  as tolerated     Aspiration precautions and compensatory swallowing strategies: upright posture, small bites/sips, and double swallows   Consider referral to:  ENT re: laryngeal function   SLP Dysphagia therapy recommended: Y- continue SLP tx in the outpatient setting\"      Reviewed results in depth with patient with swallowing mechanism visual aid.      Patient will be educated on safe swallowing compensatory strategies (e.g., upright posture, small bites/sips, slow rate, alternation of consistencies, multiple swallows, effortful swallow, etc.) to facilitate increased safety with P.O. intake (to be achieved in 4-6 weeks).     Attempted to complete compensatory strategies (small sips/bites). Will attempt other strategies in the upcoming      Patient will tolerate P.O. trials of his recommended diet with the implementation of safe swallowing strategies without overt s/sx of penetration and/or aspiration during skilled therapy sessions in this certification period (to be achieved in 4-6 weeks).      Patient will independently complete oral motor exercises daily to increase lingual range of motion, strength, and coordination to facilitate improved bolus formation/control and AP transfer (to be achieved in 4-6 weeks).      Patient will independently complete pharyngeal strengthening exercises (e.g., Effortful swallow, Mendelsohn, Susan) daily to facilitate increased pharyngeal strength and coordination (to be achieved in 4-6 weeks).     To target pharyngeal strengthening for swallowing mechanism- patient was given 3 exercises. Patient was provided [employer] handout of the Mendelsohn Maneuver and effortful swallow and clinician provided education of the swallowing exercises. Patient was shown video demonstrations " provided by [employer]. Patient was educated to not complete exercises with liquids in PO. Liquids can only be utilized in between sets, not during.     Exercises completed:   Effortful-  2 sets of  10 reps  Mendelsohn- 2 sets of  10 reps      Plan:  -Patient was provided with home exercises/activities to target goals in plan of care at the end of today's session.  -Continue with current plan of care.

## 2024-02-02 ENCOUNTER — TELEPHONE (OUTPATIENT)
Age: 69
End: 2024-02-02

## 2024-02-02 NOTE — TELEPHONE ENCOUNTER
Spoke to ptn,  per [facility] team the codes were ran through the [employer] RTE and no auth is required. Ptn understood.

## 2024-02-02 NOTE — TELEPHONE ENCOUNTER
Patients GI provider:  Azeem    Number to return call: [phone]    Reason for call: Pt called in to say he called his insurance regarding the EGD scheduled on 2/6/24 and Jabari told him that it was not authorized. Please reach out to the pt to discuss    Scheduled procedure/appointment date if applicable: procedure 2/6/24

## 2024-02-05 ENCOUNTER — OFFICE VISIT (OUTPATIENT)
Age: 69
End: 2024-02-05
Payer: COMMERCIAL

## 2024-02-05 DIAGNOSIS — R13.10 DYSPHAGIA, UNSPECIFIED TYPE: ICD-10-CM

## 2024-02-05 DIAGNOSIS — R49.0 DYSPHONIA: ICD-10-CM

## 2024-02-05 DIAGNOSIS — R13.12 OROPHARYNGEAL DYSPHAGIA: Primary | ICD-10-CM

## 2024-02-05 LAB
AMPAR1 AB SER QL IF: NEGATIVE
AMPAR2 AB SER QL IF: NEGATIVE
AMPHIPHYSIN AB SER QL: NEGATIVE
AQP4 H2O CHANNEL AB SERPL IA-ACNC: NEGATIVE
CASPR2 IGG SERPL QL IF: NEGATIVE
CV2 IGG SER-ACNC: NEGATIVE
DPPX IGG SERPL QL IF: NEGATIVE
GABABR AB SER QL IF: NEGATIVE
GAD65 IGG+IGM SER IA-SCNC: NEGATIVE NMOL/L
GLIAL NUC TYPE 1 AB SER QL IF: NEGATIVE
HU AB SER QL IF: NEGATIVE
HU2 AB SER QL IF: NEGATIVE
HU3 AB SER QL: NEGATIVE
IGLON5 IGG SER QL IF: NEGATIVE
INTERPRETATION: NEGATIVE
IP3R 1 IGG SER QL IF: NEGATIVE
LGI1 IGG SERPL QL IF: NEGATIVE
MA+TA AB SER QL: NEGATIVE
MGLUR1 IGG SER QL IF: NEGATIVE
NMDAR1 AB SER QL IF: NEGATIVE
PCA-1 AB SER QL IF: NEGATIVE
PCA-2 AB SER QL IF: NEGATIVE
PCA-TR AB SER QL IF: NEGATIVE
PCA-TR AB SER QL IF: NEGATIVE
VGCC AB SER-SCNC: 6.1 PMOL/L (ref 0–30)
ZIC4 AB SER QL: NEGATIVE

## 2024-02-05 PROCEDURE — 92526 ORAL FUNCTION THERAPY: CPT

## 2024-02-05 NOTE — PROGRESS NOTES
"Daily Speech Treatment Note    Today's date: 2024  Patient’s name: Dejuan Acosta  : 1955  MRN: [id number]  Safety measures: gerd, htn, asthma  Referring provider: Sanjuana Alfredo MD    Encounter Diagnosis     ICD-10-CM    1. Oropharyngeal dysphagia  R13.12       2. Dysphagia, unspecified type  R13.10       3. Dysphonia  R49.0           Visit tracking:  POC expires Unit limit Auth Expiration date PT/OT + Visit Limit?   3/4/24 N/a 24 N/a                           Visit/Unit Tracking  AUTH Status:  Date 24            none Used 1 2 3             Remaining  0 8 7                Subjective/Behavioral:  -Patient reported today will be his last visit d/t financial difficulties with co-pay. Pt given handout from  for financial assistance programs. HEP given for oral motor exercises, safe swallowing strategies, and swallowing exercises to complete at home.     Objective/Assessment:  - Pt reported completing swallowing exercises at home and had no difficulty with effortful swallow; he experienced more difficulty with Mendelsohn maneuver. When demonstrating Mendelsohn maneuver in office, pt exhibited incorrect hand placement below laryngeal protuberance. Pt required models and verbal instruction; video training reviewed again to increase understanding and utilization of technique. Pt reported he will be going for EGD later today. Recommendation to f/u with ENT reviewed with pt.     Short-term goals:  Patient will receive a videofluoroscopic swallow study (VFSS) to assess his current swallowing function to r/o penetration or aspiration, to determine the safest, least restrictive diet, and to recommended the appropriate rehabilitation exercises (to be achieved in 2-3 weeks).   VFSS     \"Pt presents with mild oropharyngeal dysphagia characterized by delayed swallow initiation, reduced BOT retraction, reduced hyolaryngeal excursion and protraction.  Airway closure is incomplete w/ penetration " "of thin and nectar thick liquids.  Diffuse pharyngeal retention (BOT, valleculae, pyriforms) which is mostly cleared with cues for secondary swallow.  Patient appears unaware otherwise.  Chin tuck attempted though ineffective in preventing penetration - resulting in aspiration w/ thins. Weak grunt noted, no attempt to eject otherwise.  No eli aspiration during standard PO trials in lateral view (ie w/o chin tuck).  No obstruction in bolus flow, retropulsion or retrograde flow through the UES.  Given incomplete airway closure, may consider ENT referral to r/o laryngeal pathology (note, vocal quality appears breathy and hoarse as well).         Recommendations:   Recommended Diet: regular diet and thin liquids   Recommended Form of Medications:  as tolerated     Aspiration precautions and compensatory swallowing strategies: upright posture, small bites/sips, and double swallows   Consider referral to:  ENT re: laryngeal function   SLP Dysphagia therapy recommended: Y- continue SLP tx in the outpatient setting\"      Patient will be educated on safe swallowing compensatory strategies (e.g., upright posture, small bites/sips, slow rate, alternation of consistencies, multiple swallows, effortful swallow, etc.) to facilitate increased safety with P.O. intake (to be achieved in 4-6 weeks).     2/5/24: Pt IND recalled upright posture, small bites/sips. Reviewed alternation of consistencies, slow rate, multiple swallows, and effortful swallow to improve safe swallowing.      Patient will tolerate P.O. trials of his recommended diet with the implementation of safe swallowing strategies without overt s/sx of penetration and/or aspiration during skilled therapy sessions in this certification period (to be achieved in 4-6 weeks).      Patient will independently complete oral motor exercises daily to increase lingual range of motion, strength, and coordination to facilitate improved bolus formation/control and AP transfer (to be " achieved in 4-6 weeks).   2/5/24: Pt instructed and educated on oral motor exercises to increase lingual strength and range of motion (lingual protrusion/retraction, lateralization, elevation/depression, and holds against tongue depressor). Pt instructed to complete each set of exercises 10x, daily (can be broken down into morning and nighttime practice).      Patient will independently complete pharyngeal strengthening exercises (e.g., Effortful swallow, Mendelsohn, Susan) daily to facilitate increased pharyngeal strength and coordination (to be achieved in 4-6 weeks).   2/5/24: Patient was reminded to not complete exercises with liquids in PO. Liquids can only be utilized in between sets, not during.  Exercises completed:   Effortful -  2 sets of  10 reps  Mendelsohn - 2 sets of  10 reps      Plan:  -Patient was provided with home exercises/activities to target goals in plan of care at the end of today's session.  -Discharge.

## 2024-02-06 ENCOUNTER — ANESTHESIA (OUTPATIENT)
Dept: GASTROENTEROLOGY | Facility: HOSPITAL | Age: 69
End: 2024-02-06

## 2024-02-06 ENCOUNTER — ANESTHESIA EVENT (OUTPATIENT)
Dept: GASTROENTEROLOGY | Facility: HOSPITAL | Age: 69
End: 2024-02-06

## 2024-02-06 ENCOUNTER — HOSPITAL ENCOUNTER (OUTPATIENT)
Dept: GASTROENTEROLOGY | Facility: HOSPITAL | Age: 69
Setting detail: OUTPATIENT SURGERY
Discharge: HOME | End: 2024-02-06
Admitting: INTERNAL MEDICINE
Payer: COMMERCIAL

## 2024-02-06 VITALS
BODY MASS INDEX: 26.31 KG/M2 | HEART RATE: 62 BPM | TEMPERATURE: 97.7 F | WEIGHT: 194.22 LBS | RESPIRATION RATE: 16 BRPM | DIASTOLIC BLOOD PRESSURE: 85 MMHG | HEIGHT: 72 IN | SYSTOLIC BLOOD PRESSURE: 139 MMHG | OXYGEN SATURATION: 100 %

## 2024-02-06 DIAGNOSIS — R93.3 ABNORMAL BARIUM SWALLOW: ICD-10-CM

## 2024-02-06 DIAGNOSIS — K21.9 GASTROESOPHAGEAL REFLUX DISEASE, UNSPECIFIED WHETHER ESOPHAGITIS PRESENT: ICD-10-CM

## 2024-02-06 DIAGNOSIS — R14.2 BELCHING: ICD-10-CM

## 2024-02-06 LAB — GLUCOSE SERPL-MCNC: 99 MG/DL (ref 65–140)

## 2024-02-06 PROCEDURE — 88305 TISSUE EXAM BY PATHOLOGIST: CPT | Performed by: PATHOLOGY

## 2024-02-06 PROCEDURE — 43248 EGD GUIDE WIRE INSERTION: CPT | Performed by: INTERNAL MEDICINE

## 2024-02-06 PROCEDURE — 82948 REAGENT STRIP/BLOOD GLUCOSE: CPT

## 2024-02-06 PROCEDURE — 43239 EGD BIOPSY SINGLE/MULTIPLE: CPT | Performed by: INTERNAL MEDICINE

## 2024-02-06 RX ORDER — LIDOCAINE HYDROCHLORIDE 20 MG/ML
INJECTION, SOLUTION EPIDURAL; INFILTRATION; INTRACAUDAL; PERINEURAL AS NEEDED
Status: DISCONTINUED | OUTPATIENT
Start: 2024-02-06 | End: 2024-02-06

## 2024-02-06 RX ORDER — PROPOFOL 10 MG/ML
INJECTION, EMULSION INTRAVENOUS AS NEEDED
Status: DISCONTINUED | OUTPATIENT
Start: 2024-02-06 | End: 2024-02-06

## 2024-02-06 RX ORDER — SODIUM CHLORIDE, SODIUM LACTATE, POTASSIUM CHLORIDE, CALCIUM CHLORIDE 600; 310; 30; 20 MG/100ML; MG/100ML; MG/100ML; MG/100ML
INJECTION, SOLUTION INTRAVENOUS CONTINUOUS PRN
Status: DISCONTINUED | OUTPATIENT
Start: 2024-02-06 | End: 2024-02-06

## 2024-02-06 RX ADMIN — PROPOFOL 100 MG: 10 INJECTION, EMULSION INTRAVENOUS at 08:46

## 2024-02-06 RX ADMIN — LIDOCAINE HYDROCHLORIDE 100 MG: 20 INJECTION, SOLUTION EPIDURAL; INFILTRATION; INTRACAUDAL; PERINEURAL at 08:46

## 2024-02-06 RX ADMIN — SODIUM CHLORIDE, SODIUM LACTATE, POTASSIUM CHLORIDE, AND CALCIUM CHLORIDE: .6; .31; .03; .02 INJECTION, SOLUTION INTRAVENOUS at 08:43

## 2024-02-06 RX ADMIN — PROPOFOL 50 MG: 10 INJECTION, EMULSION INTRAVENOUS at 08:48

## 2024-02-06 NOTE — INTERVAL H&P NOTE
H&P reviewed. After examining the patient I find no changes in the patients condition since the H&P had been written.    Vitals:    02/06/24 0740   BP: 153/73   Pulse: 68   Resp: 18   Temp: 98.3 °F (36.8 °C)   SpO2: 99%

## 2024-02-06 NOTE — ANESTHESIA PREPROCEDURE EVALUATION
Procedure:  EGD    Relevant Problems   CARDIO   (+) Mild aortic stenosis   (+) Mild mitral regurgitation   (+) Mixed hyperlipidemia due to type 2 diabetes mellitus    (+) Primary hypertension      ENDO   (+) Type 2 diabetes mellitus without complication, without long-term current use of insulin (HCC)      GI/HEPATIC   (+) Dysphagia      PULMONARY   (+) Mild intermittent asthma without complication   (+) ABUNDIO (obstructive sleep apnea)        Physical Exam    Airway    Mallampati score: II  TM Distance: >3 FB  Neck ROM: full     Dental       Cardiovascular  Cardiovascular exam normal    Pulmonary  Pulmonary exam normal     Other Findings        Anesthesia Plan  ASA Score- 3     Anesthesia Type- IV sedation with anesthesia with ASA Monitors.         Additional Monitors:     Airway Plan:            Plan Factors-Exercise tolerance (METS): >4 METS.    Chart reviewed. EKG reviewed. Imaging results reviewed. Existing labs reviewed. Patient summary reviewed.    Patient is not a current smoker.              Induction- intravenous.    Postoperative Plan-     Informed Consent- Anesthetic plan and risks discussed with patient.  I personally reviewed this patient with the CRNA. Discussed and agreed on the Anesthesia Plan with the CRNA..

## 2024-02-06 NOTE — PROGRESS NOTES
Assessment/Plan: I can find no significant skin lesions on his nose at this time  Still has intermittent rash in his groin will have Dermatology see  Diagnoses and all orders for this visit:    Jairo Horse  -     Ambulatory referral to Dermatology; Future    Fungal rash of torso  -     clotrimazole-betamethasone (LOTRISONE) 1-0 05 % cream; Apply topically 2 (two) times a day  -     Ambulatory referral to Dermatology; Future    Essential hypertension  -     clotrimazole-betamethasone (LOTRISONE) 1-0 05 % cream; Apply topically 2 (two) times a day    Mixed hyperlipidemia  -     clotrimazole-betamethasone (LOTRISONE) 1-0 05 % cream; Apply topically 2 (two) times a day    Weight loss, unintentional  -     clotrimazole-betamethasone (LOTRISONE) 1-0 05 % cream; Apply topically 2 (two) times a day    Lower abdominal pain  -     clotrimazole-betamethasone (LOTRISONE) 1-0 05 % cream; Apply topically 2 (two) times a day        No problem-specific Assessment & Plan notes found for this encounter  Subjective:      Patient ID: Sherri José is a 71 y o  male  He noticed a bump on his nose on the right side adjacent to his right nostril  No tenderness or redness  Seen recently with what appeared to be of groin fungal rash it has responded to Lotrisone but it still recurs his workup was negative  Overall feeling well normally active hypertension hyperlipidemia well controlled with medication  Last visit had some lower abdominal discomfort which has completely resolved      The following portions of the patient's history were reviewed and updated as appropriate:   He has a past medical history of Disc degeneration, lumbar and Peripheral vascular disease ([address] )  ,  does not have any pertinent problems on file  ,   has no past surgical history on file  ,  family history includes Breast cancer in his mother; Hip fracture in his father  ,   reports that he has never smoked   He has never used smokeless tobacco  He reports that he does not drink alcohol or use drugs  ,  has No Known Allergies     Current Outpatient Medications   Medication Sig Dispense Refill    amLODIPine (NORVASC) 5 mg tablet Take 1 tablet (5 mg total) by mouth daily 90 tablet 3    clotrimazole-betamethasone (LOTRISONE) 1-0 05 % cream Apply topically 2 (two) times a day 45 g 3    lisinopril (ZESTRIL) 40 mg tablet Take 1 tablet (40 mg total) by mouth daily 90 tablet 3    rosuvastatin (CRESTOR) 20 MG tablet Take 1 tablet (20 mg total) by mouth daily 90 tablet 3     No current facility-administered medications for this visit  Review of Systems   Constitutional: Negative for activity change, appetite change, chills, diaphoresis, fatigue, fever and unexpected weight change  HENT: Negative for congestion, dental problem, drooling, ear discharge, ear pain, facial swelling, hearing loss, nosebleeds, postnasal drip, rhinorrhea, sinus pressure, sinus pain, sneezing, sore throat, tinnitus, trouble swallowing and voice change  Eyes: Negative for photophobia, pain, discharge, redness, itching and visual disturbance  Respiratory: Negative for apnea, cough, choking, chest tightness, shortness of breath, wheezing and stridor  Cardiovascular: Negative for chest pain, palpitations and leg swelling  Gastrointestinal: Negative for abdominal distention, abdominal pain, anal bleeding, blood in stool, constipation, diarrhea, nausea, rectal pain and vomiting  Endocrine: Negative for cold intolerance, heat intolerance, polydipsia, polyphagia and polyuria  Genitourinary: Negative for decreased urine volume, difficulty urinating, dysuria, enuresis, flank pain, frequency, genital sores, hematuria and urgency  Musculoskeletal: Negative for arthralgias, back pain, gait problem, joint swelling, myalgias, neck pain and neck stiffness  Skin: Positive for rash  Negative for color change, pallor and wound     Neurological: Negative for dizziness, tremors, seizures, syncope, facial asymmetry, speech difficulty, weakness, light-headedness, numbness and headaches  Hematological: Negative for adenopathy  Does not bruise/bleed easily  Psychiatric/Behavioral: Negative for agitation, behavioral problems, confusion, decreased concentration, dysphoric mood, hallucinations, self-injury, sleep disturbance and suicidal ideas  The patient is not nervous/anxious and is not hyperactive  Objective:  Vitals:    07/03/19 0847   BP: 142/80   BP Location: Left arm   Patient Position: Sitting   Pulse: 70   SpO2: 97%   Weight: 94 8 kg (209 lb)   Height: 6' (1 829 m)     Body mass index is 28 35 kg/m²  Physical Exam   Constitutional: He is oriented to person, place, and time  He appears well-developed  HENT:   Head: Normocephalic  Right Ear: External ear normal    Left Ear: External ear normal    Mouth/Throat: Oropharynx is clear and moist    Eyes: Pupils are equal, round, and reactive to light  Conjunctivae are normal    Neck: Neck supple  No JVD present  No thyromegaly present  Cardiovascular: Normal rate, regular rhythm, normal heart sounds and intact distal pulses  Exam reveals no friction rub  No murmur heard  Pulmonary/Chest: Effort normal and breath sounds normal  No stridor  No respiratory distress  Abdominal: Soft  Bowel sounds are normal  He exhibits no distension  There is no tenderness  Musculoskeletal: Normal range of motion  He exhibits no edema  Neurological: He is alert and oriented to person, place, and time  He has normal reflexes  Skin: Skin is warm and dry  Rash noted  There is erythema ( very faint redness scrotum right lateral side)  No pallor  Unknown if ever smoked

## 2024-02-06 NOTE — ANESTHESIA POSTPROCEDURE EVALUATION
Post-Op Assessment Note    CV Status:  Stable    Pain management: adequate       Mental Status:  Sleepy and arousable   Hydration Status:  Euvolemic   PONV Controlled:  Controlled   Airway Patency:  Patent  Two or more mitigation strategies used for obstructive sleep apnea   Post Op Vitals Reviewed: Yes    No anethesia notable event occurred.    Staff: Anesthesiologist, CRNA               /60 (02/06/24 0856)    Temp 97.7 °F (36.5 °C) (02/06/24 0856)    Pulse 60 (02/06/24 0856)   Resp 16 (02/06/24 0856)    SpO2 97 % (02/06/24 0856)

## 2024-02-08 ENCOUNTER — APPOINTMENT (OUTPATIENT)
Age: 69
End: 2024-02-08
Payer: COMMERCIAL

## 2024-02-08 PROCEDURE — 88305 TISSUE EXAM BY PATHOLOGIST: CPT | Performed by: PATHOLOGY

## 2024-02-14 ENCOUNTER — APPOINTMENT (OUTPATIENT)
Age: 69
End: 2024-02-14
Payer: COMMERCIAL

## 2024-02-15 ENCOUNTER — APPOINTMENT (OUTPATIENT)
Age: 69
End: 2024-02-15
Payer: COMMERCIAL

## 2024-02-19 ENCOUNTER — APPOINTMENT (OUTPATIENT)
Age: 69
End: 2024-02-19
Payer: COMMERCIAL

## 2024-02-20 ENCOUNTER — TELEPHONE (OUTPATIENT)
Age: 69
End: 2024-02-20

## 2024-02-20 NOTE — TELEPHONE ENCOUNTER
Patients GI provider:  Dr. Esposito    Number to return call: [phone]    Reason for call: Pt calling for results of EGD, please return his call.    Scheduled procedure/appointment date if applicable: 2/6/24

## 2024-02-22 ENCOUNTER — APPOINTMENT (OUTPATIENT)
Age: 69
End: 2024-02-22
Payer: COMMERCIAL

## 2024-02-26 ENCOUNTER — APPOINTMENT (OUTPATIENT)
Age: 69
End: 2024-02-26
Payer: COMMERCIAL

## 2024-02-29 ENCOUNTER — APPOINTMENT (OUTPATIENT)
Age: 69
End: 2024-02-29
Payer: COMMERCIAL

## 2024-03-19 DIAGNOSIS — E11.9 TYPE 2 DIABETES MELLITUS WITHOUT COMPLICATION, WITHOUT LONG-TERM CURRENT USE OF INSULIN (HCC): ICD-10-CM

## 2024-03-19 RX ORDER — METFORMIN HYDROCHLORIDE 500 MG/1
TABLET, EXTENDED RELEASE ORAL
Qty: 180 TABLET | Refills: 2 | Status: SHIPPED | OUTPATIENT
Start: 2024-03-19

## 2024-03-25 ENCOUNTER — OFFICE VISIT (OUTPATIENT)
Age: 69
End: 2024-03-25
Payer: COMMERCIAL

## 2024-03-25 VITALS
HEIGHT: 72 IN | BODY MASS INDEX: 26.55 KG/M2 | HEART RATE: 65 BPM | OXYGEN SATURATION: 98 % | WEIGHT: 196 LBS | SYSTOLIC BLOOD PRESSURE: 124 MMHG | DIASTOLIC BLOOD PRESSURE: 72 MMHG | RESPIRATION RATE: 18 BRPM | TEMPERATURE: 97.2 F

## 2024-03-25 DIAGNOSIS — E11.69 MIXED HYPERLIPIDEMIA DUE TO TYPE 2 DIABETES MELLITUS  (HCC): ICD-10-CM

## 2024-03-25 DIAGNOSIS — E78.2 MIXED HYPERLIPIDEMIA DUE TO TYPE 2 DIABETES MELLITUS  (HCC): ICD-10-CM

## 2024-03-25 DIAGNOSIS — E11.9 TYPE 2 DIABETES MELLITUS WITHOUT COMPLICATION, WITHOUT LONG-TERM CURRENT USE OF INSULIN (HCC): Primary | Chronic | ICD-10-CM

## 2024-03-25 DIAGNOSIS — I10 PRIMARY HYPERTENSION: Chronic | ICD-10-CM

## 2024-03-25 PROCEDURE — 99214 OFFICE O/P EST MOD 30 MIN: CPT | Performed by: INTERNAL MEDICINE

## 2024-03-25 PROCEDURE — G2211 COMPLEX E/M VISIT ADD ON: HCPCS | Performed by: INTERNAL MEDICINE

## 2024-03-25 RX ORDER — AMLODIPINE BESYLATE 5 MG/1
5 TABLET ORAL DAILY
Qty: 90 TABLET | Refills: 3 | Status: SHIPPED | OUTPATIENT
Start: 2024-03-25 | End: 2025-03-20

## 2024-03-25 NOTE — PROGRESS NOTES
Assessment/Plan     1. Type 2 diabetes mellitus without complication, without long-term current use of insulin ([facility])  2. Mixed hyperlipidemia due to type 2 diabetes mellitus     Most recent A1c was 6.1 % on 1/9/2024. Continue metformin as prescribed. Continue statin for hyperlipidemia. No evidence of microalbuminuria.     - Hemoglobin A1C; Future  - Basic metabolic panel; Future  - CBC and Platelet; Future  - Lipid Panel with Direct LDL reflex; Future    3. Primary hypertension    Blood pressure is stable and controlled. Continue current anti-HTN medication.    - amLODIPine (NORVASC) 5 mg tablet; Take 1 tablet (5 mg total) by mouth daily  Dispense: 90 tablet; Refill: 3            Subjective     History of Present Illness  The patient presents for routine follow-up.    He has been checking his blood sugar every day. His morning blood sugars have been around around 120. He is staying away from junk and soda. He has gained some weight back because of the weather. He weighed about 190 pounds, but today he is back up to 196. He weighed 217 pounds before. He denies any nausea, vomiting, abdominal pain, or episodes of low blood sugar below 60. His lowest blood sugar was 80.    He had an EGD and got some biopsies which came back negative. His swallowing has been okay off and on. His voice changes.       Review of Systems   Constitutional: Negative.    Respiratory: Negative.     Cardiovascular: Negative.    Gastrointestinal: Negative.      Objective     /72 (BP Location: Left arm, Patient Position: Sitting, Cuff Size: Standard)   Pulse 65   Temp (!) 97.2 °F (36.2 °C) (Tympanic)   Resp 18   Ht 6' (1.829 m)   Wt 88.9 kg (196 lb)   SpO2 98%   BMI 26.58 kg/m²     Physical Exam  Constitutional:       General: He is not in acute distress.     Appearance: He is not ill-appearing.   Cardiovascular:      Rate and Rhythm: Normal rate and regular rhythm.      Pulses: no weak pulses.           Dorsalis pedis pulses are 2+  on the right side and 2+ on the left side.        Posterior tibial pulses are 2+ on the right side and 2+ on the left side.      Heart sounds: No murmur heard.  Pulmonary:      Effort: Pulmonary effort is normal. No respiratory distress.      Breath sounds: No wheezing.   Abdominal:      General: Bowel sounds are normal. There is no distension.      Tenderness: There is no abdominal tenderness.   Musculoskeletal:      Right lower leg: No edema.      Left lower leg: No edema.   Feet:      Right foot:      Skin integrity: No ulcer, skin breakdown, erythema, warmth, callus or dry skin.      Left foot:      Skin integrity: No ulcer, skin breakdown, erythema, warmth, callus or dry skin.   Neurological:      Mental Status: He is alert.     Diabetic Foot Exam  Patient's shoes and socks removed.    Right Foot/Ankle   Right Foot Inspection  Skin Exam: skin normal and skin intact. No dry skin, no warmth, no callus, no erythema, no maceration, no abnormal color, no pre-ulcer, no ulcer and no callus.     Toe Exam: ROM and strength within normal limits.     Sensory   Proprioception: intact  Monofilament testing: intact    Vascular  Capillary refills: < 3 seconds  The right DP pulse is 2+. The right PT pulse is 2+.     Left Foot/Ankle  Left Foot Inspection  Skin Exam: skin normal and skin intact. No dry skin, no warmth, no erythema, no maceration, normal color, no pre-ulcer, no ulcer and no callus.     Toe Exam: ROM and strength within normal limits.     Sensory   Proprioception: intact  Monofilament testing: intact    Vascular  Capillary refills: < 3 seconds  The left DP pulse is 2+. The left PT pulse is 2+.     Assign Risk Category  No deformity present  No loss of protective sensation  No weak pulses  Risk: 0       Case Oscarhstein, DO

## 2024-04-19 ENCOUNTER — TELEPHONE (OUTPATIENT)
Age: 69
End: 2024-04-19

## 2024-05-02 ENCOUNTER — OFFICE VISIT (OUTPATIENT)
Age: 69
End: 2024-05-02
Payer: COMMERCIAL

## 2024-05-02 VITALS
SYSTOLIC BLOOD PRESSURE: 120 MMHG | HEART RATE: 75 BPM | DIASTOLIC BLOOD PRESSURE: 72 MMHG | HEIGHT: 72 IN | WEIGHT: 198.2 LBS | TEMPERATURE: 96.6 F | RESPIRATION RATE: 18 BRPM | BODY MASS INDEX: 26.84 KG/M2 | OXYGEN SATURATION: 97 %

## 2024-05-02 DIAGNOSIS — H61.92 EARLOBE LESION, LEFT: Primary | ICD-10-CM

## 2024-05-02 PROCEDURE — 99213 OFFICE O/P EST LOW 20 MIN: CPT | Performed by: INTERNAL MEDICINE

## 2024-05-02 PROCEDURE — 1160F RVW MEDS BY RX/DR IN RCRD: CPT | Performed by: INTERNAL MEDICINE

## 2024-05-02 PROCEDURE — 3074F SYST BP LT 130 MM HG: CPT | Performed by: INTERNAL MEDICINE

## 2024-05-02 PROCEDURE — 3078F DIAST BP <80 MM HG: CPT | Performed by: INTERNAL MEDICINE

## 2024-05-02 PROCEDURE — G2211 COMPLEX E/M VISIT ADD ON: HCPCS | Performed by: INTERNAL MEDICINE

## 2024-05-02 PROCEDURE — 1159F MED LIST DOCD IN RCRD: CPT | Performed by: INTERNAL MEDICINE

## 2024-05-02 NOTE — PROGRESS NOTES
Assessment/Plan     1. Earlobe lesion, left    Cystic, benign appearing lesion posterior left ear lobe. He was reassured at this juncture.         Subjective     History of Present Illness  The patient presents for evaluation of lump on ear.    The patient first observed a lump approximately one week ago, the duration of which remains uncertain.    Supplemental Information  He is scheduled for cataract surgery next month.     Review of Systems   HENT:          Earlobe lesion     Objective     /72 (BP Location: Left arm, Patient Position: Sitting, Cuff Size: Large)   Pulse 75   Temp (!) 96.6 °F (35.9 °C) (Tympanic)   Resp 18   Ht 6' (1.829 m)   Wt 89.9 kg (198 lb 3.2 oz)   SpO2 97%   BMI 26.88 kg/m²     Physical Exam  Constitutional:       General: He is not in acute distress.     Appearance: He is not ill-appearing.   HENT:      Ears:      Comments: Benign appearing cystic lesion on ear lobe  Neurological:      Mental Status: He is alert.            Case [facility] Hospital[facility]rian[facility], DO

## 2024-05-03 ENCOUNTER — TELEPHONE (OUTPATIENT)
Age: 69
End: 2024-05-03

## 2024-05-03 ENCOUNTER — PREP FOR PROCEDURE (OUTPATIENT)
Age: 69
End: 2024-05-03

## 2024-05-03 DIAGNOSIS — Z86.010 HX OF COLONIC POLYPS: Primary | ICD-10-CM

## 2024-05-03 NOTE — TELEPHONE ENCOUNTER
Patients GI provider:  Dr. KING    Number to return call: [phone]     Reason for call: Pt will be stopping by office Monday for miralax/dulcolax prep instructions. He also prefers to review instructions at that time. He will also need diabetic instructions.  Procedure directions also sent via [employer].    Scheduled procedure/appointment date if applicable: 7/8/2024

## 2024-05-03 NOTE — TELEPHONE ENCOUNTER
Scheduled date of colonoscopy (as of today):07/08/2024  Physician performing colonoscopy: DR KING  Location of colonoscopy: [place]  Bowel prep reviewed with patient: MIRALAX/DULCOLAX  Instructions reviewed with patient by: Patient preferred to go in office for prep instructions and explanation of prep.   Clearances: n/a  diabetic

## 2024-05-03 NOTE — TELEPHONE ENCOUNTER
05/03/24  Screened by: Bernadette Torres MA    Referring Provider 5 yr repeat    Pre- Screening:     There is no height or weight on file to calculate BMI.  Has patient been referred for a routine screening Colonoscopy? yes  Is the patient between 45-75 years old? yes      Previous Colonoscopy yes   If yes:    Date: 06/17/2019    Facility: [facility]    Reason: PERSONAL HX POLYPS        Does the patient want to see a Gastroenterologist prior to their procedure OR are they having any GI symptoms? no    Has the patient been hospitalized or had abdominal surgery in the past 6 months? no    Does the patient use supplemental oxygen? no    Does the patient take Coumadin, Lovenox, Plavix, Elliquis, Xarelto, or other blood thinning medication? no    Has the patient had a stroke, cardiac event, or stent placed in the past year? no        If patient is between 45yrs - 49yrs, please advise patient that we will have to confirm benefits & coverage with their insurance company for a routine screening colonoscopy.

## 2024-05-08 ENCOUNTER — OFFICE VISIT (OUTPATIENT)
Age: 69
End: 2024-05-08
Payer: COMMERCIAL

## 2024-05-08 VITALS
BODY MASS INDEX: 26.68 KG/M2 | SYSTOLIC BLOOD PRESSURE: 124 MMHG | DIASTOLIC BLOOD PRESSURE: 78 MMHG | HEART RATE: 71 BPM | HEIGHT: 72 IN | OXYGEN SATURATION: 97 % | TEMPERATURE: 98.1 F | WEIGHT: 197 LBS

## 2024-05-08 DIAGNOSIS — I10 ESSENTIAL HYPERTENSION: ICD-10-CM

## 2024-05-08 DIAGNOSIS — J45.40 MODERATE PERSISTENT ASTHMA WITHOUT COMPLICATION: Primary | ICD-10-CM

## 2024-05-08 DIAGNOSIS — E66.3 OVERWEIGHT (BMI 25.0-29.9): ICD-10-CM

## 2024-05-08 PROCEDURE — 99214 OFFICE O/P EST MOD 30 MIN: CPT | Performed by: INTERNAL MEDICINE

## 2024-05-08 RX ORDER — LISINOPRIL 30 MG/1
30 TABLET ORAL DAILY
Qty: 90 TABLET | Refills: 1 | Status: SHIPPED | OUTPATIENT
Start: 2024-05-08 | End: 2024-05-15 | Stop reason: SDUPTHER

## 2024-05-08 RX ORDER — FLUTICASONE PROPIONATE AND SALMETEROL 250; 50 UG/1; UG/1
1 POWDER RESPIRATORY (INHALATION) 2 TIMES DAILY
Qty: 60 BLISTER | Refills: 5 | Status: SHIPPED | OUTPATIENT
Start: 2024-05-08 | End: 2024-11-04

## 2024-05-08 RX ORDER — ALBUTEROL SULFATE 90 UG/1
2 AEROSOL, METERED RESPIRATORY (INHALATION) EVERY 6 HOURS PRN
Qty: 18 G | Refills: 1 | Status: SHIPPED | OUTPATIENT
Start: 2024-05-08

## 2024-05-08 NOTE — PROGRESS NOTES
"Assessment/Plan:   Diagnoses and all orders for this visit:    Overweight (BMI 25.0-29.9)    Moderate persistent asthma without complication  -     Fluticasone-Salmeterol (Advair Diskus) 250-50 mcg/dose inhaler; Inhale 1 puff 2 (two) times a day Rinse mouth after use.  -     albuterol (Ventolin HFA) 90 mcg/act inhaler; Inhale 2 puffs every 6 (six) hours as needed for wheezing        PFTs in 2021 with combined moderate obstructive and mild restrictive airflow limitation  Significant bronchodilator response and normal diffusion  I DC'd the Breo and sent in the Advair discus 1 puff twice daily hopefully this should be covered by his insurance  Rinse mouth after use  Continue with albuterol MDI 2 puffs 4 times daily as needed  Call if any worsening symptoms or increased use of the rescue MDI  Recommend weight loss  Follow-up in 1 year or earlier as needed  Return in about 1 year (around 5/8/2025).  All questions are answered to the patient's satisfaction and understanding.  He verbalizes understanding.  He is encouraged to call with any further questions or concerns.    Portions of the record may have been created with voice recognition software.  Occasional wrong word or \"sound a like\" substitutions may have occurred due to the inherent limitations of voice recognition software.  Read the chart carefully and recognize, using context, where substitutions have occurred.    Electronically Signed by Veronica Lewis MD    ______________________________________________________________________    Chief Complaint:   Chief Complaint   Patient presents with    Follow-up       Patient ID: Dejuan is a 68 y.o. y.o. male has a past medical history of Asthma, Diabetes mellitus (HCC), Disc degeneration, lumbar, Peripheral vascular disease (HCC), SOB (shortness of breath), and Wheezing.    5/8/2024  Patient presents today for follow-up visit.  Patient is a 68-year-old male nonsmoker with past medical history of asthma, hypertension, " hyperlipidemia, mild aortic stenosis, GERD.    He is here today for follow-up.  He is overall stable with his breathing, using the Breo once per day, Singulair, nebulizer 2-3 times per day.  He does note some dyspnea on exertion particularly with hills or inclines but otherwise doing well.   He states his Breo has been expensive would like to switch to a different 1.        Review of Systems   Constitutional: Negative.    HENT: Negative.     Eyes: Negative.    Respiratory: Negative.     Cardiovascular: Negative.    Gastrointestinal: Negative.    Endocrine: Negative.    Genitourinary: Negative.    Musculoskeletal: Negative.    Allergic/Immunologic: Negative.    Neurological: Negative.    Psychiatric/Behavioral: Negative.         Smoking history: He reports that he has never smoked. He has never used smokeless tobacco.    The following portions of the patient's history were reviewed and updated as appropriate: allergies, current medications, past family history, past medical history, past social history, past surgical history, and problem list.    Immunization History   Administered Date(s) Administered    COVID-19 PFIZER VACCINE 0.3 ML IM 03/18/2021, 04/12/2021, 12/21/2021    INFLUENZA 11/05/2014, 01/05/2016, 10/17/2017, 10/21/2022    Influenza Quadrivalent, 6-35 Months IM 11/05/2014, 01/05/2016, 10/17/2017    Influenza, high dose seasonal 0.7 mL 01/23/2020, 11/29/2021, 10/21/2022, 09/21/2023    Influenza, seasonal, injectable 1955    Pneumococcal Conjugate 13-Valent 07/12/2019    Pneumococcal Polysaccharide PPV23 1955, 01/23/2020    Tdap 1955, 1955, 01/10/2024    Zoster 1955    Zoster Vaccine Recombinant 12/08/2020, 02/13/2021     Current Outpatient Medications   Medication Sig Dispense Refill    albuterol (Ventolin HFA) 90 mcg/act inhaler Inhale 2 puffs every 6 (six) hours as needed for wheezing 18 g 1    amLODIPine (NORVASC) 5 mg tablet Take 1 tablet (5 mg total) by mouth daily 90  tablet 3    aspirin 81 mg chewable tablet Chew 1 tablet (81 mg total) daily      atorvastatin (LIPITOR) 40 mg tablet TAKE 1 TABLET BY MOUTH EVERY  tablet 1    Blood Glucose Monitoring Suppl (OneTouch Verio Reflect) w/Device KIT Check blood sugars once daily. Please substitute with appropriate alternative as covered by patient's insurance. Dx: E11.65 1 kit 0    Fluticasone-Salmeterol (Advair Diskus) 250-50 mcg/dose inhaler Inhale 1 puff 2 (two) times a day Rinse mouth after use. 60 blister 5    glucose blood (OneTouch Verio) test strip Check blood sugars once daily. Please substitute with appropriate alternative as covered by patient's insurance. Dx: E11.65 100 each 3    lisinopril (ZESTRIL) 30 mg tablet TAKE 1 TABLET BY MOUTH EVERY DAY 90 tablet 1    metFORMIN (GLUCOPHAGE-XR) 500 mg 24 hr tablet Take 2 tablets by mouth everyday with breakfast 180 tablet 2    OneTouch Delica Lancets 33G MISC Check blood sugars once daily. Please substitute with appropriate alternative as covered by patient's insurance. Dx: E11.65 100 each 3    pantoprazole (PROTONIX) 40 mg tablet Take 1 tablet (40 mg total) by mouth every morning 1/2 hour before breakfast 60 tablet 0    pantoprazole (PROTONIX) 40 mg tablet TAKE 1 TABLET BY MOUTH TWICE A DAY (Patient not taking: Reported on 3/25/2024) 180 tablet 1     No current facility-administered medications for this visit.     Allergies: Pollen extract    Objective:  Vitals:    05/08/24 1052   BP: 124/78   Pulse: 71   Temp: 98.1 °F (36.7 °C)   SpO2: 97%   Weight: 89.4 kg (197 lb)   Height: 6' (1.829 m)   Oxygen Therapy  SpO2: 97 %  .  Wt Readings from Last 3 Encounters:   05/08/24 89.4 kg (197 lb)   05/02/24 89.9 kg (198 lb 3.2 oz)   03/25/24 88.9 kg (196 lb)     Body mass index is 26.72 kg/m².    Physical Exam  Vitals and nursing note reviewed.   Constitutional:       Appearance: He is well-developed.   HENT:      Head: Normocephalic and atraumatic.   Eyes:      Conjunctiva/sclera:  Conjunctivae normal.      Pupils: Pupils are equal, round, and reactive to light.   Neck:      Thyroid: No thyromegaly.      Vascular: No JVD.   Cardiovascular:      Rate and Rhythm: Normal rate and regular rhythm.      Heart sounds: Normal heart sounds. No murmur heard.     No friction rub. No gallop.   Pulmonary:      Effort: Pulmonary effort is normal. No respiratory distress.      Breath sounds: Normal breath sounds. No wheezing or rales.   Chest:      Chest wall: No tenderness.   Musculoskeletal:         General: No tenderness or deformity. Normal range of motion.      Cervical back: Normal range of motion and neck supple.   Lymphadenopathy:      Cervical: No cervical adenopathy.   Skin:     General: Skin is warm and dry.   Neurological:      Mental Status: He is alert and oriented to person, place, and time.         Lab Review:   Hospital Outpatient Visit on 02/06/2024   Component Date Value    POC Glucose 02/06/2024 99     Case Report 02/06/2024                      Value:Surgical Pathology Report                         Case: [id number]                                  Authorizing Provider:  Evangelist Esposito MD      Collected:           02/06/2024 0850              Ordering Location:      [facility]       Received:            02/06/2024 [address] [place] [address] Endoscopy                                                             Pathologist:           Osman Bunn MD                                                                 Specimens:   A) - Esophagus, distal                                                                              B) - Esophagus, proximal                                                                   Final Diagnosis 02/06/2024                      Value:This result contains rich text formatting which cannot be displayed here.    Note 02/06/2024                      Value:This result contains rich text formatting which cannot be displayed here.     Additional Information 02/06/2024                      Value:This result contains rich text formatting which cannot be displayed here.    Gross Description 02/06/2024                      Value:This result contains rich text formatting which cannot be displayed here.    Clinical Information 02/06/2024                      Value:Cold bx r.o EOE   Appointment on 01/31/2024   Component Date Value    ANTINEURONAL NUCLEAR AB * 01/31/2024 Negative     ANTI-HU ABS 01/31/2024 Negative     ANTI-RI ABS 01/31/2024 Negative     PCA Type-1 (Anti-Yo) Ab 01/31/2024 Negative     Purkinje Cell Cytop.Ab, * 01/31/2024 Negative     Purkinje Cell Cytop.Ab T* 01/31/2024 Negative     AMPHIPHYSIN ANTIBODY 01/31/2024 Negative     CRMP-5 IgG, S 01/31/2024 Negative     AGNA-1 01/31/2024 Negative     DPPX ANTIBODY 01/31/2024 Negative     MGLUR1 ANTIBODY 01/31/2024 Negative     IgLON5 Antibody 01/31/2024 Negative     MA2/TA ANTIBODY 01/31/2024 Negative     ZIC4 ANTIBODY 01/31/2024 Negative     DNER ANTIBODY 01/31/2024 Negative     ITPR1 ANTIBODY 01/31/2024 Negative     AMPA-R1 ANTIBODY 01/31/2024 Negative     AMPA-R2 ANTIBODY 01/31/2024 Negative     NANCY-B-R ANTIBODY 01/31/2024 Negative     NMDA-R ANTIBODY 01/31/2024 Negative     GAD65 ANTIBODY 01/31/2024 Negative     AQUAPORIN 4 ANTIBODY 01/31/2024 Negative     VGCC Ab Assay 01/31/2024 6.1     CASPR2 ANTIBODY,CELL-BAS* 01/31/2024 Negative     LGI1 ANTIBODY, CELL-BASE* 01/31/2024 Negative     INTERPRETATION 01/31/2024 Negative    Appointment on 01/09/2024   Component Date Value    Creatinine, Ur 01/09/2024 82.9     Albumin,U,Random 01/09/2024 <7.0     Albumin Creat Ratio 01/09/2024 <8     Sodium 01/09/2024 137     Potassium 01/09/2024 4.8     Chloride 01/09/2024 100     CO2 01/09/2024 28     ANION GAP 01/09/2024 9     BUN 01/09/2024 20     Creatinine 01/09/2024 0.89     Glucose, Fasting 01/09/2024 107 (H)     Calcium 01/09/2024 10.1     AST 01/09/2024 27     ALT 01/09/2024 46     Alkaline  Phosphatase 01/09/2024 76     Total Protein 01/09/2024 7.6     Albumin 01/09/2024 4.5     Total Bilirubin 01/09/2024 0.55     eGFR 01/09/2024 87     Hemoglobin A1C 01/09/2024 6.1 (H)     EAG 01/09/2024 128     WBC 01/09/2024 6.05     RBC 01/09/2024 4.58     Hemoglobin 01/09/2024 14.0     Hematocrit 01/09/2024 43.0     MCV 01/09/2024 94     MCH 01/09/2024 30.6     MCHC 01/09/2024 32.6     RDW 01/09/2024 12.8     Platelets 01/09/2024 248     MPV 01/09/2024 9.8     Cholesterol 01/09/2024 182     Triglycerides 01/09/2024 93     HDL, Direct 01/09/2024 47     LDL Calculated 01/09/2024 116 (H)        Diagnostics:  I have personally reviewed pertinent reports.

## 2024-05-09 DIAGNOSIS — E11.9 TYPE 2 DIABETES MELLITUS WITHOUT COMPLICATION, WITHOUT LONG-TERM CURRENT USE OF INSULIN (HCC): ICD-10-CM

## 2024-05-09 RX ORDER — BLOOD SUGAR DIAGNOSTIC
STRIP MISCELLANEOUS
Qty: 100 EACH | Refills: 3 | Status: SHIPPED | OUTPATIENT
Start: 2024-05-09

## 2024-05-13 ENCOUNTER — TELEPHONE (OUTPATIENT)
Dept: CARDIOLOGY CLINIC | Facility: CLINIC | Age: 69
End: 2024-05-13

## 2024-05-15 ENCOUNTER — OFFICE VISIT (OUTPATIENT)
Dept: CARDIOLOGY CLINIC | Facility: CLINIC | Age: 69
End: 2024-05-15
Payer: COMMERCIAL

## 2024-05-15 VITALS
HEART RATE: 75 BPM | OXYGEN SATURATION: 98 % | BODY MASS INDEX: 26.37 KG/M2 | SYSTOLIC BLOOD PRESSURE: 122 MMHG | WEIGHT: 199 LBS | RESPIRATION RATE: 16 BRPM | DIASTOLIC BLOOD PRESSURE: 80 MMHG | HEIGHT: 73 IN

## 2024-05-15 DIAGNOSIS — E78.2 MIXED HYPERLIPIDEMIA: ICD-10-CM

## 2024-05-15 DIAGNOSIS — I35.0 NON-RHEUMATIC AORTIC STENOSIS: Primary | ICD-10-CM

## 2024-05-15 DIAGNOSIS — I10 PRIMARY HYPERTENSION: Chronic | ICD-10-CM

## 2024-05-15 DIAGNOSIS — I51.7 LVH (LEFT VENTRICULAR HYPERTROPHY): ICD-10-CM

## 2024-05-15 PROCEDURE — 99214 OFFICE O/P EST MOD 30 MIN: CPT | Performed by: INTERNAL MEDICINE

## 2024-05-15 RX ORDER — LISINOPRIL 20 MG/1
20 TABLET ORAL DAILY
Qty: 90 TABLET | Refills: 3 | Status: SHIPPED | OUTPATIENT
Start: 2024-05-15 | End: 2024-05-15 | Stop reason: SDUPTHER

## 2024-05-15 RX ORDER — LISINOPRIL 20 MG/1
20 TABLET ORAL DAILY
Qty: 90 TABLET | Refills: 3 | Status: SHIPPED | OUTPATIENT
Start: 2024-05-15

## 2024-05-15 RX ORDER — AMLODIPINE BESYLATE 10 MG/1
10 TABLET ORAL DAILY
Qty: 90 TABLET | Refills: 3 | Status: SHIPPED | OUTPATIENT
Start: 2024-05-15 | End: 2024-05-15 | Stop reason: SDUPTHER

## 2024-05-15 RX ORDER — AMLODIPINE BESYLATE 10 MG/1
10 TABLET ORAL DAILY
Qty: 90 TABLET | Refills: 3 | Status: SHIPPED | OUTPATIENT
Start: 2024-05-15 | End: 2025-05-10

## 2024-05-15 NOTE — PROGRESS NOTES
CARDIOLOGY OFFICE VISIT  [facility]  [address], [place] [id number]  Tel: [phone]      NAME: Dejuan Acosta  AGE: 68 y.o.  SEX: male  : 1955  MRN: [id number]      Chief Complaint:  Chief Complaint   Patient presents with    Follow-up         History of Present Illness:   Patient comes for follow up. States he is doing well from cardiac stand point and denies chest pain / pressure, SOB, palpitations, lightheadedness, syncope, swelling feet, orthopnea, PND, claudication.    Essential hypertension, LVH -  Has been hypertensive for many years.  Taking medications regularly.  Denies lightheadedness, headache, medication side effects.      Mixed hyperlipidemia -  Has had hyperlipidemia for many years.  Taking statin regularly along with diet control.  Denies myalgia.  PCP closely monitoring the blood work.    Mild to moderate aortic stenosis per echo 2023       Past Medical History:  Past Medical History:   Diagnosis Date    Asthma     Diabetes mellitus (HCC)     Disc degeneration, lumbar     Peripheral vascular disease (HCC)     SOB (shortness of breath)     Wheezing          Past Surgical History:  Past Surgical History:   Procedure Laterality Date    COLONOSCOPY      EGD      ESOPHAGOSCOPY           Family History:  Family History   Problem Relation Age of Onset    Breast cancer Mother     Hip fracture Father          Social History:  Social History     Socioeconomic History    Marital status: Single     Spouse name: None    Number of children: None    Years of education: None    Highest education level: None   Occupational History    Occupation: retired   Tobacco Use    Smoking status: Never    Smokeless tobacco: Never   Vaping Use    Vaping status: Never Used   Substance and Sexual Activity    Alcohol use: No    Drug use: No    Sexual activity: Not Currently   Other Topics Concern    None   Social History Narrative     None     Social Determinants of Health     Financial Resource Strain: Medium Risk (7/24/2023)    Overall Financial Resource Strain (CARDIA)     Difficulty of Paying Living Expenses: Somewhat hard   Food Insecurity: Not on file   Transportation Needs: No Transportation Needs (7/24/2023)    PRAPARE - Transportation     Lack of Transportation (Medical): No     Lack of Transportation (Non-Medical): No   Physical Activity: Inactive (5/17/2021)    Exercise Vital Sign     Days of Exercise per Week: 0 days     Minutes of Exercise per Session: 0 min   Stress: No Stress Concern Present (9/21/2023)    [employer] - Occupational Stress Questionnaire     Feeling of Stress : Not at all   Social Connections: Not on file   Intimate Partner Violence: Not on file   Housing Stability: Not on file         Active Problems:  Patient Active Problem List   Diagnosis    Allergic rhinitis    Mixed hyperlipidemia due to type 2 diabetes mellitus  (HCC)    Primary hypertension    Mild aortic stenosis    Mild mitral regurgitation    Mild intermittent asthma without complication    Type 2 diabetes mellitus without complication, without long-term current use of insulin (HCC)    Dysphagia    Hoarseness    Ptosis, right         The following portions of the patient's history were reviewed and updated as appropriate: past medical history, past surgical history, past family history,  past social history, current medications, allergies and problem list.      Review of Systems:  Constitutional: Denies fever, chills  Eyes: Denies eye redness, eye discharge  ENT: Denies hearing loss, sneezing, nasal discharge, sore throat   Respiratory: Denies cough, expectoration, shortness of breath  Cardiovascular: Denies chest pain, palpitations, lower extremity swelling  Gastrointestinal: Denies abdominal pain, nausea, vomiting, diarrhea  Genito-Urinary: Denies dysuria, incontinence  Musculoskeletal: Denies back pain, joint pain,  muscle pain  Neurologic: Denies lightheadedness, syncope, headache, seizures  Endocrine: Denies polydipsia, temperature intolerance  Allergy and Immunology: Denies hives, insect bite sensitivity  Hematological and Lymphatic: Denies bleeding problems, swollen glands   Psychological: Denies depression, suicidal ideation, anxiety, panic  Dermatological: Denies pruritus, rash, skin lesion changes      Vitals:  Vitals:    05/15/24 0809   BP: 122/80   Pulse: 75   Resp: 16   SpO2: 98%       Body mass index is 26.25 kg/m².    Weight (last 2 days)       Date/Time Weight    05/15/24 0809 90.3 (199)              Physical Examination:  General: Patient is not in acute distress. Awake, alert, oriented in time, place and person. Responding to commands  Head: Normocephalic. Atraumatic  Eyes: Both pupils normal sized, round and reactive to light. Nonicteric  ENT: Normal external ear canals  Neck: Supple. JVP not raised. Trachea central. No thyromegaly  Lungs: Bilateral bronchovascular breath sounds with no crackles or rhonchi  Chest wall: No tenderness  Cardiovascular: RRR. S1 and S2 normal. Grade 3/6 KATERINE at base+  Gastrointestinal: Abdomen soft, nontender. No guarding or rigidity. Liver and spleen not palpable. Bowel sounds present  Neurologic: Patient is awake, alert, oriented in time, place and person. Responding to commands. Moving all extremities  Integumentary:  No skin rash  Lymphatic: No cervical lymphadenopathy  Back: Symmetric. No CVA tenderness  Extremities: No clubbing, cyanosis or edema      Laboratory Results:  CBC with diff:   Lab Results   Component Value Date    WBC 6.05 01/09/2024    RBC 4.58 01/09/2024    HGB 14.0 01/09/2024    HCT 43.0 01/09/2024    MCV 94 01/09/2024    MCH 30.6 01/09/2024    RDW 12.8 01/09/2024     01/09/2024       CMP:  Lab Results   Component Value Date    CREATININE 0.89 01/09/2024    CREATININE 1.06 12/07/2018    BUN 20 01/09/2024    BUN 25 12/07/2018    K 4.8 01/09/2024    K 4.3  "12/07/2018     01/09/2024     12/07/2018    CO2 28 01/09/2024    CO2 26 12/07/2018    ALKPHOS 76 01/09/2024    ALKPHOS 80 12/07/2018    ALT 46 01/09/2024    ALT 52 12/07/2018    AST 27 01/09/2024    AST 25 12/07/2018    BILIDIR 0.08 05/21/2023       Lab Results   Component Value Date    HGBA1C 6.1 (H) 01/09/2024    HGBA1C 6.3 (H) 12/07/2018    MG 2.2 03/10/2023       Lab Results   Component Value Date    TROPONINI <0.02 04/11/2021       Lipid Profile:   No results found for: \"CHOL\"  Lab Results   Component Value Date    HDL 47 01/09/2024    HDL 37 (L) 03/16/2021    HDL 39 (L) 07/06/2020     Lab Results   Component Value Date    LDLCALC 116 (H) 01/09/2024    LDLCALC 103 (H) 03/16/2021    LDLCALC 105 (H) 07/06/2020     Lab Results   Component Value Date    TRIG 93 01/09/2024    TRIG 227 (H) 03/16/2021    TRIG 172 (H) 07/06/2020       Cardiac testing:   Results for orders placed during the hospital encounter of 06/16/23    Echo complete w/ contrast if indicated    Interpretation Summary    Left Ventricle: Left ventricular cavity size is normal. Wall thickness is mildly increased. There is mild concentric hypertrophy. The left ventricular ejection fraction is 60%. Systolic function is normal. Wall motion is normal. Diastolic function is normal.    Aortic Valve: The aortic valve is trileaflet. The leaflets are moderately calcified. There is mild to moderate stenosis.  Peak gradient 34 mmHg.  Mean gradient 21 mmHg.  Calculated aortic valve area approximately 0.8 to 0.9 cm².    Mitral Valve: There is mild annular calcification.      Results for orders placed during the hospital encounter of 06/16/23    NM myocardial perfusion spect (rx stress and/or rest)    Interpretation Summary    Stress ECG: No ST deviation is noted. The ECG was negative for ischemia.    Perfusion Defect Conclusion: There is no evidence of transient ischemic dilation (TID). TID index 1.04.    Perfusion: There are no perfusion defects.    " Stress Function: Left ventricular function post-stress is normal. Stress ejection fraction is 70 %.    Stress Combined Conclusion: Left ventricular perfusion is normal.  .    Results for orders placed during the hospital encounter of 20    Stress test only, exercise    Narrative  [facility]  [address] [facility] [place] [address]  [phone]    Stress Electrocardiography during Exercise    Name: DEJUAN ALTAMIRANO  MR #: [id number]  Account #: [id number]  Study date: 2020  : 1955  Age: 64 years  Gender: Male  Height: 72 in  Weight: 219 lb  BSA: 2.22 mï¾²    Allergies: NO KNOWN ALLERGIES    Diagnosis: R06.09 - Other forms of dyspnea    Technician:  Anamika Alvarado MS, CCT  GROUP:  [facility]  Interpreting Physician:  Sydnee Velez MD  Referring Physician:  Micky Wong MD  Primary Physician:  Micky Wong MD    CLINICAL QUESTION: Detection of coronary artery disease.    HISTORY: The patient is a 64 year old  male. Chest pain status: no chest pain. Other symptoms: dyspnea of recent onset. Coronary artery disease risk factors: dyslipidemia and hypertension. Cardiovascular history: none significant.  Medications: an ACE inhibitor/ARB, a lipid lowering agent, and an antihypertensive agent.    REST ECG: Normal sinus rhythm. Normal baseline ECG.    PROCEDURE: Treadmill exercise testing was performed, using the Dejuan protocol. Stress electrocardiographic evaluation was performed.    DEJUAN PROTOCOL:  HR bpm SBP mmHg DBP mmHg Symptoms Rhythm/conduct  Baseline 75 142 86 none NSR  Stage 1 88 162 78 -- NSR  Stage 2 95 168 78 -- NSR  Stage 3 123 190 78 -- sinus tach  Stage 4 137 -- -- mild fatigue sinus tach  Immediate 139 -- -- -- sinus tach  Recovery 1 123 178 62 -- sinus tach  Recovery 2 104 -- -- -- sinus tach  Recovery 3 99 -- -- -- NSR  Recovery 5 84 148 70 -- NSR  No medications or fluids given.    STRESS SUMMARY: Duration of exercise was 10 min. The  patient exercised to protocol stage 4. Maximal work rate was 13.4 METs. Functional capacity was above normal (greater than 20%). Maximal heart rate during stress was 139 bpm ( 90 % of  maximal predicted heart rate). The heart rate response to stress was normal. There was resting hypertension with an appropriate blood pressure response to stress. The rate-pressure product for the peak heart rate and blood pressure was  45108. There was no chest pain during stress. The stress test was terminated due to achievement of target heart rate and mild fatigue. The stress ECG was negative for ischemia. There were no stress arrhythmias or conduction abnormalities.    SUMMARY:  -  Stress results: Duration of exercise was 10 min. Target heart rate was achieved. There was resting hypertension with an appropriate blood pressure response to stress. There was no chest pain during stress.  -  ECG conclusions: The stress ECG was negative for ischemia.    IMPRESSIONS: Normal study at the work load achieved.    Prepared and signed by    Sydnee Velez MD  Signed 02/03/2020 15:44:44      Medications:    Current Outpatient Medications:     albuterol (Ventolin HFA) 90 mcg/act inhaler, Inhale 2 puffs every 6 (six) hours as needed for wheezing, Disp: 18 g, Rfl: 1    amLODIPine (NORVASC) 5 mg tablet, Take 1 tablet (5 mg total) by mouth daily, Disp: 90 tablet, Rfl: 3    aspirin 81 mg chewable tablet, Chew 1 tablet (81 mg total) daily, Disp: , Rfl:     atorvastatin (LIPITOR) 40 mg tablet, TAKE 1 TABLET BY MOUTH EVERY DAY, Disp: 100 tablet, Rfl: 1    Blood Glucose Monitoring Suppl (OneTouch Verio Reflect) w/Device KIT, Check blood sugars once daily. Please substitute with appropriate alternative as covered by patient's insurance. Dx: E11.65, Disp: 1 kit, Rfl: 0    Fluticasone-Salmeterol (Advair Diskus) 250-50 mcg/dose inhaler, Inhale 1 puff 2 (two) times a day Rinse mouth after use., Disp: 60 blister, Rfl: 5    glucose blood (OneTouch Verio)  test strip, Check blood sugars once daily. Please substitute with appropriate alternative as covered by patient's insurance. Dx: E11.65, Disp: 100 each, Rfl: 3    lisinopril (ZESTRIL) 30 mg tablet, TAKE 1 TABLET BY MOUTH EVERY DAY, Disp: 90 tablet, Rfl: 1    metFORMIN (GLUCOPHAGE-XR) 500 mg 24 hr tablet, Take 2 tablets by mouth everyday with breakfast, Disp: 180 tablet, Rfl: 2    OneTouch Delica Lancets 33G MISC, Check blood sugars once daily. Please substitute with appropriate alternative as covered by patient's insurance. Dx: E11.65, Disp: 100 each, Rfl: 3    pantoprazole (PROTONIX) 40 mg tablet, Take 1 tablet (40 mg total) by mouth every morning 1/2 hour before breakfast (Patient taking differently: Take 40 mg by mouth every morning 1 tablet  before breakfast), Disp: 60 tablet, Rfl: 0      Allergies:  Allergies   Allergen Reactions    Pollen Extract Nasal Congestion         Assessment and Plan:  1. Non-rheumatic aortic stenosis  Being followed up with serial echocardiograms at regular intervals.  Next echocardiogram due in January 2025  Symptoms to watch out for discussed including amongst others chest pain, shortness of breath, lightheadedness, palpitations    2. Essential hypertension  BP stable.  Increase amlodipine from 5 mg to 10 mg daily.  Decrease lisinopril from 30 mg to 20 mg daily.  Both changes to be done simultaneously    3. LVH (left ventricular hypertrophy)  Tight BP control    4. Mixed hyperlipidemia  Continue atorvastatin 40 mg daily along with diet control.  His PCP closely monitors his blood work    Recommend aggressive risk factor modification and therapeutic lifestyle changes.  Low-salt, low-calorie, low-fat, low-cholesterol diet with regular exercise and to optimize weight.  I will defer the ordering and monitoring of necessity lab studies to you, but I am available and happy to review and manage any of the data at your request in the future.    Discussed concepts of atherosclerosis,  including signs and symptoms of cardiac disease.    Previous studies were reviewed.    Safety measures were reviewed.  Questions were entertained and answered.  Patient was advised to report any problems requiring medical attention.    Follow-up with PCP and appropriate specialist and lab work as discussed.    Return for follow up visit as scheduled or earlier, if needed.  Thank you for allowing me to participate in the care and evaluation of your patient.  Should you have any questions, please feel free to contact me.    Annelise Licona MD  5/15/2024,8:30 AM

## 2024-05-31 ENCOUNTER — TELEPHONE (OUTPATIENT)
Age: 69
End: 2024-05-31

## 2024-05-31 ENCOUNTER — TELEPHONE (OUTPATIENT)
Dept: NEUROLOGY | Facility: CLINIC | Age: 69
End: 2024-05-31

## 2024-05-31 DIAGNOSIS — I10 PRIMARY HYPERTENSION: Chronic | ICD-10-CM

## 2024-05-31 DIAGNOSIS — K22.10 EROSIVE ESOPHAGITIS: ICD-10-CM

## 2024-05-31 RX ORDER — LISINOPRIL 20 MG/1
20 TABLET ORAL DAILY
Qty: 90 TABLET | Refills: 3 | Status: SHIPPED | OUTPATIENT
Start: 2024-05-31

## 2024-05-31 RX ORDER — PANTOPRAZOLE SODIUM 40 MG/1
40 TABLET, DELAYED RELEASE ORAL 2 TIMES DAILY
Qty: 180 TABLET | Refills: 1 | Status: SHIPPED | OUTPATIENT
Start: 2024-05-31

## 2024-05-31 RX ORDER — AMLODIPINE BESYLATE 10 MG/1
10 TABLET ORAL DAILY
Qty: 90 TABLET | Refills: 3 | Status: SHIPPED | OUTPATIENT
Start: 2024-05-31 | End: 2025-05-26

## 2024-05-31 NOTE — TELEPHONE ENCOUNTER
Spoke with patient regarding medication changes for Amlodipine 10mg and Lisinopril 20mg.    Scripts were sent to [facility], patient was notified were ready for . When he went to  they said they didn't have anything for him.    Reviewed chart for additional changes, none noted. Spoke with [facility] pharmacy, they do not have the changed doses on his profile.    Please advise that the Amlodipine 10mg and Lisinopril 20mg are the correct doses so we can send a med refill request. Thank you.    Please advise.

## 2024-06-03 NOTE — PROGRESS NOTES
Patient ID: Dejuan Acosta is a 68 y.o. male.    Assessment/Plan:    Dysphagia  He had an abnormal swallow evaluation.  He subsequently saw GI.  EGD was relatively unremarkable and he underwent esophageal dilatation which was helpful.  He is no longer complaining of difficulty swallowing.  Hoarse voice has improved.  He never noticed the ptosis and does not feel like he has droopy eyelids.  Acetylcholine receptor binding, blocking, modulating antibodies and MuSK antibodies were all negative.  Paraneoplastic antibody was also negative.  We originally ordered MRI of the brain with and without contrast.  Patient was unable to tolerate it.  He subsequently had CT head with and without contrast which did not show any cranial nerve enhancement.    Recommendations:  -Given improvement, he can follow-up with me as needed       Hoarseness  This improved after the esophageal dilatation    Ptosis  None detected today     Recommendations:  -As above, with check EMG with rep stim if above work up negative     Peripheral neuropathy  Likely due to underlying diabetes     Follow-up as needed.      I have spent a total time of 30 minutes on 06/04/24 in caring for this patient including Diagnostic results, Prognosis, Impressions, Counseling / Coordination of care, Documenting in the medical record, Reviewing / ordering tests, medicine, procedures  , and Obtaining or reviewing history  .         Subjective:    Mr Acosta is a 68 yr old handed right gentleman with PMH HTN, HLD, esophageal polypectomy, GERD, asthma, who presents for follow up for dysphagia, hoarseness and ptosis. Last seen in Jan 2024.    He underwent EGD which was unremarkable.  He did undergo esophageal dilatation which has helped.  He denies dysphagia or coughing.  Sometimes it takes him longer to swallow but he has not had any symptoms of food getting stuck.  No difficulty swallowing liquids.  His weight is 195 pounds.  No further weight loss.  He had lost about 20  pounds in 2023, some of which he attributes to having been diagnosed with diabetes and purposeful weight loss.    He has not noticed ptosis.  Ptosis was originally observed by his optometrist.  He has to have cataract surgery., No dysarthria. No weakness in the arms or legs. Hoarseness is worse when he talks more.     The hoarse quality of his voice has also improved post EGD.     At a previous visit, I ordered MRI brain w/wo contrast due to symptoms of multiple cranial nerves. He could not tolerate MRI, so we dud CT head with contrast which was unremarkable.                      Objective:    Pulse 74, temperature 97.7 °F (36.5 °C), temperature source Temporal, weight 88.5 kg (195 lb 1.6 oz), SpO2 99%.    Physical Exam  Constitutional:       Appearance: Normal appearance.   HENT:      Mouth/Throat:      Mouth: Mucous membranes are moist.      Pharynx: Oropharynx is clear.   Eyes:      Conjunctiva/sclera: Conjunctivae normal.   Neck:      Vascular: No carotid bruit.   Cardiovascular:      Rate and Rhythm: Normal rate and regular rhythm.      Heart sounds: Murmur heard.   Pulmonary:      Effort: Pulmonary effort is normal.      Breath sounds: Normal breath sounds.   Skin:     General: Skin is warm and dry.   Psychiatric:         Mood and Affect: Mood normal.         Behavior: Behavior normal.         Neurological Exam  Mental status: Awake, alert, oriented to person place and time.  Naming, knowledge, repetition, concentration, recall intact.       Cranial nerves: Extraocular movements intact.  No ptosis.  No fatigable ptosis.  Pupils equally round and reactive to light.  Visual fields full to confrontation.  Facial sensation intact.  Face symmetric.  Hearing loss in the left ear.  Tongue, uvula, palate midline and intact.   Voice hoarse.  Gag reflex intact. Sternocleidomastoid intact.  Hoarseness is better.      Motor: Neck flexion 5/5, extension 5/5.  Strength 5/5 in all 4 extremities.       Cerebellar: No  dysmetria.  Tremor on finger-to-nose bilaterally.  Heel-to-shin intact.  Gait normal.     Reflexes: 2+ in the arms, 2-3+ at the knees and 2+ at the ankles.       Sensory: Light touch, temperature, intact.  Vibration 5 seconds at the great toes.    ROS:    Review of Systems  Constitutional:  Negative for appetite change, fatigue and fever.   HENT:  Positive for trouble swallowing (off and on). Negative for hearing loss, tinnitus and voice change.    Eyes:  Positive for visual disturbance (ptosis at times-has to have cataract surgery). Negative for photophobia and pain.   Respiratory: Negative.  Negative for shortness of breath.    Cardiovascular: Negative.  Negative for palpitations.   Gastrointestinal: Negative.  Negative for nausea and vomiting.   Endocrine: Negative.  Negative for cold intolerance.   Genitourinary: Negative.  Negative for dysuria, frequency and urgency.   Musculoskeletal:  Negative for back pain, gait problem, myalgias, neck pain and neck stiffness.   Skin: Negative.  Negative for rash.   Allergic/Immunologic: Negative.    Neurological:  Positive for weakness (denies). Negative for dizziness, tremors, seizures, syncope, facial asymmetry, speech difficulty, light-headedness, numbness and headaches.   Hematological: Negative.  Does not bruise/bleed easily.   Psychiatric/Behavioral: Negative.  Negative for confusion, hallucinations and sleep disturbance.         DATA:  Component  Ref Range & Units 1/31/24  9:22 AM   ANTINEURONAL NUCLEAR AB TYPE 3  Negative Negative   ANTI-HU ABS  Negative Negative   ANTI-RI ABS  Negative Negative   PCA Type-1 (Anti-Yo) Ab  Negative Negative   Purkinje Cell Cytop.Ab, Type 2  Negative Negative   Purkinje Cell Cytop.Ab Type Tr  Negative Negative   AMPHIPHYSIN ANTIBODY  Negative Negative   CRMP-5 IgG, S  Negative Negative   AGNA-1  Negative Negative   DPPX ANTIBODY  Negative Negative   MGLUR1 ANTIBODY  Negative Negative   IgLON5 Antibody  Negative Negative   MA2/TA  ANTIBODY  Negative Negative   ZIC4 ANTIBODY  Negative Negative   DNER ANTIBODY  Negative Negative   ITPR1 ANTIBODY  Negative Negative   AMPA-R1 ANTIBODY  Negative Negative   AMPA-R2 ANTIBODY  Negative Negative   NANCY-B-R ANTIBODY  Negative Negative   NMDA-R ANTIBODY  Negative Negative   GAD65 ANTIBODY  Negative Negative   AQUAPORIN 4 ANTIBODY  Negative Negative   VGCC Ab Assay  0.0 - 30.0 pmol/L 6.1   CASPR2 ANTIBODY,CELL-BASED IFA  Negative Negative   LGI1 ANTIBODY, CELL-BASED IFA  Negative Negative   INTERPRETATION  Negative Negative   Comment: A negative autoimmune neurology/paraneoplastic profile result  does not rule out all clinically relevant antibodies. If indicated,  a phenotype-specific profile (For example, Encephalopathy, dementia,  myelopathy, or axonal neuropathy) should be considered.       EGD 2/6/2024:  IMPRESSION:  The upper third of the esophagus, middle third of the esophagus, lower third of the esophagus, GE junction, Z-line, cardia, fundus of the stomach, body of the stomach, incisura, antrum, prepyloric region, pylorus, duodenal bulb, 2nd part of the duodenum and 3rd part of the duodenum appeared normal.  Performed forceps biopsies in the upper third of the esophagus and lower third of the esophagus  Dilated in the esophagus with Savary-Palak dilator to 54 Fr ending size

## 2024-06-04 ENCOUNTER — OFFICE VISIT (OUTPATIENT)
Dept: NEUROLOGY | Facility: CLINIC | Age: 69
End: 2024-06-04
Payer: COMMERCIAL

## 2024-06-04 VITALS — BODY MASS INDEX: 25.74 KG/M2 | TEMPERATURE: 97.7 F | HEART RATE: 74 BPM | WEIGHT: 195.1 LBS | OXYGEN SATURATION: 99 %

## 2024-06-04 DIAGNOSIS — R13.12 OROPHARYNGEAL DYSPHAGIA: Primary | ICD-10-CM

## 2024-06-04 DIAGNOSIS — R49.0 HOARSENESS: ICD-10-CM

## 2024-06-04 PROCEDURE — 99214 OFFICE O/P EST MOD 30 MIN: CPT | Performed by: PSYCHIATRY & NEUROLOGY

## 2024-06-04 NOTE — PROGRESS NOTES
Patient ID: Dejuan Acosta is a 68 y.o. male.    Assessment/Plan:    Dysphagia  He had an abnormal swallow evaluation.  He subsequently saw GI.  EGD was relatively unremarkable and he underwent esophageal dilatation which was helpful.  He is no longer complaining of difficulty swallowing.  Hoarse voice has improved.  He never noticed the ptosis and does not feel like he has droopy eyelids.  Acetylcholine receptor binding, blocking, modulating antibodies and MuSK antibodies were all negative.  Paraneoplastic antibody was also negative.  We originally ordered MRI of the brain with and without contrast.  Patient was unable to tolerate it.  He subsequently had CT head with and without contrast which did not show any cranial nerve enhancement.    Recommendations:  -Given improvement, he can follow-up with me as needed       {Assess/PlanSmartLinks:78107}       Subjective:    HPI    {[facility] Neurology HPI texts:99246}    {Common ambulatory SmartLinks:20444}         Objective:    Pulse 74, temperature 97.7 °F (36.5 °C), temperature source Temporal, weight 88.5 kg (195 lb 1.6 oz), SpO2 99%.    Physical Exam    Neurological Exam      ROS:    Review of Systems   Constitutional:  Negative for appetite change, fatigue and fever.   HENT:  Positive for trouble swallowing (off and on). Negative for hearing loss, tinnitus and voice change.    Eyes:  Positive for visual disturbance (ptosis at times-has to have cataract surgery). Negative for photophobia and pain.   Respiratory: Negative.  Negative for shortness of breath.    Cardiovascular: Negative.  Negative for palpitations.   Gastrointestinal: Negative.  Negative for nausea and vomiting.   Endocrine: Negative.  Negative for cold intolerance.   Genitourinary: Negative.  Negative for dysuria, frequency and urgency.   Musculoskeletal:  Negative for back pain, gait problem, myalgias, neck pain and neck stiffness.   Skin: Negative.  Negative for rash.   Allergic/Immunologic:  Negative.    Neurological:  Positive for weakness (denies). Negative for dizziness, tremors, seizures, syncope, facial asymmetry, speech difficulty, light-headedness, numbness and headaches.   Hematological: Negative.  Does not bruise/bleed easily.   Psychiatric/Behavioral: Negative.  Negative for confusion, hallucinations and sleep disturbance.

## 2024-06-04 NOTE — ASSESSMENT & PLAN NOTE
He had an abnormal swallow evaluation.  He subsequently saw GI.  EGD was relatively unremarkable and he underwent esophageal dilatation which was helpful.  He is no longer complaining of difficulty swallowing.  Hoarse voice has improved.  He never noticed the ptosis and does not feel like he has droopy eyelids.  Acetylcholine receptor binding, blocking, modulating antibodies and MuSK antibodies were all negative.  Paraneoplastic antibody was also negative.  We originally ordered MRI of the brain with and without contrast.  Patient was unable to tolerate it.  He subsequently had CT head with and without contrast which did not show any cranial nerve enhancement.    Recommendations:  -Given improvement, he can follow-up with me as needed

## 2024-06-10 ENCOUNTER — OFFICE VISIT (OUTPATIENT)
Age: 69
End: 2024-06-10
Payer: COMMERCIAL

## 2024-06-10 VITALS
TEMPERATURE: 94.8 F | OXYGEN SATURATION: 98 % | HEART RATE: 77 BPM | DIASTOLIC BLOOD PRESSURE: 70 MMHG | WEIGHT: 196.4 LBS | SYSTOLIC BLOOD PRESSURE: 128 MMHG | HEIGHT: 73 IN | BODY MASS INDEX: 26.03 KG/M2

## 2024-06-10 DIAGNOSIS — T78.40XA ALLERGY, INITIAL ENCOUNTER: ICD-10-CM

## 2024-06-10 DIAGNOSIS — J06.9 UPPER RESPIRATORY TRACT INFECTION, UNSPECIFIED TYPE: Primary | ICD-10-CM

## 2024-06-10 PROCEDURE — G2211 COMPLEX E/M VISIT ADD ON: HCPCS

## 2024-06-10 PROCEDURE — 99213 OFFICE O/P EST LOW 20 MIN: CPT

## 2024-06-10 RX ORDER — LORATADINE 10 MG/1
10 TABLET ORAL DAILY
Qty: 30 TABLET | Refills: 0 | Status: SHIPPED | OUTPATIENT
Start: 2024-06-10

## 2024-06-10 NOTE — PROGRESS NOTES
Ambulatory Visit  Name: Dejuan Acosta      : 1955      MRN: [id number]  Encounter Provider: Emilia Coto PA-C  Encounter Date: 6/10/2024   Encounter department: [facility] [place]    Assessment & Plan   1. Upper respiratory tract infection, unspecified type  -     Phenylephrine-Doxylamine-DM (NyQuil Cough DM + Congestion) 5-6.25-10 MG/15ML LIQD; Take 236 mL by mouth 2 (two) times a day  Recommend the patient take ibuprofen or acetaminophen as needed for pain and OTC cold medication like dayquil or nyquil as needed for symptomatic relief and stay well hydrated. I also recommended he take a daily allergy medication to help reduce allergy related congestion. Follow up if symptoms worsen or fail to improve and go to the ER if the patient develops fever, chills, confusion, or worsening neck pain.   2. Allergy, initial encounter  -     loratadine (CLARITIN) 10 mg tablet; Take 1 tablet (10 mg total) by mouth daily     History of Present Illness   {Disappearing Hyperlinks I Encounters * My Last Note * Since Last Visit * History :52760}  Dejuan is a 68-year-old male presenting to the office with a complaint of headaches, neck stiffness, wheezing, and congestion for the past week and mild dizziness and stomach cramps starting this morning. He has been taking his Advair inhaler twice per day and the albuterol inhaler once per day. His headache is mild 1/10 achy pain in the front and sometimes back of his head. He has always had some cracking and popping of his neck, but it has been more stiff over the past week. He denies fever, chills, chest pain, changes in vision, N/V/D. Other than the inhalers he has not tried anything for his symptoms.           Review of Systems   Constitutional:  Negative for chills and fever.   HENT:  Positive for congestion. Negative for ear discharge, ear pain, sinus pressure, sinus pain and sore throat.    Eyes:  Negative for pain and visual disturbance.   Respiratory:   "Positive for wheezing. Negative for cough and shortness of breath.    Cardiovascular:  Negative for chest pain and palpitations.   Gastrointestinal:  Negative for abdominal pain, constipation and diarrhea.   Genitourinary:  Negative for dysuria and hematuria.   Musculoskeletal:  Positive for neck stiffness. Negative for arthralgias, myalgias and neck pain.   Skin:  Negative for color change and rash.   Neurological:  Positive for dizziness and headaches.       Objective   {Disappearing Hyperlinks   Review Vitals * Enter New Vitals * Results Review * Labs * Imaging * Cardiology * Procedures * Lung Cancer Screening :84859}  /70   Pulse 77   Temp (!) 94.8 °F (34.9 °C)   Ht 6' 1\" (1.854 m)   Wt 89.1 kg (196 lb 6.4 oz)   SpO2 98%   BMI 25.91 kg/m²     Physical Exam  Vitals and nursing note reviewed.   Constitutional:       General: He is not in acute distress.     Appearance: He is well-developed. He is not ill-appearing or diaphoretic.   HENT:      Head: Normocephalic and atraumatic.      Right Ear: Tympanic membrane normal.      Left Ear: Tympanic membrane normal.      Nose: Congestion present.      Mouth/Throat:      Mouth: Mucous membranes are moist.      Pharynx: Oropharynx is clear. Posterior oropharyngeal erythema present. No oropharyngeal exudate.   Eyes:      Extraocular Movements: Extraocular movements intact.      Conjunctiva/sclera: Conjunctivae normal.   Cardiovascular:      Rate and Rhythm: Normal rate and regular rhythm.      Heart sounds: No murmur heard.  Pulmonary:      Effort: Pulmonary effort is normal. No respiratory distress.      Breath sounds: Normal breath sounds. No wheezing, rhonchi or rales.   Abdominal:      Palpations: Abdomen is soft.      Tenderness: There is no abdominal tenderness.   Musculoskeletal:         General: No swelling.      Cervical back: Neck supple. No erythema or signs of trauma. Muscular tenderness present. No spinous process tenderness. Normal range of " motion.      Comments: Negative brudzinski's sign   Skin:     General: Skin is warm and dry.      Capillary Refill: Capillary refill takes less than 2 seconds.   Neurological:      General: No focal deficit present.      Mental Status: He is alert.   Psychiatric:         Mood and Affect: Mood normal.         Behavior: Behavior normal.       Administrative Statements {Disappearing Hyperlinks I  Level of Service * [facility]}

## 2024-06-24 DIAGNOSIS — J45.40 MODERATE PERSISTENT ASTHMA WITHOUT COMPLICATION: ICD-10-CM

## 2024-06-24 RX ORDER — ALBUTEROL SULFATE 90 UG/1
AEROSOL, METERED RESPIRATORY (INHALATION)
Qty: 18 G | Refills: 5 | Status: SHIPPED | OUTPATIENT
Start: 2024-06-24

## 2024-06-25 ENCOUNTER — PREP FOR PROCEDURE (OUTPATIENT)
Age: 69
End: 2024-06-25

## 2024-07-02 DIAGNOSIS — T78.40XA ALLERGY, INITIAL ENCOUNTER: ICD-10-CM

## 2024-07-02 RX ORDER — LORATADINE 10 MG/1
10 TABLET ORAL DAILY
Qty: 90 TABLET | Refills: 1 | Status: SHIPPED | OUTPATIENT
Start: 2024-07-02

## 2024-07-03 ENCOUNTER — CONSULT (OUTPATIENT)
Age: 69
End: 2024-07-03
Payer: COMMERCIAL

## 2024-07-03 VITALS
WEIGHT: 196.8 LBS | SYSTOLIC BLOOD PRESSURE: 128 MMHG | DIASTOLIC BLOOD PRESSURE: 74 MMHG | HEIGHT: 73 IN | HEART RATE: 77 BPM | BODY MASS INDEX: 26.08 KG/M2 | TEMPERATURE: 94.9 F | OXYGEN SATURATION: 98 %

## 2024-07-03 DIAGNOSIS — E11.9 TYPE 2 DIABETES MELLITUS WITHOUT COMPLICATION, WITHOUT LONG-TERM CURRENT USE OF INSULIN (HCC): Chronic | ICD-10-CM

## 2024-07-03 DIAGNOSIS — E11.69 MIXED HYPERLIPIDEMIA DUE TO TYPE 2 DIABETES MELLITUS  (HCC): Chronic | ICD-10-CM

## 2024-07-03 DIAGNOSIS — Z01.818 PRE-OP EXAMINATION: Primary | ICD-10-CM

## 2024-07-03 DIAGNOSIS — I10 PRIMARY HYPERTENSION: Chronic | ICD-10-CM

## 2024-07-03 DIAGNOSIS — I35.0 MILD AORTIC STENOSIS: Chronic | ICD-10-CM

## 2024-07-03 DIAGNOSIS — J45.20 MILD INTERMITTENT ASTHMA WITHOUT COMPLICATION: Chronic | ICD-10-CM

## 2024-07-03 DIAGNOSIS — E78.2 MIXED HYPERLIPIDEMIA DUE TO TYPE 2 DIABETES MELLITUS  (HCC): Chronic | ICD-10-CM

## 2024-07-03 PROCEDURE — 99214 OFFICE O/P EST MOD 30 MIN: CPT

## 2024-07-03 PROCEDURE — 93000 ELECTROCARDIOGRAM COMPLETE: CPT

## 2024-07-03 RX ORDER — ATORVASTATIN CALCIUM 40 MG/1
40 TABLET, FILM COATED ORAL DAILY
Qty: 100 TABLET | Refills: 1 | Status: SHIPPED | OUTPATIENT
Start: 2024-07-03

## 2024-07-03 NOTE — ASSESSMENT & PLAN NOTE
Lab Results   Component Value Date    HGBA1C 6.1 (H) 01/09/2024   Controlled, continue current medications  Has AWV scheduled in September, will do eye exam then and update labs

## 2024-07-03 NOTE — PROGRESS NOTES
Presurgical Evaluation    Subjective:      Patient ID: Dejuan Acosta is a 69 y.o. male.    Chief Complaint   Patient presents with    Pre-op Exam     Right cataract 7/16/24 , left 7/30/24 Dr[facility]       Here for pre op exam for cataract surgery  No current concerns or symptoms.   Follows with cardiology        The following portions of the patient's history were reviewed and updated as appropriate: allergies, current medications, past family history, past medical history, past social history, past surgical history, and problem list.    Procedure date: Right cataract 7/16/24 , left 7/30/24 Dr. [facility]     Surgeon:  Dr Santos  Planned procedure:  Cataract surgery  Diagnosis for procedure:  Cataracts    Prior anesthesia: Yes   General; Complications:  None / Tolerated well    CAD History: None   NOTE: Patient should see Cardiology if time available before surgery, and if appropriate.    Pulmonary History: Asthma    Renal history: None    Diabetes History:  Type 2  Controlled     Neurological History: None     On Immunosuppressant meds/biologics: No      Review of Systems   Constitutional:  Negative for activity change, diaphoresis, fatigue and fever.   HENT: Negative.  Negative for congestion and ear pain.    Eyes:  Positive for visual disturbance (blurry vision, chronic). Negative for photophobia, pain, discharge, redness and itching.   Respiratory:  Negative for cough, chest tightness and shortness of breath.    Cardiovascular: Negative.  Negative for chest pain, palpitations and leg swelling.   Gastrointestinal: Negative.    Endocrine: Negative for polydipsia, polyphagia and polyuria.   Genitourinary:  Negative for dysuria, flank pain, frequency, hematuria and urgency.   Musculoskeletal:  Negative for back pain, joint swelling, neck pain and neck stiffness.   Skin: Negative.    Neurological:  Negative for dizziness, weakness, light-headedness and headaches.    Hematological:  Negative for adenopathy.   Psychiatric/Behavioral: Negative.  Negative for confusion and sleep disturbance. The patient is not nervous/anxious.          Current Outpatient Medications   Medication Sig Dispense Refill    albuterol (PROVENTIL HFA,VENTOLIN HFA) 90 mcg/act inhaler INHALE 2 PUFFS EVERY 6 HOURS AS NEEDED FOR WHEEZING 18 g 5    amLODIPine (NORVASC) 10 mg tablet Take 1 tablet (10 mg total) by mouth daily 90 tablet 3    aspirin 81 mg chewable tablet Chew 1 tablet (81 mg total) daily      atorvastatin (LIPITOR) 40 mg tablet Take 1 tablet (40 mg total) by mouth daily 100 tablet 1    Blood Glucose Monitoring Suppl (OneTouch Verio Reflect) w/Device KIT Check blood sugars once daily. Please substitute with appropriate alternative as covered by patient's insurance. Dx: E11.65 1 kit 0    Fluticasone-Salmeterol (Advair Diskus) 250-50 mcg/dose inhaler Inhale 1 puff 2 (two) times a day Rinse mouth after use. 60 blister 5    glucose blood (OneTouch Verio) test strip Check blood sugars once daily. Please substitute with appropriate alternative as covered by patient's insurance. Dx: E11.65 100 each 3    lisinopril (ZESTRIL) 20 mg tablet Take 1 tablet (20 mg total) by mouth daily 90 tablet 3    loratadine (CLARITIN) 10 mg tablet TAKE 1 TABLET BY MOUTH EVERY DAY 90 tablet 1    metFORMIN (GLUCOPHAGE-XR) 500 mg 24 hr tablet Take 2 tablets by mouth everyday with breakfast 180 tablet 2    OneTouch Delica Lancets 33G MISC Check blood sugars once daily. Please substitute with appropriate alternative as covered by patient's insurance. Dx: E11.65 100 each 3    pantoprazole (PROTONIX) 40 mg tablet TAKE 1 TABLET BY MOUTH TWICE A DAY (Patient taking differently: Take 40 mg by mouth daily) 180 tablet 1    Phenylephrine-Doxylamine-DM (NyQuil Cough DM + Congestion) 5-6.25-10 MG/15ML LIQD Take 236 mL by mouth 2 (two) times a day 236 mL 0     No current facility-administered medications for this visit.       Allergies on  "file:   Pollen extract    Patient Active Problem List   Diagnosis    Allergic rhinitis    Mixed hyperlipidemia due to type 2 diabetes mellitus  (HCC)    Primary hypertension    Mild aortic stenosis    Mild mitral regurgitation    Mild intermittent asthma without complication    Type 2 diabetes mellitus without complication, without long-term current use of insulin (HCC)    Dysphagia    Hoarseness    Ptosis, right    Pre-op examination        Past Medical History:   Diagnosis Date    Asthma     Diabetes mellitus (HCC)     Disc degeneration, lumbar     Peripheral vascular disease (HCC)     SOB (shortness of breath)     Wheezing        Past Surgical History:   Procedure Laterality Date    COLONOSCOPY      EGD      ESOPHAGOSCOPY         Family History   Problem Relation Age of Onset    Breast cancer Mother     Hip fracture Father        Social History     Tobacco Use    Smoking status: Never    Smokeless tobacco: Never   Vaping Use    Vaping status: Never Used   Substance Use Topics    Alcohol use: No    Drug use: No       Objective:    Vitals:    07/03/24 1011   BP: 128/74   Pulse: 77   Temp: (!) 94.9 °F (34.9 °C)   SpO2: 98%   Weight: 89.3 kg (196 lb 12.8 oz)   Height: 6' 1\" (1.854 m)        Physical Exam  Vitals and nursing note reviewed.   Constitutional:       General: He is not in acute distress.     Appearance: Normal appearance. He is normal weight. He is not ill-appearing, toxic-appearing or diaphoretic.   HENT:      Head: Normocephalic and atraumatic.      Right Ear: Tympanic membrane and ear canal normal.      Left Ear: Tympanic membrane and ear canal normal.      Nose: Nose normal. No congestion or rhinorrhea.      Mouth/Throat:      Mouth: Mucous membranes are moist.      Pharynx: No oropharyngeal exudate or posterior oropharyngeal erythema.   Eyes:      Extraocular Movements: Extraocular movements intact.      Conjunctiva/sclera: Conjunctivae normal.      Pupils: Pupils are equal, round, and reactive to " light.   Cardiovascular:      Rate and Rhythm: Normal rate and regular rhythm.      Pulses: Normal pulses.      Heart sounds: Murmur (chronic) heard.   Pulmonary:      Effort: Pulmonary effort is normal.      Breath sounds: Normal breath sounds.   Abdominal:      General: Abdomen is flat. Bowel sounds are normal.      Tenderness: There is no abdominal tenderness.   Musculoskeletal:      Cervical back: Normal range of motion.   Skin:     General: Skin is warm and dry.      Capillary Refill: Capillary refill takes less than 2 seconds.      Findings: No rash.   Neurological:      Mental Status: He is alert. Mental status is at baseline.      Motor: No weakness.   Psychiatric:         Mood and Affect: Mood normal.         Behavior: Behavior normal.         Thought Content: Thought content normal.         Judgment: Judgment normal.           Preop labs/testing available and reviewed: yes               EKG yes    Echo no.... Has one scheduled for november    Stress test/cath no    PFT/[place] no    Functional capacity: Shovel snow                          6 Mets   Pick the highest level patient can comfortably perform   4 mets or greater for surgery    RCRI  High Risk surgery?         1 Point  CAD History:         1 Point   MI; Positive Stress Test; CP due to Mi;  Nitrate Usage to control Angina; Pathologic Q wave on EKG  CHF Active:         1 Point   Pulm Edema; Paroxysmal Nocturnal Dyspnea;  Bibasilar Rales (crackles);S3; CHF on CXR  Cerebrovascular Disease (TIA or CVA):     1 Point  DM on Insulin:        1 Point  Serum Creat >2.0 mg/dl:       1 Point          Total Points: 0     Scorin: Class I, Very Low Risk (0.4%)     1: Class II, Low risk (0.9%)     2: Class III Moderate (6.6%)     3: Class IV High (>11%)      JAYNE Risk:  GFR:        For PCP:  If GFR>60, Hold ACE/ARB/Diuretic on the day of surgery, and NSAIDS 10 days before.    If GFR<45, Consider PRE and POST op Nephrology Consult.    If 46 <GFR> 59 : Has Patient  had JAYNE in last 6 Months? no   If YES: Preop Nephrology consult   If No:  Post Op Nephrology consult.           Assessment/Plan:    Patient is medically optimized (cleared) for the planned procedure.    Further testing/evaluation is not required.    Postop concerns: no    Problem List Items Addressed This Visit       Mixed hyperlipidemia due to type 2 diabetes mellitus  (HCC) (Chronic)       Lab Results   Component Value Date    HGBA1C 6.1 (H) 01/09/2024     Needs refill on medication. Continue following with cardiology          Relevant Medications    atorvastatin (LIPITOR) 40 mg tablet    Primary hypertension (Chronic)      Controlled today at 128/74. Continue current medications and dash diet          Mild aortic stenosis (Chronic)      Follows with cardiology, no recent angina or palpitations. Last seen 5/15.     EKG completed in office today noted below          Relevant Orders    POCT ECG    Mild intermittent asthma without complication (Chronic)      Controlled, Hasnt needed rescue inhaler in past few months. No current concerns          Type 2 diabetes mellitus without complication, without long-term current use of insulin (HCC) (Chronic)       Lab Results   Component Value Date    HGBA1C 6.1 (H) 01/09/2024   Controlled, continue current medications  Has AWV scheduled in September, will do eye exam then and update labs         Pre-op examination - Primary     Most recent labs reviewed    EKG stable compared to  previous:  Sinus rhythm with 1st degree AV block  Inferior infarct stable from previous EKG  No ST/T abnormalities  Rate 70 BPM                Diagnoses and all orders for this visit:    Pre-op examination    Mild aortic stenosis  Comments:  Follows with cardiology, no recent angina or palpitations. EKG completed in office today noted below  Orders:  -     POCT ECG    Type 2 diabetes mellitus without complication, without long-term current use of insulin ([facility])  -     Cancel: IRIS Diabetic eye  exam    Primary hypertension  Comments:  Controlled today at 128/74. Continue current medications and dash diet    Mild intermittent asthma without complication  Comments:  Controlled, Hasnt needed rescue inhaler in past few months. No current concerns    Mixed hyperlipidemia due to type 2 diabetes mellitus  (HCC)  -     atorvastatin (LIPITOR) 40 mg tablet; Take 1 tablet (40 mg total) by mouth daily          Pre-Surgery Instructions:   Medication Instructions    albuterol (PROVENTIL HFA,VENTOLIN HFA) 90 mcg/act inhaler per anesthesia guidelines     amLODIPine (NORVASC) 10 mg tablet per anesthesia guidelines     aspirin 81 mg chewable tablet per anesthesia guidelines     atorvastatin (LIPITOR) 40 mg tablet per anesthesia guidelines     Blood Glucose Monitoring Suppl (OneTouch Verio Reflect) w/Device KIT per anesthesia guidelines     Fluticasone-Salmeterol (Advair Diskus) 250-50 mcg/dose inhaler per anesthesia guidelines     glucose blood (OneTouch Verio) test strip per anesthesia guidelines     lisinopril (ZESTRIL) 20 mg tablet per anesthesia guidelines     loratadine (CLARITIN) 10 mg tablet per anesthesia guidelines     metFORMIN (GLUCOPHAGE-XR) 500 mg 24 hr tablet per anesthesia guidelines     OneTouch Delica Lancets 33G MISC per anesthesia guidelines     pantoprazole (PROTONIX) 40 mg tablet per anesthesia guidelines     Phenylephrine-Doxylamine-DM (NyQuil Cough DM + Congestion) 5-6.25-10 MG/15ML LIQD per anesthesia guidelines           POCT ECG     Date/Time  7/3/2024 10:20 AM     Performed by  Ryan Pastrana PA-C   Authorized by  Ryan Pastrana PA-C     Universal Protocol   Consent: Verbal consent obtained.  Consent given by: patient  Patient understanding: patient states understanding of the procedure being performed  Patient consent: the patient's understanding of the procedure matches consent given  Procedure consent: procedure consent matches procedure scheduled  Test results: test  "results available and properly labeled  Patient identity confirmed: verbally with patient      Local anesthesia used: no     Anesthesia   Local anesthesia used: no     Sedation   Patient sedated: no       Procedure Details   Procedure Notes: Sinus rhythm with 1st degree AV block  Inferior infarct stable from previous EKG  No ST/T abnormalities  Rate 70 BPM               NOTE: Please use the above to review important meds for your specialty, the remainder \"per anesthesia Guidelines.\"    NOTE: Please place an Inbasket message for \"SOC\" pool for complicated patients.         "

## 2024-07-03 NOTE — ASSESSMENT & PLAN NOTE
Follows with cardiology, no recent angina or palpitations. Last seen 5/15.     EKG completed in office today noted below

## 2024-07-03 NOTE — ASSESSMENT & PLAN NOTE
Most recent labs reviewed    EKG stable compared to  previous:  Sinus rhythm with 1st degree AV block  Inferior infarct stable from previous EKG  No ST/T abnormalities  Rate 70 BPM

## 2024-07-05 DIAGNOSIS — K22.10 EROSIVE ESOPHAGITIS: ICD-10-CM

## 2024-07-05 RX ORDER — PANTOPRAZOLE SODIUM 40 MG/1
40 TABLET, DELAYED RELEASE ORAL 2 TIMES DAILY
Qty: 180 TABLET | Refills: 1 | Status: SHIPPED | OUTPATIENT
Start: 2024-07-05

## 2024-07-08 ENCOUNTER — ANESTHESIA (OUTPATIENT)
Dept: GASTROENTEROLOGY | Facility: HOSPITAL | Age: 69
End: 2024-07-08

## 2024-07-08 ENCOUNTER — HOSPITAL ENCOUNTER (OUTPATIENT)
Dept: GASTROENTEROLOGY | Facility: HOSPITAL | Age: 69
Setting detail: OUTPATIENT SURGERY
Discharge: HOME | End: 2024-07-08
Attending: INTERNAL MEDICINE | Admitting: INTERNAL MEDICINE
Payer: COMMERCIAL

## 2024-07-08 ENCOUNTER — ANESTHESIA EVENT (OUTPATIENT)
Dept: GASTROENTEROLOGY | Facility: HOSPITAL | Age: 69
End: 2024-07-08

## 2024-07-08 VITALS
BODY MASS INDEX: 25.83 KG/M2 | HEART RATE: 59 BPM | RESPIRATION RATE: 20 BRPM | SYSTOLIC BLOOD PRESSURE: 113 MMHG | WEIGHT: 190.7 LBS | TEMPERATURE: 98 F | DIASTOLIC BLOOD PRESSURE: 77 MMHG | HEIGHT: 72 IN | OXYGEN SATURATION: 100 %

## 2024-07-08 DIAGNOSIS — Z86.010 HX OF COLONIC POLYPS: ICD-10-CM

## 2024-07-08 LAB — GLUCOSE SERPL-MCNC: 122 MG/DL (ref 65–140)

## 2024-07-08 PROCEDURE — 45380 COLONOSCOPY AND BIOPSY: CPT | Performed by: INTERNAL MEDICINE

## 2024-07-08 PROCEDURE — 88305 TISSUE EXAM BY PATHOLOGIST: CPT | Performed by: STUDENT IN AN ORGANIZED HEALTH CARE EDUCATION/TRAINING PROGRAM

## 2024-07-08 PROCEDURE — 45385 COLONOSCOPY W/LESION REMOVAL: CPT | Performed by: INTERNAL MEDICINE

## 2024-07-08 PROCEDURE — 82948 REAGENT STRIP/BLOOD GLUCOSE: CPT

## 2024-07-08 RX ORDER — SODIUM CHLORIDE, SODIUM LACTATE, POTASSIUM CHLORIDE, CALCIUM CHLORIDE 600; 310; 30; 20 MG/100ML; MG/100ML; MG/100ML; MG/100ML
75 INJECTION, SOLUTION INTRAVENOUS CONTINUOUS
Status: CANCELLED | OUTPATIENT
Start: 2024-07-08

## 2024-07-08 RX ORDER — PROPOFOL 10 MG/ML
INJECTION, EMULSION INTRAVENOUS AS NEEDED
Status: DISCONTINUED | OUTPATIENT
Start: 2024-07-08 | End: 2024-07-08

## 2024-07-08 RX ORDER — PHENYLEPHRINE HCL IN 0.9% NACL 1 MG/10 ML
SYRINGE (ML) INTRAVENOUS AS NEEDED
Status: DISCONTINUED | OUTPATIENT
Start: 2024-07-08 | End: 2024-07-08

## 2024-07-08 RX ORDER — SODIUM CHLORIDE, SODIUM LACTATE, POTASSIUM CHLORIDE, CALCIUM CHLORIDE 600; 310; 30; 20 MG/100ML; MG/100ML; MG/100ML; MG/100ML
125 INJECTION, SOLUTION INTRAVENOUS CONTINUOUS
Status: DISCONTINUED | OUTPATIENT
Start: 2024-07-08 | End: 2024-07-12 | Stop reason: HOSPADM

## 2024-07-08 RX ORDER — SODIUM CHLORIDE, SODIUM LACTATE, POTASSIUM CHLORIDE, CALCIUM CHLORIDE 600; 310; 30; 20 MG/100ML; MG/100ML; MG/100ML; MG/100ML
INJECTION, SOLUTION INTRAVENOUS CONTINUOUS PRN
Status: DISCONTINUED | OUTPATIENT
Start: 2024-07-08 | End: 2024-07-08

## 2024-07-08 RX ADMIN — SODIUM CHLORIDE, SODIUM LACTATE, POTASSIUM CHLORIDE, AND CALCIUM CHLORIDE 125 ML/HR: .6; .31; .03; .02 INJECTION, SOLUTION INTRAVENOUS at 06:31

## 2024-07-08 RX ADMIN — Medication 100 MCG: at 07:32

## 2024-07-08 RX ADMIN — PROPOFOL 100 MG: 10 INJECTION, EMULSION INTRAVENOUS at 07:24

## 2024-07-08 RX ADMIN — SODIUM CHLORIDE, SODIUM LACTATE, POTASSIUM CHLORIDE, AND CALCIUM CHLORIDE: .6; .31; .03; .02 INJECTION, SOLUTION INTRAVENOUS at 07:24

## 2024-07-08 RX ADMIN — PROPOFOL 50 MG: 10 INJECTION, EMULSION INTRAVENOUS at 07:28

## 2024-07-08 RX ADMIN — PROPOFOL 50 MG: 10 INJECTION, EMULSION INTRAVENOUS at 07:30

## 2024-07-08 NOTE — H&P
History and Physical - [facility]  Dejuan Acosta 69 y.o. male MRN: [id number]                  HPI: Dejuan Acosta is a 69 y.o. year old male who presents for colonoscopy for history of colon polyps.  Last colonoscopy 5 years ago      REVIEW OF SYSTEMS: Per the HPI, and otherwise unremarkable.    Historical Information   Past Medical History:   Diagnosis Date    Asthma     Diabetes mellitus (HCC)     Disc degeneration, lumbar     Peripheral vascular disease (HCC)     SOB (shortness of breath)     Wheezing      Past Surgical History:   Procedure Laterality Date    COLONOSCOPY      COLONOSCOPY      EGD      ESOPHAGOSCOPY       Social History   Social History     Substance and Sexual Activity   Alcohol Use No     Social History     Substance and Sexual Activity   Drug Use No     Social History     Tobacco Use   Smoking Status Never   Smokeless Tobacco Never     Family History   Problem Relation Age of Onset    Breast cancer Mother     Hip fracture Father        Meds/Allergies     Not in a hospital admission.    Allergies   Allergen Reactions    Pollen Extract Nasal Congestion       Objective     Blood pressure 145/79, pulse 76, temperature 97.8 °F (36.6 °C), temperature source Temporal, resp. rate 18, height 6' (1.829 m), weight 86.5 kg (190 lb 11.2 oz), SpO2 98%.      PHYSICAL EXAM    Gen: NAD  CV: RRR  CHEST: Clear  ABD: soft, NT/ND  EXT: no edema  Neuro: AAO      ASSESSMENT/PLAN:  This is a 69 y.o. year old male here for history of colon polyps     PLAN:   Procedure: Colonoscopy

## 2024-07-08 NOTE — ANESTHESIA POSTPROCEDURE EVALUATION
Post-Op Assessment Note    CV Status:  Stable    Pain management: adequate       Mental Status:  Alert and awake   Hydration Status:  Euvolemic   PONV Controlled:  Controlled   Airway Patency:  Patent     Post Op Vitals Reviewed: Yes    No anethesia notable event occurred.    Staff: CRNA               BP (!) 89/51 (07/08/24 0738)    Temp 98 °F (36.7 °C) (07/08/24 0738)    Pulse (!) 54 (07/08/24 0738)   Resp 17 (07/08/24 0738)    SpO2 96 % (07/08/24 0738)

## 2024-07-08 NOTE — ANESTHESIA PREPROCEDURE EVALUATION
Procedure:  COLONOSCOPY    Relevant Problems   ANESTHESIA (within normal limits)      CARDIO   (+) Mild aortic stenosis   (+) Mild mitral regurgitation   (+) Mixed hyperlipidemia due to type 2 diabetes mellitus  (HCC)   (+) Primary hypertension      ENDO   this AM   (+) Type 2 diabetes mellitus without complication, without long-term current use of insulin (HCC)      GI/HEPATIC  Confirmed NPO appropriate  S/p bowel prep   (+) Dysphagia      /RENAL (within normal limits)      HEMATOLOGY (within normal limits)      MUSCULOSKELETAL (within normal limits)      PULMONARY  Asthma symptoms well-controlled.  Last use of rescue inhaler yesterday in conjunction with seasonal allergies   (+) Mild intermittent asthma without complication   (-) Smoking   (-) URI (upper respiratory infection)      Ear/Nose/Throat   (+) Allergic rhinitis        Physical Exam    Airway    Mallampati score: II         Dental        Cardiovascular  Rhythm: regular    Pulmonary   Breath sounds clear to auscultation, No wheezes    Other Findings        Anesthesia Plan  ASA Score- 3     Anesthesia Type- IV sedation with anesthesia with ASA Monitors.         Additional Monitors:     Airway Plan:            Plan Factors-Exercise tolerance (METS): >4 METS.    Chart reviewed.        Patient is not a current smoker.              Induction- intravenous.    Postoperative Plan-         Informed Consent- Anesthetic plan and risks discussed with patient.  I personally reviewed this patient with the CRNA. Discussed and agreed on the Anesthesia Plan with the CRNA..      NM Stress 6/16/23:  Interpretation Summary    Stress ECG: No ST deviation is noted. The ECG was negative for ischemia.    Perfusion Defect Conclusion: There is no evidence of transient ischemic dilation (TID). TID index 1.04.    Perfusion: There are no perfusion defects.    Stress Function: Left ventricular function post-stress is normal. Stress ejection fraction is 70 %.    Stress Combined  Conclusion: Left ventricular perfusion is normal.      TTE 6/16/23:    Interpretation Summary     Left Ventricle: Left ventricular cavity size is normal. Wall thickness is mildly increased. There is mild concentric hypertrophy. The left ventricular ejection fraction is 60%. Systolic function is normal. Wall motion is normal. Diastolic function is normal.    Aortic Valve: The aortic valve is trileaflet. The leaflets are moderately calcified. There is mild to moderate stenosis.  Peak gradient 34 mmHg.  Mean gradient 21 mmHg.  Calculated aortic valve area approximately 0.8 to 0.9 cm².    Mitral Valve: There is mild annular calcification.

## 2024-07-10 PROCEDURE — 88305 TISSUE EXAM BY PATHOLOGIST: CPT | Performed by: STUDENT IN AN ORGANIZED HEALTH CARE EDUCATION/TRAINING PROGRAM

## 2024-07-23 ENCOUNTER — TELEPHONE (OUTPATIENT)
Age: 69
End: 2024-07-23

## 2024-07-23 NOTE — TELEPHONE ENCOUNTER
Patients GI provider:  Dr. KING    Number to return call: [phone]     Reason for call: Pt contacted office requesting recent pathology results. Please review results and contact patient.

## 2024-08-05 ENCOUNTER — APPOINTMENT (OUTPATIENT)
Age: 69
End: 2024-08-05
Payer: COMMERCIAL

## 2024-08-05 DIAGNOSIS — E78.2 MIXED HYPERLIPIDEMIA DUE TO TYPE 2 DIABETES MELLITUS  (HCC): ICD-10-CM

## 2024-08-05 DIAGNOSIS — E11.69 MIXED HYPERLIPIDEMIA DUE TO TYPE 2 DIABETES MELLITUS  (HCC): ICD-10-CM

## 2024-08-05 LAB
ANION GAP SERPL CALCULATED.3IONS-SCNC: 10 MMOL/L (ref 4–13)
BUN SERPL-MCNC: 23 MG/DL (ref 5–25)
CALCIUM SERPL-MCNC: 9.8 MG/DL (ref 8.4–10.2)
CHLORIDE SERPL-SCNC: 101 MMOL/L (ref 96–108)
CHOLEST SERPL-MCNC: 172 MG/DL
CO2 SERPL-SCNC: 27 MMOL/L (ref 21–32)
CREAT SERPL-MCNC: 0.99 MG/DL (ref 0.6–1.3)
ERYTHROCYTE [DISTWIDTH] IN BLOOD BY AUTOMATED COUNT: 12.6 % (ref 11.6–15.1)
EST. AVERAGE GLUCOSE BLD GHB EST-MCNC: 134 MG/DL
GFR SERPL CREATININE-BSD FRML MDRD: 76 ML/MIN/1.73SQ M
GLUCOSE P FAST SERPL-MCNC: 102 MG/DL (ref 65–99)
HBA1C MFR BLD: 6.3 %
HCT VFR BLD AUTO: 44.4 % (ref 36.5–49.3)
HDLC SERPL-MCNC: 45 MG/DL
HGB BLD-MCNC: 14.5 G/DL (ref 12–17)
LDLC SERPL CALC-MCNC: 90 MG/DL (ref 0–100)
MCH RBC QN AUTO: 30.7 PG (ref 26.8–34.3)
MCHC RBC AUTO-ENTMCNC: 32.7 G/DL (ref 31.4–37.4)
MCV RBC AUTO: 94 FL (ref 82–98)
PLATELET # BLD AUTO: 263 THOUSANDS/UL (ref 149–390)
PMV BLD AUTO: 9.8 FL (ref 8.9–12.7)
POTASSIUM SERPL-SCNC: 4.2 MMOL/L (ref 3.5–5.3)
RBC # BLD AUTO: 4.73 MILLION/UL (ref 3.88–5.62)
SODIUM SERPL-SCNC: 138 MMOL/L (ref 135–147)
TRIGL SERPL-MCNC: 183 MG/DL
WBC # BLD AUTO: 7.76 THOUSAND/UL (ref 4.31–10.16)

## 2024-08-05 PROCEDURE — 36415 COLL VENOUS BLD VENIPUNCTURE: CPT

## 2024-08-05 PROCEDURE — 80048 BASIC METABOLIC PNL TOTAL CA: CPT

## 2024-08-05 PROCEDURE — 80061 LIPID PANEL: CPT

## 2024-08-05 PROCEDURE — 83036 HEMOGLOBIN GLYCOSYLATED A1C: CPT

## 2024-08-05 PROCEDURE — 85027 COMPLETE CBC AUTOMATED: CPT

## 2024-09-08 DIAGNOSIS — E11.9 TYPE 2 DIABETES MELLITUS WITHOUT COMPLICATION, WITHOUT LONG-TERM CURRENT USE OF INSULIN (HCC): ICD-10-CM

## 2024-09-08 RX ORDER — METFORMIN HCL 500 MG
TABLET, EXTENDED RELEASE 24 HR ORAL
Qty: 180 TABLET | Refills: 2 | Status: SHIPPED | OUTPATIENT
Start: 2024-09-08

## 2024-09-23 ENCOUNTER — OFFICE VISIT (OUTPATIENT)
Age: 69
End: 2024-09-23
Payer: COMMERCIAL

## 2024-09-23 VITALS
WEIGHT: 200 LBS | RESPIRATION RATE: 18 BRPM | OXYGEN SATURATION: 97 % | HEIGHT: 72 IN | SYSTOLIC BLOOD PRESSURE: 122 MMHG | HEART RATE: 69 BPM | BODY MASS INDEX: 27.09 KG/M2 | DIASTOLIC BLOOD PRESSURE: 74 MMHG | TEMPERATURE: 97 F

## 2024-09-23 DIAGNOSIS — E11.9 TYPE 2 DIABETES MELLITUS WITHOUT COMPLICATION, WITHOUT LONG-TERM CURRENT USE OF INSULIN (HCC): Primary | Chronic | ICD-10-CM

## 2024-09-23 DIAGNOSIS — E78.2 MIXED HYPERLIPIDEMIA DUE TO TYPE 2 DIABETES MELLITUS  (HCC): ICD-10-CM

## 2024-09-23 DIAGNOSIS — Z00.00 MEDICARE ANNUAL WELLNESS VISIT, SUBSEQUENT: ICD-10-CM

## 2024-09-23 DIAGNOSIS — E11.69 MIXED HYPERLIPIDEMIA DUE TO TYPE 2 DIABETES MELLITUS  (HCC): ICD-10-CM

## 2024-09-23 DIAGNOSIS — I10 PRIMARY HYPERTENSION: Chronic | ICD-10-CM

## 2024-09-23 PROBLEM — Z01.818 PRE-OP EXAMINATION: Status: RESOLVED | Noted: 2024-07-03 | Resolved: 2024-09-23

## 2024-09-23 LAB
LEFT EYE DIABETIC RETINOPATHY: NORMAL
LEFT EYE IMAGE QUALITY: NORMAL
LEFT EYE MACULAR EDEMA: NORMAL
LEFT EYE OTHER RETINOPATHY: NORMAL
RIGHT EYE DIABETIC RETINOPATHY: NORMAL
RIGHT EYE IMAGE QUALITY: NORMAL
RIGHT EYE MACULAR EDEMA: NORMAL
RIGHT EYE OTHER RETINOPATHY: NORMAL
SEVERITY (EYE EXAM): NORMAL

## 2024-09-23 PROCEDURE — 92250 FUNDUS PHOTOGRAPHY W/I&R: CPT | Performed by: INTERNAL MEDICINE

## 2024-09-23 PROCEDURE — 99214 OFFICE O/P EST MOD 30 MIN: CPT | Performed by: INTERNAL MEDICINE

## 2024-09-23 PROCEDURE — G0439 PPPS, SUBSEQ VISIT: HCPCS | Performed by: INTERNAL MEDICINE

## 2024-09-23 NOTE — ASSESSMENT & PLAN NOTE
Lab Results   Component Value Date    LDLCALC 90 08/05/2024     LDL is improved from previous. Continue statin. Check diabetic eye exam.    Orders:    IRIS Diabetic eye exam    CBC; Future    Lipid Panel with Direct LDL reflex; Future    Comprehensive metabolic panel; Future    Hemoglobin A1C; Future    Albumin / creatinine urine ratio; Future

## 2024-09-23 NOTE — ASSESSMENT & PLAN NOTE
Lab Results   Component Value Date    HGBA1C 6.3 (H) 08/05/2024     Sugars are controllefd. Continue metformin as prescribed. Increase cardiovascular exercise and strength/resistance training. Repeat labs in 6 months.

## 2024-09-23 NOTE — PATIENT INSTRUCTIONS
Medicare Preventive Visit Patient Instructions  Thank you for completing your Welcome to Medicare Visit or Medicare Annual Wellness Visit today. Your next wellness visit will be due in one year (9/24/2025).  The screening/preventive services that you may require over the next 5-10 years are detailed below. Some tests may not apply to you based off risk factors and/or age. Screening tests ordered at today's visit but not completed yet may show as past due. Also, please note that scanned in results may not display below.  Preventive Screenings:  Service Recommendations Previous Testing/Comments   Colorectal Cancer Screening  Colonoscopy    Fecal Occult Blood Test (FOBT)/Fecal Immunochemical Test (FIT)  Fecal DNA/Cologuard Test  Flexible Sigmoidoscopy Age: 45-75 years old   Colonoscopy: every 10 years (May be performed more frequently if at higher risk)  OR  FOBT/FIT: every 1 year  OR  Cologuard: every 3 years  OR  Sigmoidoscopy: every 5 years  Screening may be recommended earlier than age 45 if at higher risk for colorectal cancer. Also, an individualized decision between you and your healthcare provider will decide whether screening between the ages of 76-85 would be appropriate. Colonoscopy: 07/08/2024  FOBT/FIT: Not on file  Cologuard: Not on file  Sigmoidoscopy: Not on file    Screening Current     Prostate Cancer Screening Individualized decision between patient and health care provider in men between ages of 55-69   Medicare will cover every 12 months beginning on the day after your 50th birthday PSA: 0.94 ng/mL           Hepatitis C Screening Once for adults born between 1945 and 1965  More frequently in patients at high risk for Hepatitis C Hep C Antibody: 01/25/2020    Screening Current   Diabetes Screening 1-2 times per year if you're at risk for diabetes or have pre-diabetes Fasting glucose: 102 mg/dL (8/5/2024)  A1C: 6.3 % (8/5/2024)  Screening Not Indicated  History Diabetes   Cholesterol Screening Once  every 5 years if you don't have a lipid disorder. May order more often based on risk factors. Lipid panel: 08/05/2024  Screening Not Indicated  History Lipid Disorder      Other Preventive Screenings Covered by Medicare:  Abdominal Aortic Aneurysm (AAA) Screening: covered once if your at risk. You're considered to be at risk if you have a family history of AAA or a male between the age of 65-75 who smoking at least 100 cigarettes in your lifetime.  Lung Cancer Screening: covers low dose CT scan once per year if you meet all of the following conditions: (1) Age 55-77; (2) No signs or symptoms of lung cancer; (3) Current smoker or have quit smoking within the last 15 years; (4) You have a tobacco smoking history of at least 20 pack years (packs per day x number of years you smoked); (5) You get a written order from a healthcare provider.  Glaucoma Screening: covered annually if you're considered high risk: (1) You have diabetes OR (2) Family history of glaucoma OR (3)  aged 50 and older OR (4)  American aged 65 and older  Osteoporosis Screening: covered every 2 years if you meet one of the following conditions: (1) Have a vertebral abnormality; (2) On glucocorticoid therapy for more than 3 months; (3) Have primary hyperparathyroidism; (4) On osteoporosis medications and need to assess response to drug therapy.  HIV Screening: covered annually if you're between the age of 15-65. Also covered annually if you are younger than 15 and older than 65 with risk factors for HIV infection. For pregnant patients, it is covered up to 3 times per pregnancy.    Immunizations:  Immunization Recommendations   Influenza Vaccine Annual influenza vaccination during flu season is recommended for all persons aged >= 6 months who do not have contraindications   Pneumococcal Vaccine   * Pneumococcal conjugate vaccine = PCV13 (Prevnar 13), PCV15 (Vaxneuvance), PCV20 (Prevnar 20)  * Pneumococcal polysaccharide vaccine  = PPSV23 (Pneumovax) Adults 19-63 yo with certain risk factors or if 65+ yo  If never received any pneumonia vaccine: recommend Prevnar 20 (PCV20)  Give PCV20 if previously received 1 dose of PCV13 or PPSV23   Hepatitis B Vaccine 3 dose series if at intermediate or high risk (ex: diabetes, end stage renal disease, liver disease)   Respiratory syncytial virus (RSV) Vaccine - COVERED BY MEDICARE PART D  * RSVPreF3 (Arexvy) CDC recommends that adults 60 years of age and older may receive a single dose of RSV vaccine using shared clinical decision-making (SCDM)   Tetanus (Td) Vaccine - COST NOT COVERED BY MEDICARE PART B Following completion of primary series, a booster dose should be given every 10 years to maintain immunity against tetanus. Td may also be given as tetanus wound prophylaxis.   Tdap Vaccine - COST NOT COVERED BY MEDICARE PART B Recommended at least once for all adults. For pregnant patients, recommended with each pregnancy.   Shingles Vaccine (Shingrix) - COST NOT COVERED BY MEDICARE PART B  2 shot series recommended in those 19 years and older who have or will have weakened immune systems or those 50 years and older     Health Maintenance Due:      Topic Date Due   • Colorectal Cancer Screening  07/07/2029   • Hepatitis C Screening  Completed     Immunizations Due:      Topic Date Due   • COVID-19 Vaccine (4 - 2023-24 season) 09/01/2024   • Influenza Vaccine (1) 09/01/2024     Advance Directives   What are advance directives?  Advance directives are legal documents that state your wishes and plans for medical care. These plans are made ahead of time in case you lose your ability to make decisions for yourself. Advance directives can apply to any medical decision, such as the treatments you want, and if you want to donate organs.   What are the types of advance directives?  There are many types of advance directives, and each state has rules about how to use them. You may choose a combination of any of  the following:  Living will:  This is a written record of the treatment you want. You can also choose which treatments you do not want, which to limit, and which to stop at a certain time. This includes surgery, medicine, IV fluid, and tube feedings.   Durable power of  for healthcare (DPAHC):  This is a written record that states who you want to make healthcare choices for you when you are unable to make them for yourself. This person, called a proxy, is usually a family member or a friend. You may choose more than 1 proxy.  Do not resuscitate (DNR) order:  A DNR order is used in case your heart stops beating or you stop breathing. It is a request not to have certain forms of treatment, such as CPR. A DNR order may be included in other types of advance directives.  Medical directive:  This covers the care that you want if you are in a coma, near death, or unable to make decisions for yourself. You can list the treatments you want for each condition. Treatment may include pain medicine, surgery, blood transfusions, dialysis, IV or tube feedings, and a ventilator (breathing machine).  Values history:  This document has questions about your views, beliefs, and how you feel and think about life. This information can help others choose the care that you would choose.  Why are advance directives important?  An advance directive helps you control your care. Although spoken wishes may be used, it is better to have your wishes written down. Spoken wishes can be misunderstood, or not followed. Treatments may be given even if you do not want them. An advance directive may make it easier for your family to make difficult choices about your care.   Weight Management   Why it is important to manage your weight:  Being overweight increases your risk of health conditions such as heart disease, high blood pressure, type 2 diabetes, and certain types of cancer. It can also increase your risk for osteoarthritis, sleep apnea,  and other respiratory problems. Aim for a slow, steady weight loss. Even a small amount of weight loss can lower your risk of health problems.  How to lose weight safely:  A safe and healthy way to lose weight is to eat fewer calories and get regular exercise. You can lose up about 1 pound a week by decreasing the number of calories you eat by 500 calories each day.   Healthy meal plan for weight management:  A healthy meal plan includes a variety of foods, contains fewer calories, and helps you stay healthy. A healthy meal plan includes the following:  Eat whole-grain foods more often.  A healthy meal plan should contain fiber. Fiber is the part of grains, fruits, and vegetables that is not broken down by your body. Whole-grain foods are healthy and provide extra fiber in your diet. Some examples of whole-grain foods are whole-wheat breads and pastas, oatmeal, brown rice, and bulgur.  Eat a variety of vegetables every day.  Include dark, leafy greens such as spinach, kale, willie greens, and mustard greens. Eat yellow and orange vegetables such as carrots, sweet potatoes, and winter squash.   Eat a variety of fruits every day.  Choose fresh or canned fruit (canned in its own juice or light syrup) instead of juice. Fruit juice has very little or no fiber.  Eat low-fat dairy foods.  Drink fat-free (skim) milk or 1% milk. Eat fat-free yogurt and low-fat cottage cheese. Try low-fat cheeses such as mozzarella and other reduced-fat cheeses.  Choose meat and other protein foods that are low in fat.  Choose beans or other legumes such as split peas or lentils. Choose fish, skinless poultry (chicken or turkey), or lean cuts of red meat (beef or pork). Before you cook meat or poultry, cut off any visible fat.   Use less fat and oil.  Try baking foods instead of frying them. Add less fat, such as margarine, sour cream, regular salad dressing and mayonnaise to foods. Eat fewer high-fat foods. Some examples of high-fat foods  include french fries, doughnuts, ice cream, and cakes.  Eat fewer sweets.  Limit foods and drinks that are high in sugar. This includes candy, cookies, regular soda, and sweetened drinks.  Exercise:  Exercise at least 30 minutes per day on most days of the week. Some examples of exercise include walking, biking, dancing, and swimming. You can also fit in more physical activity by taking the stairs instead of the elevator or parking farther away from stores. Ask your healthcare provider about the best exercise plan for you.      © Copyright [employer] 2018 Information is for End User's use only and may not be sold, redistributed or otherwise used for commercial purposes. All illustrations and images included in CareNotes® are the copyrighted property of A.D.A.M., Inc. or [employer]

## 2024-09-23 NOTE — PROGRESS NOTES
Ambulatory Visit  Name: Dejuan Acosta      : 1955      MRN: [id number]  Encounter Provider: Case Pearson DO  Encounter Date: 2024   Encounter department: [facility] PRIMARY [facility] [place]    Assessment & Plan  Type 2 diabetes mellitus without complication, without long-term current use of insulin (HCC)    Lab Results   Component Value Date    HGBA1C 6.3 (H) 2024     Sugars are controllefd. Continue metformin as prescribed. Increase cardiovascular exercise and strength/resistance training. Repeat labs in 6 months.    Mixed hyperlipidemia due to type 2 diabetes mellitus  (HCC)    Lab Results   Component Value Date    LDLCALC 90 2024     LDL is improved from previous. Continue statin. Check diabetic eye exam.    Orders:    IRIS Diabetic eye exam    CBC; Future    Lipid Panel with Direct LDL reflex; Future    Comprehensive metabolic panel; Future    Hemoglobin A1C; Future    Albumin / creatinine urine ratio; Future    Primary hypertension    Patients blood pressure is controlled. Continue current anti-HTN regimen.    Medicare annual wellness visit, subsequent           Depression Screening and Follow-up Plan: Patient was screened for depression during today's encounter. They screened negative with a PHQ-2 score of 0.      Preventive health issues were discussed with patient, and age appropriate screening tests were ordered as noted in patient's After Visit Summary. Personalized health advice and appropriate referrals for health education or preventive services given if needed, as noted in patient's After Visit Summary.    History of Present Illness     History of Present Illness  The patient presents for a routine checkup/medicare wellness visit.    He reports no chest pain, shortness of breath, or palpitations. There is no presence of blood in his urine or stool.     His blood sugar levels fluctuate, reaching up to 160, which he attributes to his diet. He has gained some weight recently  and currently weighs 200 pounds. He believes he may have lost weight too quickly initially, losing 25 pounds in a month due to dietary changes.     Patient Care Team:  Case Pearson DO as PCP - General (Internal Medicine)  Micky Wong MD as PCP - PCP-[facility] (RT[facility])  Micky Wong MD as PCP - PCP-[facility] ([facility])  Carrol Patterson as Diabetes Educator (Diabetes Services)    Review of Systems   Constitutional:  Negative for activity change, appetite change and fatigue.   Respiratory:  Negative for apnea, cough, chest tightness, shortness of breath and wheezing.    Cardiovascular:  Negative for chest pain, palpitations and leg swelling.   Gastrointestinal:  Negative for abdominal distention, abdominal pain, blood in stool, constipation, diarrhea, nausea and vomiting.   Musculoskeletal:  Negative for arthralgias, back pain, gait problem, joint swelling and myalgias.   Skin:  Negative for rash and wound.   Neurological:  Negative for dizziness, weakness, light-headedness, numbness and headaches.   Psychiatric/Behavioral:  Negative for behavioral problems, confusion, hallucinations, sleep disturbance and suicidal ideas. The patient is not nervous/anxious.      Medical History Reviewed by provider this encounter:  Tobacco  Allergies  Meds  Problems  Med Hx  Surg Hx  Fam Hx       Annual Wellness Visit Questionnaire   Dejuan is here for his Subsequent Wellness visit. Last Medicare Wellness visit information reviewed, patient interviewed and updates made to the record today.      Health Risk Assessment:   Patient rates overall health as good. Patient feels that their physical health rating is same. Patient is satisfied with their life. Eyesight was rated as same. Hearing was rated as same. Patient feels that their emotional and mental health rating is same. Patients states they are never, rarely angry. Patient states they are sometimes unusually tired/fatigued. Pain experienced in the last 7 days has  been none. Patient states that he has experienced no weight loss or gain in last 6 months.     Depression Screening:   PHQ-2 Score: 0      Fall Risk Screening:   In the past year, patient has experienced: no history of falling in past year      Home Safety:  Patient does not have trouble with stairs inside or outside of their home. Patient has working smoke alarms and has working carbon monoxide detector. Home safety hazards include: none.     Nutrition:   Current diet is Regular.     Medications:   Patient is currently taking over-the-counter supplements. OTC medications include: see medication list. Patient is able to manage medications.     Activities of Daily Living (ADLs)/Instrumental Activities of Daily Living (IADLs):   Walk and transfer into and out of bed and chair?: Yes  Dress and groom yourself?: Yes    Bathe or shower yourself?: Yes    Feed yourself? Yes  Do your laundry/housekeeping?: Yes  Manage your money, pay your bills and track your expenses?: Yes  Make your own meals?: Yes    Do your own shopping?: Yes    Previous Hospitalizations:   Any hospitalizations or ED visits within the last 12 months?: Yes    How many hospitalizations have you had in the last year?: 1-2    Advance Care Planning:   Living will: No    Durable POA for healthcare: No    Advanced directive: No    Five wishes given: No      Cognitive Screening:   Provider or family/friend/caregiver concerned regarding cognition?: No    PREVENTIVE SCREENINGS      Cardiovascular Screening:    General: Screening Not Indicated and History Lipid Disorder      Diabetes Screening:     General: Screening Not Indicated and History Diabetes      Colorectal Cancer Screening:     General: Screening Current      Prostate Cancer Screening:    General: Screening Current      Osteoporosis Screening:    General: Screening Not Indicated      Abdominal Aortic Aneurysm (AAA) Screening:    Risk factors include: age between 65-74 yo        General: Screening Not  Indicated      Lung Cancer Screening:     General: Screening Not Indicated      Hepatitis C Screening:    General: Screening Current    Screening, Brief Intervention, and Referral to Treatment (SBIRT)    Screening  Typical number of drinks in a day: 0  Typical number of drinks in a week: 0  Interpretation: Low risk drinking behavior.    AUDIT-C Screenin) How often did you have a drink containing alcohol in the past year? never  2) How many drinks did you have on a typical day when you were drinking in the past year? 0  3) How often did you have 6 or more drinks on one occasion in the past year? never    AUDIT-C Score: 0  Interpretation: Score 0-3 (male): Negative screen for alcohol misuse    Single Item Drug Screening:  How often have you used an illegal drug (including marijuana) or a prescription medication for non-medical reasons in the past year? never    Single Item Drug Screen Score: 0  Interpretation: Negative screen for possible drug use disorder    Brief Intervention  Alcohol & drug use screenings were reviewed. No concerns regarding substance use disorder identified.     SDOH Risk Assessment  Social determinants of health (SDOH) risk assesment tool was completed. The tool at a minimum covered housing stability, food insecurity, transportation needs, and utility difficulty. Patient had at risk responses for the following SDOH domains: transportation needs and housing stability.     Other Counseling Topics:   Car/seat belt/driving safety, skin self-exam, sunscreen and regular weightbearing exercise.     Social Determinants of Health     Financial Resource Strain: Medium Risk (2023)    Overall Financial Resource Strain (CARDIA)     Difficulty of Paying Living Expenses: Somewhat hard   Food Insecurity: No Food Insecurity (2024)    Hunger Vital Sign     Worried About Running Out of Food in the Last Year: Never true     Ran Out of Food in the Last Year: Never true   Transportation Needs: No  Transportation Needs (9/23/2024)    PRAPARE - Transportation     Lack of Transportation (Medical): No     Lack of Transportation (Non-Medical): No   Housing Stability: Low Risk  (9/23/2024)    Housing Stability Vital Sign     Unable to Pay for Housing in the Last Year: No     Number of Times Moved in the Last Year: 0     Homeless in the Last Year: No   Utilities: Not At Risk (9/23/2024)    [facility] Utilities     Threatened with loss of utilities: No     Objective     /74 (BP Location: Left arm, Patient Position: Sitting, Cuff Size: Large)   Pulse 69   Temp (!) 97 °F (36.1 °C) (Tympanic)   Resp 18   Ht 6' (1.829 m)   Wt 90.7 kg (200 lb)   SpO2 97%   BMI 27.12 kg/m²     Physical Exam  Constitutional:       General: He is not in acute distress.     Appearance: He is not ill-appearing.   Cardiovascular:      Rate and Rhythm: Normal rate and regular rhythm.      Heart sounds: No murmur heard.  Pulmonary:      Effort: Pulmonary effort is normal. No respiratory distress.      Breath sounds: No wheezing.   Abdominal:      General: Bowel sounds are normal. There is no distension.      Tenderness: There is no abdominal tenderness.   Musculoskeletal:      Right lower leg: No edema.      Left lower leg: No edema.   Neurological:      Mental Status: He is alert.       Case Pearson,

## 2024-10-02 ENCOUNTER — IMMUNIZATIONS (OUTPATIENT)
Age: 69
End: 2024-10-02
Payer: COMMERCIAL

## 2024-10-02 DIAGNOSIS — Z23 ENCOUNTER FOR IMMUNIZATION: Primary | ICD-10-CM

## 2024-10-02 PROCEDURE — 90662 IIV NO PRSV INCREASED AG IM: CPT

## 2024-10-02 PROCEDURE — G0008 ADMIN INFLUENZA VIRUS VAC: HCPCS

## 2024-10-18 DIAGNOSIS — E78.2 MIXED HYPERLIPIDEMIA DUE TO TYPE 2 DIABETES MELLITUS  (HCC): Chronic | ICD-10-CM

## 2024-10-18 DIAGNOSIS — E11.69 MIXED HYPERLIPIDEMIA DUE TO TYPE 2 DIABETES MELLITUS  (HCC): Chronic | ICD-10-CM

## 2024-10-18 RX ORDER — ATORVASTATIN CALCIUM 40 MG/1
40 TABLET, FILM COATED ORAL DAILY
Qty: 90 TABLET | Refills: 1 | Status: SHIPPED | OUTPATIENT
Start: 2024-10-18

## 2024-10-27 ENCOUNTER — HOSPITAL ENCOUNTER (EMERGENCY)
Facility: HOSPITAL | Age: 69
Discharge: HOME | End: 2024-10-27
Attending: EMERGENCY MEDICINE
Payer: COMMERCIAL

## 2024-10-27 VITALS
RESPIRATION RATE: 20 BRPM | DIASTOLIC BLOOD PRESSURE: 88 MMHG | WEIGHT: 200 LBS | HEIGHT: 72 IN | BODY MASS INDEX: 27.09 KG/M2 | HEART RATE: 78 BPM | SYSTOLIC BLOOD PRESSURE: 155 MMHG | OXYGEN SATURATION: 97 % | TEMPERATURE: 97.7 F

## 2024-10-27 DIAGNOSIS — E83.42 HYPOMAGNESEMIA: ICD-10-CM

## 2024-10-27 DIAGNOSIS — R42 DIZZINESS: Primary | ICD-10-CM

## 2024-10-27 LAB
ANION GAP SERPL CALCULATED.3IONS-SCNC: 7 MMOL/L (ref 4–13)
BASOPHILS # BLD AUTO: 0.03 THOUSANDS/ΜL (ref 0–0.1)
BASOPHILS NFR BLD AUTO: 0 % (ref 0–1)
BUN SERPL-MCNC: 19 MG/DL (ref 5–25)
CALCIUM SERPL-MCNC: 9.7 MG/DL (ref 8.4–10.2)
CARDIAC TROPONIN I PNL SERPL HS: 5 NG/L
CHLORIDE SERPL-SCNC: 101 MMOL/L (ref 96–108)
CK SERPL-CCNC: 134 U/L (ref 39–308)
CO2 SERPL-SCNC: 27 MMOL/L (ref 21–32)
CREAT SERPL-MCNC: 1.31 MG/DL (ref 0.6–1.3)
EOSINOPHIL # BLD AUTO: 0.52 THOUSAND/ΜL (ref 0–0.61)
EOSINOPHIL NFR BLD AUTO: 6 % (ref 0–6)
ERYTHROCYTE [DISTWIDTH] IN BLOOD BY AUTOMATED COUNT: 12.3 % (ref 11.6–15.1)
GFR SERPL CREATININE-BSD FRML MDRD: 54 ML/MIN/1.73SQ M
GLUCOSE SERPL-MCNC: 126 MG/DL (ref 65–140)
HCT VFR BLD AUTO: 40.1 % (ref 36.5–49.3)
HGB BLD-MCNC: 13 G/DL (ref 12–17)
IMM GRANULOCYTES # BLD AUTO: 0.05 THOUSAND/UL (ref 0–0.2)
IMM GRANULOCYTES NFR BLD AUTO: 1 % (ref 0–2)
LYMPHOCYTES # BLD AUTO: 2.23 THOUSANDS/ΜL (ref 0.6–4.47)
LYMPHOCYTES NFR BLD AUTO: 27 % (ref 14–44)
MAGNESIUM SERPL-MCNC: 1.8 MG/DL (ref 1.9–2.7)
MCH RBC QN AUTO: 30 PG (ref 26.8–34.3)
MCHC RBC AUTO-ENTMCNC: 32.4 G/DL (ref 31.4–37.4)
MCV RBC AUTO: 93 FL (ref 82–98)
MONOCYTES # BLD AUTO: 0.63 THOUSAND/ΜL (ref 0.17–1.22)
MONOCYTES NFR BLD AUTO: 8 % (ref 4–12)
NEUTROPHILS # BLD AUTO: 4.9 THOUSANDS/ΜL (ref 1.85–7.62)
NEUTS SEG NFR BLD AUTO: 58 % (ref 43–75)
NRBC BLD AUTO-RTO: 0 /100 WBCS
PLATELET # BLD AUTO: 255 THOUSANDS/UL (ref 149–390)
PMV BLD AUTO: 9.1 FL (ref 8.9–12.7)
POTASSIUM SERPL-SCNC: 4.1 MMOL/L (ref 3.5–5.3)
RBC # BLD AUTO: 4.33 MILLION/UL (ref 3.88–5.62)
SODIUM SERPL-SCNC: 135 MMOL/L (ref 135–147)
WBC # BLD AUTO: 8.36 THOUSAND/UL (ref 4.31–10.16)

## 2024-10-27 PROCEDURE — 84484 ASSAY OF TROPONIN QUANT: CPT | Performed by: EMERGENCY MEDICINE

## 2024-10-27 PROCEDURE — 99284 EMERGENCY DEPT VISIT MOD MDM: CPT

## 2024-10-27 PROCEDURE — 80048 BASIC METABOLIC PNL TOTAL CA: CPT | Performed by: EMERGENCY MEDICINE

## 2024-10-27 PROCEDURE — 93005 ELECTROCARDIOGRAM TRACING: CPT

## 2024-10-27 PROCEDURE — 82550 ASSAY OF CK (CPK): CPT | Performed by: EMERGENCY MEDICINE

## 2024-10-27 PROCEDURE — 85025 COMPLETE CBC W/AUTO DIFF WBC: CPT | Performed by: EMERGENCY MEDICINE

## 2024-10-27 PROCEDURE — 36415 COLL VENOUS BLD VENIPUNCTURE: CPT | Performed by: EMERGENCY MEDICINE

## 2024-10-27 PROCEDURE — 83735 ASSAY OF MAGNESIUM: CPT | Performed by: EMERGENCY MEDICINE

## 2024-10-27 PROCEDURE — 99284 EMERGENCY DEPT VISIT MOD MDM: CPT | Performed by: EMERGENCY MEDICINE

## 2024-10-27 RX ORDER — MECLIZINE HYDROCHLORIDE 25 MG/1
50 TABLET ORAL ONCE
Status: COMPLETED | OUTPATIENT
Start: 2024-10-27 | End: 2024-10-27

## 2024-10-27 RX ORDER — MECLIZINE HYDROCHLORIDE 25 MG/1
25 TABLET ORAL 3 TIMES DAILY PRN
Qty: 30 TABLET | Refills: 0 | Status: SHIPPED | OUTPATIENT
Start: 2024-10-27

## 2024-10-27 RX ADMIN — MECLIZINE HYDROCHLORIDE 50 MG: 25 TABLET ORAL at 21:18

## 2024-10-28 LAB
ATRIAL RATE: 78 BPM
P AXIS: 24 DEGREES
PR INTERVAL: 206 MS
QRS AXIS: 83 DEGREES
QRSD INTERVAL: 92 MS
QT INTERVAL: 376 MS
QTC INTERVAL: 428 MS
T WAVE AXIS: 11 DEGREES
VENTRICULAR RATE: 78 BPM

## 2024-10-28 PROCEDURE — 93010 ELECTROCARDIOGRAM REPORT: CPT | Performed by: INTERNAL MEDICINE

## 2024-10-28 NOTE — ED PROVIDER NOTES
Time reflects when diagnosis was documented in both MDM as applicable and the Disposition within this note       Time User Action Codes Description Comment    10/27/2024 10:39 PM Pankaj Bergman [R42] Dizziness     10/27/2024 10:39 PM Pankaj Bergman [E83.42] Hypomagnesemia           ED Disposition       ED Disposition   Discharge    Condition   Stable    Date/Time   Sun Oct 27, 2024 10:39 PM    Comment   Dejuan Acosta discharge to home/self care.                   Assessment & Plan       Medical Decision Making  Problems Addressed:  Dizziness: acute illness or injury  Hypomagnesemia: acute illness or injury    Amount and/or Complexity of Data Reviewed  Labs: ordered. Decision-making details documented in ED Course.  Discussion of management or test interpretation with external provider(s): Improved in the ER  Ambulatory w/o assist     Risk  OTC drugs.        ED Course as of 10/28/24 0336   Sun Oct 27, 2024   2146 MAGNESIUM(!): 1.8       Medications   meclizine (ANTIVERT) tablet 50 mg (50 mg Oral Given 10/27/24 2118)       ED Risk Strat Scores                           SBIRT 22yo+      Flowsheet Row Most Recent Value   Initial Alcohol Screen: US AUDIT-C     1. How often do you have a drink containing alcohol? 0 Filed at: 10/27/2024 2029   2. How many drinks containing alcohol do you have on a typical day you are drinking?  0 Filed at: 10/27/2024 2029   3b. FEMALE Any Age, or MALE 65+: How often do you have 4 or more drinks on one occassion? 0 Filed at: 10/27/2024 2029   Audit-C Score 0 Filed at: 10/27/2024 2029   SHEELA: How many times in the past year have you...    Used an illegal drug or used a prescription medication for non-medical reasons? Never Filed at: 10/27/2024 2029                            History of Present Illness       Chief Complaint   Patient presents with    Dizziness     Pt arrives c/o feeling lightheaded after getting up quickly off of the couch around 1800 this evening. He thought the feeling  would go away but it remains. Denies chest pain.        Past Medical History:   Diagnosis Date    Asthma     Diabetes mellitus (HCC)     Disc degeneration, lumbar     Peripheral vascular disease (HCC)     SOB (shortness of breath)     Wheezing       Past Surgical History:   Procedure Laterality Date    COLONOSCOPY      COLONOSCOPY      EGD      ESOPHAGOSCOPY        Family History   Problem Relation Age of Onset    Breast cancer Mother     Hip fracture Father       Social History     Tobacco Use    Smoking status: Never    Smokeless tobacco: Never   Vaping Use    Vaping status: Never Used   Substance Use Topics    Alcohol use: No    Drug use: No      E-Cigarette/Vaping    E-Cigarette Use Never User       E-Cigarette/Vaping Substances    Nicotine No     THC No     CBD No     Flavoring No     Other No     Unknown No       I have reviewed and agree with the history as documented.     Dejuan Acosta is a 69 y.o.  year old male  Past Medical History:  No date: Asthma  No date: Diabetes mellitus (HCC)  No date: Disc degeneration, lumbar  No date: Peripheral vascular disease (HCC)  No date: SOB (shortness of breath)  No date: Wheezing  Social History    Tobacco Use      Smoking status: Never      Smokeless tobacco: Never    Vaping Use      Vaping status: Never Used    Alcohol use: No    Drug use: No    Patient presents with:  Dizziness: Pt arrives c/o feeling lightheaded after getting up quickly off of the couch around 1800 this evening.   He thought the feeling would go away but it remains. Denies chest pain.   Felt slightly off balance with it  No HA, symptoms improved     History obtained directly from the PATIENT              History provided by:  Patient   used: No    Dizziness  Associated symptoms: no chest pain, no palpitations, no shortness of breath and no vomiting        Review of Systems   Constitutional:  Negative for chills and fever.   HENT:  Negative for ear pain and sore throat.    Eyes:   Negative for pain and visual disturbance.   Respiratory:  Negative for cough and shortness of breath.    Cardiovascular:  Negative for chest pain and palpitations.   Gastrointestinal:  Negative for abdominal pain and vomiting.   Genitourinary:  Negative for dysuria and hematuria.   Musculoskeletal:  Negative for arthralgias and back pain.   Skin:  Negative for color change and rash.   Neurological:  Positive for dizziness and light-headedness. Negative for tremors, seizures, syncope, speech difficulty and numbness.   All other systems reviewed and are negative.          Objective       ED Triage Vitals [10/27/24 2027]   Temperature Pulse Blood Pressure Respirations SpO2 Patient Position - Orthostatic VS   97.7 °F (36.5 °C) 93 (!) 196/98 18 99 % Sitting      Temp Source Heart Rate Source BP Location FiO2 (%) Pain Score    Temporal Monitor Left arm -- --      Vitals      Date and Time Temp Pulse SpO2 Resp BP Pain Score FACES Pain Rating User   10/27/24 2200 -- 78 97 % 20 155/88 -- -- KB   10/27/24 2115 -- 76 97 % 20 -- -- -- KB   10/27/24 2045 -- 77 97 % 18 188/88 -- -- KB   10/27/24 2027 97.7 °F (36.5 °C) 93 99 % 18 196/98 -- -- RO            Physical Exam  Vitals and nursing note reviewed.   Constitutional:       General: He is not in acute distress.     Appearance: Normal appearance. He is well-developed.   HENT:      Head: Normocephalic and atraumatic.      Right Ear: External ear normal.      Left Ear: External ear normal.      Ears:      Comments: Lots of wax on canal but not obstructed   Eyes:      Conjunctiva/sclera: Conjunctivae normal.   Cardiovascular:      Rate and Rhythm: Normal rate and regular rhythm.      Heart sounds: No murmur heard.  Pulmonary:      Effort: Pulmonary effort is normal. No respiratory distress.      Breath sounds: Normal breath sounds.   Abdominal:      Palpations: Abdomen is soft.      Tenderness: There is no abdominal tenderness.   Musculoskeletal:         General: No swelling.       Cervical back: Neck supple.   Skin:     General: Skin is warm and dry.      Capillary Refill: Capillary refill takes less than 2 seconds.   Neurological:      General: No focal deficit present.      Mental Status: He is alert and oriented to person, place, and time. Mental status is at baseline.      Cranial Nerves: No cranial nerve deficit.      Motor: No weakness.      Gait: Gait normal.   Psychiatric:         Mood and Affect: Mood normal.         Thought Content: Thought content normal.         Judgment: Judgment normal.         Results Reviewed       Procedure Component Value Units Date/Time    HS Troponin 0hr (reflex protocol) [[id number]]  (Normal) Collected: 10/27/24 2114    Lab Status: Final result Specimen: Blood from Arm, Left Updated: 10/27/24 2147     hs TnI 0hr 5 ng/L     Basic metabolic panel [[id number]]  (Abnormal) Collected: 10/27/24 2114    Lab Status: Final result Specimen: Blood from Arm, Left Updated: 10/27/24 2143     Sodium 135 mmol/L      Potassium 4.1 mmol/L      Chloride 101 mmol/L      CO2 27 mmol/L      ANION GAP 7 mmol/L      BUN 19 mg/dL      Creatinine 1.31 mg/dL      Glucose 126 mg/dL      Calcium 9.7 mg/dL      eGFR 54 ml/min/1.73sq m     Narrative:      [employer] Kid[employer] guidelines for Chronic Kidney Disease (CKD):     Stage 1 with normal or high GFR (GFR > 90 mL/min/1.73 square meters)    Stage 2 Mild CKD (GFR = 60-89 mL/min/1.73 square meters)    Stage 3A Moderate CKD (GFR = 45-59 mL/min/1.73 square meters)    Stage 3B Moderate CKD (GFR = 30-44 mL/min/1.73 square meters)    Stage 4 Severe CKD (GFR = 15-29 mL/min/1.73 square meters)    Stage 5 End Stage CKD (GFR <15 mL/min/1.73 square meters)  Note: GFR calculation is accurate only with a steady state creatinine    Magnesium [[id number]]  (Abnormal) Collected: 10/27/24 2114    Lab Status: Final result Specimen: Blood from Arm, Left Updated: 10/27/24 2143     Magnesium 1.8 mg/dL     CK [[id number]]  (Normal) Collected:  10/27/24 2114    Lab Status: Final result Specimen: Blood from Arm, Left Updated: 10/27/24 2143     Total  U/L     CBC and differential [[id number]] Collected: 10/27/24 2114    Lab Status: Final result Specimen: Blood from Arm, Left Updated: 10/27/24 2122     WBC 8.36 Thousand/uL      RBC 4.33 Million/uL      Hemoglobin 13.0 g/dL      Hematocrit 40.1 %      MCV 93 fL      MCH 30.0 pg      MCHC 32.4 g/dL      RDW 12.3 %      MPV 9.1 fL      Platelets 255 Thousands/uL      nRBC 0 /100 WBCs      Segmented % 58 %      Immature Grans % 1 %      Lymphocytes % 27 %      Monocytes % 8 %      Eosinophils Relative 6 %      Basophils Relative 0 %      Absolute Neutrophils 4.90 Thousands/µL      Absolute Immature Grans 0.05 Thousand/uL      Absolute Lymphocytes 2.23 Thousands/µL      Absolute Monocytes 0.63 Thousand/µL      Eosinophils Absolute 0.52 Thousand/µL      Basophils Absolute 0.03 Thousands/µL             No orders to display       Procedures    ED Medication and Procedure Management   Prior to Admission Medications   Prescriptions Last Dose Informant Patient Reported? Taking?   Blood Glucose Monitoring Suppl (OneTouch Verio Reflect) w/Device KIT  Self No No   Sig: Check blood sugars once daily. Please substitute with appropriate alternative as covered by patient's insurance. Dx: E11.65   Fluticasone-Salmeterol (Advair Diskus) 250-50 mcg/dose inhaler  Self No No   Sig: Inhale 1 puff 2 (two) times a day Rinse mouth after use.   OneTouch Delica Lancets 33G MISC  Self No No   Sig: Check blood sugars once daily. Please substitute with appropriate alternative as covered by patient's insurance. Dx: E11.65   albuterol (PROVENTIL HFA,VENTOLIN HFA) 90 mcg/act inhaler  Self No No   Sig: INHALE 2 PUFFS EVERY 6 HOURS AS NEEDED FOR WHEEZING   amLODIPine (NORVASC) 10 mg tablet  Self No No   Sig: Take 1 tablet (10 mg total) by mouth daily   aspirin 81 mg chewable tablet  Self No No   Sig: Chew 1 tablet (81 mg total) daily    atorvastatin (LIPITOR) 40 mg tablet   No No   Sig: TAKE 1 TABLET BY MOUTH EVERY DAY   glucose blood (OneTouch Verio) test strip  Self No No   Sig: Check blood sugars once daily. Please substitute with appropriate alternative as covered by patient's insurance. Dx: E11.65   lisinopril (ZESTRIL) 20 mg tablet  Self No No   Sig: Take 1 tablet (20 mg total) by mouth daily   metFORMIN (GLUCOPHAGE-XR) 500 mg 24 hr tablet  Self No No   Sig: TAKE 2 TABLETS BY MOUTH EVERYDAY WITH BREAKFAST   pantoprazole (PROTONIX) 40 mg tablet  Self No No   Sig: Take 1 tablet (40 mg total) by mouth 2 (two) times a day      Facility-Administered Medications: None     Discharge Medication List as of 10/27/2024 10:39 PM        START taking these medications    Details   meclizine (ANTIVERT) 25 mg tablet Take 1 tablet (25 mg total) by mouth 3 (three) times a day as needed for dizziness, Starting Sun 10/27/2024, Normal           CONTINUE these medications which have NOT CHANGED    Details   albuterol (PROVENTIL HFA,VENTOLIN HFA) 90 mcg/act inhaler INHALE 2 PUFFS EVERY 6 HOURS AS NEEDED FOR WHEEZING, Normal      amLODIPine (NORVASC) 10 mg tablet Take 1 tablet (10 mg total) by mouth daily, Starting Fri 5/31/2024, Until Mon 5/26/2025, Normal      aspirin 81 mg chewable tablet Chew 1 tablet (81 mg total) daily, Starting Wed 5/31/2023, No Print      atorvastatin (LIPITOR) 40 mg tablet TAKE 1 TABLET BY MOUTH EVERY DAY, Starting Fri 10/18/2024, Normal      Blood Glucose Monitoring Suppl (OneTouch Verio Reflect) w/Device KIT Check blood sugars once daily. Please substitute with appropriate alternative as covered by patient's insurance. Dx: E11.65, Normal      Fluticasone-Salmeterol (Advair Diskus) 250-50 mcg/dose inhaler Inhale 1 puff 2 (two) times a day Rinse mouth after use., Starting Wed 5/8/2024, Until Mon 11/4/2024, Normal      glucose blood (OneTouch Verio) test strip Check blood sugars once daily. Please substitute with appropriate alternative as  covered by patient's insurance. Dx: E11.65, Normal      lisinopril (ZESTRIL) 20 mg tablet Take 1 tablet (20 mg total) by mouth daily, Starting Fri 5/31/2024, Normal      metFORMIN (GLUCOPHAGE-XR) 500 mg 24 hr tablet TAKE 2 TABLETS BY MOUTH EVERYDAY WITH BREAKFAST, Normal      OneTouch Delica Lancets 33G MISC Check blood sugars once daily. Please substitute with appropriate alternative as covered by patient's insurance. Dx: E11.65, Normal      pantoprazole (PROTONIX) 40 mg tablet Take 1 tablet (40 mg total) by mouth 2 (two) times a day, Starting Fri 7/5/2024, Normal           No discharge procedures on file.  ED SEPSIS DOCUMENTATION   Time reflects when diagnosis was documented in both MDM as applicable and the Disposition within this note       Time User Action Codes Description Comment    10/27/2024 10:39 PM Pankaj Bergman [R42] Dizziness     10/27/2024 10:39 PM Pankaj Bergman [E83.42] Hypomagnesemia                  Pankaj Bergman MD  10/28/24 0336

## 2024-10-28 NOTE — DISCHARGE INSTRUCTIONS
A  personal message from Dr. Pankaj Bergman,  Thank you so much for allowing me to care for you today.    I pride myself in the care and attention I give all my patients.  I hope you were a witness to this tonight.   If for any reason your condition does not improve or worsens, or you have a question that was not answered during your visit you can feel free to text me on my personal phone #  # [phone].   I will answer to your message and continue your care past your emergency room visit.     Please understand that although you are being discharged because your condition has been deemed stable and able to be managed on an outpatient setting. However your condition may worsen as part of the natural progression of the illness/condition, if this occurs please come back to the emergency department for a repeat evaluation.

## 2024-11-05 ENCOUNTER — HOSPITAL ENCOUNTER (OUTPATIENT)
Dept: NON INVASIVE DIAGNOSTICS | Facility: CLINIC | Age: 69
Discharge: HOME | End: 2024-11-05
Payer: COMMERCIAL

## 2024-11-05 VITALS
DIASTOLIC BLOOD PRESSURE: 88 MMHG | SYSTOLIC BLOOD PRESSURE: 155 MMHG | WEIGHT: 200 LBS | BODY MASS INDEX: 28 KG/M2 | HEART RATE: 77 BPM | HEIGHT: 71 IN

## 2024-11-05 DIAGNOSIS — I35.0 NON-RHEUMATIC AORTIC STENOSIS: ICD-10-CM

## 2024-11-05 LAB
AORTIC ROOT: 3.3 CM
AORTIC VALVE MEAN VELOCITY: 18.3 M/S
APICAL FOUR CHAMBER EJECTION FRACTION: 53 %
ASCENDING AORTA: 3.6 CM
AV AREA BY CONTINUOUS VTI: 1.4 CM2
AV AREA PEAK VELOCITY: 1.1 CM2
AV LVOT MEAN GRADIENT: 1 MMHG
AV LVOT PEAK GRADIENT: 3 MMHG
AV MEAN GRADIENT: 16 MMHG
AV PEAK GRADIENT: 38 MMHG
AV VALVE AREA: 1.39 CM2
AV VELOCITY RATIO: 0.26
BSA FOR ECHO PROCEDURE: 2.11 M2
DOP CALC AO PEAK VEL: 3.1 M/S
DOP CALC AO VTI: 60.71 CM
DOP CALC LVOT AREA: 4.15 CM2
DOP CALC LVOT CARDIAC INDEX: 2.44 L/MIN/M2
DOP CALC LVOT CARDIAC OUTPUT: 5.2 L/MIN
DOP CALC LVOT DIAMETER: 2.3 CM
DOP CALC LVOT PEAK VEL VTI: 20.39 CM
DOP CALC LVOT PEAK VEL: 0.82 M/S
DOP CALC LVOT STROKE INDEX: 39 ML/M2
DOP CALC LVOT STROKE VOLUME: 84.67
E WAVE DECELERATION TIME: 190 MS
E/A RATIO: 0.75
FRACTIONAL SHORTENING: 38 (ref 28–44)
INTERVENTRICULAR SEPTUM IN DIASTOLE (PARASTERNAL SHORT AXIS VIEW): 1.2 CM
INTERVENTRICULAR SEPTUM: 1.2 CM (ref 0.6–1.1)
LAAS-AP2: 15.8 CM2
LAAS-AP4: 19.3 CM2
LEFT ATRIUM SIZE: 4.3 CM
LEFT ATRIUM VOLUME (MOD BIPLANE): 50 ML
LEFT ATRIUM VOLUME INDEX (MOD BIPLANE): 23.5 ML/M2
LEFT INTERNAL DIMENSION IN SYSTOLE: 2.6 CM (ref 2.1–4)
LEFT VENTRICULAR INTERNAL DIMENSION IN DIASTOLE: 4.2 CM (ref 3.5–6)
LEFT VENTRICULAR POSTERIOR WALL IN END DIASTOLE: 1.2 CM
LEFT VENTRICULAR STROKE VOLUME: 55 ML
LVSV (TEICH): 55 ML
MV E'TISSUE VEL-SEP: 6 CM/S
MV PEAK A VEL: 1.07 M/S
MV PEAK E VEL: 80 CM/S
MV STENOSIS PRESSURE HALF TIME: 55 MS
MV VALVE AREA P 1/2 METHOD: 4
RIGHT ATRIUM AREA SYSTOLE A4C: 14.8 CM2
RIGHT VENTRICLE ID DIMENSION: 3.4 CM
SL CV LEFT ATRIUM LENGTH A2C: 4.9 CM
SL CV LV EF: 60
SL CV PED ECHO LEFT VENTRICLE DIASTOLIC VOLUME (MOD BIPLANE) 2D: 80 ML
SL CV PED ECHO LEFT VENTRICLE SYSTOLIC VOLUME (MOD BIPLANE) 2D: 25 ML
TRICUSPID ANNULAR PLANE SYSTOLIC EXCURSION: 1.8 CM

## 2024-11-05 PROCEDURE — 93306 TTE W/DOPPLER COMPLETE: CPT

## 2024-11-05 PROCEDURE — 93306 TTE W/DOPPLER COMPLETE: CPT | Performed by: INTERNAL MEDICINE

## 2024-11-07 ENCOUNTER — TELEPHONE (OUTPATIENT)
Dept: CARDIOLOGY CLINIC | Facility: CLINIC | Age: 69
End: 2024-11-07

## 2024-11-07 NOTE — TELEPHONE ENCOUNTER
----- Message from Annelise Licona MD sent at 11/6/2024  9:51 AM EST -----  Please call and inform the patient that the echo showed-  Mild to moderate tightness (stenosis) of the aortic valve  We will follow-up this valve with another echocardiogram in a couple of years

## 2024-11-07 NOTE — TELEPHONE ENCOUNTER
Pt returned the call.  Dr Licona's echo result notes relayed to the pt.  Pt verbalized understanding and has no further questions at this time.

## 2024-11-24 DIAGNOSIS — J45.40 MODERATE PERSISTENT ASTHMA WITHOUT COMPLICATION: ICD-10-CM

## 2024-11-25 RX ORDER — ALBUTEROL SULFATE 90 UG/1
INHALANT RESPIRATORY (INHALATION)
Qty: 18 G | Refills: 5 | Status: SHIPPED | OUTPATIENT
Start: 2024-11-25

## 2024-11-27 ENCOUNTER — OFFICE VISIT (OUTPATIENT)
Age: 69
End: 2024-11-27
Payer: COMMERCIAL

## 2024-11-27 VITALS
HEART RATE: 80 BPM | SYSTOLIC BLOOD PRESSURE: 122 MMHG | DIASTOLIC BLOOD PRESSURE: 70 MMHG | OXYGEN SATURATION: 97 % | HEIGHT: 71 IN | TEMPERATURE: 94.5 F | WEIGHT: 205.2 LBS | BODY MASS INDEX: 28.73 KG/M2

## 2024-11-27 DIAGNOSIS — Q80.9 XERODERMA: Primary | ICD-10-CM

## 2024-11-27 PROCEDURE — G2211 COMPLEX E/M VISIT ADD ON: HCPCS

## 2024-11-27 PROCEDURE — 99213 OFFICE O/P EST LOW 20 MIN: CPT

## 2024-11-27 NOTE — PROGRESS NOTES
"Name: Dejuan Acosta      : 1955      MRN: [id number]  Encounter Provider: Ryan Pastrana PA-C  Encounter Date: 2024   Encounter department: [facility] [place]  :  Assessment & Plan  Xeroderma  Reassured, Advised to apply moistures   Follow up if new or worsening symptoms occur   Follow up for routine physical in march as scheduled              History of Present Illness     Presents today for bruise on the left leg. Has been itching his lower leg with his heel recently at night in bed. Is not itchy at any other time. Skin is also dry. Patient concerned because has dm and worried about reduced healing time      Review of Systems   Constitutional:  Negative for activity change, diaphoresis, fatigue and fever.   HENT: Negative.     Eyes: Negative.    Respiratory: Negative.  Negative for cough, chest tightness and shortness of breath.    Cardiovascular:  Negative for chest pain, palpitations and leg swelling.   Gastrointestinal: Negative.    Endocrine: Negative.  Negative for polydipsia, polyphagia and polyuria.   Genitourinary: Negative.  Negative for dysuria, flank pain, frequency, hematuria and urgency.   Musculoskeletal: Negative.  Negative for back pain, joint swelling, neck pain and neck stiffness.   Skin: Negative.    Neurological: Negative.  Negative for dizziness, weakness, light-headedness and headaches.   Hematological:  Negative for adenopathy.   Psychiatric/Behavioral: Negative.  Negative for confusion and sleep disturbance. The patient is not nervous/anxious.           Objective   /70 (Patient Position: Sitting, Cuff Size: Standard)   Pulse 80   Temp (!) 94.5 °F (34.7 °C)   Ht 5' 11\" (1.803 m)   Wt 93.1 kg (205 lb 3.2 oz)   SpO2 97%   BMI 28.62 kg/m²      Physical Exam  Vitals and nursing note reviewed.   Constitutional:       General: He is not in acute distress.     Appearance: Normal appearance. He is normal weight. He is not ill-appearing, " toxic-appearing or diaphoretic.   HENT:      Head: Normocephalic and atraumatic.      Right Ear: External ear normal.      Left Ear: External ear normal.      Nose: Nose normal.   Eyes:      General: No scleral icterus.        Right eye: No discharge.         Left eye: No discharge.      Extraocular Movements: Extraocular movements intact.      Conjunctiva/sclera: Conjunctivae normal.   Cardiovascular:      Rate and Rhythm: Normal rate.   Pulmonary:      Effort: Pulmonary effort is normal.   Musculoskeletal:      Cervical back: Normal range of motion and neck supple.      Right lower leg: No edema.      Left lower leg: No edema.   Skin:     Findings: Bruising present. No rash.             Comments: Dry skin with scales. Some bruising siria the left leg.   No open wounds. No redness or warmth    Neurological:      Mental Status: He is alert. Mental status is at baseline.   Psychiatric:         Mood and Affect: Mood normal.         Behavior: Behavior normal.         Thought Content: Thought content normal.         Judgment: Judgment normal.

## 2024-12-20 ENCOUNTER — VBI (OUTPATIENT)
Dept: ADMINISTRATIVE | Facility: OTHER | Age: 69
End: 2024-12-20

## 2024-12-21 NOTE — TELEPHONE ENCOUNTER
12/20/24 7:37 PM     Chart reviewed for Hemoglobin A1c was/were submitted to the patient's insurance.     Carol Meza   [facility]

## 2025-01-14 ENCOUNTER — TELEPHONE (OUTPATIENT)
Age: 70
End: 2025-01-14

## 2025-02-04 ENCOUNTER — APPOINTMENT (OUTPATIENT)
Age: 70
End: 2025-02-04
Payer: COMMERCIAL

## 2025-02-04 DIAGNOSIS — E11.69 MIXED HYPERLIPIDEMIA DUE TO TYPE 2 DIABETES MELLITUS  (HCC): ICD-10-CM

## 2025-02-04 DIAGNOSIS — E78.2 MIXED HYPERLIPIDEMIA DUE TO TYPE 2 DIABETES MELLITUS  (HCC): ICD-10-CM

## 2025-02-04 LAB
ALBUMIN SERPL BCG-MCNC: 4.5 G/DL (ref 3.5–5)
ALP SERPL-CCNC: 96 U/L (ref 34–104)
ALT SERPL W P-5'-P-CCNC: 49 U/L (ref 7–52)
ANION GAP SERPL CALCULATED.3IONS-SCNC: 12 MMOL/L (ref 4–13)
AST SERPL W P-5'-P-CCNC: 26 U/L (ref 13–39)
BILIRUB SERPL-MCNC: 0.59 MG/DL (ref 0.2–1)
BUN SERPL-MCNC: 23 MG/DL (ref 5–25)
CALCIUM SERPL-MCNC: 10 MG/DL (ref 8.4–10.2)
CHLORIDE SERPL-SCNC: 99 MMOL/L (ref 96–108)
CHOLEST SERPL-MCNC: 193 MG/DL (ref ?–200)
CO2 SERPL-SCNC: 26 MMOL/L (ref 21–32)
CREAT SERPL-MCNC: 1.19 MG/DL (ref 0.6–1.3)
CREAT UR-MCNC: 215 MG/DL
ERYTHROCYTE [DISTWIDTH] IN BLOOD BY AUTOMATED COUNT: 12.8 % (ref 11.6–15.1)
EST. AVERAGE GLUCOSE BLD GHB EST-MCNC: 134 MG/DL
GFR SERPL CREATININE-BSD FRML MDRD: 61 ML/MIN/1.73SQ M
GLUCOSE P FAST SERPL-MCNC: 126 MG/DL (ref 65–99)
HBA1C MFR BLD: 6.3 %
HCT VFR BLD AUTO: 44.4 % (ref 36.5–49.3)
HDLC SERPL-MCNC: 42 MG/DL
HGB BLD-MCNC: 14.4 G/DL (ref 12–17)
LDLC SERPL CALC-MCNC: 108 MG/DL (ref 0–100)
MCH RBC QN AUTO: 30.3 PG (ref 26.8–34.3)
MCHC RBC AUTO-ENTMCNC: 32.4 G/DL (ref 31.4–37.4)
MCV RBC AUTO: 94 FL (ref 82–98)
MICROALBUMIN UR-MCNC: <7 MG/L
PLATELET # BLD AUTO: 316 THOUSANDS/UL (ref 149–390)
PMV BLD AUTO: 9.8 FL (ref 8.9–12.7)
POTASSIUM SERPL-SCNC: 4.3 MMOL/L (ref 3.5–5.3)
PROT SERPL-MCNC: 7.7 G/DL (ref 6.4–8.4)
RBC # BLD AUTO: 4.75 MILLION/UL (ref 3.88–5.62)
SODIUM SERPL-SCNC: 137 MMOL/L (ref 135–147)
TRIGL SERPL-MCNC: 216 MG/DL (ref ?–150)
WBC # BLD AUTO: 7.61 THOUSAND/UL (ref 4.31–10.16)

## 2025-02-04 PROCEDURE — 82043 UR ALBUMIN QUANTITATIVE: CPT

## 2025-02-04 PROCEDURE — 80061 LIPID PANEL: CPT

## 2025-02-04 PROCEDURE — 82570 ASSAY OF URINE CREATININE: CPT

## 2025-02-04 PROCEDURE — 80053 COMPREHEN METABOLIC PANEL: CPT

## 2025-02-04 PROCEDURE — 83036 HEMOGLOBIN GLYCOSYLATED A1C: CPT

## 2025-02-04 PROCEDURE — 85027 COMPLETE CBC AUTOMATED: CPT

## 2025-02-04 PROCEDURE — 36415 COLL VENOUS BLD VENIPUNCTURE: CPT

## 2025-02-06 ENCOUNTER — RESULTS FOLLOW-UP (OUTPATIENT)
Age: 70
End: 2025-02-06

## 2025-02-15 DIAGNOSIS — K22.10 EROSIVE ESOPHAGITIS: ICD-10-CM

## 2025-02-17 ENCOUNTER — OFFICE VISIT (OUTPATIENT)
Dept: CARDIOLOGY CLINIC | Facility: CLINIC | Age: 70
End: 2025-02-17
Payer: COMMERCIAL

## 2025-02-17 VITALS
DIASTOLIC BLOOD PRESSURE: 74 MMHG | WEIGHT: 204 LBS | BODY MASS INDEX: 28.56 KG/M2 | HEART RATE: 76 BPM | HEIGHT: 71 IN | OXYGEN SATURATION: 98 % | RESPIRATION RATE: 18 BRPM | SYSTOLIC BLOOD PRESSURE: 124 MMHG

## 2025-02-17 DIAGNOSIS — I10 PRIMARY HYPERTENSION: Primary | ICD-10-CM

## 2025-02-17 DIAGNOSIS — K22.10 EROSIVE ESOPHAGITIS: ICD-10-CM

## 2025-02-17 DIAGNOSIS — E78.2 MIXED HYPERLIPIDEMIA: ICD-10-CM

## 2025-02-17 DIAGNOSIS — I35.0 MILD AORTIC STENOSIS: Chronic | ICD-10-CM

## 2025-02-17 DIAGNOSIS — I51.7 LVH (LEFT VENTRICULAR HYPERTROPHY): ICD-10-CM

## 2025-02-17 PROCEDURE — 99214 OFFICE O/P EST MOD 30 MIN: CPT | Performed by: INTERNAL MEDICINE

## 2025-02-17 RX ORDER — PANTOPRAZOLE SODIUM 40 MG/1
40 TABLET, DELAYED RELEASE ORAL 2 TIMES DAILY
Qty: 180 TABLET | Refills: 1 | OUTPATIENT
Start: 2025-02-17

## 2025-02-17 RX ORDER — PANTOPRAZOLE SODIUM 40 MG/1
40 TABLET, DELAYED RELEASE ORAL 2 TIMES DAILY
Qty: 180 TABLET | Refills: 1 | Status: SHIPPED | OUTPATIENT
Start: 2025-02-17

## 2025-02-17 NOTE — PROGRESS NOTES
CARDIOLOGY OFFICE VISIT  [facility]  [address] [place], [place] [address]  Tel: [phone]      NAME: Dejuan Acosta  AGE: 69 y.o.  SEX: male  : 1955  MRN: [id number]      Chief Complaint:  Chief Complaint   Patient presents with    Follow-up         History of Present Illness:   Patient comes for follow up. States he is doing well from cardiac stand point and denies chest pain / pressure, SOB, palpitations, lightheadedness, syncope, swelling feet, orthopnea, PND, claudication.    Primary hypertension, LVH -  Has been hypertensive for many years.  Taking medications regularly.  Denies lightheadedness, headache, medication side effects.      Mixed hyperlipidemia -  Has had hyperlipidemia for many years.  Taking statin regularly along with diet control.  Denies myalgia.  PCP closely monitoring the blood work.    Mild to moderate aortic stenosis per echo 2023, 2024.      Past Medical History:  Past Medical History:   Diagnosis Date    Asthma     Diabetes mellitus (HCC)     Disc degeneration, lumbar     Peripheral vascular disease (HCC)     SOB (shortness of breath)     Wheezing          Past Surgical History:  Past Surgical History:   Procedure Laterality Date    COLONOSCOPY      COLONOSCOPY      EGD      ESOPHAGOSCOPY           Family History:  Family History   Problem Relation Age of Onset    Breast cancer Mother     Hip fracture Father          Social History:  Social History     Socioeconomic History    Marital status: Single     Spouse name: Not on file    Number of children: Not on file    Years of education: Not on file    Highest education level: Not on file   Occupational History    Occupation: retired   Tobacco Use    Smoking status: Never    Smokeless tobacco: Never   Vaping Use    Vaping status: Never Used   Substance and Sexual Activity    Alcohol use: No    Drug use: No    Sexual activity: Not Currently    Other Topics Concern    Not on file   Social History Narrative    Not on file     Social Drivers of Health     Financial Resource Strain: Medium Risk (7/24/2023)    Overall Financial Resource Strain (CARDIA)     Difficulty of Paying Living Expenses: Somewhat hard   Food Insecurity: No Food Insecurity (9/23/2024)    Nursing - Inadequate Food Risk Classification     Worried About Running Out of Food in the Last Year: Never true     Ran Out of Food in the Last Year: Never true     Ran Out of Food in the Last Year: Not on file   Transportation Needs: No Transportation Needs (9/23/2024)    PRAPARE - Transportation     Lack of Transportation (Medical): No     Lack of Transportation (Non-Medical): No   Physical Activity: Inactive (5/17/2021)    Exercise Vital Sign     Days of Exercise per Week: 0 days     Minutes of Exercise per Session: 0 min   Stress: No Stress Concern Present (9/21/2023)    [employer] - Occupational Stress Questionnaire     Feeling of Stress : Not at all   Social Connections: Not on file   Intimate Partner Violence: Not on file   Housing Stability: Low Risk  (9/23/2024)    Housing Stability Vital Sign     Unable to Pay for Housing in the Last Year: No     Number of Times Moved in the Last Year: 0     Homeless in the Last Year: No         Active Problems:  Patient Active Problem List   Diagnosis    Allergic rhinitis    Mixed hyperlipidemia due to type 2 diabetes mellitus  (HCC)    Primary hypertension    Mild aortic stenosis    Mild mitral regurgitation    Mild intermittent asthma without complication    Type 2 diabetes mellitus without complication, without long-term current use of insulin (HCC)    Dysphagia    Hoarseness    Ptosis, right         The following portions of the patient's history were reviewed and updated as appropriate: past medical history, past surgical history, past family history,  past social history, current medications, allergies and problem  list.      Review of Systems:  Constitutional: Denies fever, chills  Eyes: Denies eye redness, eye discharge  ENT: Denies hearing loss, sneezing, nasal discharge, sore throat   Respiratory: Denies cough, expectoration, shortness of breath  Cardiovascular: Denies chest pain, palpitations, lower extremity swelling  Gastrointestinal: Denies abdominal pain, nausea, vomiting, diarrhea  Genito-Urinary: Denies dysuria, incontinence  Musculoskeletal: Denies back pain, joint pain, muscle pain  Neurologic: Denies lightheadedness, syncope, headache, seizures  Endocrine: Denies polydipsia, temperature intolerance  Allergy and Immunology: Denies hives, insect bite sensitivity  Hematological and Lymphatic: Denies bleeding problems, swollen glands   Psychological: Denies depression, suicidal ideation, anxiety, panic  Dermatological: Denies pruritus, rash, skin lesion changes      Vitals:  Vitals:    02/17/25 0801   BP: 124/74   Pulse: 76   Resp: 18   SpO2: 98%       Body mass index is 28.45 kg/m².    Weight (last 2 days)       Date/Time Weight    02/17/25 0801 92.5 (204)              Physical Examination:  General: Patient is not in acute distress. Awake, alert, oriented in time, place and person. Responding to commands  Head: Normocephalic. Atraumatic  Eyes: Both pupils normal sized, round and reactive to light. Nonicteric  ENT: Normal external ear canals  Neck: Supple. JVP not raised. Trachea central. No thyromegaly  Lungs: Bilateral bronchovascular breath sounds with no crackles or rhonchi  Chest wall: No tenderness  Cardiovascular: RRR. S1 and S2 normal. Grade 3/6 harsh KATERINE at base  Gastrointestinal: Abdomen soft, nontender. No guarding or rigidity. Liver and spleen not palpable. Bowel sounds present  Neurologic: Patient is awake, alert, oriented in time, place and person. Responding to commands. Moving all extremities  Integumentary:  No skin rash  Lymphatic: No cervical lymphadenopathy  Back: Symmetric. No CVA  "tenderness  Extremities: No clubbing, cyanosis or edema      Laboratory Results:  CBC with diff:   Lab Results   Component Value Date    WBC 7.61 02/04/2025    RBC 4.75 02/04/2025    HGB 14.4 02/04/2025    HCT 44.4 02/04/2025    MCV 94 02/04/2025    MCH 30.3 02/04/2025    RDW 12.8 02/04/2025     02/04/2025       CMP:  Lab Results   Component Value Date    CREATININE 1.19 02/04/2025    CREATININE 1.06 12/07/2018    BUN 23 02/04/2025    BUN 25 12/07/2018    K 4.3 02/04/2025    K 4.3 12/07/2018    CL 99 02/04/2025     12/07/2018    CO2 26 02/04/2025    CO2 26 12/07/2018    ALKPHOS 96 02/04/2025    ALKPHOS 80 12/07/2018    ALT 49 02/04/2025    ALT 52 12/07/2018    AST 26 02/04/2025    AST 25 12/07/2018    BILIDIR 0.08 05/21/2023       Lab Results   Component Value Date    HGBA1C 6.3 (H) 02/04/2025    HGBA1C 6.3 (H) 12/07/2018    MG 1.8 (L) 10/27/2024       Lab Results   Component Value Date    TROPONINI <0.02 04/11/2021    CKTOTAL 134 10/27/2024       Lipid Profile:   No results found for: \"CHOL\"  Lab Results   Component Value Date    HDL 42 02/04/2025    HDL 45 08/05/2024    HDL 47 01/09/2024     Lab Results   Component Value Date    LDLCALC 108 (H) 02/04/2025    LDLCALC 90 08/05/2024    LDLCALC 116 (H) 01/09/2024     Lab Results   Component Value Date    TRIG 216 (H) 02/04/2025    TRIG 183 (H) 08/05/2024    TRIG 93 01/09/2024       Cardiac testing:   Results for orders placed during the hospital encounter of 11/05/24    Echo complete w/ contrast if indicated    Interpretation Summary    Left Ventricle: Left ventricular cavity size is normal. Wall thickness is mildly increased. There is mild concentric hypertrophy. The left ventricular ejection fraction is 60%. Systolic function is normal. Wall motion is normal. Diastolic function is normal.    Right Ventricle: Right ventricular cavity size is normal. Systolic function is normal.    Aortic Valve: The aortic valve is trileaflet. The leaflets are moderately " thickened. The leaflets are mildly calcified. There is mildly reduced mobility. There is mild to moderate stenosis.  Peak velocity was 3.1 m/s.  Peak and mean gradients across the valve are 38 and 16 mmHg respectively.  Aortic valve area by the continuity equation method was 1.3 cm².    Tricuspid Valve: There is trace regurgitation.    Pulmonic Valve: There is mild regurgitation.        Results for orders placed during the hospital encounter of 23    NM myocardial perfusion spect (rx stress and/or rest)    Interpretation Summary    Stress ECG: No ST deviation is noted. The ECG was negative for ischemia.    Perfusion Defect Conclusion: There is no evidence of transient ischemic dilation (TID). TID index 1.04.    Perfusion: There are no perfusion defects.    Stress Function: Left ventricular function post-stress is normal. Stress ejection fraction is 70 %.    Stress Combined Conclusion: Left ventricular perfusion is normal.    Results for orders placed during the hospital encounter of 20    Stress test only, exercise    [place] [phone]    Stress Electrocardiography during Exercise    Name: BOBBI ALTAMIRANO  MR #: [id number]  Account #: [id number]  Study date: 2020  : 1955  Age: 64 years  Gender: Male  Height: 72 in  Weight: 219 lb  BSA: 2.22 mï¾²    Allergies: NO KNOWN ALLERGIES    Diagnosis: R06.09 - Other forms of dyspnea    Technician:  Anamika Alvarado MS, CCT  GROUP:  [facility]  Interpreting Physician:  Sydnee Velez MD  Referring Physician:  Micky Wong MD  Primary Physician:  Micky Wong MD    CLINICAL QUESTION: Detection of coronary artery disease.    HISTORY: The patient is a 64 year old  male. Chest pain status: no chest pain. Other symptoms: dyspnea of recent onset. Coronary artery disease risk factors: dyslipidemia and hypertension. Cardiovascular history: none  significant.  Medications: an ACE inhibitor/ARB, a lipid lowering agent, and an antihypertensive agent.    REST ECG: Normal sinus rhythm. Normal baseline ECG.    PROCEDURE: Treadmill exercise testing was performed, using the Bobbi protocol. Stress electrocardiographic evaluation was performed.    BOBBI PROTOCOL:  HR bpm SBP mmHg DBP mmHg Symptoms Rhythm/conduct  Baseline 75 142 86 none NSR  Stage 1 88 162 78 -- NSR  Stage 2 95 168 78 -- NSR  Stage 3 123 190 78 -- sinus tach  Stage 4 137 -- -- mild fatigue sinus tach  Immediate 139 -- -- -- sinus tach  Recovery 1 123 178 62 -- sinus tach  Recovery 2 104 -- -- -- sinus tach  Recovery 3 99 -- -- -- NSR  Recovery 5 84 148 70 -- NSR  No medications or fluids given.    STRESS SUMMARY: Duration of exercise was 10 min. The patient exercised to protocol stage 4. Maximal work rate was 13.4 METs. Functional capacity was above normal (greater than 20%). Maximal heart rate during stress was 139 bpm ( 90 % of  maximal predicted heart rate). The heart rate response to stress was normal. There was resting hypertension with an appropriate blood pressure response to stress. The rate-pressure product for the peak heart rate and blood pressure was  [id number]. There was no chest pain during stress. The stress test was terminated due to achievement of target heart rate and mild fatigue. The stress ECG was negative for ischemia. There were no stress arrhythmias or conduction abnormalities.    SUMMARY:  -  Stress results: Duration of exercise was 10 min. Target heart rate was achieved. There was resting hypertension with an appropriate blood pressure response to stress. There was no chest pain during stress.  -  ECG conclusions: The stress ECG was negative for ischemia.    IMPRESSIONS: Normal study at the work load achieved.    Prepared and signed by    Sydnee Velez MD  Signed 02/03/2020 15:44:44        Medications:    Current Outpatient Medications:     albuterol (PROVENTIL HFA,VENTOLIN  HFA) 90 mcg/act inhaler, TAKE 2 PUFFS BY MOUTH EVERY 6 HOURS AS NEEDED FOR WHEEZE, Disp: 18 g, Rfl: 5    amLODIPine (NORVASC) 10 mg tablet, Take 1 tablet (10 mg total) by mouth daily, Disp: 90 tablet, Rfl: 3    aspirin 81 mg chewable tablet, Chew 1 tablet (81 mg total) daily, Disp: , Rfl:     atorvastatin (LIPITOR) 40 mg tablet, TAKE 1 TABLET BY MOUTH EVERY DAY, Disp: 90 tablet, Rfl: 1    Blood Glucose Monitoring Suppl (OneTouch Verio Reflect) w/Device KIT, Check blood sugars once daily. Please substitute with appropriate alternative as covered by patient's insurance. Dx: E11.65, Disp: 1 kit, Rfl: 0    Fluticasone-Salmeterol (Advair Diskus) 250-50 mcg/dose inhaler, Inhale 1 puff 2 (two) times a day Rinse mouth after use., Disp: 60 blister, Rfl: 5    glucose blood (OneTouch Verio) test strip, Check blood sugars once daily. Please substitute with appropriate alternative as covered by patient's insurance. Dx: E11.65, Disp: 100 each, Rfl: 3    lisinopril (ZESTRIL) 20 mg tablet, Take 1 tablet (20 mg total) by mouth daily, Disp: 90 tablet, Rfl: 3    metFORMIN (GLUCOPHAGE-XR) 500 mg 24 hr tablet, TAKE 2 TABLETS BY MOUTH EVERYDAY WITH BREAKFAST, Disp: 180 tablet, Rfl: 2    OneTouch Delica Lancets 33G MISC, Check blood sugars once daily. Please substitute with appropriate alternative as covered by patient's insurance. Dx: E11.65, Disp: 100 each, Rfl: 3    pantoprazole (PROTONIX) 40 mg tablet, Take 1 tablet (40 mg total) by mouth 2 (two) times a day, Disp: 180 tablet, Rfl: 1      Allergies:  Allergies   Allergen Reactions    Pollen Extract Nasal Congestion         Assessment and Plan:  1. Primary hypertension (Primary)  Blood pressure stable.  Continue amlodipine 10 mg daily, lisinopril 20 mg daily.  Continue to monitor BP at home and call if abnormal    2. LVH (left ventricular hypertrophy)  Tight blood pressure control    3. Mixed hyperlipidemia  Continue atorvastatin 40 mg daily and diet control.  Lipid panel reviewed.   Diet control discussed with    4. Mild aortic stenosis  Last echocardiogram from Nov 2024 reviewed.  Stable mild to moderate aortic stenosis.  Follow-up echocardiogram at recommended interval.    Recommend aggressive risk factor modification and therapeutic lifestyle changes.  Low-salt, low-calorie, low-fat, low-cholesterol diet with regular exercise and to optimize weight.  I will defer the ordering and monitoring of necessity lab studies to you, but I am available and happy to review and manage any of the data at your request in the future.    Discussed concepts of atherosclerosis, including signs and symptoms of cardiac disease.    Previous studies were reviewed.    Safety measures were reviewed.  Questions were entertained and answered.  Patient was advised to report any problems requiring medical attention.    Follow-up with PCP and appropriate specialist and lab work as discussed.    Return for follow up visit as scheduled or earlier, if needed.  Thank you for allowing me to participate in the care and evaluation of your patient.  Should you have any questions, please feel free to contact me.    Annelise Licona MD  2/17/2025,10:10 AM

## 2025-03-11 ENCOUNTER — APPOINTMENT (EMERGENCY)
Dept: CT IMAGING | Facility: HOSPITAL | Age: 70
End: 2025-03-11
Payer: COMMERCIAL

## 2025-03-11 ENCOUNTER — HOSPITAL ENCOUNTER (OUTPATIENT)
Facility: HOSPITAL | Age: 70
Setting detail: OBSERVATION
Discharge: HOME | End: 2025-03-12
Attending: EMERGENCY MEDICINE | Admitting: INTERNAL MEDICINE
Payer: COMMERCIAL

## 2025-03-11 ENCOUNTER — APPOINTMENT (OUTPATIENT)
Dept: MRI IMAGING | Facility: HOSPITAL | Age: 70
End: 2025-03-11
Payer: COMMERCIAL

## 2025-03-11 DIAGNOSIS — H53.2 DIPLOPIA: Primary | ICD-10-CM

## 2025-03-11 DIAGNOSIS — G45.9 TIA (TRANSIENT ISCHEMIC ATTACK): ICD-10-CM

## 2025-03-11 LAB
ALBUMIN SERPL BCG-MCNC: 4.6 G/DL (ref 3.5–5)
ALP SERPL-CCNC: 111 U/L (ref 34–104)
ALT SERPL W P-5'-P-CCNC: 45 U/L (ref 7–52)
ANION GAP SERPL CALCULATED.3IONS-SCNC: 9 MMOL/L (ref 4–13)
APTT PPP: 28 SECONDS (ref 23–34)
AST SERPL W P-5'-P-CCNC: 24 U/L (ref 13–39)
BASOPHILS # BLD AUTO: 0.05 THOUSANDS/ÂΜL (ref 0–0.1)
BASOPHILS NFR BLD AUTO: 1 % (ref 0–1)
BILIRUB DIRECT SERPL-MCNC: 0.03 MG/DL (ref 0–0.2)
BILIRUB SERPL-MCNC: 0.28 MG/DL (ref 0.2–1)
BUN SERPL-MCNC: 22 MG/DL (ref 5–25)
CALCIUM SERPL-MCNC: 10.1 MG/DL (ref 8.4–10.2)
CARDIAC TROPONIN I PNL SERPL HS: 4 NG/L (ref ?–50)
CHLORIDE SERPL-SCNC: 101 MMOL/L (ref 96–108)
CO2 SERPL-SCNC: 26 MMOL/L (ref 21–32)
CREAT SERPL-MCNC: 1.13 MG/DL (ref 0.6–1.3)
EOSINOPHIL # BLD AUTO: 0.43 THOUSAND/ÂΜL (ref 0–0.61)
EOSINOPHIL NFR BLD AUTO: 5 % (ref 0–6)
ERYTHROCYTE [DISTWIDTH] IN BLOOD BY AUTOMATED COUNT: 12.4 % (ref 11.6–15.1)
FLUAV AG UPPER RESP QL IA.RAPID: NEGATIVE
FLUBV AG UPPER RESP QL IA.RAPID: NEGATIVE
GFR SERPL CREATININE-BSD FRML MDRD: 65 ML/MIN/1.73SQ M
GLUCOSE SERPL-MCNC: 120 MG/DL (ref 65–140)
GLUCOSE SERPL-MCNC: 165 MG/DL (ref 65–140)
HCT VFR BLD AUTO: 42 % (ref 36.5–49.3)
HGB BLD-MCNC: 14 G/DL (ref 12–17)
IMM GRANULOCYTES # BLD AUTO: 0.03 THOUSAND/UL (ref 0–0.2)
IMM GRANULOCYTES NFR BLD AUTO: 0 % (ref 0–2)
INR PPP: 0.9 (ref 0.85–1.19)
LYMPHOCYTES # BLD AUTO: 2.18 THOUSANDS/ÂΜL (ref 0.6–4.47)
LYMPHOCYTES NFR BLD AUTO: 24 % (ref 14–44)
MCH RBC QN AUTO: 30.2 PG (ref 26.8–34.3)
MCHC RBC AUTO-ENTMCNC: 33.3 G/DL (ref 31.4–37.4)
MCV RBC AUTO: 91 FL (ref 82–98)
MONOCYTES # BLD AUTO: 0.74 THOUSAND/ÂΜL (ref 0.17–1.22)
MONOCYTES NFR BLD AUTO: 8 % (ref 4–12)
NEUTROPHILS # BLD AUTO: 5.84 THOUSANDS/ÂΜL (ref 1.85–7.62)
NEUTS SEG NFR BLD AUTO: 62 % (ref 43–75)
NRBC BLD AUTO-RTO: 0 /100 WBCS
PLATELET # BLD AUTO: 291 THOUSANDS/UL (ref 149–390)
PMV BLD AUTO: 9 FL (ref 8.9–12.7)
POTASSIUM SERPL-SCNC: 3.9 MMOL/L (ref 3.5–5.3)
PROT SERPL-MCNC: 7.9 G/DL (ref 6.4–8.4)
PROTHROMBIN TIME: 12.9 SECONDS (ref 12.3–15)
RBC # BLD AUTO: 4.64 MILLION/UL (ref 3.88–5.62)
SARS-COV+SARS-COV-2 AG RESP QL IA.RAPID: NEGATIVE
SODIUM SERPL-SCNC: 136 MMOL/L (ref 135–147)
WBC # BLD AUTO: 9.27 THOUSAND/UL (ref 4.31–10.16)

## 2025-03-11 PROCEDURE — 70551 MRI BRAIN STEM W/O DYE: CPT

## 2025-03-11 PROCEDURE — 70498 CT ANGIOGRAPHY NECK: CPT

## 2025-03-11 PROCEDURE — 87811 SARS-COV-2 COVID19 W/OPTIC: CPT | Performed by: EMERGENCY MEDICINE

## 2025-03-11 PROCEDURE — 99285 EMERGENCY DEPT VISIT HI MDM: CPT | Performed by: EMERGENCY MEDICINE

## 2025-03-11 PROCEDURE — 85025 COMPLETE CBC W/AUTO DIFF WBC: CPT | Performed by: EMERGENCY MEDICINE

## 2025-03-11 PROCEDURE — 85730 THROMBOPLASTIN TIME PARTIAL: CPT | Performed by: EMERGENCY MEDICINE

## 2025-03-11 PROCEDURE — 70496 CT ANGIOGRAPHY HEAD: CPT

## 2025-03-11 PROCEDURE — 93005 ELECTROCARDIOGRAM TRACING: CPT

## 2025-03-11 PROCEDURE — 36415 COLL VENOUS BLD VENIPUNCTURE: CPT | Performed by: EMERGENCY MEDICINE

## 2025-03-11 PROCEDURE — 80048 BASIC METABOLIC PNL TOTAL CA: CPT | Performed by: EMERGENCY MEDICINE

## 2025-03-11 PROCEDURE — 84484 ASSAY OF TROPONIN QUANT: CPT | Performed by: EMERGENCY MEDICINE

## 2025-03-11 PROCEDURE — 82948 REAGENT STRIP/BLOOD GLUCOSE: CPT

## 2025-03-11 PROCEDURE — 99223 1ST HOSP IP/OBS HIGH 75: CPT | Performed by: INTERNAL MEDICINE

## 2025-03-11 PROCEDURE — 87804 INFLUENZA ASSAY W/OPTIC: CPT | Performed by: EMERGENCY MEDICINE

## 2025-03-11 PROCEDURE — 99285 EMERGENCY DEPT VISIT HI MDM: CPT

## 2025-03-11 PROCEDURE — 80076 HEPATIC FUNCTION PANEL: CPT | Performed by: EMERGENCY MEDICINE

## 2025-03-11 PROCEDURE — 85610 PROTHROMBIN TIME: CPT | Performed by: EMERGENCY MEDICINE

## 2025-03-11 RX ORDER — INSULIN LISPRO 100 [IU]/ML
1-5 INJECTION, SOLUTION INTRAVENOUS; SUBCUTANEOUS
Status: DISCONTINUED | OUTPATIENT
Start: 2025-03-11 | End: 2025-03-12 | Stop reason: HOSPADM

## 2025-03-11 RX ORDER — FLUTICASONE FUROATE AND VILANTEROL 200; 25 UG/1; UG/1
1 POWDER RESPIRATORY (INHALATION)
Status: DISCONTINUED | OUTPATIENT
Start: 2025-03-12 | End: 2025-03-12 | Stop reason: HOSPADM

## 2025-03-11 RX ORDER — PANTOPRAZOLE SODIUM 40 MG/1
40 TABLET, DELAYED RELEASE ORAL 2 TIMES DAILY
Status: DISCONTINUED | OUTPATIENT
Start: 2025-03-11 | End: 2025-03-12 | Stop reason: HOSPADM

## 2025-03-11 RX ORDER — LORAZEPAM 2 MG/ML
2 INJECTION INTRAMUSCULAR ONCE AS NEEDED
Status: COMPLETED | OUTPATIENT
Start: 2025-03-11 | End: 2025-03-11

## 2025-03-11 RX ORDER — HEPARIN SODIUM 5000 [USP'U]/ML
5000 INJECTION, SOLUTION INTRAVENOUS; SUBCUTANEOUS EVERY 8 HOURS SCHEDULED
Status: DISCONTINUED | OUTPATIENT
Start: 2025-03-11 | End: 2025-03-12 | Stop reason: HOSPADM

## 2025-03-11 RX ORDER — ASPIRIN 81 MG/1
81 TABLET, CHEWABLE ORAL DAILY
Status: DISCONTINUED | OUTPATIENT
Start: 2025-03-12 | End: 2025-03-12

## 2025-03-11 RX ORDER — ALBUTEROL SULFATE 90 UG/1
1 INHALANT RESPIRATORY (INHALATION) EVERY 6 HOURS PRN
Status: DISCONTINUED | OUTPATIENT
Start: 2025-03-11 | End: 2025-03-12 | Stop reason: HOSPADM

## 2025-03-11 RX ORDER — LISINOPRIL 10 MG/1
20 TABLET ORAL DAILY
Status: DISCONTINUED | OUTPATIENT
Start: 2025-03-12 | End: 2025-03-12 | Stop reason: HOSPADM

## 2025-03-11 RX ORDER — ASPIRIN 81 MG/1
243 TABLET, CHEWABLE ORAL ONCE
Status: COMPLETED | OUTPATIENT
Start: 2025-03-11 | End: 2025-03-11

## 2025-03-11 RX ORDER — ATORVASTATIN CALCIUM 40 MG/1
40 TABLET, FILM COATED ORAL DAILY
Status: DISCONTINUED | OUTPATIENT
Start: 2025-03-12 | End: 2025-03-12 | Stop reason: HOSPADM

## 2025-03-11 RX ORDER — AMLODIPINE BESYLATE 5 MG/1
10 TABLET ORAL DAILY
Status: DISCONTINUED | OUTPATIENT
Start: 2025-03-12 | End: 2025-03-12 | Stop reason: HOSPADM

## 2025-03-11 RX ORDER — CLOPIDOGREL BISULFATE 75 MG/1
300 TABLET ORAL ONCE
Status: COMPLETED | OUTPATIENT
Start: 2025-03-11 | End: 2025-03-11

## 2025-03-11 RX ADMIN — ASPIRIN 243 MG: 81 TABLET, CHEWABLE ORAL at 17:34

## 2025-03-11 RX ADMIN — IOHEXOL 85 ML: 350 INJECTION, SOLUTION INTRAVENOUS at 16:28

## 2025-03-11 RX ADMIN — LORAZEPAM 2 MG: 2 INJECTION INTRAMUSCULAR; INTRAVENOUS at 23:18

## 2025-03-11 RX ADMIN — HEPARIN SODIUM 5000 UNITS: 5000 INJECTION INTRAVENOUS; SUBCUTANEOUS at 17:55

## 2025-03-11 RX ADMIN — CLOPIDOGREL 300 MG: 75 TABLET ORAL at 17:34

## 2025-03-11 NOTE — ED PROVIDER NOTES
Time reflects when diagnosis was documented in both MDM as applicable and the Disposition within this note       Time User Action Codes Description Comment    3/11/2025  5:33 PM Brady Webber Add [H53.2] Diplopia           ED Disposition       ED Disposition   Admit    Condition   Stable    Date/Time   Tue Mar 11, 2025  5:33 PM    Comment   Case was discussed with Dr Aiken and the patient's admission status was agreed to be Admission Status: observation status to the service of Dr. Aiken .               Assessment & Plan       Medical Decision Making  Problems Addressed:  Diplopia: complicated acute illness or injury     Details: Ddx includes stroke, tia, ocular pathology, etc.     Amount and/or Complexity of Data Reviewed  Labs: ordered.  Radiology: ordered and independent interpretation performed.    Risk  OTC drugs.  Prescription drug management.  Decision regarding hospitalization.             Medications   albuterol (PROVENTIL HFA,VENTOLIN HFA) inhaler 1 puff (has no administration in time range)   amLODIPine (NORVASC) tablet 10 mg (has no administration in time range)   aspirin chewable tablet 81 mg (has no administration in time range)   atorvastatin (LIPITOR) tablet 40 mg (has no administration in time range)   fluticasone-vilanterol 200-25 mcg/actuation 1 puff (has no administration in time range)   lisinopril (ZESTRIL) tablet 20 mg (has no administration in time range)   pantoprazole (PROTONIX) EC tablet 40 mg (40 mg Oral Not Given 3/11/25 1752)   heparin (porcine) subcutaneous injection 5,000 Units (5,000 Units Subcutaneous Given 3/11/25 1755)   insulin lispro (HumALOG/ADMELOG) 100 units/mL subcutaneous injection 1-5 Units (0 Units Subcutaneous Not Given 3/11/25 1817)   LORazepam (ATIVAN) injection 2 mg (has no administration in time range)   iohexol (OMNIPAQUE) 350 MG/ML injection (MULTI-DOSE) 85 mL (85 mL Intravenous Given 3/11/25 1628)   aspirin chewable tablet 243 mg (243 mg Oral Given  3/11/25 1734)   clopidogrel (PLAVIX) tablet 300 mg (300 mg Oral Given 3/11/25 1734)       ED Risk Strat Scores                            SBIRT 22yo+      Flowsheet Row Most Recent Value   Initial Alcohol Screen: US AUDIT-C     1. How often do you have a drink containing alcohol? 0 Filed at: 03/11/2025 1717   2. How many drinks containing alcohol do you have on a typical day you are drinking?  0 Filed at: 03/11/2025 1717   3b. FEMALE Any Age, or MALE 65+: How often do you have 4 or more drinks on one occassion? 0 Filed at: 03/11/2025 1717   Audit-C Score 0 Filed at: 03/11/2025 1717   SHEELA: How many times in the past year have you...    Used an illegal drug or used a prescription medication for non-medical reasons? Never Filed at: 03/11/2025 1717                            History of Present Illness       Chief Complaint   Patient presents with    Visual Field Change     Pt arrives c/o double vision 1 week ago. Pt reports vision has since resolved. Referred by eye doctor to be evaluated  in ED.        Past Medical History:   Diagnosis Date    Asthma     Diabetes mellitus (HCC)     Disc degeneration, lumbar     Peripheral vascular disease (HCC)     SOB (shortness of breath)     Wheezing       Past Surgical History:   Procedure Laterality Date    COLONOSCOPY      COLONOSCOPY      EGD      ESOPHAGOSCOPY        Family History   Problem Relation Age of Onset    Breast cancer Mother     Hip fracture Father       Social History     Tobacco Use    Smoking status: Never    Smokeless tobacco: Never   Vaping Use    Vaping status: Never Used   Substance Use Topics    Alcohol use: No    Drug use: No      E-Cigarette/Vaping    E-Cigarette Use Never User       E-Cigarette/Vaping Substances    Nicotine No     THC No     CBD No     Flavoring No     Other No     Unknown No       I have reviewed and agree with the history as documented.     68 yo male on ASA with h/o HTN, DM and AS who presents to ED c/o an episode of diplopia one week  ago. He went to eye doctor today who diagnosed CN VI palsy and sent pt to ED for evaluation of central/non-ocular cause of diplopia. Pt has no sxs or concerns at this time. No headache, neck pain, recent fall, speech disturbance, imbalance, etc.         Review of Systems   Eyes:  Positive for visual disturbance.   Neurological:  Negative for dizziness, tremors, seizures, syncope, facial asymmetry, speech difficulty, weakness, light-headedness, numbness and headaches.           Objective       ED Triage Vitals   Temperature Pulse Blood Pressure Respirations SpO2 Patient Position - Orthostatic VS   03/11/25 1523 03/11/25 1523 03/11/25 1523 03/11/25 1523 03/11/25 1523 03/11/25 1545   98 °F (36.7 °C) (!) 110 143/94 18 99 % Sitting      Temp Source Heart Rate Source BP Location FiO2 (%) Pain Score    03/11/25 1523 03/11/25 1523 03/11/25 1523 -- --    Temporal Monitor Left arm        Vitals      Date and Time Temp Pulse SpO2 Resp BP Pain Score FACES Pain Rating User   03/11/25 2000 -- 75 96 % 20 119/68 -- --    03/11/25 1900 -- 80 98 % 20 133/78 -- --    03/11/25 1800 -- 78 99 % 20 120/79 -- --    03/11/25 1737 -- 81 99 % -- 153/78 -- --    03/11/25 1713 -- -- -- -- 130/106 -- -- TC   03/11/25 1545 -- 102 99 % 20 225/101 -- --    03/11/25 1523 98 °F (36.7 °C) 110 99 % 18 143/94 -- -- RO            Physical Exam  Vitals and nursing note reviewed.   Constitutional:       General: He is not in acute distress.     Appearance: Normal appearance. He is well-developed. He is not ill-appearing, toxic-appearing or diaphoretic.   HENT:      Head: Normocephalic and atraumatic.      Mouth/Throat:      Mouth: Mucous membranes are moist.      Pharynx: Oropharynx is clear.   Eyes:      Conjunctiva/sclera: Conjunctivae normal.      Pupils: Pupils are equal, round, and reactive to light.   Neck:      Vascular: No JVD.   Cardiovascular:      Rate and Rhythm: Normal rate and regular rhythm.      Pulses: Normal pulses.      Heart  sounds: Normal heart sounds. No murmur heard.     No friction rub. No gallop.   Pulmonary:      Effort: Pulmonary effort is normal. No respiratory distress.      Breath sounds: Normal breath sounds. No stridor. No wheezing or rales.   Abdominal:      General: There is no distension.      Palpations: Abdomen is soft.      Tenderness: There is no abdominal tenderness. There is no guarding or rebound.   Musculoskeletal:         General: No swelling, tenderness, deformity or signs of injury. Normal range of motion.      Cervical back: Normal range of motion and neck supple. No rigidity.   Skin:     General: Skin is warm and dry.      Capillary Refill: Capillary refill takes less than 2 seconds.      Coloration: Skin is not jaundiced or pale.      Findings: No bruising or erythema.   Neurological:      General: No focal deficit present.      Mental Status: He is alert and oriented to person, place, and time.      Cranial Nerves: No cranial nerve deficit.      Sensory: No sensory deficit.      Motor: No weakness or abnormal muscle tone.      Coordination: Coordination normal.      Gait: Gait normal.      Comments: NIHSS 0         Results Reviewed       Procedure Component Value Units Date/Time    Fingerstick Glucose (POCT) [[id number]]  (Normal) Collected: 03/11/25 1814    Lab Status: Final result Specimen: Blood Updated: 03/11/25 1815     POC Glucose 120 mg/dl     HS Troponin 0hr (reflex protocol) [[id number]]  (Normal) Collected: 03/11/25 1604    Lab Status: Final result Specimen: Blood from Arm, Left Updated: 03/11/25 1641     hs TnI 0hr 4 ng/L     Protime-INR [[id number]]  (Normal) Collected: 03/11/25 1604    Lab Status: Final result Specimen: Blood from Arm, Left Updated: 03/11/25 1640     Protime 12.9 seconds      INR 0.90    Narrative:      INR Therapeutic Range    Indication                                             INR Range      Atrial Fibrillation                                                2.0-3.0  Hypercoagulable State                                    2.0.2.3  Left Ventricular Asist Device                            2.0-3.0  Mechanical Heart Valve                                  -    Aortic(with afib, MI, embolism, HF, LA enlargement,    and/or coagulopathy)                                     2.0-3.0 (2.5-3.5)     Mitral                                                             2.5-3.5  Prosthetic/Bioprosthetic Heart Valve               2.0-3.0  Venous thromboembolism (VTE: VT, PE        2.0-3.0    APTT [[id number]]  (Normal) Collected: 03/11/25 1604    Lab Status: Final result Specimen: Blood from Arm, Left Updated: 03/11/25 1640     PTT 28 seconds     Basic metabolic panel [[id number]]  (Abnormal) Collected: 03/11/25 1604    Lab Status: Final result Specimen: Blood from Arm, Left Updated: 03/11/25 1637     Sodium 136 mmol/L      Potassium 3.9 mmol/L      Chloride 101 mmol/L      CO2 26 mmol/L      ANION GAP 9 mmol/L      BUN 22 mg/dL      Creatinine 1.13 mg/dL      Glucose 165 mg/dL      Calcium 10.1 mg/dL      eGFR 65 ml/min/1.73sq m     Narrative:      National Kid[employer] Foundation guidelines for Chronic Kidney Disease (CKD):     Stage 1 with normal or high GFR (GFR > 90 mL/min/1.73 square meters)    Stage 2 Mild CKD (GFR = 60-89 mL/min/1.73 square meters)    Stage 3A Moderate CKD (GFR = 45-59 mL/min/1.73 square meters)    Stage 3B Moderate CKD (GFR = 30-44 mL/min/1.73 square meters)    Stage 4 Severe CKD (GFR = 15-29 mL/min/1.73 square meters)    Stage 5 End Stage CKD (GFR <15 mL/min/1.73 square meters)  Note: GFR calculation is accurate only with a steady state creatinine    Hepatic function panel [[id number]]  (Abnormal) Collected: 03/11/25 1604    Lab Status: Final result Specimen: Blood from Arm, Left Updated: 03/11/25 1637     Total Bilirubin 0.28 mg/dL      Bilirubin, Direct 0.03 mg/dL      Alkaline Phosphatase 111 U/L      AST 24 U/L      ALT 45 U/L      Total Protein 7.9 g/dL       Albumin 4.6 g/dL     FLU/COVID Rapid Antigen (30 min. TAT) - Preferred screening test in ED [[id number]]  (Normal) Collected: 03/11/25 1604    Lab Status: Final result Specimen: Nares from Nose Updated: 03/11/25 1633     SARS COV Rapid Antigen Negative     Influenza A Rapid Antigen Negative     Influenza B Rapid Antigen Negative    Narrative:      This test has been performed using the [employer] 2 FLU+SARS Antigen test under the Emergency Use Authorization (EUA). This test has been validated by the  and verified by the performing laboratory. The Lucina uses lateral flow immunofluorescent sandwich assay to detect SARS-COV, Influenza A and Influenza B Antigen.     The [employer] Lucina 2 SARS Antigen test does not differentiate between SARS-CoV and SARS-CoV-2.     Negative results are presumptive and may be confirmed with a molecular assay, if necessary, for patient management. Negative results do not rule out SARS-CoV-2 or influenza infection and should not be used as the sole basis for treatment or patient management decisions. A negative test result may occur if the level of antigen in a sample is below the limit of detection of this test.     Positive results are indicative of the presence of viral antigens, but do not rule out bacterial infection or co-infection with other viruses.     All test results should be used as an adjunct to clinical observations and other information available to the provider.    FOR PEDIATRIC PATIENTS - copy/paste COVID Guidelines URL to browser: [email or web address]    CBC and differential [[id number]] Collected: 03/11/25 1604    Lab Status: Final result Specimen: Blood from Arm, Left Updated: 03/11/25 1620     WBC 9.27 Thousand/uL      RBC 4.64 Million/uL      Hemoglobin 14.0 g/dL      Hematocrit 42.0 %      MCV 91 fL      MCH 30.2 pg      MCHC 33.3 g/dL      RDW 12.4 %      MPV 9.0 fL      Platelets 291 Thousands/uL       nRBC 0 /100 WBCs      Segmented % 62 %      Immature Grans % 0 %      Lymphocytes % 24 %      Monocytes % 8 %      Eosinophils Relative 5 %      Basophils Relative 1 %      Absolute Neutrophils 5.84 Thousands/µL      Absolute Immature Grans 0.03 Thousand/uL      Absolute Lymphocytes 2.18 Thousands/µL      Absolute Monocytes 0.74 Thousand/µL      Eosinophils Absolute 0.43 Thousand/µL      Basophils Absolute 0.05 Thousands/µL             CTA head and neck with and without contrast   Final Interpretation by Ghassan Liz MD (03/11 4414)      CT Brain:  No acute intracranial abnormality.      CT Angiography: No large vessel occlusion or hemodynamically significant stenosis. No aneurysm no vascular malformation..                  Workstation performed: [id number]         MRI brain wo contrast    (Results Pending)       Procedures    ED Medication and Procedure Management   Prior to Admission Medications   Prescriptions Last Dose Informant Patient Reported? Taking?   Blood Glucose Monitoring Suppl (OneTouch Verio Reflect) w/Device KIT  Self No No   Sig: Check blood sugars once daily. Please substitute with appropriate alternative as covered by patient's insurance. Dx: E11.65   Fluticasone-Salmeterol (Advair Diskus) 250-50 mcg/dose inhaler  Self No No   Sig: Inhale 1 puff 2 (two) times a day Rinse mouth after use.   OneTouch Delica Lancets 33G MISC  Self No No   Sig: Check blood sugars once daily. Please substitute with appropriate alternative as covered by patient's insurance. Dx: E11.65   albuterol (PROVENTIL HFA,VENTOLIN HFA) 90 mcg/act inhaler   No No   Sig: TAKE 2 PUFFS BY MOUTH EVERY 6 HOURS AS NEEDED FOR WHEEZE   amLODIPine (NORVASC) 10 mg tablet  Self No No   Sig: Take 1 tablet (10 mg total) by mouth daily   aspirin 81 mg chewable tablet  Self No No   Sig: Chew 1 tablet (81 mg total) daily   atorvastatin (LIPITOR) 40 mg tablet   No No   Sig: TAKE 1 TABLET BY MOUTH EVERY DAY   glucose blood (OneTouch Verio) test  strip  Self No No   Sig: Check blood sugars once daily. Please substitute with appropriate alternative as covered by patient's insurance. Dx: E11.65   lisinopril (ZESTRIL) 20 mg tablet  Self No No   Sig: Take 1 tablet (20 mg total) by mouth daily   metFORMIN (GLUCOPHAGE-XR) 500 mg 24 hr tablet  Self No No   Sig: TAKE 2 TABLETS BY MOUTH EVERYDAY WITH BREAKFAST   pantoprazole (PROTONIX) 40 mg tablet   No No   Sig: Take 1 tablet (40 mg total) by mouth 2 (two) times a day      Facility-Administered Medications: None     Patient's Medications   Discharge Prescriptions    No medications on file     No discharge procedures on file.  ED SEPSIS DOCUMENTATION   Time reflects when diagnosis was documented in both MDM as applicable and the Disposition within this note       Time User Action Codes Description Comment    3/11/2025  5:33 PM Brady Webber Add [H53.2] Diplopia                  Brady Webber MD  03/11/25 2051

## 2025-03-11 NOTE — ASSESSMENT & PLAN NOTE
"Lab Results   Component Value Date    HGBA1C 6.3 (H) 02/04/2025       No results for input(s): \"POCGLU\" in the last 72 hours.    Blood Sugar Average: Last 72 hrs:    SSI  Monitor blood glucose  "

## 2025-03-11 NOTE — H&P
"H&P - Hospitalist   Name: Dejuan Acosta 69 y.o. male I MRN: [id number]  Unit/Bed#: ED 17 I Date of Admission: 3/11/2025   Date of Service: 3/11/2025 I Hospital Day: 0     Assessment & Plan  Diplopia  69-year-old male with history of hypertension, hyperlipidemia, DM presented with one day of double vision, now resolved.  On exam slight anisocoria with left pupil more dilated compared to R although not by much, extraocular movements were normal during my exam.  He was seen by outpatient optometrist who recommended evaluation patient to rule out a central cause  CT+CTA head and neck without any acute intracranial abnormality    For now we will keep on stroke pathway  MRI of the brain  Neurology consult  PT/OT/SLP  Aspirin  Statin  Has received Plavix load at the ED, will defer continuation to neurology AM pending clinical eval and workup  Monitor neurological exam  Mixed hyperlipidemia due to type 2 diabetes mellitus  (HCC)  Lab Results   Component Value Date    HGBA1C 6.3 (H) 02/04/2025       No results for input(s): \"POCGLU\" in the last 72 hours.    Blood Sugar Average: Last 72 hrs:    Continue statin  Primary hypertension  Will allow for permissive HTN today  Can resume antihypertensives tomorrow pending clinical course  Mild intermittent asthma without complication  Stable, no wheezing  Continue inhalers  Type 2 diabetes mellitus without complication, without long-term current use of insulin (HCC)  Lab Results   Component Value Date    HGBA1C 6.3 (H) 02/04/2025       No results for input(s): \"POCGLU\" in the last 72 hours.    Blood Sugar Average: Last 72 hrs:    SSI  Monitor blood glucose      VTE Pharmacologic Prophylaxis:   Heparin  Code Status: Level 1 - Full Code   Discussion with family: Patient    Anticipated Length of Stay: Patient will be admitted on an observation basis with an anticipated length of stay of less than 2 midnights secondary to diplopia.    History of Present Illness   Chief Complaint: " Diplopia    Dejuan Acosta is a 69 y.o. male with a PMH of HTN, HLD, DM who presents with double vision.  Patient mentions that couple days ago he did have sudden onset of double vision with the sensation of 2 images crossing concomitantly when looking forward and downwards, this happened all of a sudden and lasted for less than a couple hours.  This vision improved by itself.  He eventually had a follow-up appointment with his outpatient optometrist which was a routine appointment, he went for that  , When mentioning that to that provider he was recommended to come to the ED for further evaluation. The patient denies any headache, any loss of consciousness, any focal weakness otherwise. Currently feels back to baseline      Review of Systems   Neurological:         Double vision   All other systems reviewed and are negative.      Historical Information   Past Medical History:   Diagnosis Date    Asthma     Diabetes mellitus (HCC)     Disc degeneration, lumbar     Peripheral vascular disease (HCC)     SOB (shortness of breath)     Wheezing      Past Surgical History:   Procedure Laterality Date    COLONOSCOPY      COLONOSCOPY      EGD      ESOPHAGOSCOPY       Social History     Tobacco Use    Smoking status: Never    Smokeless tobacco: Never   Vaping Use    Vaping status: Never Used   Substance and Sexual Activity    Alcohol use: No    Drug use: No    Sexual activity: Not Currently     E-Cigarette/Vaping    E-Cigarette Use Never User      E-Cigarette/Vaping Substances    Nicotine No     THC No     CBD No     Flavoring No     Other No     Unknown No      Family History   Problem Relation Age of Onset    Breast cancer Mother     Hip fracture Father      Social History:  Marital Status: Single       Meds/Allergies   I have reviewed home medications using recent Epic encounter.  Prior to Admission medications    Medication Sig Start Date End Date Taking? Authorizing Provider   albuterol (PROVENTIL HFA,VENTOLIN HFA) 90  mcg/act inhaler TAKE 2 PUFFS BY MOUTH EVERY 6 HOURS AS NEEDED FOR WHEEZE 11/25/24   Veronica Lewis MD   amLODIPine (NORVASC) 10 mg tablet Take 1 tablet (10 mg total) by mouth daily 5/31/24 5/26/25  Annelise Licona MD   aspirin 81 mg chewable tablet Chew 1 tablet (81 mg total) daily 5/31/23   Annelise Licona MD   atorvastatin (LIPITOR) 40 mg tablet TAKE 1 TABLET BY MOUTH EVERY DAY 10/18/24   Ryan Pastrana PA-C   Blood Glucose Monitoring Suppl (OneTouch Verio Reflect) w/Device KIT Check blood sugars once daily. Please substitute with appropriate alternative as covered by patient's insurance. Dx: E11.65 5/24/23   PATRICIA Brooks   Fluticasone-Salmeterol (Advair Diskus) 250-50 mcg/dose inhaler Inhale 1 puff 2 (two) times a day Rinse mouth after use. 5/8/24 2/17/25  Veronica Lewis MD   glucose blood (OneTouch Verio) test strip Check blood sugars once daily. Please substitute with appropriate alternative as covered by patient's insurance. Dx: E11.65 5/9/24   Case Pearson, DO   lisinopril (ZESTRIL) 20 mg tablet Take 1 tablet (20 mg total) by mouth daily 5/31/24   Annelise Licona MD   metFORMIN (GLUCOPHAGE-XR) 500 mg 24 hr tablet TAKE 2 TABLETS BY MOUTH EVERYDAY WITH BREAKFAST 9/8/24   Case Pearson, DO   OneTouch Delica Lancets 33G MISC Check blood sugars once daily. Please substitute with appropriate alternative as covered by patient's insurance. Dx: E11.65 5/24/23   PATRICIA Brooks   pantoprazole (PROTONIX) 40 mg tablet Take 1 tablet (40 mg total) by mouth 2 (two) times a day 2/17/25   Case Pearson, DO     Allergies   Allergen Reactions    Pollen Extract Nasal Congestion       Objective :  Temp:  [98 °F (36.7 °C)] 98 °F (36.7 °C)  HR:  [] 81  BP: (130-225)/() 153/78  Resp:  [18-20] 20  SpO2:  [99 %] 99 %  O2 Device: None (Room air)    Physical Exam  Vitals and nursing note reviewed.   Constitutional:       General: He is not in acute distress.     Appearance: Normal  appearance. He is not ill-appearing, toxic-appearing or diaphoretic.   HENT:      Head: Normocephalic and atraumatic.      Right Ear: External ear normal.      Left Ear: External ear normal.      Nose: Nose normal. No congestion or rhinorrhea.      Mouth/Throat:      Mouth: Mucous membranes are moist.      Pharynx: Oropharynx is clear. No oropharyngeal exudate or posterior oropharyngeal erythema.   Eyes:      General: No scleral icterus.        Right eye: No discharge.         Left eye: No discharge.      Pupils: Pupils are equal, round, and reactive to light.   Neck:      Vascular: No carotid bruit.   Cardiovascular:      Rate and Rhythm: Normal rate and regular rhythm.      Pulses: Normal pulses.      Heart sounds: No murmur heard.     No friction rub. No gallop.   Pulmonary:      Effort: Pulmonary effort is normal. No respiratory distress.      Breath sounds: Normal breath sounds. No stridor. No wheezing, rhonchi or rales.   Abdominal:      General: Abdomen is flat. Bowel sounds are normal. There is no distension.      Palpations: Abdomen is soft. There is no mass.      Tenderness: There is no abdominal tenderness. There is no guarding or rebound.      Hernia: No hernia is present.   Musculoskeletal:         General: No swelling, tenderness, deformity or signs of injury. Normal range of motion.      Cervical back: Normal range of motion. No rigidity. No muscular tenderness.   Lymphadenopathy:      Cervical: No cervical adenopathy.   Skin:     General: Skin is warm and dry.      Capillary Refill: Capillary refill takes less than 2 seconds.      Coloration: Skin is not jaundiced or pale.      Findings: No bruising or erythema.   Neurological:      General: No focal deficit present.      Mental Status: He is alert and oriented to person, place, and time. Mental status is at baseline.      Cranial Nerves: No cranial nerve deficit.      Sensory: No sensory deficit.      Motor: No weakness.      Coordination:  Coordination normal.      Deep Tendon Reflexes: Reflexes normal.      Comments: AAOx4, GCS15, EOM normal during my exam but patient does have slight anisocoria with the left pupil more dilated compared to the right (4 vs 2mm), both reactive however, otherwise strength and sensitivity preserved throughout upper and lower extremities   Psychiatric:         Mood and Affect: Mood normal.         Behavior: Behavior normal.         Thought Content: Thought content normal.         Judgment: Judgment normal.          Lines/Drains:            Lab Results: I have reviewed the following results:  Results from last 7 days   Lab Units 03/11/25  1604   WBC Thousand/uL 9.27   HEMOGLOBIN g/dL 14.0   HEMATOCRIT % 42.0   PLATELETS Thousands/uL 291   SEGS PCT % 62   LYMPHO PCT % 24   MONO PCT % 8   EOS PCT % 5     Results from last 7 days   Lab Units 03/11/25  1604   SODIUM mmol/L 136   POTASSIUM mmol/L 3.9   CHLORIDE mmol/L 101   CO2 mmol/L 26   BUN mg/dL 22   CREATININE mg/dL 1.13   ANION GAP mmol/L 9   CALCIUM mg/dL 10.1   ALBUMIN g/dL 4.6   TOTAL BILIRUBIN mg/dL 0.28   ALK PHOS U/L 111*   ALT U/L 45   AST U/L 24   GLUCOSE RANDOM mg/dL 165*     Results from last 7 days   Lab Units 03/11/25  1604   INR  0.90         Lab Results   Component Value Date    HGBA1C 6.3 (H) 02/04/2025    HGBA1C 6.3 (H) 08/05/2024    HGBA1C 6.1 (H) 01/09/2024           Imaging Results Review: I personally reviewed the following image studies in PACS and associated radiology reports: CT head. My interpretation of the radiology images/reports is: No acute abnormality.      Administrative Statements       ** Please Note: This note has been constructed using a voice recognition system. **  Strange

## 2025-03-11 NOTE — ASSESSMENT & PLAN NOTE
Will allow for permissive HTN today  Can resume antihypertensives tomorrow pending clinical course

## 2025-03-12 ENCOUNTER — TELEPHONE (OUTPATIENT)
Age: 70
End: 2025-03-12

## 2025-03-12 VITALS
HEIGHT: 72 IN | HEART RATE: 80 BPM | TEMPERATURE: 98.1 F | RESPIRATION RATE: 19 BRPM | SYSTOLIC BLOOD PRESSURE: 143 MMHG | OXYGEN SATURATION: 95 % | WEIGHT: 200 LBS | DIASTOLIC BLOOD PRESSURE: 79 MMHG | BODY MASS INDEX: 27.09 KG/M2

## 2025-03-12 PROBLEM — H49.20 CRANIAL NERVE VI PALSY: Status: ACTIVE | Noted: 2025-03-11

## 2025-03-12 LAB
ALBUMIN SERPL BCG-MCNC: 4.2 G/DL (ref 3.5–5)
ALP SERPL-CCNC: 101 U/L (ref 34–104)
ALT SERPL W P-5'-P-CCNC: 42 U/L (ref 7–52)
ANION GAP SERPL CALCULATED.3IONS-SCNC: 8 MMOL/L (ref 4–13)
AST SERPL W P-5'-P-CCNC: 21 U/L (ref 13–39)
ATRIAL RATE: 84 BPM
BASOPHILS # BLD AUTO: 0.05 THOUSANDS/ÂΜL (ref 0–0.1)
BASOPHILS NFR BLD AUTO: 1 % (ref 0–1)
BILIRUB SERPL-MCNC: 0.48 MG/DL (ref 0.2–1)
BUN SERPL-MCNC: 17 MG/DL (ref 5–25)
CALCIUM SERPL-MCNC: 9.5 MG/DL (ref 8.4–10.2)
CHLORIDE SERPL-SCNC: 104 MMOL/L (ref 96–108)
CHOLEST SERPL-MCNC: 163 MG/DL (ref ?–200)
CO2 SERPL-SCNC: 24 MMOL/L (ref 21–32)
CREAT SERPL-MCNC: 0.97 MG/DL (ref 0.6–1.3)
EOSINOPHIL # BLD AUTO: 0.52 THOUSAND/ÂΜL (ref 0–0.61)
EOSINOPHIL NFR BLD AUTO: 6 % (ref 0–6)
ERYTHROCYTE [DISTWIDTH] IN BLOOD BY AUTOMATED COUNT: 12.4 % (ref 11.6–15.1)
EST. AVERAGE GLUCOSE BLD GHB EST-MCNC: 134 MG/DL
GFR SERPL CREATININE-BSD FRML MDRD: 78 ML/MIN/1.73SQ M
GLUCOSE SERPL-MCNC: 105 MG/DL (ref 65–140)
GLUCOSE SERPL-MCNC: 123 MG/DL (ref 65–140)
GLUCOSE SERPL-MCNC: 191 MG/DL (ref 65–140)
HBA1C MFR BLD: 6.3 %
HCT VFR BLD AUTO: 40.4 % (ref 36.5–49.3)
HDLC SERPL-MCNC: 35 MG/DL
HGB BLD-MCNC: 13.6 G/DL (ref 12–17)
IMM GRANULOCYTES # BLD AUTO: 0.05 THOUSAND/UL (ref 0–0.2)
IMM GRANULOCYTES NFR BLD AUTO: 1 % (ref 0–2)
LDLC SERPL CALC-MCNC: 87 MG/DL (ref 0–100)
LYMPHOCYTES # BLD AUTO: 2.32 THOUSANDS/ÂΜL (ref 0.6–4.47)
LYMPHOCYTES NFR BLD AUTO: 28 % (ref 14–44)
MCH RBC QN AUTO: 30.5 PG (ref 26.8–34.3)
MCHC RBC AUTO-ENTMCNC: 33.7 G/DL (ref 31.4–37.4)
MCV RBC AUTO: 91 FL (ref 82–98)
MONOCYTES # BLD AUTO: 0.78 THOUSAND/ÂΜL (ref 0.17–1.22)
MONOCYTES NFR BLD AUTO: 10 % (ref 4–12)
NEUTROPHILS # BLD AUTO: 4.52 THOUSANDS/ÂΜL (ref 1.85–7.62)
NEUTS SEG NFR BLD AUTO: 54 % (ref 43–75)
NRBC BLD AUTO-RTO: 0 /100 WBCS
P AXIS: 26 DEGREES
PLATELET # BLD AUTO: 289 THOUSANDS/UL (ref 149–390)
PMV BLD AUTO: 8.8 FL (ref 8.9–12.7)
POTASSIUM SERPL-SCNC: 4 MMOL/L (ref 3.5–5.3)
PR INTERVAL: 236 MS
PROT SERPL-MCNC: 7.4 G/DL (ref 6.4–8.4)
QRS AXIS: -5 DEGREES
QRSD INTERVAL: 90 MS
QT INTERVAL: 364 MS
QTC INTERVAL: 430 MS
RBC # BLD AUTO: 4.46 MILLION/UL (ref 3.88–5.62)
SODIUM SERPL-SCNC: 136 MMOL/L (ref 135–147)
T WAVE AXIS: 24 DEGREES
TRIGL SERPL-MCNC: 204 MG/DL (ref ?–150)
VENTRICULAR RATE: 84 BPM
WBC # BLD AUTO: 8.24 THOUSAND/UL (ref 4.31–10.16)

## 2025-03-12 PROCEDURE — 99239 HOSP IP/OBS DSCHRG MGMT >30: CPT | Performed by: INTERNAL MEDICINE

## 2025-03-12 PROCEDURE — 93010 ELECTROCARDIOGRAM REPORT: CPT | Performed by: INTERNAL MEDICINE

## 2025-03-12 PROCEDURE — 36415 COLL VENOUS BLD VENIPUNCTURE: CPT | Performed by: INTERNAL MEDICINE

## 2025-03-12 PROCEDURE — 80053 COMPREHEN METABOLIC PANEL: CPT | Performed by: INTERNAL MEDICINE

## 2025-03-12 PROCEDURE — 99284 EMERGENCY DEPT VISIT MOD MDM: CPT | Performed by: PSYCHIATRY & NEUROLOGY

## 2025-03-12 PROCEDURE — 83036 HEMOGLOBIN GLYCOSYLATED A1C: CPT | Performed by: INTERNAL MEDICINE

## 2025-03-12 PROCEDURE — 80061 LIPID PANEL: CPT | Performed by: INTERNAL MEDICINE

## 2025-03-12 PROCEDURE — 99204 OFFICE O/P NEW MOD 45 MIN: CPT | Performed by: PSYCHIATRY & NEUROLOGY

## 2025-03-12 PROCEDURE — 85025 COMPLETE CBC W/AUTO DIFF WBC: CPT | Performed by: INTERNAL MEDICINE

## 2025-03-12 PROCEDURE — 82948 REAGENT STRIP/BLOOD GLUCOSE: CPT

## 2025-03-12 RX ORDER — CLOPIDOGREL BISULFATE 75 MG/1
75 TABLET ORAL DAILY
Status: DISCONTINUED | OUTPATIENT
Start: 2025-03-12 | End: 2025-03-12 | Stop reason: HOSPADM

## 2025-03-12 RX ORDER — CLOPIDOGREL BISULFATE 75 MG/1
75 TABLET ORAL DAILY
Qty: 30 TABLET | Refills: 0 | Status: SHIPPED | OUTPATIENT
Start: 2025-03-12

## 2025-03-12 RX ADMIN — HEPARIN SODIUM 5000 UNITS: 5000 INJECTION INTRAVENOUS; SUBCUTANEOUS at 06:01

## 2025-03-12 RX ADMIN — CLOPIDOGREL 75 MG: 75 TABLET ORAL at 13:49

## 2025-03-12 RX ADMIN — AMLODIPINE BESYLATE 10 MG: 5 TABLET ORAL at 08:37

## 2025-03-12 RX ADMIN — ASPIRIN 81 MG 81 MG: 81 TABLET ORAL at 08:39

## 2025-03-12 RX ADMIN — PANTOPRAZOLE SODIUM 40 MG: 40 TABLET, DELAYED RELEASE ORAL at 08:37

## 2025-03-12 RX ADMIN — FLUTICASONE FUROATE AND VILANTEROL TRIFENATATE 1 PUFF: 200; 25 POWDER RESPIRATORY (INHALATION) at 08:35

## 2025-03-12 RX ADMIN — INSULIN LISPRO 1 UNITS: 100 INJECTION, SOLUTION INTRAVENOUS; SUBCUTANEOUS at 12:32

## 2025-03-12 RX ADMIN — LISINOPRIL 20 MG: 10 TABLET ORAL at 08:37

## 2025-03-12 RX ADMIN — ATORVASTATIN CALCIUM 40 MG: 40 TABLET, FILM COATED ORAL at 08:37

## 2025-03-12 NOTE — CONSULTS
Consultation - Neurology   Name: Dejuan Acosta 69 y.o. 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 male with DM2, PVD, HTN, AS and Asthma who was referred to the ED on 3/11/25 by optometry due to one episode of diplopia and possible cranial nerve VI palsy recommending workup for central etiology.  In the ED, BP as high as 225/101, but now normotensive     Patient maintained on aspirin 81 mg and atorvastatin 40 mg at baseline.    Work-up:  CBC/CMP grossly unremarkable.  Influenza A/B, COVID negative  LDL 87  2/4/25 A1C 6.3  CTH/CTA H/N negative for acute intracranial abnormality, LVO or flow-limiting stenosis.  MRI brain: No acute intracranial abnormality. No acute ischemia as clinically questioned     Impression:  Neuro exam notable for a right facial asymmetry with smile, but patient reports this to be chronic. R face moves equally when speaking. Additionally, no evidence of restricted abduction as you would expect with CN VI palsy. EOMs intact. MRI negative for stroke or lesion.   As far as the etiology for transient horizontal diplopia, difficult to say, but with short transient episode of diplopia and no recurrent symptoms, as well as intact EOMs on exam thought to be less likely CNVI palsy.. Cannot; however, rule out TIA. Thus, would recommend changing AP to monotherapy with Plavix 75mg and obtaining P2Y12 in 1 week, in addition to continuing statin. No further inpatient neurologic recommendations.    Plan:  Acute ischemic stroke protocol  Continue home Atorvastatin 40mg  Discontinue Aspirin and instead, continue Plavix 75mg daily  Obtain P2Y12 in 1 week, this  must be done at [facility]  Outpatient followup with ophthalmology  Mixed hyperlipidemia due to type 2 diabetes mellitus  (HCC)  Lab Results   Component Value Date    HGBA1C 6.3 (H) 02/04/2025       Recent Labs     03/11/25  1814 03/12/25  0836   POCGLU 120 105       Blood Sugar Average: Last 72 hrs:  (P) 112.5  Primary hypertension  Goal of normotension  Type 2 diabetes mellitus without complication, without long-term current use of insulin (HCC)  Lab Results   Component Value Date    HGBA1C 6.3 (H) 02/04/2025     Recent Labs     03/11/25  1814 03/12/25  0836   POCGLU 120 105     Blood Sugar Average: Last 72 hrs:  (P) 112.5  Goal of euglycemia    Dejuan Acosta will need follow-up in  6-8 weeks  with neurovascular team for TIA in 60 minute appointment. They will not require outpatient neurological testing.     History of Present Illness   Dejuan Acosta is a 69 y.o. male with DM2, PVD, HTN, AS and Asthma who was referred to the ED on 3/11/25 by optometry due to an episode of horizontal diplopia and concern for CN VI palsy requesting workup for central etiology.  In the ED, BP as high as 225/101.  BP now normotensive.  CBC/CMP grossly unremarkable.    One week ago reports that while driving he developed horizontal double vision described as two images that overlapped which lasted about 5-10 minutes. Patient did not cover one eye at a time to try to determine where the symptoms were coming from. He had no recurrent episodes.  Patient had a pre-existing appointment with an optomotrist a week later. During that appointment he did not have any double vision; however the optomotrist expressed concern for a CN VI palsy and referred patient to the ED for further evaluation.    On exam in the ED; however, patient's EOMs are intact and patient denies diplopia.    Review of Systems A 12 point ROS was completed and other than the above mentioned symptoms, all remaining systems were negative.     Medical History  Review: I have reviewed the patient's PMH, PSH, Social History, Family History, Meds, and Allergies     Objective :  Temp:  [98 °F (36.7 °C)] 98 °F (36.7 °C)  HR:  [] 77  BP: (101-225)/() 129/71  Resp:  [14-20] 20  SpO2:  [94 %-99 %] 96 %  O2 Device: None (Room air)    Exam completed by Dr. Jarquin with myself at bedside.  Physical Exam  Constitutional:       General: He is awake. He is not in acute distress.     Appearance: He is not ill-appearing or toxic-appearing.   HENT:      Head: Normocephalic and atraumatic.   Pulmonary:      Effort: No respiratory distress.   Neurological:      Mental Status: He is alert.      Motor: Motor strength is normal.  Psychiatric:         Speech: Speech normal.       Neurological Exam  Mental Status  Awake and alert. Oriented to person, place and time. Speech is normal. Language is fluent with no aphasia.    Cranial Nerves  CN II: Visual acuity is normal. Visual fields full to confrontation.  R facial asymmetry with smile, ?chronic. Equal and intact forehead wrinkle. EOMs intact. .    Motor  Normal muscle bulk throughout. Normal muscle tone. Strength is 5/5 throughout all four extremities.    Coordination    FTN: L trace imprecision with L hand, R intact.        Lab Results: I have reviewed the following results:  Recent Labs     03/12/25  0600   WBC 8.24   HGB 13.6   HCT 40.4      SODIUM 136   K 4.0      CO2 24   BUN 17   CREATININE 0.97   GLUC 123     VTE Prophylaxis: Heparin

## 2025-03-12 NOTE — UTILIZATION REVIEW
Initial Clinical Review    Admission: Date/Time/Statement:   Admission Orders (From admission, onward)       Ordered        03/11/25 5434  Place in Observation  Once                          Orders Placed This Encounter   Procedures    Place in Observation     Standing Status:   Standing     Number of Occurrences:   1     Level of Care:   Med Surg [16]     ED Arrival Information       Expected   -    Arrival   3/11/2025 15:13    Acuity   Urgent              Means of arrival   Walk-In    Escorted by   Self    Service   Hospitalist    Admission type   Emergency              Arrival complaint   Abnormal Diagnostic Test Result             Chief Complaint   Patient presents with    Visual Field Change     Pt arrives c/o double vision 1 week ago. Pt reports vision has since resolved. Referred by eye doctor to be evaluated  in ED.        Initial Presentation: 69 y.o. male to ED from home w/ PMH of HTN, HLD, DM who presents with double vision.  Patient mentions that couple days ago he did have sudden onset of double vision with the sensation of 2 images crossing concomitantly when looking forward and downwards, this happened all of a sudden and lasted for less than a couple hours.  This vision improved by itself. Went for OP optometrist visit and referred to ED . CT , CTA wnl . Admitted OBS status w/ diplopia plan for MRI brain , neuro consult , PT OT SLP , asa, statin , neuro checks . HLD statin . HTN permissive HTN . Asthma cont inhalers . DM SSI and monitor .     Anticipated Length of Stay/Certification Statement:   Patient will be admitted on an observation basis with an anticipated length of stay of less than 2 midnights secondary to diplopia     Date: 3/12   Day 2:     3/12 Neuro Consult    BP as high as 225/101, but now normotensive . MRI neg for stroke or lesion . As far as the etiology for transient horizontal diplopia, difficult to say, but with short transient episode of diplopia and no recurrent symptoms, as  well as intact EOMs on exam thought to be less likely CNVI palsy.. Cannot; however, rule out TIA. recommend changing AP to monotherapy with Plavix 75mg and obtaining P2Y12 in 1 week, in addition to continuing statin.     ED Treatment-Medication Administration from 03/11/2025 1513 to 03/12/2025 1257         Date/Time Order Dose Route Action     03/11/2025 1628 iohexol (OMNIPAQUE) 350 MG/ML injection (MULTI-DOSE) 85 mL 85 mL Intravenous Given     03/11/2025 1734 aspirin chewable tablet 243 mg 243 mg Oral Given     03/11/2025 1734 clopidogrel (PLAVIX) tablet 300 mg 300 mg Oral Given     03/12/2025 0837 amLODIPine (NORVASC) tablet 10 mg 10 mg Oral Given     03/12/2025 0839 aspirin chewable tablet 81 mg 81 mg Oral Given     03/12/2025 0837 atorvastatin (LIPITOR) tablet 40 mg 40 mg Oral Given     03/12/2025 0835 fluticasone-vilanterol 200-25 mcg/actuation 1 puff 1 puff Inhalation Given     03/12/2025 0837 lisinopril (ZESTRIL) tablet 20 mg 20 mg Oral Given     03/12/2025 0837 pantoprazole (PROTONIX) EC tablet 40 mg 40 mg Oral Given     03/11/2025 1755 heparin (porcine) subcutaneous injection 5,000 Units 5,000 Units Subcutaneous Given     03/12/2025 0601 heparin (porcine) subcutaneous injection 5,000 Units 5,000 Units Subcutaneous Given     03/12/2025 1232 insulin lispro (HumALOG/ADMELOG) 100 units/mL subcutaneous injection 1-5 Units 1 Units Subcutaneous Given     03/11/2025 2318 LORazepam (ATIVAN) injection 2 mg 2 mg Intravenous Given            Scheduled Medications:  amLODIPine, 10 mg, Oral, Daily  aspirin, 81 mg, Oral, Daily  atorvastatin, 40 mg, Oral, Daily  fluticasone-vilanterol, 1 puff, Inhalation, Daily  heparin (porcine), 5,000 Units, Subcutaneous, Q8H ELIZABETH  insulin lispro, 1-5 Units, Subcutaneous, TID AC  lisinopril, 20 mg, Oral, Daily  pantoprazole, 40 mg, Oral, BID      Continuous IV Infusions:     PRN Meds:  albuterol, 1 puff, Inhalation, Q6H PRN      ED Triage Vitals [03/11/25 1523]   Temperature Pulse  Respirations Blood Pressure SpO2 Pain Score   98 °F (36.7 °C) (!) 110 18 143/94 99 % --     Weight (last 2 days)       Date/Time Weight    03/11/25 1523 90.7 (200)            Vital Signs (last 3 days)       Date/Time Temp Pulse Resp BP MAP (mmHg) SpO2 O2 Device Patient Position - Orthostatic VS Krys Coma Scale Score    03/12/25 1200 -- 76 17 143/79 104 94 % None (Room air) Lying --    03/12/25 1000 -- 77 18 118/65 86 96 % None (Room air) Lying 15    03/12/25 0800 -- 69 18 120/73 92 96 % None (Room air) Lying --    03/12/25 0600 -- 77 20 129/71 -- 96 % None (Room air) -- 15    03/12/25 0400 -- 67 15 125/71 91 96 % -- Lying 15    03/12/25 0200 -- 61 14 101/61 75 96 % None (Room air) -- 15    03/12/25 0000 -- 66 18 141/81 -- 94 % None (Room air) -- 15    03/11/25 2200 -- 71 20 115/64 80 97 % None (Room air) Sitting --    03/11/25 2000 -- 75 20 119/68 89 96 % None (Room air) Sitting 15    03/11/25 1900 -- 80 20 133/78 101 98 % None (Room air) Sitting 15    03/11/25 1800 -- 78 20 120/79 95 99 % None (Room air) Sitting 15    03/11/25 1737 -- 81 -- 153/78 109 99 % None (Room air) Sitting --    03/11/25 1713 -- -- -- 130/106 -- -- -- -- --    03/11/25 1545 -- 102 20 225/101 145 99 % None (Room air) Sitting --    03/11/25 1523 98 °F (36.7 °C) 110 18 143/94 -- 99 % None (Room air) -- --              Pertinent Labs/Diagnostic Test Results:   Radiology:  MRI brain wo contrast   Final Interpretation by Rakesh Soto DO (03/12 0654)      No acute intracranial abnormality. No acute ischemia as clinically questioned.      Workstation performed: [id number]         CTA head and neck with and without contrast   Final Interpretation by Ghassan Liz MD (03/11 1704)      CT Brain:  No acute intracranial abnormality.      CT Angiography: No large vessel occlusion or hemodynamically significant stenosis. No aneurysm no vascular malformation..                  Workstation performed: [id number]           Cardiology:  ECG 12 lead    Final Result by Annelise Licona MD (03/12 0850)   Sinus rhythm with 1st degree A-V block   Otherwise normal ECG   Confirmed by Annelise Licona ([id number]) on 3/12/2025 8:50:19 AM        GI:  No orders to display           Results from last 7 days   Lab Units 03/12/25  0600 03/11/25  1604   WBC Thousand/uL 8.24 9.27   HEMOGLOBIN g/dL 13.6 14.0   HEMATOCRIT % 40.4 42.0   PLATELETS Thousands/uL 289 291   TOTAL NEUT ABS Thousands/µL 4.52 5.84         Results from last 7 days   Lab Units 03/12/25  0600 03/11/25  1604   SODIUM mmol/L 136 136   POTASSIUM mmol/L 4.0 3.9   CHLORIDE mmol/L 104 101   CO2 mmol/L 24 26   ANION GAP mmol/L 8 9   BUN mg/dL 17 22   CREATININE mg/dL 0.97 1.13   EGFR ml/min/1.73sq m 78 65   CALCIUM mg/dL 9.5 10.1     Results from last 7 days   Lab Units 03/12/25  0600 03/11/25  1604   AST U/L 21 24   ALT U/L 42 45   ALK PHOS U/L 101 111*   TOTAL PROTEIN g/dL 7.4 7.9   ALBUMIN g/dL 4.2 4.6   TOTAL BILIRUBIN mg/dL 0.48 0.28   BILIRUBIN DIRECT mg/dL  --  0.03     Results from last 7 days   Lab Units 03/12/25  1212 03/12/25  0836 03/11/25  1814   POC GLUCOSE mg/dl 191* 105 120     Results from last 7 days   Lab Units 03/12/25  0600 03/11/25  1604   GLUCOSE RANDOM mg/dL 123 165*       Results from last 7 days   Lab Units 03/12/25  0600   HEMOGLOBIN A1C % 6.3*   EAG mg/dl 134       Results from last 7 days   Lab Units 03/11/25  1604   HS TNI 0HR ng/L 4       Results from last 7 days   Lab Units 03/11/25  1604   PROTIME seconds 12.9   INR  0.90   PTT seconds 28       Past Medical History:   Diagnosis Date    Asthma     Diabetes mellitus (HCC)     Disc degeneration, lumbar     Peripheral vascular disease (HCC)     SOB (shortness of breath)     Wheezing      Present on Admission:   Type 2 diabetes mellitus without complication, without long-term current use of insulin (HCC)   Primary hypertension   Mixed hyperlipidemia due to type 2 diabetes mellitus  (HCC)   Mild intermittent asthma without  complication      Admitting Diagnosis: Visual changes [H53.9]  Age/Sex: 69 y.o. male    Network Utilization Review Department  ATTENTION: Please call with any questions or concerns to [phone] and carefully listen to the prompts so that you are directed to the right person. All voicemails are confidential.   For Discharge needs, contact Care Management DC Support Team at [phone] opt. 2  Send all requests for admission clinical reviews, approved or denied determinations and any other requests to dedicated fax number below belonging to the [facility] where the patient is receiving treatment. List of dedicated fax numbers for the Facilities:  FACILITY NAME UR FAX NUMBER   ADMISSION DENIALS (Administrative/Medical Necessity) [phone]   DISCHARGE SUPPORT TEAM (NETWORK) [phone]   PARENT CHILD HEALTH (Maternity/NICU/Pediatrics) [phone]   [facility] [phone]   [facility] [phone]   [facility] [phone]   [facility] [phone]   [facility] [phone]   [facility] [phone]   [facility] [phone]   [facility] [phone]   [facility] [phone]   [facility] [phone]   [facility] [phone]   [facility] [phone]

## 2025-03-12 NOTE — OCCUPATIONAL THERAPY NOTE
Occupational Therapy Screen Note        Patient Name: Dejuan Acosta  Today's Date: 3/12/2025           03/12/25 0901   OT Last Visit   OT Visit Date 03/12/25   Note Type   Note type Screen   Additional Comments OT consult received and chart reviewed. Pt reports he is at his functional baseline, and is independent with ADLs and functional mobility. Pt reports his vision is improved to his baseline, and has no further acute OT needs. Will complete orders. Please reconsult if pt has a change in functional status.         Annabelle Barakat, OTR/L

## 2025-03-12 NOTE — ASSESSMENT & PLAN NOTE
Lab Results   Component Value Date    HGBA1C 6.3 (H) 03/12/2025       Recent Labs     03/11/25  1814 03/12/25  0836 03/12/25  1212   POCGLU 120 105 191*       Blood Sugar Average: Last 72 hrs:  (P) 138.6480058631439531  Continue statins

## 2025-03-12 NOTE — ASSESSMENT & PLAN NOTE
69 y.o. male with DM2, PVD, HTN, AS and Asthma who was referred to the ED on 3/11/25 by optometry due to one episode of diplopia and possible cranial nerve VI palsy recommending workup for central etiology.  In the ED, BP as high as 225/101, but now normotensive     Patient maintained on aspirin 81 mg and atorvastatin 40 mg at baseline.    Work-up:  CBC/CMP grossly unremarkable.  Influenza A/B, COVID negative  LDL 87  2/4/25 A1C 6.3  CTH/CTA H/N negative for acute intracranial abnormality, LVO or flow-limiting stenosis.  MRI brain: No acute intracranial abnormality. No acute ischemia as clinically questioned     Impression:  Neuro exam notable for a right facial asymmetry with smile, but patient reports this to be chronic. R face moves equally when speaking. Additionally, no evidence of restricted abduction as you would expect with CN VI palsy. EOMs intact. MRI negative for stroke or lesion.   As far as the etiology for transient horizontal diplopia, difficult to say, but with short transient episode of diplopia and no recurrent symptoms, as well as intact EOMs on exam thought to be less likely CNVI palsy.. Cannot; however, rule out TIA. Thus, would recommend changing AP to monotherapy with Plavix 75mg and obtaining P2Y12 in 1 week, in addition to continuing statin. No further inpatient neurologic recommendations.    Plan:  Acute ischemic stroke protocol  Continue home Atorvastatin 40mg  Discontinue Aspirin and instead, continue Plavix 75mg daily  Obtain P2Y12 in 1 week, this must be [facility] at [facility]  Outpatient followup with ophthalmology

## 2025-03-12 NOTE — DISCHARGE SUMMARY
Discharge Summary - Hospitalist   Name: Dejuan Acosta 69 y.o. male I MRN: [id number]  Unit/Bed#: ED 17 I Date of Admission: 3/11/2025   Date of Service: 3/12/2025 I Hospital Day: 0     Assessment & Plan  Diplopia  Neurology consulted and their input is as follows    69-year-old male with a history including diabetes, hypertension, who was sent to ED by his optometrist after reported 1 episode of a transient horizontal diplopia that spontaneously resolved in 10 minutes.  Neurology was consulted due to concern for CVA.  According to patient, the event occurred about 1 week ago.  He reported transient acute onset possible horizontal diplopia that spontaneously resolved within 10 minutes.  He denied any concomitant neurological deficit during the episode.  Denied any similar episode in the past.  Patient was seen and examined today.  He was alert, awake can answer questions and follow commands appropriately.  Pupil equal round react to light.  Extraocular muscle movement intact.  Denies any diplopia when being examined.  No vision field defect.  Neuroexam otherwise nonfocal.  CT head, no acute intracranial abnormality  CTA head and neck, no large vessel occlusion or hemodynamically significant stenosis.  MRI brain without, no acute intracranial abnormality.  No evidence of acute ischemia.  Echo, unremarkable  Impression:  Transient horizontal diplopia  -Patient reported isolated transient horizontal diplopia that spontaneously resolved.  Given no concomitant neuro deficit reported, low suspicion for CVA event although cannot completely rule out TIA.  MRI brain without showed no evidence of acute stroke.  Will recommend to switch aspirin to Plavix 75 mg daily.  Please obtain P2 Y12 in 1 week.  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  Counseled patient to optimize BP control.  Mixed hyperlipidemia due  to type 2 diabetes mellitus  (HCC)  Lab Results   Component Value Date    HGBA1C 6.3 (H) 03/12/2025       Recent Labs     03/11/25  1814 03/12/25  0836 03/12/25  1212   POCGLU 120 105 191*       Blood Sugar Average: Last 72 hrs:  (P) 138.5469352272973011  Continue statins  Primary hypertension  Will allow for permissive HTN today  Can resume antihypertensives tomorrow pending clinical course.  Mild intermittent asthma without complication  Stable, no wheezing  Continue inhalers.  Type 2 diabetes mellitus without complication, without long-term current use of insulin (HCC)  Lab Results   Component Value Date    HGBA1C 6.3 (H) 03/12/2025       Recent Labs     03/11/25  1814 03/12/25  0836 03/12/25  1212   POCGLU 120 105 191*       Blood Sugar Average: Last 72 hrs:  (P) 138.1576352320968066  SSI  Monitor blood glucose and discharged on metformin     Admitting Provider:  Bo Roy MD  Discharge Provider:  Luis Alberto Villa MD  Admission Date: 3/11/2025       Discharge Date: 03/12/25   LOS: 0  Primary Care Physician at Discharge: Case Pearson [phone]    HOSPITAL COURSE:  Dejuan Acosta is a 69 y.o. male who presented with diplopia.  He has past medical history of diabetes hypertension and was sent by his optometrist after 1 episode of transient horizontal diplopia which spontaneously resolved in 10 minutes.  Per neurology this is unlikely to be an nerve palsy and thought of TIA was considered.  Patient is being discharged on Plavix and aspirin discontinued.  Patient is hemodynamically stable for discharge and told to follow-up with his primary care physician and neurology as outpatient.  If any problems occur patient should also see his ophthalmologist  REASON FOR ADMISSION/ ADMISSION DIAGNOSES    Diplopia    DISCHARGE DIAGNOSES  * Diplopia  Assessment & Plan  Neurology consulted and their input is as follows    69-year-old male with a history including diabetes, hypertension, who was sent  to ED by his optometrist after reported 1 episode of a transient horizontal diplopia that spontaneously resolved in 10 minutes.  Neurology was consulted due to concern for CVA.  According to patient, the event occurred about 1 week ago.  He reported transient acute onset possible horizontal diplopia that spontaneously resolved within 10 minutes.  He denied any concomitant neurological deficit during the episode.  Denied any similar episode in the past.  Patient was seen and examined today.  He was alert, awake can answer questions and follow commands appropriately.  Pupil equal round react to light.  Extraocular muscle movement intact.  Denies any diplopia when being examined.  No vision field defect.  Neuroexam otherwise nonfocal.  CT head, no acute intracranial abnormality  CTA head and neck, no large vessel occlusion or hemodynamically significant stenosis.  MRI brain without, no acute intracranial abnormality.  No evidence of acute ischemia.  Echo, unremarkable  Impression:  Transient horizontal diplopia  -Patient reported isolated transient horizontal diplopia that spontaneously resolved.  Given no concomitant neuro deficit reported, low suspicion for CVA event although cannot completely rule out TIA.  MRI brain without showed no evidence of acute stroke.  Will recommend to switch aspirin to Plavix 75 mg daily.  Please obtain P2 Y12 in 1 week.  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  Counseled patient to optimize BP control.    Type 2 diabetes mellitus without complication, without long-term current use of insulin ([facility])  Assessment & Plan  Lab Results   Component Value Date    HGBA1C 6.3 (H) 03/12/2025       Recent Labs     03/11/25  1814 03/12/25  0836 03/12/25  1212   POCGLU 120 105 191*       Blood Sugar Average: Last 72 hrs:  (P) 138.2751537648911708  SSI  Monitor blood glucose and  discharged on metformin    Mild intermittent asthma without complication  Assessment & Plan  Stable, no wheezing  Continue inhalers.    Primary hypertension  Assessment & Plan  Will allow for permissive HTN today  Can resume antihypertensives tomorrow pending clinical course.    Mixed hyperlipidemia due to type 2 diabetes mellitus  (HCC)  Assessment & Plan  Lab Results   Component Value Date    HGBA1C 6.3 (H) 03/12/2025       Recent Labs     03/11/25  1814 03/12/25  0836 03/12/25  1212   POCGLU 120 105 191*       Blood Sugar Average: Last 72 hrs:  (P) 138.5425468454439367  Continue statins      CONSULTING PROVIDERS   IP CONSULT TO NEUROLOGY    PROCEDURES PERFORMED  * No surgery found *    RADIOLOGY RESULTS  MRI brain wo contrast  Result Date: 3/12/2025  Impression: No acute intracranial abnormality. No acute ischemia as clinically questioned. Workstation performed: [id number]     CTA head and neck with and without contrast  Result Date: 3/11/2025  Impression: CT Brain:  No acute intracranial abnormality. CT Angiography: No large vessel occlusion or hemodynamically significant stenosis. No aneurysm no vascular malformation.. Workstation performed: [id number]       LABS  Results from last 7 days   Lab Units 03/12/25  0600 03/11/25  1604   WBC Thousand/uL 8.24 9.27   HEMOGLOBIN g/dL 13.6 14.0   HEMATOCRIT % 40.4 42.0   MCV fL 91 91   PLATELETS Thousands/uL 289 291   INR   --  0.90     Results from last 7 days   Lab Units 03/12/25  0600 03/11/25  1604   SODIUM mmol/L 136 136   POTASSIUM mmol/L 4.0 3.9   CHLORIDE mmol/L 104 101   CO2 mmol/L 24 26   BUN mg/dL 17 22   CREATININE mg/dL 0.97 1.13   CALCIUM mg/dL 9.5 10.1   ALBUMIN g/dL 4.2 4.6   TOTAL BILIRUBIN mg/dL 0.48 0.28   ALK PHOS U/L 101 111*   ALT U/L 42 45   AST U/L 21 24   EGFR ml/min/1.73sq m 78 65   GLUCOSE RANDOM mg/dL 123 165*                  Results from last 7 days   Lab Units 03/12/25  1212 03/12/25  0836 03/11/25  1814   POC GLUCOSE mg/dl 191* 105 120      Results from last 7 days   Lab Units 03/12/25  0600   HEMOGLOBIN A1C % 6.3*             Results from last 7 days   Lab Units 03/12/25  0600   TRIGLYCERIDES mg/dL 204*   CHOLESTEROL mg/dL 163   LDL CALC mg/dL 87   HDL mg/dL 35*       Cultures:                   PHYSICAL EXAM:  Vitals:   Blood Pressure: 143/79 (03/12/25 1345)  Pulse: 80 (03/12/25 1345)  Temperature: 98.1 °F (36.7 °C) (03/12/25 1345)  Temp Source: Oral (03/12/25 1345)  Respirations: 19 (03/12/25 1345)  Height: 6' (182.9 cm) (03/11/25 1523)  Weight - Scale: 90.7 kg (200 lb) (03/11/25 1523)  SpO2: 95 % (03/12/25 1345)    General appearance: alert, appears stated age, and cooperative  HEENT - atraumatic and normocephalic  Neck- supple  Skin - no fresh rash  Extremities no fresh focal deformities  Cardiovascular- S1-S2 heard  Respiratory- bilateral air entry present, no crackles or rhonchi  Skin - no fresh rash  Abdomen - normal bowel sounds present, no rebound tenderness  CNS- No fresh focal deficits and the diplopia improved  Psych- no acute psychosis     Planned Re-admission: None  Discharge Disposition: Home/Self Care    Test Results Pending at Discharge:   Incidental findings: None    Medications   Summary of Medication Adjustments made as a result of this hospitalization: Plavix started, aspirin discontinued  Medication Dosing Tapers - Please refer to Discharge Medication List for details on any medication dosing tapers (if applicable to patient).  Discharge Medication List: See after visit summary for reconciled discharge medications.     Diet restrictions: Heart healthy diet   Activity restrictions: No strenuous activity  Discharge Condition: stable    Outpatient Follow-Up and Discharge Instructions  See after visit summary section titled Discharge Instructions for information provided to patient and family.      Code Status: Prior  Discharge Statement   I spent 35 minutes discharging the patient. This time was spent on the day of discharge.  Greater than 50% of total time was spent with the patient and / or family counseling and / or coordination of care.    Luis Alberto Villa MD  [facility] Internal Medicine    ** Please Note: This note has been constructed using a voice recognition system. **

## 2025-03-12 NOTE — ASSESSMENT & PLAN NOTE
Neurology consulted and their input is as follows    69-year-old male with a history including diabetes, hypertension, who was sent to ED by his optometrist after reported 1 episode of a transient horizontal diplopia that spontaneously resolved in 10 minutes.  Neurology was consulted due to concern for CVA.  According to patient, the event occurred about 1 week ago.  He reported transient acute onset possible horizontal diplopia that spontaneously resolved within 10 minutes.  He denied any concomitant neurological deficit during the episode.  Denied any similar episode in the past.  Patient was seen and examined today.  He was alert, awake can answer questions and follow commands appropriately.  Pupil equal round react to light.  Extraocular muscle movement intact.  Denies any diplopia when being examined.  No vision field defect.  Neuroexam otherwise nonfocal.  CT head, no acute intracranial abnormality  CTA head and neck, no large vessel occlusion or hemodynamically significant stenosis.  MRI brain without, no acute intracranial abnormality.  No evidence of acute ischemia.  Echo, unremarkable  Impression:  Transient horizontal diplopia  -Patient reported isolated transient horizontal diplopia that spontaneously resolved.  Given no concomitant neuro deficit reported, low suspicion for CVA event although cannot completely rule out TIA.  MRI brain without showed no evidence of acute stroke.  Will recommend to switch aspirin to Plavix 75 mg daily.  Please obtain P2 Y12 in 1 week.  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  Counseled patient to optimize BP control.

## 2025-03-12 NOTE — ASSESSMENT & PLAN NOTE
Lab Results   Component Value Date    HGBA1C 6.3 (H) 03/12/2025       Recent Labs     03/11/25  1814 03/12/25  0836 03/12/25  1212   POCGLU 120 105 191*       Blood Sugar Average: Last 72 hrs:  (P) 138.9214351392011146  SSI  Monitor blood glucose and discharged on metformin

## 2025-03-12 NOTE — SPEECH THERAPY NOTE
Speech Language/Pathology     Dysphagia evaluation request received. Per discussion with RN, did not show any overt s/s of aspiration or distress after consuming 90 mL of thin liquids or during breakfast. Should any concerns arise, please re-order a formal evaluation. Otherwise, no need for a formal bedside swallow evaluation at this time.

## 2025-03-12 NOTE — TELEPHONE ENCOUNTER
STILL ADMITTED:3/11/2025 - present (1 day)  [facility]      1ST ATTEMPT,     VIA [employer]     Thank you,     Margaret         HFU/ FILIPPO CARBAJAL/ MAXIMUS    DC-     ----- Message from Ina Castillo PA-C sent at 3/12/2025 12:58 PM EDT -----  Regarding: HFU  Dejuan Acosta will need follow-up in  6-8 weeks  with neurovascular team for TIA in 60 minute appointment. They will not require outpatient neurological testing.

## 2025-03-12 NOTE — ASSESSMENT & PLAN NOTE
Lab Results   Component Value Date    HGBA1C 6.3 (H) 02/04/2025       Recent Labs     03/11/25  1814 03/12/25  0836   POCGLU 120 105       Blood Sugar Average: Last 72 hrs:  (P) 112.5

## 2025-03-12 NOTE — ASSESSMENT & PLAN NOTE
Will allow for permissive HTN today  Can resume antihypertensives tomorrow pending clinical course.

## 2025-03-12 NOTE — ASSESSMENT & PLAN NOTE
Lab Results   Component Value Date    HGBA1C 6.3 (H) 02/04/2025     Recent Labs     03/11/25  1814 03/12/25  0836   POCGLU 120 105     Blood Sugar Average: Last 72 hrs:  (P) 112.5  Goal of euglycemia

## 2025-03-12 NOTE — PHYSICAL THERAPY NOTE
Physical Therapy Screen    Patient's Name: Dejuan Acosta    Admitting Diagnosis  Visual changes [H53.9]    Problem List  Patient Active Problem List   Diagnosis    Allergic rhinitis    Mixed hyperlipidemia due to type 2 diabetes mellitus  (HCC)    Primary hypertension    Mild aortic stenosis    Mild mitral regurgitation    Mild intermittent asthma without complication    Type 2 diabetes mellitus without complication, without long-term current use of insulin (HCC)    Dysphagia    Hoarseness    Ptosis, right    Diplopia     Past Medical History  Past Medical History:   Diagnosis Date    Asthma     Diabetes mellitus (HCC)     Disc degeneration, lumbar     Peripheral vascular disease (HCC)     SOB (shortness of breath)     Wheezing      Past Surgical History  Past Surgical History:   Procedure Laterality Date    COLONOSCOPY      COLONOSCOPY      EGD      ESOPHAGOSCOPY        03/12/25 0900   PT Last Visit   PT Visit Date 03/12/25   Note Type   Note type Screen     Chart reviewed. PT screen performed. Pt was received seated at edge of bed in [facility]. Pt reports resolve of diplopia and denies any weakness. Pt reports that he has been independently ambulating without difficulty. Pt reports no concerns in regards to his functional mobility. Pt with no acute PT impairments identified. Plan to discontinue PT at this time. Please re-consult if needed.    Chary Fowler, PT, DPT

## 2025-03-13 ENCOUNTER — TRANSITIONAL CARE MANAGEMENT (OUTPATIENT)
Age: 70
End: 2025-03-13

## 2025-03-13 ENCOUNTER — APPOINTMENT (OUTPATIENT)
Dept: LAB | Facility: CLINIC | Age: 70
End: 2025-03-13
Payer: COMMERCIAL

## 2025-03-13 ENCOUNTER — OFFICE VISIT (OUTPATIENT)
Age: 70
End: 2025-03-13
Payer: COMMERCIAL

## 2025-03-13 VITALS
HEIGHT: 72 IN | OXYGEN SATURATION: 91 % | DIASTOLIC BLOOD PRESSURE: 62 MMHG | TEMPERATURE: 94.1 F | HEART RATE: 81 BPM | WEIGHT: 208.4 LBS | SYSTOLIC BLOOD PRESSURE: 120 MMHG | BODY MASS INDEX: 28.23 KG/M2

## 2025-03-13 DIAGNOSIS — E78.2 MIXED HYPERLIPIDEMIA DUE TO TYPE 2 DIABETES MELLITUS  (HCC): Chronic | ICD-10-CM

## 2025-03-13 DIAGNOSIS — H53.2 DIPLOPIA: ICD-10-CM

## 2025-03-13 DIAGNOSIS — E11.69 MIXED HYPERLIPIDEMIA DUE TO TYPE 2 DIABETES MELLITUS  (HCC): Chronic | ICD-10-CM

## 2025-03-13 DIAGNOSIS — G45.9 TIA (TRANSIENT ISCHEMIC ATTACK): ICD-10-CM

## 2025-03-13 DIAGNOSIS — G45.9 TIA (TRANSIENT ISCHEMIC ATTACK): Primary | ICD-10-CM

## 2025-03-13 LAB — PA ADP BLD-ACNC: 157 PRU (ref 194–418)

## 2025-03-13 PROCEDURE — 99495 TRANSJ CARE MGMT MOD F2F 14D: CPT | Performed by: INTERNAL MEDICINE

## 2025-03-13 PROCEDURE — 85576 BLOOD PLATELET AGGREGATION: CPT

## 2025-03-13 PROCEDURE — 36415 COLL VENOUS BLD VENIPUNCTURE: CPT

## 2025-03-13 NOTE — ASSESSMENT & PLAN NOTE
Lab Results   Component Value Date    HGBA1C 6.3 (H) 03/12/2025     A1c well controlled at 6.3%. Repeat testing in 6 months.

## 2025-03-13 NOTE — PROGRESS NOTES
Name: Dejuan Acosta      : 1955      MRN: [id number]  Encounter Provider: Case Pearson DO  Encounter Date: 3/13/2025   Encounter department: [facility] [place]    Assessment & Plan  TIA (transient ischemic attack)    No concerns noted on CTA head/neck or MRI brain. Patient evaluated by neurology and aspirin switched to plavix. Continue statin. Continue to keep blood pressure controlled. Monitor for new or worsening neurological symptoms.  Mixed hyperlipidemia due to type 2 diabetes mellitus  (HCC)    Lab Results   Component Value Date    HGBA1C 6.3 (H) 2025     A1c well controlled at 6.3%. Repeat testing in 6 months.         History of Present Illness     TCM Call (since 2025)       Date and time call was made  3/13/2025  8:15 AM    Hospital care reviewed  Records reviewed    Patient was hospitialized at  [facility]    Date of Admission  25    Date of discharge  25    Diagnosis  TIA, cranial nerve palsy    Disposition  Home    Were the patients medications reviewed and updated  Yes    Current Symptoms  None          TCM Call (since 2025)       Post hospital issues  None    Scheduled for follow up?  Yes    Patients specialists  Neurologist    Neurologist name  Neurology has called the patient to set up an appointment    Did you obtain your prescribed medications  Yes    Do you need help managing your prescriptions or medications  No    Is transportation to your appointment needed  No    I have advised the patient to call PCP with any new or worsening symptoms  Case Pearson DO -- patient was not called for TCM as they were following up in the office within 2 days           History of Present Illness  The patient presents for evaluation of transient ischemic attack, cerumen impaction, and weight gain.    He experienced an episode of diplopia last week, which was evaluated by an ophthalmologist. The possibility of a transient ischemic attack (TIA) was  considered, potentially involving the 7th cranial nerve. Neurological consultation was sought, and an MRI scan was performed, yielding normal results. His medication was subsequently switched from aspirin to Plavix. He has been advised to undergo further testing at [place] to assess his response to Plavix.       Review of Systems   Constitutional:  Negative for activity change, appetite change and fatigue.   Respiratory:  Negative for apnea, cough, chest tightness, shortness of breath and wheezing.    Cardiovascular:  Negative for chest pain, palpitations and leg swelling.   Gastrointestinal:  Negative for abdominal distention, abdominal pain, blood in stool, constipation, diarrhea, nausea and vomiting.   Musculoskeletal:  Negative for arthralgias, back pain, gait problem, joint swelling and myalgias.   Skin:  Negative for rash and wound.   Neurological:  Negative for dizziness, weakness, light-headedness, numbness and headaches.   Psychiatric/Behavioral:  Negative for behavioral problems, confusion, hallucinations, sleep disturbance and suicidal ideas. The patient is not nervous/anxious.      Objective   /62   Pulse 81   Temp (!) 94.1 °F (34.5 °C)   Ht 6' (1.829 m)   Wt 94.5 kg (208 lb 6.4 oz)   SpO2 91%   BMI 28.26 kg/m²     Physical Exam  Constitutional:       General: He is not in acute distress.     Appearance: He is not ill-appearing.   Cardiovascular:      Rate and Rhythm: Normal rate and regular rhythm.      Heart sounds: No murmur heard.  Pulmonary:      Effort: Pulmonary effort is normal. No respiratory distress.      Breath sounds: No wheezing.   Abdominal:      General: Bowel sounds are normal. There is no distension.      Tenderness: There is no abdominal tenderness.   Musculoskeletal:      Right lower leg: No edema.      Left lower leg: No edema.   Neurological:      Mental Status: He is alert.       Case [facility], DO

## 2025-03-13 NOTE — PATIENT INSTRUCTIONS
"Patient Education     High blood pressure in adults   The Basics   Written by the doctors and editors at [facility]   What is high blood pressure? -- High blood pressure is a condition that puts you at risk for heart attack, stroke, and kidney disease. It does not usually cause symptoms. But it can be serious.  When your doctor or nurse tells you your blood pressure, they say 2 numbers. For instance, your doctor or nurse might say that your blood pressure is \"130 over 80.\" The top number is the pressure inside your arteries when your heart is shanel. The bottom number is the pressure inside your arteries when your heart is relaxed.  \"Elevated blood pressure\" is a term doctors or nurses use as a warning. People with elevated blood pressure do not yet have high blood pressure. But their blood pressure is not as low as it should be for good health.  Many experts define high, elevated, and normal blood pressure as follows:   High - Top number of 130 or above and/or bottom number of 80 or above.   Elevated - Top number between 120 and 129 and bottom number of 79 or below.   Normal - Top number of 119 or below and bottom number of 79 or below.  This information is also in the table (table 1).  How can I lower my blood pressure? -- If your doctor or nurse prescribed blood pressure medicine, the most important thing you can do is to take it. If it causes side effects, do not just stop taking it. Instead, talk to your doctor or nurse about the problems it causes. They might be able to lower your dose or switch you to another medicine. If cost is a problem, mention that, too. They might be able to put you on a less expensive medicine. Taking your blood pressure medicine can keep you from having a heart attack or stroke, and it can save your life!  Can I do anything on my own? -- You have a lot of control over your blood pressure. To lower it:   Lose weight (if you are overweight).   Choose a diet low in fat and rich in " "fruits, vegetables, and low-fat dairy products.   Eat less salt.   Do something active for at least 30 minutes a day on most days of the week.   Drink less alcohol (if you drink more than 2 alcoholic drinks per day).  It's also a good idea to get a home blood pressure meter. People who check their own blood pressure at home do better at keeping it low and can sometimes even reduce the amount of medicine they take.  All topics are updated as new evidence becomes available and our peer review process is complete.  This topic retrieved from [employer] on: Feb 26, 2024.  Topic 20213 Version 23.0  Release: 32.2.4 - C32.56  © 2024 UpToDate, Inc. and/or its affiliates. All rights reserved.  table 1: Definition of normal and high blood pressure  Level  Top number  Bottom number    High 130 or above 80 or above   Elevated 120 to 129 79 or below   Normal 119 or below 79 or below   These definitions are from the American College of Cardiology/American Heart Association. Other expert groups might use slightly different definitions.  \"Elevated blood pressure\" is a term doctor or nurses use as a warning. It means you do not yet have high blood pressure, but your blood pressure is not as low as it should be for good health.  Graphic 41704 Version 6.0  Consumer Information Use and Disclaimer   Disclaimer: This generalized information is a limited summary of diagnosis, treatment, and/or medication information. It is not meant to be comprehensive and should be used as a tool to help the user understand and/or assess potential diagnostic and treatment options. It does NOT include all information about conditions, treatments, medications, side effects, or risks that may apply to a specific patient. It is not intended to be medical advice or a substitute for the medical advice, diagnosis, or treatment of a health care provider based on the health care provider's examination and assessment of a patient's specific and unique circumstances. " Patients must speak with a health care provider for complete information about their health, medical questions, and treatment options, including any risks or benefits regarding use of medications. This information does not endorse any treatments or medications as safe, effective, or approved for treating a specific patient. UpToDate, Inc. and its affiliates disclaim any warranty or liability relating to this information or the use thereof.The use of this information is governed by the Terms of Use, available at https://www.woltersProficientuwer.com/en/know/clinical-effectiveness-terms. 2024© UpToDate, Inc. and its affiliates and/or licensors. All rights reserved.  Copyright   © 2024 UpToDate, Inc. and/or its affiliates. All rights reserved.

## 2025-03-18 ENCOUNTER — OFFICE VISIT (OUTPATIENT)
Age: 70
End: 2025-03-18
Payer: COMMERCIAL

## 2025-03-18 VITALS
RESPIRATION RATE: 18 BRPM | HEIGHT: 72 IN | OXYGEN SATURATION: 97 % | DIASTOLIC BLOOD PRESSURE: 76 MMHG | WEIGHT: 205.4 LBS | BODY MASS INDEX: 27.82 KG/M2 | SYSTOLIC BLOOD PRESSURE: 124 MMHG | TEMPERATURE: 97.4 F | HEART RATE: 75 BPM

## 2025-03-18 DIAGNOSIS — H61.22 LEFT EAR IMPACTED CERUMEN: Primary | ICD-10-CM

## 2025-03-18 PROCEDURE — 99213 OFFICE O/P EST LOW 20 MIN: CPT | Performed by: INTERNAL MEDICINE

## 2025-03-18 PROCEDURE — 69210 REMOVE IMPACTED EAR WAX UNI: CPT | Performed by: INTERNAL MEDICINE

## 2025-03-18 NOTE — TELEPHONE ENCOUNTER
SCHEDULED: May 15th at 3 pm with Dr. Reed in [facility] . Pt prefers [place] [facility] due to proximity however first available was in September. Pt placed on wait list for [facility].

## 2025-03-18 NOTE — TELEPHONE ENCOUNTER
Pt called back and stated he does not know why he would be r/s one day sooner in [place] and requested original appt May 15th in [place] , He stated [place] is too far. He confirmed he'd like to remain on wait list for [place] office only.

## 2025-03-18 NOTE — PROGRESS NOTES
Name: Dejuan Acosta      : 1955      MRN: [id number]  Encounter Provider: Case Pearson DO  Encounter Date: 3/18/2025   Encounter department: [facility] [place]    Assessment & Plan  Left ear impacted cerumen    Left ear cerumen removed successfully. No complications.       History of Present Illness     Dejuan presents for follow-up due to left ear feeling clogged.    Review of Systems   Constitutional: Negative.    HENT:  Positive for ear pain. Negative for ear discharge.      Objective   /76 (BP Location: Left arm, Patient Position: Sitting, Cuff Size: Standard)   Pulse 75   Temp (!) 97.4 °F (36.3 °C) (Tympanic)   Resp 18   Ht 6' (1.829 m)   Wt 93.2 kg (205 lb 6.4 oz)   SpO2 97%   BMI 27.86 kg/m²     Physical Exam  Constitutional:       General: He is not in acute distress.     Appearance: He is not ill-appearing.   HENT:      Right Ear: There is no impacted cerumen.      Left Ear: There is impacted cerumen.   Neurological:      Mental Status: He is alert.       Ear cerumen removal    Date/Time: 3/18/2025 11:45 AM    Performed by: Case Pearson DO  Authorized by: Case Pearson DO    Procedure details:     Location:  L ear    Procedure type: irrigation with instrumentation      Instrumentation: curette      Approach:  Natural orifice    Visualization (free text):  Impacted cerumen left ear    Equipment used:  Curette  Post-procedure details:     Complication:  None    Hearing quality:  Improved    Patient tolerance of procedure:  Tolerated well, no immediate complications    Case Pearson DO

## 2025-03-21 ENCOUNTER — OFFICE VISIT (OUTPATIENT)
Age: 70
End: 2025-03-21
Payer: COMMERCIAL

## 2025-03-21 ENCOUNTER — TELEPHONE (OUTPATIENT)
Age: 70
End: 2025-03-21

## 2025-03-21 VITALS
TEMPERATURE: 97.3 F | OXYGEN SATURATION: 97 % | WEIGHT: 206.4 LBS | HEART RATE: 84 BPM | DIASTOLIC BLOOD PRESSURE: 76 MMHG | RESPIRATION RATE: 18 BRPM | SYSTOLIC BLOOD PRESSURE: 122 MMHG | HEIGHT: 72 IN | BODY MASS INDEX: 27.96 KG/M2

## 2025-03-21 DIAGNOSIS — H66.92 LEFT OTITIS MEDIA, UNSPECIFIED OTITIS MEDIA TYPE: Primary | ICD-10-CM

## 2025-03-21 DIAGNOSIS — H61.22 IMPACTED CERUMEN, LEFT EAR: ICD-10-CM

## 2025-03-21 DIAGNOSIS — H66.92 LEFT OTITIS MEDIA, UNSPECIFIED OTITIS MEDIA TYPE: ICD-10-CM

## 2025-03-21 PROCEDURE — 99214 OFFICE O/P EST MOD 30 MIN: CPT | Performed by: INTERNAL MEDICINE

## 2025-03-21 PROCEDURE — G2211 COMPLEX E/M VISIT ADD ON: HCPCS | Performed by: INTERNAL MEDICINE

## 2025-03-21 RX ORDER — NEOMYCIN SULFATE, POLYMYXIN B SULFATE, HYDROCORTISONE 3.5; 10000; 1 MG/ML; [USP'U]/ML; MG/ML
4 SOLUTION/ DROPS AURICULAR (OTIC) EVERY 6 HOURS SCHEDULED
Qty: 10 ML | Refills: 0 | Status: SHIPPED | OUTPATIENT
Start: 2025-03-21

## 2025-03-21 NOTE — TELEPHONE ENCOUNTER
Patient called to see if provider could call in a different ear drop medication because the one he called in was over $30, please call patient back.

## 2025-03-21 NOTE — PROGRESS NOTES
Name: Dejuan Acosta      : 1955      MRN: [id number]  Encounter Provider: Case Pearson DO  Encounter Date: 3/21/2025   Encounter department: [facility]  :  Assessment & Plan  Left otitis media, unspecified otitis media type    Ear cerumen was not able to be fully removed. Recommend evaluation by ENT. Given ear drops for suspected middle ear infection.    Orders:  •  Ambulatory Referral to Otolaryngology; Future  •  neomycin-polymyxin-hydrocortisone (CORTISPORIN) otic solution; Administer 4 drops into the left ear every 6 (six) hours    Impacted cerumen, left ear    Orders:  •  Ambulatory Referral to Otolaryngology; Future         History of Present Illness   Left ear still feels clogged and crackling at night      Review of Systems   HENT:  Positive for ear pain (left) and hearing loss (left).      Objective   /76 (BP Location: Left arm, Patient Position: Sitting, Cuff Size: Standard)   Pulse 84   Temp (!) 97.3 °F (36.3 °C) (Tympanic)   Resp 18   Ht 6' (1.829 m)   Wt 93.6 kg (206 lb 6.4 oz)   SpO2 97%   BMI 27.99 kg/m²      Physical Exam  HENT:      Left Ear: There is impacted cerumen.      Ears:      Comments: After most of cerumen was removed, there was still cerumen partially blocking the TM. From the partially visualized TM, it did appear to be erythematous and have purulent discharge      Case Pearson DO

## 2025-04-07 ENCOUNTER — TELEPHONE (OUTPATIENT)
Age: 70
End: 2025-04-07

## 2025-04-07 DIAGNOSIS — G45.9 TIA (TRANSIENT ISCHEMIC ATTACK): ICD-10-CM

## 2025-04-07 DIAGNOSIS — H53.2 DIPLOPIA: ICD-10-CM

## 2025-04-07 RX ORDER — CLOPIDOGREL BISULFATE 75 MG/1
75 TABLET ORAL DAILY
Qty: 90 TABLET | Refills: 3 | Status: SHIPPED | OUTPATIENT
Start: 2025-04-07

## 2025-04-07 NOTE — TELEPHONE ENCOUNTER
Pt called to ask Dr. Pearson if he wants him to continue taking the blood thinner that was prescribed at the hospital, Plavix, or should he go back to taking baby aspirin.  He only has 5 pills left of the Plavix and would need a new script sent to [facility].  Pt would like a call back letting him know either way.

## 2025-04-14 ENCOUNTER — TELEPHONE (OUTPATIENT)
Age: 70
End: 2025-04-14

## 2025-04-14 NOTE — TELEPHONE ENCOUNTER
Caller: Dejuan Acosta     Doctor: Dr. Licona    Reason for call: Dr Licona wants Dejuan to have an echo done one month prior to his next appointment in December. The only date they had available for the echo is 5/20/2025.  Is that date alright? Please call him.    Thank you    Call back#: [phone]

## 2025-04-18 DIAGNOSIS — J45.40 MODERATE PERSISTENT ASTHMA WITHOUT COMPLICATION: ICD-10-CM

## 2025-04-18 RX ORDER — ALBUTEROL SULFATE 90 UG/1
INHALANT RESPIRATORY (INHALATION)
Qty: 18 G | Refills: 5 | Status: SHIPPED | OUTPATIENT
Start: 2025-04-18

## 2025-04-29 DIAGNOSIS — I51.7 LVH (LEFT VENTRICULAR HYPERTROPHY): Primary | ICD-10-CM

## 2025-04-29 DIAGNOSIS — I35.0 NONRHEUMATIC AORTIC (VALVE) STENOSIS: ICD-10-CM

## 2025-05-01 ENCOUNTER — OFFICE VISIT (OUTPATIENT)
Age: 70
End: 2025-05-01
Payer: COMMERCIAL

## 2025-05-01 VITALS
BODY MASS INDEX: 27.63 KG/M2 | WEIGHT: 204 LBS | HEART RATE: 82 BPM | HEIGHT: 72 IN | TEMPERATURE: 97.9 F | RESPIRATION RATE: 16 BRPM | SYSTOLIC BLOOD PRESSURE: 124 MMHG | DIASTOLIC BLOOD PRESSURE: 82 MMHG | OXYGEN SATURATION: 96 %

## 2025-05-01 DIAGNOSIS — J45.20 MILD INTERMITTENT ASTHMA WITHOUT COMPLICATION: Primary | Chronic | ICD-10-CM

## 2025-05-01 PROCEDURE — 99213 OFFICE O/P EST LOW 20 MIN: CPT | Performed by: PHYSICIAN ASSISTANT

## 2025-05-01 PROCEDURE — G2211 COMPLEX E/M VISIT ADD ON: HCPCS | Performed by: PHYSICIAN ASSISTANT

## 2025-05-01 RX ORDER — FLUTICASONE PROPIONATE AND SALMETEROL 250; 50 UG/1; UG/1
1 POWDER RESPIRATORY (INHALATION) 2 TIMES DAILY
COMMUNITY

## 2025-05-01 NOTE — PROGRESS NOTES
Follow-up  Visit - Pulmonary Medicine   Name: Dejuan Acosta      : 1955      MRN: [id number]  Encounter Provider: Magdaleno Cartwright PA-C  Encounter Date: 2025   Encounter department: [facility] PUL[facility]  :  Assessment & Plan  Mild intermittent asthma without complication  Patient here for annual follow-up.  He is overall doing well with his breathing.  He continues with the Advair but has only been using it once per day and finds this manages his asthma well         Return in about 1 year (around 2026).    History of Present Illness   Dejuan Acosta is a 69 y.o. male non-smoker with past medical history of asthma, hypertension, lipidemia,  aortic stenosis, GERD who presents for annual follow-up.  He is overall stable with his breathing, continues with Advair but has only been using it once per day.  He does note some difficulty on very humid days or with significant exertion but able to complete his daily activities.    Review of Systems   Constitutional: Negative.    HENT: Negative.     Respiratory: Negative.     Cardiovascular: Negative.    Gastrointestinal: Negative.    Genitourinary: Negative.    Musculoskeletal: Negative.    Skin: Negative.    Allergic/Immunologic: Negative.    Neurological: Negative.    Psychiatric/Behavioral: Negative.         Aside from what is mentioned in the HPI, ROS is otherwise negative    Current Outpatient Medications on File Prior to Visit   Medication Sig Dispense Refill    albuterol (PROVENTIL HFA,VENTOLIN HFA) 90 mcg/act inhaler INHALE 2 PUFFS BY MOUTH EVERY 6 HOURS AS NEEDED FOR WHEEZE 18 g 5    amLODIPine (NORVASC) 10 mg tablet Take 1 tablet (10 mg total) by mouth daily 90 tablet 3    atorvastatin (LIPITOR) 40 mg tablet TAKE 1 TABLET BY MOUTH EVERY DAY 90 tablet 1    Blood Glucose Monitoring Suppl (OneTouch Verio Reflect) w/Device KIT Check blood sugars once daily. Please substitute with appropriate alternative as covered by patient's  insurance. Dx: E11.65 1 kit 0    clopidogrel (PLAVIX) 75 mg tablet Take 1 tablet (75 mg total) by mouth daily 90 tablet 3    Fluticasone-Salmeterol (Advair) 250-50 mcg/dose inhaler Inhale 1 puff 2 (two) times a day Rinse mouth after use.      glucose blood (OneTouch Verio) test strip Check blood sugars once daily. Please substitute with appropriate alternative as covered by patient's insurance. Dx: E11.65 100 each 3    lisinopril (ZESTRIL) 20 mg tablet Take 1 tablet (20 mg total) by mouth daily 90 tablet 3    metFORMIN (GLUCOPHAGE-XR) 500 mg 24 hr tablet TAKE 2 TABLETS BY MOUTH EVERYDAY WITH BREAKFAST 180 tablet 2    neomycin-polymyxin-hydrocortisone (CORTISPORIN) otic solution Administer 4 drops into the left ear every 6 (six) hours 10 mL 0    OneTouch Delica Lancets 33G MISC Check blood sugars once daily. Please substitute with appropriate alternative as covered by patient's insurance. Dx: E11.65 100 each 3    pantoprazole (PROTONIX) 40 mg tablet Take 1 tablet (40 mg total) by mouth 2 (two) times a day 180 tablet 1     No current facility-administered medications on file prior to visit.         Medical History Reviewed by provider this encounter:     .    Objective   /82 (BP Location: Right arm, Patient Position: Sitting, Cuff Size: Large)   Pulse 82   Temp 97.9 °F (36.6 °C) (Oral)   Resp 16   Ht 6' (1.829 m)   Wt 92.5 kg (204 lb)   SpO2 96%   BMI 27.67 kg/m²     Physical Exam  Vitals reviewed.   Constitutional:       General: He is not in acute distress.     Appearance: Normal appearance. He is well-developed. He is not ill-appearing.   HENT:      Head: Normocephalic and atraumatic.      Mouth/Throat:      Pharynx: Oropharynx is clear.   Eyes:      Pupils: Pupils are equal, round, and reactive to light.   Cardiovascular:      Rate and Rhythm: Normal rate and regular rhythm.   Pulmonary:      Effort: Pulmonary effort is normal. No respiratory distress.      Breath sounds: Normal breath sounds. No  "decreased breath sounds, wheezing, rhonchi or rales.   Abdominal:      General: Abdomen is flat. There is no distension.   Musculoskeletal:         General: Normal range of motion.      Cervical back: Normal range of motion.      Right lower leg: No edema.      Left lower leg: No edema.   Skin:     General: Skin is warm and dry.      Findings: No rash.   Neurological:      Mental Status: He is alert and oriented to person, place, and time.   Psychiatric:         Mood and Affect: Mood normal.         Behavior: Behavior normal.           Diagnostic Data:  Labs: I personally reviewed the most recent laboratory data pertinent to today's visit.      Radiology results:  Radiology Results Review : No pertinent imaging studies reviewed.      PFT/spirometry results:  No results found for: \"FEV1\", \"FVC\", \"MEB4ILU\", \"TLC\", \"DLCO\"       Oximetry testing:      Other studies:      Magdaleno Cartwright PA-C      "

## 2025-05-01 NOTE — ASSESSMENT & PLAN NOTE
Patient here for annual follow-up.  He is overall doing well with his breathing.  He continues with the Advair but has only been using it once per day and finds this manages his asthma well

## 2025-05-15 ENCOUNTER — OFFICE VISIT (OUTPATIENT)
Dept: NEUROLOGY | Facility: CLINIC | Age: 70
End: 2025-05-15
Payer: COMMERCIAL

## 2025-05-15 VITALS
HEART RATE: 86 BPM | SYSTOLIC BLOOD PRESSURE: 144 MMHG | DIASTOLIC BLOOD PRESSURE: 90 MMHG | WEIGHT: 206 LBS | RESPIRATION RATE: 18 BRPM | HEIGHT: 72 IN | OXYGEN SATURATION: 98 % | BODY MASS INDEX: 27.9 KG/M2

## 2025-05-15 DIAGNOSIS — E11.69 MIXED HYPERLIPIDEMIA DUE TO TYPE 2 DIABETES MELLITUS  (HCC): Chronic | ICD-10-CM

## 2025-05-15 DIAGNOSIS — E78.2 MIXED HYPERLIPIDEMIA DUE TO TYPE 2 DIABETES MELLITUS  (HCC): Chronic | ICD-10-CM

## 2025-05-15 DIAGNOSIS — I10 PRIMARY HYPERTENSION: Chronic | ICD-10-CM

## 2025-05-15 DIAGNOSIS — H53.2 DIPLOPIA: Primary | ICD-10-CM

## 2025-05-15 PROCEDURE — 99214 OFFICE O/P EST MOD 30 MIN: CPT | Performed by: PSYCHIATRY & NEUROLOGY

## 2025-05-15 PROCEDURE — G2211 COMPLEX E/M VISIT ADD ON: HCPCS | Performed by: PSYCHIATRY & NEUROLOGY

## 2025-05-15 RX ORDER — AMLODIPINE BESYLATE 10 MG/1
10 TABLET ORAL DAILY
Qty: 90 TABLET | Refills: 1 | Status: SHIPPED | OUTPATIENT
Start: 2025-05-15

## 2025-05-15 RX ORDER — ATORVASTATIN CALCIUM 40 MG/1
40 TABLET, FILM COATED ORAL DAILY
Qty: 90 TABLET | Refills: 1 | Status: SHIPPED | OUTPATIENT
Start: 2025-05-15

## 2025-05-15 RX ORDER — LISINOPRIL 20 MG/1
20 TABLET ORAL DAILY
Qty: 90 TABLET | Refills: 1 | Status: SHIPPED | OUTPATIENT
Start: 2025-05-15

## 2025-05-15 NOTE — PATIENT INSTRUCTIONS
- Please continue to follow with your family doctor, cardiology, and other providers  - Please continue clopidogrel and atorvastatin  - Please contact the office for any questions or concerns   - Please to follow up with neurology as needed ([facility] preferred)

## 2025-05-15 NOTE — PROGRESS NOTES
Neurology Residency Continuity Clinic Note    Patient ID: Dejuan Acosta 69 y.o. male  Format of Visit: In-Person  Nature of Visit: Hospital Follow-Up  Primary Care Provider: Case Pearson DO  Last office visit: 6/4/24  Last office visit Provider: Sanjuana Alfredo MD    Assessment/Plan:  Assessment & Plan  Diplopia  Assessment:  Patient is a right-handed 69-year-old male past medical history of hyperlipidemia, hypertension, aortic stenosis and mitral regurgitation, asthma, type 2 diabetes and others that presents to the residency neurology clinic as a hospital follow-up in terms of transient diplopia.  Reportedly he was driving home 1 day, and experienced around 10 minutes of vertical diplopia (described stop sign to be on top of 1 another), which subsequently resolved.  Patient subsequently went to ophthalmology 1 week later, was reported to also notice disconjugate gaze and was sent to the ED emergency room for evaluation.  Patient reported no neurologic deficits, and no further diplopia.  Patient had an MRI of the brain completed which demonstrated no acute infarct, CTA head and neck demonstrated no LVO/hemodynamically significant stenosis.  Patient was transitioned from aspirin to clopidogrel, and continued on atorvastatin.  Patient does follow with cardiology and has an echocardiogram scheduled.  During previous neurology visit patient was evaluated for dysphagia, hoarseness and ptosis, acetylcholine antibody (binding, blocking, modulating), and MUSK were negative.  Patient denied further episodes of diplopia, dysphagia, hoarseness, or ptosis since hospitalization.  Patient's physical examination was without focal neurologic deficit with exception of a fine tremor in the right upper extremity.  Patient reported the tremor to have been present for a long time, does not interfere with ADLs, and is not of concern to him at this point in time, and reported that he will follow-up with neurology in the future if he  "wants a tremor to be addressed.    Plan:  - Case discussed with attending neurologist Dr. Reeves  - Please continue to follow with your family doctor, cardiology, and other providers  - Please continue clopidogrel and atorvastatin  - Please contact the office for any questions or concerns   - Please to follow up with neurology as needed ([place] location preferred)           Subjective:  Dejuan Acosta is an/a right-handed 69 y.o.male with a PMH of hyperlipidemia, hypertension, aortic stenosis, mitral regurgitation, asthma, type 2 diabetes, and others presented to the residency neurology clinic for hospital follow-up in terms of transient diplopia.  Patient was not accompanied by family bedside.     During the last office visit the patient reported:  \"Mr Acosta is a 68 yr old handed right gentleman with PMH HTN, HLD, esophageal polypectomy, GERD, asthma, who presents for follow up for dysphagia, hoarseness and ptosis. Last seen in Jan 2024. He underwent EGD which was unremarkable.  He did undergo esophageal dilatation which has helped.  He denies dysphagia or coughing.  Sometimes it takes him longer to swallow but he has not had any symptoms of food getting stuck.  No difficulty swallowing liquids.  His weight is 195 pounds.  No further weight loss.  He had lost about 20 pounds in 2023, some of which he attributes to having been diagnosed with diabetes and purposeful weight loss. He has not noticed ptosis.  Ptosis was originally observed by his optometrist.  He has to have cataract surgery., No dysarthria. No weakness in the arms or legs. Hoarseness is worse when he talks more.  The hoarse quality of his voice has also improved post EGD. At a previous visit, I ordered MRI brain w/wo contrast due to symptoms of multiple cranial nerves. He could not tolerate MRI, so we dud CT head with contrast which was unremarkable. \"    Patient had admission to the hospital for transient diplopia, was sent in by ophthalmologist: " "\"69-year-old male with a history including diabetes, hypertension, who was sent to ED by his optometrist after reported 1 episode of a transient horizontal diplopia that spontaneously resolved in 10 minutes.  Neurology was consulted due to concern for CVA. According to patient, the event occurred about 1 week ago.  He reported transient acute onset possible horizontal diplopia that spontaneously resolved within 10 minutes.  He denied any concomitant neurological deficit during the episode.  Denied any similar episode in the past. Patient was seen and examined today.  He was alert, awake can answer questions and follow commands appropriately.  Pupil equal round react to light.  Extraocular muscle movement intact.  Denies any diplopia when being examined.  No vision field defect.  Neuroexam otherwise nonfocal. CT head, no acute intracranial abnormality CTA head and neck, no large vessel occlusion or hemodynamically significant stenosis. MRI brain without, no acute intracranial abnormality.  No evidence of acute ischemia. Echo, unremarkable    Impression:  Transient horizontal diplopia. Patient reported isolated transient horizontal diplopia that spontaneously resolved.  Given no concomitant neuro deficit reported, low suspicion for CVA event although cannot completely rule out TIA.  MRI brain without showed no evidence of acute stroke.  Will recommend to switch aspirin to Plavix 75 mg daily.  Please obtain P2 Y12 in 1 week. 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  Counseled patient to optimize BP control.\"    During office visit today, 5/15/2025, the patient reported:  - The patient reported that since the last office visit there was an episode where he had transient diplopia which the patient described to be vertical where the stop sign was noted to be \"on top on each other\". The patient went to " his optometrist about a week later (due to just having received lasix surgery and was referred to the ED.   - The patient denied further episodes of transient visual abnormalities since hospitalization and denied further episodes of ptosis, diplopia, and dysphagia.  - Patient is compliant with clopidogrel and has not missed any doses  - The patient reported that he does follow with cardiology (heart murmur), has an echocardiogram scheduled.  - The patient denied falls, trips, LOC.  - The patient denied further questions or concerns    The following portions of the patient's history were reviewed and updated as appropriate: allergies, current medications, past family history, past medical history, past social history, past surgical history, and problem list.    Objective:  Vitals:    05/15/25 1425   BP: 144/90   Pulse: 86   Resp: 18   SpO2: 98%     Physical Exam  Vitals reviewed.   HENT:      Head: Normocephalic.      Right Ear: Hearing normal.      Left Ear: Hearing normal.     Eyes:      General: Lids are normal.      Extraocular Movements: Extraocular movements intact.      Pupils: Pupils are equal, round, and reactive to light.       Cardiovascular:      Rate and Rhythm: Normal rate.   Pulmonary:      Effort: No respiratory distress.   Abdominal:      General: There is no distension.     Skin:     General: Skin is warm and dry.      Coloration: Skin is not jaundiced or pale.     Neurological:      Mental Status: He is alert.      Cranial Nerves: No cranial nerve deficit.      Sensory: No sensory deficit.      Motor: No weakness.      Coordination: Coordination normal.      Gait: Gait normal.      Deep Tendon Reflexes: Reflexes normal.      Reflex Scores:       Tricep reflexes are 2+ on the right side and 2+ on the left side.       Bicep reflexes are 2+ on the right side and 2+ on the left side.       Brachioradialis reflexes are 2+ on the right side and 2+ on the left side.       Patellar reflexes are 2+ on the  "right side and 2+ on the left side.       Achilles reflexes are 2+ on the right side and 2+ on the left side.    Psychiatric:         Speech: Speech normal.       Neurological Exam  Mental Status  Alert. Speech is normal.    Cranial Nerves  CN II: Right visual acuity: Counts fingers. Left visual acuity: Counts fingers. Right normal visual field. Left normal visual field. Right funduscopic exam: not visualized. Left funduscopic exam: not visualized.  CN III, IV, VI: Extraocular movements intact bilaterally. Normal lids and orbits bilaterally. Pupils equal round and reactive to light bilaterally.  CN V:  Right: Facial sensation is normal.  Left: Facial sensation is normal on the left.  CN VII:  Right: There is no facial weakness.  Left: There is no facial weakness.  CN VIII:  Right: Hearing is normal.  Left: Hearing is normal.  CN IX, X: Palate elevates symmetrically  CN XI:  Right: Sternocleidomastoid strength is normal. Trapezius strength is normal.  Left: Sternocleidomastoid strength is normal. Trapezius strength is normal.  CN XII: Tongue midline without atrophy or fasciculations.    Motor  Normal muscle bulk throughout. No fasciculations present. Normal muscle tone.  - Fine tremor noted in RUE, patient reported the tremor to have been present \"for I don't know how long, very long\" and reported that the tremor is not interfering with ADLs and not of concerns to him today..    Sensory  Light touch is normal in upper and lower extremities.     Reflexes                                            Right                      Left  Brachioradialis                    2+                         2+  Biceps                                 2+                         2+  Triceps                                2+                         2+  Patellar                                2+                         2+  Achilles                                2+                         2+    Right pathological reflexes: Crossed adductor " absent. Ankle clonus absent.  Left pathological reflexes: Crossed adductor absent. Ankle clonus absent.    Coordination  Right: Finger-to-nose normal. Rapid alternating movement normal. Heel-to-shin normal.Left: Finger-to-nose normal. Rapid alternating movement normal. Heel-to-shin normal.    Gait   Normal gait.Casual gait is normal including stance, stride, and arm swing. Able to rise from chair without using arms.    Imaging Studies:  - MRI Brain wo, 3/11/25: IMPRESSION: No acute intracranial abnormality. No acute ischemia as clinically questioned.  - CT[facility]N wwo, 3/11/25: IMPRESSION: CT Brain:  No acute intracranial abnormality. CT Angiography: No large vessel occlusion or hemodynamically significant stenosis. No aneurysm no vascular malformation.    Lab Studies:  - P2Y12: 157  - A1c: 6.3  - Lipids: 163/204/35/87  - AChR Bindin.15: negative  - AChR Blockin: negative  - AChR Modulatin: negative  - MUSK: < 1.0: negative  - Lyme: Negative    ROS:  Review of Systems   Constitutional:  Negative for chills and fever.   HENT:  Negative for ear pain and sore throat.    Eyes:  Negative for pain and visual disturbance.   Respiratory:  Negative for cough and shortness of breath.    Cardiovascular:  Negative for chest pain and palpitations.   Gastrointestinal:  Negative for abdominal pain and vomiting.   Genitourinary:  Negative for dysuria and hematuria.   Musculoskeletal:  Negative for arthralgias and back pain.   Skin:  Negative for color change and rash.   Neurological:  Positive for tremors. Negative for dizziness, seizures, syncope, facial asymmetry, speech difficulty, weakness, light-headedness, numbness and headaches.   All other systems reviewed and are negative.    This note was completed in part utilizing Dragon Dictation Software. Grammatical errors, random word insertions, spelling mistakes, and incomplete sentences may be an occasional consequence of this system secondary to software limitations,  ambient noise, and hardware issues.  If you have any questions or concerns about the content, text, or information contained within the body of this dictation, please contact the provider for clarification.

## 2025-05-15 NOTE — ASSESSMENT & PLAN NOTE
Assessment:  Patient is a right-handed 69-year-old male past medical history of hyperlipidemia, hypertension, aortic stenosis and mitral regurgitation, asthma, type 2 diabetes and others that presents to the residency neurology clinic as a hospital follow-up in terms of transient diplopia.  Reportedly he was driving home 1 day, and experienced around 10 minutes of vertical diplopia (described stop sign to be on top of 1 another), which subsequently resolved.  Patient subsequently went to ophthalmology 1 week later, was reported to also notice disconjugate gaze and was sent to the ED emergency room for evaluation.  Patient reported no neurologic deficits, and no further diplopia.  Patient had an MRI of the brain completed which demonstrated no acute infarct, CTA head and neck demonstrated no LVO/hemodynamically significant stenosis.  Patient was transitioned from aspirin to clopidogrel, and continued on atorvastatin.  Patient does follow with cardiology and has an echocardiogram scheduled.  During previous neurology visit patient was evaluated for dysphagia, hoarseness and ptosis, acetylcholine antibody (binding, blocking, modulating), and MUSK were negative.  Patient denied further episodes of diplopia, dysphagia, hoarseness, or ptosis since hospitalization.  Patient's physical examination was without focal neurologic deficit with exception of a fine tremor in the right upper extremity.  Patient reported the tremor to have been present for a long time, does not interfere with ADLs, and is not of concern to him at this point in time, and reported that he will follow-up with neurology in the future if he wants a tremor to be addressed.    Plan:  - Case discussed with attending neurologist Dr. Reeves  - Please continue to follow with your family doctor, cardiology, and other providers  - Please continue clopidogrel and atorvastatin  - Please contact the office for any questions or concerns   - Please to follow up with  neurology as needed ([place] location preferred)

## 2025-05-16 DIAGNOSIS — E11.9 TYPE 2 DIABETES MELLITUS WITHOUT COMPLICATION, WITHOUT LONG-TERM CURRENT USE OF INSULIN (HCC): ICD-10-CM

## 2025-05-16 RX ORDER — METFORMIN HYDROCHLORIDE 500 MG/1
TABLET, EXTENDED RELEASE ORAL
Qty: 180 TABLET | Refills: 2 | Status: SHIPPED | OUTPATIENT
Start: 2025-05-16

## 2025-08-11 ENCOUNTER — TELEPHONE (OUTPATIENT)
Age: 70
End: 2025-08-11